# Patient Record
Sex: MALE | Race: WHITE | NOT HISPANIC OR LATINO | Employment: FULL TIME | URBAN - NONMETROPOLITAN AREA
[De-identification: names, ages, dates, MRNs, and addresses within clinical notes are randomized per-mention and may not be internally consistent; named-entity substitution may affect disease eponyms.]

---
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
## 2020-03-20 ENCOUNTER — HOSPITAL ENCOUNTER (EMERGENCY)
Facility: HOSPITAL | Age: 20
Discharge: HOME | End: 2020-03-20
Attending: EMERGENCY MEDICINE | Admitting: EMERGENCY MEDICINE
Payer: COMMERCIAL

## 2020-03-20 VITALS
HEIGHT: 66 IN | OXYGEN SATURATION: 97 % | RESPIRATION RATE: 16 BRPM | HEART RATE: 78 BPM | TEMPERATURE: 98.4 F | DIASTOLIC BLOOD PRESSURE: 86 MMHG | BODY MASS INDEX: 29.87 KG/M2 | SYSTOLIC BLOOD PRESSURE: 135 MMHG | WEIGHT: 185.85 LBS

## 2020-03-20 DIAGNOSIS — R05.9 COUGH: Primary | ICD-10-CM

## 2020-03-20 PROCEDURE — 99283 EMERGENCY DEPT VISIT LOW MDM: CPT

## 2020-03-20 PROCEDURE — 99281 EMR DPT VST MAYX REQ PHY/QHP: CPT | Performed by: PHYSICIAN ASSISTANT

## 2020-03-20 NOTE — ED PROVIDER NOTES
History  Chief Complaint   Patient presents with    Cough     patient reports cough starting four days ago, was sent here from work due to cough, denies n/v/d/fever/chills     Patient presents to the emergency department today essentially because he has a cough and he states his employer sent every 1 home has a cough and need 0 know to return to work  He has had no fevers chills or sweats  He has had absolutely no travel between home and work which is not in a hot zone [employer]  He is a smoker  Symptoms present for about 4 days  No chest pain or shortness of breath  Prior to Admission Medications   Prescriptions Last Dose Informant Patient Reported? Taking?   atorvastatin (LIPITOR) 20 mg tablet Not Taking at Unknown time  Yes No   Sig: Take 20 mg by mouth daily   ondansetron (ZOFRAN-ODT) 4 mg disintegrating tablet Not Taking at Unknown time  No No   Sig: Take 1 tablet (4 mg total) by mouth every 6 (six) hours as needed for nausea or vomiting   Patient not taking: Reported on 3/20/2020   potassium chloride (K-DUR,KLOR-CON) 20 mEq tablet   No No   Sig: Take 1 tablet (20 mEq total) by mouth 2 (two) times a day for 2 days   simvastatin (ZOCOR) 20 mg tablet Not Taking at Unknown time  Yes No   Sig: Take 20 mg by mouth daily at bedtime      Facility-Administered Medications: None       Past Medical History:   Diagnosis Date    ADHD (attention deficit hyperactivity disorder)     Bipolar disorder ([facility] [place] [address]     Hyperlipidemia     Psychiatric disorder     Tourette syndrome        Past Surgical History:   Procedure Laterality Date    TONSILECTOMY AND ADNOIDECTOMY      TYMPANOSTOMY TUBE PLACEMENT         History reviewed  No pertinent family history  I have reviewed and agree with the history as documented      E-Cigarette/Vaping    E-Cigarette Use Current Every Day User     Comments dap pen      E-Cigarette/Vaping Substances     Social History     Tobacco Use    Smoking status: Current Every Day Smoker Packs/day: 1 00     Types: Cigarettes    Smokeless tobacco: Never Used   Substance Use Topics    Alcohol use: Not Currently    Drug use: Not Currently     Types: Marijuana     Comment: pills       Review of Systems   Constitutional: Negative  Negative for activity change, appetite change, chills, diaphoresis, fatigue, fever and unexpected weight change  HENT: Negative  Negative for sore throat, trouble swallowing and voice change  Eyes: Negative  Respiratory: Positive for cough  Negative for chest tightness, shortness of breath and wheezing  Cardiovascular: Negative  Negative for chest pain, palpitations and leg swelling  Gastrointestinal: Negative  Negative for abdominal pain, blood in stool, nausea and vomiting  Endocrine: Negative  Genitourinary: Negative  Negative for flank pain and hematuria  Musculoskeletal: Negative  Negative for arthralgias, back pain, gait problem, joint swelling, myalgias, neck pain and neck stiffness  Skin: Negative  Negative for rash and wound  Allergic/Immunologic: Negative  Neurological: Negative  Negative for dizziness, seizures, syncope, weakness, light-headedness and headaches  Hematological: Negative  Psychiatric/Behavioral: Negative  All other systems reviewed and are negative  Physical Exam  Physical Exam   Constitutional: He is oriented to person, place, and time  Vital signs are normal  He appears well-developed and well-nourished  He does not have a sickly appearance  He does not appear ill  No distress  HENT:   Right Ear: External ear normal  No swelling  Tympanic membrane is not bulging  Left Ear: External ear normal  No swelling  Tympanic membrane is not bulging  Nose: Nose normal    Mouth/Throat: Oropharynx is clear and moist  No oropharyngeal exudate  Eyes: Pupils are equal, round, and reactive to light  Conjunctivae, EOM and lids are normal    Neck: Normal range of motion  Neck supple  No JVD present   No tracheal deviation, no edema and normal range of motion present  No thyromegaly present  Cardiovascular: Normal rate, regular rhythm, normal heart sounds, intact distal pulses and normal pulses  Exam reveals no gallop and no friction rub  No murmur heard  Pulmonary/Chest: Effort normal and breath sounds normal  No stridor  No respiratory distress  He has no wheezes  He has no rales  He exhibits no tenderness  Abdominal: Soft  Bowel sounds are normal  He exhibits no distension and no mass  There is no tenderness  There is no rebound, no guarding and negative Esparza's sign  No hernia  Musculoskeletal: Normal range of motion  He exhibits no edema or tenderness  Lymphadenopathy:     He has no cervical adenopathy  Neurological: He is alert and oriented to person, place, and time  He has normal strength and normal reflexes  No cranial nerve deficit or sensory deficit  GCS eye subscore is 4  GCS verbal subscore is 5  GCS motor subscore is 6  Skin: Skin is warm and dry  Capillary refill takes less than 2 seconds  No rash noted  He is not diaphoretic  No erythema  No pallor  Psychiatric: He has a normal mood and affect  His speech is normal and behavior is normal    Vitals reviewed        Vital Signs  ED Triage Vitals [03/20/20 0959]   Temperature Pulse Respirations Blood Pressure SpO2   98 4 °F (36 9 °C) 78 16 135/86 97 %      Temp Source Heart Rate Source Patient Position - Orthostatic VS BP Location FiO2 (%)   Temporal Monitor Lying Left arm --      Pain Score       --           Vitals:    03/20/20 0959   BP: 135/86   Pulse: 78   Patient Position - Orthostatic VS: Lying         Visual Acuity      ED Medications  Medications - No data to display    Diagnostic Studies  Results Reviewed     None                 No orders to display              Procedures  Procedures         ED Course                                 MDM      Disposition  Final diagnoses:   Cough     Time reflects when diagnosis was documented in both MDM as applicable and the Disposition within this note     Time User Action Codes Description Comment    3/20/2020 10:05 AM Efra Blanchard Add [R05] Cough       ED Disposition     ED Disposition Condition Date/Time Comment    Discharge Stable Fri Mar 20, 2020 10:05 AM Ari Gooden discharge to home/self care  Follow-up Information     Follow up With Specialties Details Why Contact Info    Ramón San DO  Schedule an appointment as soon as possible for a visit   [phone] [id number]  [phone]            Patient's Medications   Discharge Prescriptions    No medications on file     No discharge procedures on file      PDMP Review     None          ED Provider  Electronically Signed by           Silas Mitchell PA-C  03/20/20 8572

## 2020-03-31 ENCOUNTER — HOSPITAL ENCOUNTER (EMERGENCY)
Facility: HOSPITAL | Age: 20
Discharge: HOME | End: 2020-03-31
Attending: EMERGENCY MEDICINE | Admitting: EMERGENCY MEDICINE
Payer: COMMERCIAL

## 2020-03-31 VITALS
WEIGHT: 181.22 LBS | OXYGEN SATURATION: 97 % | TEMPERATURE: 98.6 F | SYSTOLIC BLOOD PRESSURE: 135 MMHG | RESPIRATION RATE: 16 BRPM | DIASTOLIC BLOOD PRESSURE: 69 MMHG | HEART RATE: 89 BPM | BODY MASS INDEX: 29.25 KG/M2

## 2020-03-31 DIAGNOSIS — J32.9 SINUSITIS: Primary | ICD-10-CM

## 2020-03-31 PROCEDURE — 99283 EMERGENCY DEPT VISIT LOW MDM: CPT

## 2020-03-31 PROCEDURE — 99284 EMERGENCY DEPT VISIT MOD MDM: CPT | Performed by: PHYSICIAN ASSISTANT

## 2020-03-31 RX ORDER — AMOXICILLIN 500 MG/1
500 CAPSULE ORAL 3 TIMES DAILY
Qty: 30 CAPSULE | Refills: 0 | Status: SHIPPED | OUTPATIENT
Start: 2020-03-31 | End: 2020-04-10

## 2020-03-31 RX ORDER — FLUTICASONE PROPIONATE 50 MCG
1 SPRAY, SUSPENSION (ML) NASAL DAILY
Qty: 16 G | Refills: 0 | Status: SHIPPED | OUTPATIENT
Start: 2020-03-31 | End: 2020-08-04 | Stop reason: ALTCHOICE

## 2020-04-04 ENCOUNTER — HOSPITAL ENCOUNTER (EMERGENCY)
Facility: HOSPITAL | Age: 20
Discharge: HOME | End: 2020-04-04
Attending: EMERGENCY MEDICINE | Admitting: EMERGENCY MEDICINE
Payer: COMMERCIAL

## 2020-04-04 ENCOUNTER — APPOINTMENT (EMERGENCY)
Dept: RADIOLOGY | Facility: HOSPITAL | Age: 20
End: 2020-04-04
Payer: COMMERCIAL

## 2020-04-04 VITALS
RESPIRATION RATE: 18 BRPM | TEMPERATURE: 97 F | DIASTOLIC BLOOD PRESSURE: 74 MMHG | OXYGEN SATURATION: 96 % | HEART RATE: 81 BPM | SYSTOLIC BLOOD PRESSURE: 154 MMHG | BODY MASS INDEX: 28.96 KG/M2 | WEIGHT: 179.45 LBS

## 2020-04-04 DIAGNOSIS — R05.9 COUGH: ICD-10-CM

## 2020-04-04 DIAGNOSIS — R11.10 VOMITING: Primary | ICD-10-CM

## 2020-04-04 PROCEDURE — 99284 EMERGENCY DEPT VISIT MOD MDM: CPT

## 2020-04-04 PROCEDURE — 87635 SARS-COV-2 COVID-19 AMP PRB: CPT | Performed by: EMERGENCY MEDICINE

## 2020-04-04 PROCEDURE — 71045 X-RAY EXAM CHEST 1 VIEW: CPT

## 2020-04-04 PROCEDURE — 99284 EMERGENCY DEPT VISIT MOD MDM: CPT | Performed by: EMERGENCY MEDICINE

## 2020-04-04 RX ORDER — ONDANSETRON 4 MG/1
4 TABLET, ORALLY DISINTEGRATING ORAL ONCE
Status: COMPLETED | OUTPATIENT
Start: 2020-04-04 | End: 2020-04-04

## 2020-04-04 RX ORDER — ONDANSETRON 4 MG/1
4 TABLET, FILM COATED ORAL EVERY 6 HOURS
Qty: 12 TABLET | Refills: 0 | Status: SHIPPED | OUTPATIENT
Start: 2020-04-04 | End: 2020-08-04 | Stop reason: ALTCHOICE

## 2020-04-04 RX ADMIN — ONDANSETRON 4 MG: 4 TABLET, ORALLY DISINTEGRATING ORAL at 15:49

## 2020-04-07 LAB — SARS-COV-2 RNA SPEC QL NAA+PROBE: NOT DETECTED

## 2020-06-03 ENCOUNTER — APPOINTMENT (EMERGENCY)
Dept: RADIOLOGY | Facility: HOSPITAL | Age: 20
End: 2020-06-03
Payer: COMMERCIAL

## 2020-06-03 ENCOUNTER — HOSPITAL ENCOUNTER (EMERGENCY)
Facility: HOSPITAL | Age: 20
Discharge: HOME | End: 2020-06-04
Attending: EMERGENCY MEDICINE | Admitting: EMERGENCY MEDICINE
Payer: COMMERCIAL

## 2020-06-03 DIAGNOSIS — T50.901A POLYSUBSTANCE OVERDOSE: Primary | ICD-10-CM

## 2020-06-03 LAB
ANION GAP SERPL CALCULATED.3IONS-SCNC: 8 MMOL/L (ref 4–13)
APAP SERPL-MCNC: <2 UG/ML (ref 10–20)
BASOPHILS # BLD AUTO: 0.06 THOUSANDS/ΜL (ref 0–0.1)
BASOPHILS NFR BLD AUTO: 1 % (ref 0–1)
BUN SERPL-MCNC: 16 MG/DL (ref 5–25)
CALCIUM SERPL-MCNC: 8.8 MG/DL (ref 8.3–10.1)
CHLORIDE SERPL-SCNC: 105 MMOL/L (ref 100–108)
CO2 SERPL-SCNC: 28 MMOL/L (ref 21–32)
CREAT SERPL-MCNC: 0.99 MG/DL (ref 0.6–1.3)
EOSINOPHIL # BLD AUTO: 0.61 THOUSAND/ΜL (ref 0–0.61)
EOSINOPHIL NFR BLD AUTO: 5 % (ref 0–6)
ERYTHROCYTE [DISTWIDTH] IN BLOOD BY AUTOMATED COUNT: 12.8 % (ref 11.6–15.1)
ETHANOL SERPL-MCNC: <3 MG/DL (ref 0–3)
GFR SERPL CREATININE-BSD FRML MDRD: 110 ML/MIN/1.73SQ M
GLUCOSE SERPL-MCNC: 96 MG/DL (ref 65–140)
HCT VFR BLD AUTO: 41.3 % (ref 36.5–49.3)
HGB BLD-MCNC: 14.1 G/DL (ref 12–17)
IMM GRANULOCYTES # BLD AUTO: 0.08 THOUSAND/UL (ref 0–0.2)
IMM GRANULOCYTES NFR BLD AUTO: 1 % (ref 0–2)
LYMPHOCYTES # BLD AUTO: 3.76 THOUSANDS/ΜL (ref 0.6–4.47)
LYMPHOCYTES NFR BLD AUTO: 33 % (ref 14–44)
MAGNESIUM SERPL-MCNC: 1.7 MG/DL (ref 1.6–2.6)
MCH RBC QN AUTO: 29.9 PG (ref 26.8–34.3)
MCHC RBC AUTO-ENTMCNC: 34.1 G/DL (ref 31.4–37.4)
MCV RBC AUTO: 88 FL (ref 82–98)
MONOCYTES # BLD AUTO: 1.41 THOUSAND/ΜL (ref 0.17–1.22)
MONOCYTES NFR BLD AUTO: 12 % (ref 4–12)
NEUTROPHILS # BLD AUTO: 5.43 THOUSANDS/ΜL (ref 1.85–7.62)
NEUTS SEG NFR BLD AUTO: 48 % (ref 43–75)
NRBC BLD AUTO-RTO: 0 /100 WBCS
PLATELET # BLD AUTO: 287 THOUSANDS/UL (ref 149–390)
PMV BLD AUTO: 8.9 FL (ref 8.9–12.7)
POTASSIUM SERPL-SCNC: 3.6 MMOL/L (ref 3.5–5.3)
RBC # BLD AUTO: 4.72 MILLION/UL (ref 3.88–5.62)
SALICYLATES SERPL-MCNC: <3 MG/DL (ref 3–20)
SODIUM SERPL-SCNC: 141 MMOL/L (ref 136–145)
WBC # BLD AUTO: 11.35 THOUSAND/UL (ref 4.31–10.16)

## 2020-06-03 PROCEDURE — 83735 ASSAY OF MAGNESIUM: CPT | Performed by: EMERGENCY MEDICINE

## 2020-06-03 PROCEDURE — 80320 DRUG SCREEN QUANTALCOHOLS: CPT | Performed by: EMERGENCY MEDICINE

## 2020-06-03 PROCEDURE — 99284 EMERGENCY DEPT VISIT MOD MDM: CPT | Performed by: EMERGENCY MEDICINE

## 2020-06-03 PROCEDURE — 99285 EMERGENCY DEPT VISIT HI MDM: CPT

## 2020-06-03 PROCEDURE — 80048 BASIC METABOLIC PNL TOTAL CA: CPT | Performed by: EMERGENCY MEDICINE

## 2020-06-03 PROCEDURE — 85025 COMPLETE CBC W/AUTO DIFF WBC: CPT | Performed by: EMERGENCY MEDICINE

## 2020-06-03 PROCEDURE — 80329 ANALGESICS NON-OPIOID 1 OR 2: CPT | Performed by: EMERGENCY MEDICINE

## 2020-06-03 PROCEDURE — 36415 COLL VENOUS BLD VENIPUNCTURE: CPT | Performed by: EMERGENCY MEDICINE

## 2020-06-03 PROCEDURE — 96374 THER/PROPH/DIAG INJ IV PUSH: CPT

## 2020-06-03 PROCEDURE — 93005 ELECTROCARDIOGRAM TRACING: CPT

## 2020-06-03 PROCEDURE — 71045 X-RAY EXAM CHEST 1 VIEW: CPT

## 2020-06-03 RX ORDER — ONDANSETRON 2 MG/ML
4 INJECTION INTRAMUSCULAR; INTRAVENOUS ONCE
Status: COMPLETED | OUTPATIENT
Start: 2020-06-03 | End: 2020-06-03

## 2020-06-03 RX ORDER — NALOXONE HYDROCHLORIDE 1 MG/ML
2 INJECTION PARENTERAL ONCE
Status: COMPLETED | OUTPATIENT
Start: 2020-06-03 | End: 2020-06-03

## 2020-06-03 RX ADMIN — ONDANSETRON 4 MG: 2 INJECTION INTRAMUSCULAR; INTRAVENOUS at 22:26

## 2020-06-04 VITALS
HEART RATE: 52 BPM | DIASTOLIC BLOOD PRESSURE: 60 MMHG | SYSTOLIC BLOOD PRESSURE: 121 MMHG | OXYGEN SATURATION: 98 % | RESPIRATION RATE: 18 BRPM | WEIGHT: 181 LBS | TEMPERATURE: 97.7 F | BODY MASS INDEX: 29.21 KG/M2

## 2020-06-04 RX ORDER — NALOXONE HYDROCHLORIDE 1 MG/ML
2 INJECTION INTRAMUSCULAR; INTRAVENOUS; SUBCUTANEOUS ONCE
Qty: 2 ML | Refills: 1 | Status: SHIPPED | OUTPATIENT
Start: 2020-06-04 | End: 2021-10-19

## 2020-06-05 LAB
ATRIAL RATE: 62 BPM
P AXIS: 28 DEGREES
PR INTERVAL: 130 MS
QRS AXIS: 36 DEGREES
QRSD INTERVAL: 94 MS
QT INTERVAL: 396 MS
QTC INTERVAL: 401 MS
T WAVE AXIS: 1 DEGREES
VENTRICULAR RATE: 62 BPM

## 2020-06-05 PROCEDURE — 93010 ELECTROCARDIOGRAM REPORT: CPT | Performed by: INTERNAL MEDICINE

## 2020-07-01 ENCOUNTER — APPOINTMENT (EMERGENCY)
Dept: RADIOLOGY | Facility: HOSPITAL | Age: 20
End: 2020-07-01
Payer: COMMERCIAL

## 2020-07-01 ENCOUNTER — HOSPITAL ENCOUNTER (EMERGENCY)
Facility: HOSPITAL | Age: 20
Discharge: HOME | End: 2020-07-01
Attending: EMERGENCY MEDICINE
Payer: COMMERCIAL

## 2020-07-01 ENCOUNTER — APPOINTMENT (EMERGENCY)
Dept: CT IMAGING | Facility: HOSPITAL | Age: 20
End: 2020-07-01
Payer: COMMERCIAL

## 2020-07-01 VITALS
WEIGHT: 178.79 LBS | TEMPERATURE: 98.7 F | DIASTOLIC BLOOD PRESSURE: 55 MMHG | HEIGHT: 66 IN | HEART RATE: 57 BPM | RESPIRATION RATE: 18 BRPM | SYSTOLIC BLOOD PRESSURE: 112 MMHG | OXYGEN SATURATION: 96 % | BODY MASS INDEX: 28.73 KG/M2

## 2020-07-01 DIAGNOSIS — R11.2 NAUSEA AND VOMITING: ICD-10-CM

## 2020-07-01 DIAGNOSIS — R10.9 ABDOMINAL PAIN: Primary | ICD-10-CM

## 2020-07-01 LAB
ALBUMIN SERPL BCP-MCNC: 3.8 G/DL (ref 3.5–5)
ALP SERPL-CCNC: 83 U/L (ref 46–484)
ALT SERPL W P-5'-P-CCNC: 23 U/L (ref 12–78)
ANION GAP SERPL CALCULATED.3IONS-SCNC: 8 MMOL/L (ref 4–13)
AST SERPL W P-5'-P-CCNC: 13 U/L (ref 5–45)
BASOPHILS # BLD AUTO: 0.06 THOUSANDS/ΜL (ref 0–0.1)
BASOPHILS NFR BLD AUTO: 1 % (ref 0–1)
BILIRUB SERPL-MCNC: 0.2 MG/DL (ref 0.2–1)
BILIRUB UR QL STRIP: NEGATIVE
BUN SERPL-MCNC: 19 MG/DL (ref 5–25)
CALCIUM SERPL-MCNC: 8.5 MG/DL (ref 8.3–10.1)
CHLORIDE SERPL-SCNC: 105 MMOL/L (ref 100–108)
CLARITY UR: CLEAR
CO2 SERPL-SCNC: 27 MMOL/L (ref 21–32)
COLOR UR: YELLOW
CREAT SERPL-MCNC: 0.85 MG/DL (ref 0.6–1.3)
EOSINOPHIL # BLD AUTO: 1.04 THOUSAND/ΜL (ref 0–0.61)
EOSINOPHIL NFR BLD AUTO: 11 % (ref 0–6)
ERYTHROCYTE [DISTWIDTH] IN BLOOD BY AUTOMATED COUNT: 12.8 % (ref 11.6–15.1)
GFR SERPL CREATININE-BSD FRML MDRD: 126 ML/MIN/1.73SQ M
GLUCOSE SERPL-MCNC: 111 MG/DL (ref 65–140)
GLUCOSE UR STRIP-MCNC: NEGATIVE MG/DL
HCT VFR BLD AUTO: 38.6 % (ref 36.5–49.3)
HGB BLD-MCNC: 13.2 G/DL (ref 12–17)
HGB UR QL STRIP.AUTO: NEGATIVE
IMM GRANULOCYTES # BLD AUTO: 0.04 THOUSAND/UL (ref 0–0.2)
IMM GRANULOCYTES NFR BLD AUTO: 0 % (ref 0–2)
KETONES UR STRIP-MCNC: ABNORMAL MG/DL
LEUKOCYTE ESTERASE UR QL STRIP: NEGATIVE
LIPASE SERPL-CCNC: 278 U/L (ref 73–393)
LYMPHOCYTES # BLD AUTO: 4.81 THOUSANDS/ΜL (ref 0.6–4.47)
LYMPHOCYTES NFR BLD AUTO: 47 % (ref 14–44)
MAGNESIUM SERPL-MCNC: 2 MG/DL (ref 1.6–2.6)
MCH RBC QN AUTO: 30.3 PG (ref 26.8–34.3)
MCHC RBC AUTO-ENTMCNC: 34.2 G/DL (ref 31.4–37.4)
MCV RBC AUTO: 89 FL (ref 82–98)
MONOCYTES # BLD AUTO: 1.14 THOUSAND/ΜL (ref 0.17–1.22)
MONOCYTES NFR BLD AUTO: 12 % (ref 4–12)
NEUTROPHILS # BLD AUTO: 2.85 THOUSANDS/ΜL (ref 1.85–7.62)
NEUTS SEG NFR BLD AUTO: 29 % (ref 43–75)
NITRITE UR QL STRIP: NEGATIVE
NRBC BLD AUTO-RTO: 0 /100 WBCS
PH UR STRIP.AUTO: 6.5 [PH]
PLATELET # BLD AUTO: 262 THOUSANDS/UL (ref 149–390)
PMV BLD AUTO: 9.3 FL (ref 8.9–12.7)
POTASSIUM SERPL-SCNC: 3.6 MMOL/L (ref 3.5–5.3)
PROT SERPL-MCNC: 6.9 G/DL (ref 6.4–8.2)
PROT UR STRIP-MCNC: NEGATIVE MG/DL
RBC # BLD AUTO: 4.36 MILLION/UL (ref 3.88–5.62)
SODIUM SERPL-SCNC: 140 MMOL/L (ref 136–145)
SP GR UR STRIP.AUTO: 1.02 (ref 1–1.03)
UROBILINOGEN UR QL STRIP.AUTO: 0.2 E.U./DL
WBC # BLD AUTO: 9.94 THOUSAND/UL (ref 4.31–10.16)

## 2020-07-01 PROCEDURE — 99284 EMERGENCY DEPT VISIT MOD MDM: CPT | Performed by: EMERGENCY MEDICINE

## 2020-07-01 PROCEDURE — 83735 ASSAY OF MAGNESIUM: CPT | Performed by: EMERGENCY MEDICINE

## 2020-07-01 PROCEDURE — 80053 COMPREHEN METABOLIC PANEL: CPT | Performed by: EMERGENCY MEDICINE

## 2020-07-01 PROCEDURE — 96361 HYDRATE IV INFUSION ADD-ON: CPT

## 2020-07-01 PROCEDURE — 96374 THER/PROPH/DIAG INJ IV PUSH: CPT

## 2020-07-01 PROCEDURE — 71046 X-RAY EXAM CHEST 2 VIEWS: CPT

## 2020-07-01 PROCEDURE — 83690 ASSAY OF LIPASE: CPT | Performed by: EMERGENCY MEDICINE

## 2020-07-01 PROCEDURE — 74177 CT ABD & PELVIS W/CONTRAST: CPT

## 2020-07-01 PROCEDURE — 85025 COMPLETE CBC W/AUTO DIFF WBC: CPT | Performed by: EMERGENCY MEDICINE

## 2020-07-01 PROCEDURE — 96375 TX/PRO/DX INJ NEW DRUG ADDON: CPT

## 2020-07-01 PROCEDURE — 81003 URINALYSIS AUTO W/O SCOPE: CPT | Performed by: EMERGENCY MEDICINE

## 2020-07-01 PROCEDURE — 36415 COLL VENOUS BLD VENIPUNCTURE: CPT | Performed by: EMERGENCY MEDICINE

## 2020-07-01 PROCEDURE — 99284 EMERGENCY DEPT VISIT MOD MDM: CPT

## 2020-07-01 RX ORDER — NALOXONE HYDROCHLORIDE 1 MG/ML
INJECTION INTRAMUSCULAR; INTRAVENOUS; SUBCUTANEOUS
Status: DISCONTINUED
Start: 2020-07-01 | End: 2020-07-01 | Stop reason: WASHOUT

## 2020-07-01 RX ORDER — KETOROLAC TROMETHAMINE 30 MG/ML
10 INJECTION, SOLUTION INTRAMUSCULAR; INTRAVENOUS ONCE
Status: COMPLETED | OUTPATIENT
Start: 2020-07-01 | End: 2020-07-01

## 2020-07-01 RX ORDER — ONDANSETRON 2 MG/ML
4 INJECTION INTRAMUSCULAR; INTRAVENOUS ONCE
Status: COMPLETED | OUTPATIENT
Start: 2020-07-01 | End: 2020-07-01

## 2020-07-01 RX ADMIN — KETOROLAC TROMETHAMINE 9.9 MG: 30 INJECTION, SOLUTION INTRAMUSCULAR at 01:09

## 2020-07-01 RX ADMIN — IOHEXOL 100 ML: 350 INJECTION, SOLUTION INTRAVENOUS at 01:43

## 2020-07-01 RX ADMIN — ONDANSETRON 4 MG: 2 INJECTION INTRAMUSCULAR; INTRAVENOUS at 01:08

## 2020-07-01 RX ADMIN — SODIUM CHLORIDE 1000 ML: 0.9 INJECTION, SOLUTION INTRAVENOUS at 01:07

## 2020-07-01 NOTE — ED PROVIDER NOTES
History  Chief Complaint   Patient presents with    Abdominal Pain     abdominal pain past 2 days  Patient: Cecilia Roper  24 y o /male  YOB: 2000  MRN: [id number]  PCP: Luisa Andersen DO  Date of evaluation: 7/1/2020    (N B   Voice-recognition software may have been used in the preparation of this document  Occasional wrong word or "sound-alike" substitutions may have occurred due to the inherent limitations of voice recognition software  Interpretation should be guided by context )    3 days of epigastric pain and bloating, constant, gradually increasing, nonradiating  Associated with vomiting of ingested food and then light green fluid, nothing black, red, or purple  No coffee grounds  He complains of a burning sensation in the esophagus since he has been vomiting  Where episodes of diarrhea, liquid, nothing black, red, or purple  No documented fever but he has been getting clammy and sweaty  He admits to dyspnea on exertion  He complains of lightheadedness on standing and moving around  +++   Seen as Trauma pt 06/12/2020 at [facility]:    CT Impression:  No evidence of acute traumatic injury within the chest abdomen or pelvis  Workstation:[id number]  Result Narrative  Clinical History: Trauma  Comparison: No previous chest or abdominal CT  Comment: CT of the chest, abdomen, and pelvis was performed after the  administration of 100 cc of Omnipaque 350 intravenous contrast       Chest:  Lungs/pleura: No pulmonary parenchymal mass or focal consolidation  No pleural  effusion or pneumothorax  Central airways: Grossly patent  Mediastinum/lymph nodes: No evidence of mediastinal hematoma  Scattered  unenlarged mediastinal and bilateral hilar lymph nodes are nonspecific but  presumably reactive  Vessels/heart: Thoracic aorta normal in caliber, without dissection  Heart  normal in size, with no significant pericardial effusion      Thyroid/chest wall: Thyroid is likely partially visualized but grossly  unremarkable  No axillary adenopathy  Bones: No evidence of acute fracture within the thorax  Abdomen:  Liver: Unremarkable  Spleen: Unremarkable  Pancreas: Unremarkable  Adrenal glands: Unremarkable  Gallbladder/bile ducts: Unremarkable  Kidneys: Unremarkable  Bowel: Small large bowel normal in caliber  Appendix is normal in appearance  Lymph nodes: No retroperitoneal or mesenteric adenopathy  Peritoneum: No free fluid, free intraperitoneal air, or fluid collection within  the abdomen  Vessels: Abdominal aorta normal in caliber  Pelvis:  Urinary bladder: Unremarkable  Lymph nodes: No pelvic adenopathy  Bones: No evidence of acute pelvic fracture  Please see the separate dedicated  CT report for evaluation of the thoracolumbar spine  Other Result Information  Interface, Rad Results In - 06/12/2020  5:45 PM EDT  Clinical History: Trauma  Comparison: No previous chest or abdominal CT  Comment: CT of the chest, abdomen, and pelvis was performed after the  administration of 100 cc of Omnipaque 350 intravenous contrast       Chest:  Lungs/pleura: No pulmonary parenchymal mass or focal consolidation  No pleural  effusion or pneumothorax  Central airways: Grossly patent  Mediastinum/lymph nodes: No evidence of mediastinal hematoma  Scattered  unenlarged mediastinal and bilateral hilar lymph nodes are nonspecific but  presumably reactive  Vessels/heart: Thoracic aorta normal in caliber, without dissection  Heart  normal in size, with no significant pericardial effusion  Thyroid/chest wall: Thyroid is likely partially visualized but grossly  unremarkable  No axillary adenopathy  Bones: No evidence of acute fracture within the thorax  Abdomen:  Liver: Unremarkable  Spleen: Unremarkable  Pancreas: Unremarkable  Adrenal glands: Unremarkable  Gallbladder/bile ducts: Unremarkable  Kidneys: Unremarkable      Bowel: Small large bowel normal in caliber  Appendix is normal in appearance  Lymph nodes: No retroperitoneal or mesenteric adenopathy  Peritoneum: No free fluid, free intraperitoneal air, or fluid collection within  the abdomen  Vessels: Abdominal aorta normal in caliber  Pelvis:  Urinary bladder: Unremarkable  Lymph nodes: No pelvic adenopathy  Bones: No evidence of acute pelvic fracture  Please see the separate dedicated  CT report for evaluation of the thoracolumbar spine  IMPRESSION:  Impression:  No evidence of acute traumatic injury within the chest abdomen or pelvis     +++      History provided by:  Patient and medical records  Abdominal Pain   Associated symptoms: chills, diarrhea, nausea and vomiting    Associated symptoms: no chest pain, no constipation and no fever  Cough: Patient states chronic smoker's cough  Shortness of breath:  dyspnea on exertion  Prior to Admission Medications   Prescriptions Last Dose Informant Patient Reported?  Taking?   atorvastatin (LIPITOR) 20 mg tablet Not Taking at Unknown time  Yes No   Sig: Take 20 mg by mouth daily   fluticasone (FLONASE) 50 mcg/act nasal spray Not Taking at Unknown time  No No   Si spray into each nostril daily   Patient not taking: Reported on 2020   ondansetron (ZOFRAN) 4 mg tablet   No No   Sig: Take 1 tablet (4 mg total) by mouth every 6 (six) hours   ondansetron (ZOFRAN-ODT) 4 mg disintegrating tablet   No No   Sig: Take 1 tablet (4 mg total) by mouth every 6 (six) hours as needed for nausea or vomiting   Patient not taking: Reported on 3/20/2020   potassium chloride (K-DUR,KLOR-CON) 20 mEq tablet   No No   Sig: Take 1 tablet (20 mEq total) by mouth 2 (two) times a day for 2 days   simvastatin (ZOCOR) 20 mg tablet Not Taking at Unknown time  Yes No   Sig: Take 20 mg by mouth daily at bedtime      Facility-Administered Medications: None       Past Medical History:   Diagnosis Date    ADHD (attention deficit hyperactivity disorder)  Bipolar disorder ([facility] [address] )     Hyperlipidemia     Hypertension     Psychiatric disorder     Tourette syndrome        Past Surgical History:   Procedure Laterality Date    TONSILECTOMY AND ADNOIDECTOMY      TYMPANOSTOMY TUBE PLACEMENT         History reviewed  No pertinent family history  I have reviewed and agree with the history as documented  E-Cigarette/Vaping    E-Cigarette Use Never User      E-Cigarette/Vaping Substances    Nicotine No     THC No     CBD No     Flavoring No     Other No     Unknown No      Social History     Tobacco Use    Smoking status: Current Every Day Smoker     Packs/day: 2 00     Types: Cigarettes    Smokeless tobacco: Never Used   Substance Use Topics    Alcohol use: Not Currently    Drug use: Yes     Types: Marijuana     Comment: pills       Review of Systems   Constitutional: Positive for chills and diaphoresis  Negative for fever  HENT: Negative  Negative for trouble swallowing and voice change  Eyes: Negative for photophobia and visual disturbance  Respiratory: Negative  Cough: Patient states chronic smoker's cough  Shortness of breath:  dyspnea on exertion  Cardiovascular: Negative  Negative for chest pain, palpitations and leg swelling  Gastrointestinal: Positive for abdominal distention, abdominal pain, diarrhea, nausea and vomiting  Negative for constipation  Endocrine: Negative  Negative for polydipsia and polyuria  Genitourinary: Negative  Negative for difficulty urinating and urgency  Musculoskeletal: Negative  Negative for back pain and neck pain  Skin: Negative  Negative for rash and wound  Allergic/Immunologic: Negative for immunocompromised state  Neurological: Negative  Negative for speech difficulty and weakness  Hematological: Negative  Negative for adenopathy  Does not bruise/bleed easily  All other systems reviewed and are negative        Physical Exam  Physical Exam   Constitutional: He is oriented to person, place, and time  He appears well-developed and well-nourished  HENT:   Mouth/Throat: Oropharynx is clear and moist and mucous membranes are normal    Voice normal   Eyes: Pupils are equal, round, and reactive to light  EOM are normal    Cardiovascular: Normal rate and regular rhythm  Pulmonary/Chest: Effort normal    Abdominal: Soft  Bowel sounds are normal  There is tenderness in the epigastric area and left lower quadrant  There is no rigidity, no rebound and no guarding  No bruising on chest, abdomen, flanks, back  Neurological: He is alert and oriented to person, place, and time  GCS eye subscore is 4  GCS verbal subscore is 5  GCS motor subscore is 6  Skin: Skin is warm and dry  Psychiatric: He has a normal mood and affect  His speech is normal and behavior is normal    Nursing note and vitals reviewed        Vital Signs  ED Triage Vitals [07/01/20 0027]   Temperature Pulse Respirations Blood Pressure SpO2   98 7 °F (37 1 °C) 80 18 124/66 97 %      Temp src Heart Rate Source Patient Position - Orthostatic VS BP Location FiO2 (%)   -- Monitor Sitting Right arm --      Pain Score       7           Vitals:    07/01/20 0058 07/01/20 0115 07/01/20 0257 07/01/20 0300   BP: 102/55 104/59 112/55 112/55   Pulse: 58 56 59 57   Patient Position - Orthostatic VS: Standing - Orthostatic VS Sitting  Sitting         Visual Acuity      ED Medications  Medications   naloxone (NARCAN) 2 MG/2ML injection **ADS Override Pull** (has no administration in time range)   sodium chloride 0 9 % bolus 1,000 mL (0 mL Intravenous Stopped 7/1/20 0257)   ondansetron (ZOFRAN) injection 4 mg (4 mg Intravenous Given 7/1/20 0108)   ketorolac (TORADOL) injection 9 9 mg (9 9 mg Intravenous Given 7/1/20 0109)   iohexol (OMNIPAQUE) 350 MG/ML injection (SINGLE-DOSE) 100 mL (100 mL Intravenous Given 7/1/20 0143)       Diagnostic Studies  Results Reviewed     Procedure Component Value Units Date/Time    Comprehensive metabolic panel [[id number]] Collected:  07/01/20 0054    Lab Status:  Final result Specimen:  Blood from Arm, Right Updated:  07/01/20 0121     Sodium 140 mmol/L      Potassium 3 6 mmol/L      Chloride 105 mmol/L      CO2 27 mmol/L      ANION GAP 8 mmol/L      BUN 19 mg/dL      Creatinine 0 85 mg/dL      Glucose 111 mg/dL      Calcium 8 5 mg/dL      AST 13 U/L      ALT 23 U/L      Alkaline Phosphatase 83 U/L      Total Protein 6 9 g/dL      Albumin 3 8 g/dL      Total Bilirubin 0 20 mg/dL      eGFR 126 ml/min/1 73sq m     Narrative:       National Kidney Disease Foundation guidelines for Chronic Kidney Disease (CKD):     Stage 1 with normal or high GFR (GFR > 90 mL/min/1 73 square meters)    Stage 2 Mild CKD (GFR = 60-89 mL/min/1 73 square meters)    Stage 3A Moderate CKD (GFR = 45-59 mL/min/1 73 square meters)    Stage 3B Moderate CKD (GFR = 30-44 mL/min/1 73 square meters)    Stage 4 Severe CKD (GFR = 15-29 mL/min/1 73 square meters)    Stage 5 End Stage CKD (GFR <15 mL/min/1 73 square meters)  Note: GFR calculation is accurate only with a steady state creatinine    Magnesium [[id number]]  (Normal) Collected:  07/01/20 0054    Lab Status:  Final result Specimen:  Blood from Arm, Right Updated:  07/01/20 0114     Magnesium 2 0 mg/dL     Lipase [[id number]]  (Normal) Collected:  07/01/20 0054    Lab Status:  Final result Specimen:  Blood from Arm, Right Updated:  07/01/20 0114     Lipase 278 u/L     UA w Reflex to Microscopic w Reflex to Culture [[id number]]  (Abnormal) Collected:  07/01/20 0054    Lab Status:  Final result Specimen:  Urine, Clean Catch Updated:  07/01/20 0106     Color, UA Yellow     Clarity, UA Clear     Specific Gravity, UA 1 025     pH, UA 6 5     Leukocytes, UA Negative     Nitrite, UA Negative     Protein, UA Negative mg/dl      Glucose, UA Negative mg/dl      Ketones, UA Trace mg/dl      Urobilinogen, UA 0 2 E U /dl      Bilirubin, UA Negative     Blood, UA Negative    CBC and differential [[id number]] (Abnormal) Collected:  07/01/20 0054    Lab Status:  Final result Specimen:  Blood from Arm, Right Updated:  07/01/20 0105     WBC 9 94 Thousand/uL      RBC 4 36 Million/uL      Hemoglobin 13 2 g/dL      Hematocrit 38 6 %      MCV 89 fL      MCH 30 3 pg      MCHC 34 2 g/dL      RDW 12 8 %      MPV 9 3 fL      Platelets 934 Thousands/uL      nRBC 0 /100 WBCs      Neutrophils Relative 29 %      Immat GRANS % 0 %      Lymphocytes Relative 47 %      Monocytes Relative 12 %      Eosinophils Relative 11 %      Basophils Relative 1 %      Neutrophils Absolute 2 85 Thousands/µL      Immature Grans Absolute 0 04 Thousand/uL      Lymphocytes Absolute 4 81 Thousands/µL      Monocytes Absolute 1 14 Thousand/µL      Eosinophils Absolute 1 04 Thousand/µL      Basophils Absolute 0 06 Thousands/µL                  XR chest 2 views   Final Result by Barber Nam MD (07/01 0156)      No acute cardiopulmonary disease  Workstation performed: [id number]         CT abdomen pelvis with contrast   Final Result by Polly Harp DO (07/01 0206)      No acute findings            Workstation performed: [id number]                    Procedures  Procedures         ED Course  ED Course as of Jul 01 0346 Wed Jul 01, 2020   0300 Came to see patient because RN was unable to wake patient  VS noted  Pt appeared pale  Patient did not awaken to my voice but did awaken when I turned the bracelet his arm to check his name  I asked him why it was so difficult to wake him  He said that he had just been sleeping deeply because he had been able to sleep well before  He denied having taken any medications or drugs prior to coming in for during the visit  He became more animated and appeared to become increasingly angry with us after that  He finally exclaimed "you can fucking discharge me" and he started ripping off his leads and BP cuff       0309 Pt left the department with a brisk steady gait, muttering    I was able to give his discharge papers to his female  who did accept them for later reference  MOISÉS      Most Recent Value   During the past 12 months, did you:   1  Drink any alcohol (more than a few sips)? No Filed at: 07/01/2020 0027   2  Smoke any marijuana or hashish  No Filed at: 07/01/2020 0027   3  Use anything else to get high? ("anything else" includes illegal drugs, over the counter and prescription drugs, and things that you sniff or 'parson')? No Filed at: 07/01/2020 0027                                        MDM  Number of Diagnoses or Management Options  Diagnosis management comments: The differential diagnosis includes but is not limited to the following:  peptic ulcer disease, GERD, appendicitis, gastroenteritis, gastritis, enteritis, colitis, , hepatitis, pancreatitis, diverticulitis, food poisoning, mesenteric adenitis, mesenteric ischemia, inflammatory bowel disease, irritable bowel syndrome, ileus, bowel obstruction,  volvulus, AAA, cholecystitis, biliary colic, pelvic pathology, renal colic, pyelonephritis, UTI           Amount and/or Complexity of Data Reviewed  Tests in the radiology section of CPT®: ordered and reviewed  Tests in the medicine section of CPT®: ordered and reviewed  Obtain history from someone other than the patient: yes    Risk of Complications, Morbidity, and/or Mortality  Presenting problems: high  Diagnostic procedures: high    Patient Progress  Patient progress: improved        Disposition  Final diagnoses:   Abdominal pain   Nausea and vomiting     Time reflects when diagnosis was documented in both MDM as applicable and the Disposition within this note     Time User Action Codes Description Comment    7/1/2020  3:01 AM Juana Chakraborty Add [R10 9] Abdominal pain     7/1/2020  3:01 AM Juana Chakraborty Add [R11 2] Nausea and vomiting       ED Disposition     ED Disposition Condition Date/Time Comment    Discharge Stable Wed Jul 1, 2020  3:02 AM Yaneth Perez discharge to home/self care  Follow-up Information     Follow up With Specialties Details Why Contact Info Additional Information    Dinah Clarke DO    [address] [facility]   [address] [place] [id number] [facility]   [address] [facility],  [address], [facility] AFFILIATE OF [place], [place], [address]          Discharge Medication List as of 7/1/2020  3:02 AM      CONTINUE these medications which have NOT CHANGED    Details   atorvastatin (LIPITOR) 20 mg tablet Take 20 mg by mouth daily, Historical Med      fluticasone (FLONASE) 50 mcg/act nasal spray 1 spray into each nostril daily, Starting Tue 3/31/2020, Normal      ondansetron (ZOFRAN) 4 mg tablet Take 1 tablet (4 mg total) by mouth every 6 (six) hours, Starting Sat 4/4/2020, Normal      ondansetron (ZOFRAN-ODT) 4 mg disintegrating tablet Take 1 tablet (4 mg total) by mouth every 6 (six) hours as needed for nausea or vomiting, Starting Sat 7/27/2019, Print      potassium chloride (K-DUR,KLOR-CON) 20 mEq tablet Take 1 tablet (20 mEq total) by mouth 2 (two) times a day for 2 days, Starting Sun 4/7/2019, Until Mon 4/22/2019, Normal      simvastatin (ZOCOR) 20 mg tablet Take 20 mg by mouth daily at bedtime, Historical Med               PDMP Review     None          ED Provider  Electronically Signed by           Elpidio Mcclure MD  07/01/20 9409

## 2020-07-01 NOTE — ED NOTES
Once patient was awake he said "what the fuck is wrong with you I was sleeping, I have not slept in days and I am tired " Educated him that we tried a few times with sternal rubs and he would still not wake        Tarik Pepe RN  07/01/20 4797

## 2020-07-01 NOTE — ED NOTES
Attempts to give patient PO fluids, patient would not wake up  Tap and should with no response  Vigourous sternal rub for 10 seconds with no response  Noted to have sweat beads to forehead  Vital signs assessed  Requested for doctor to come to the room  Doctor came to room  Visitor in room noted to be laying on 2 chairs, also had difficulty waking her  Once we were able to wake the patient he became verbally aggressive and stated he was sick of this bullshit and wanted to leave  He then ripped his gown and ripped it off        Eren Wilder RN  07/01/20 3962

## 2020-08-04 ENCOUNTER — HOSPITAL ENCOUNTER (INPATIENT)
Facility: HOSPITAL | Age: 20
LOS: 7 days | Discharge: HOME | DRG: 753 | End: 2020-08-12
Attending: EMERGENCY MEDICINE | Admitting: PSYCHIATRY & NEUROLOGY
Payer: COMMERCIAL

## 2020-08-04 DIAGNOSIS — F32.A DEPRESSION: ICD-10-CM

## 2020-08-04 DIAGNOSIS — F31.32 BIPOLAR 1 DISORDER, DEPRESSED, MODERATE (HCC): Primary | ICD-10-CM

## 2020-08-04 LAB
AMPHETAMINES SERPL QL SCN: NEGATIVE
BARBITURATES UR QL: NEGATIVE
BENZODIAZ UR QL: NEGATIVE
COCAINE UR QL: NEGATIVE
ETHANOL EXG-MCNC: 0 MG/DL
METHADONE UR QL: NEGATIVE
OPIATES UR QL SCN: NEGATIVE
OXYCODONE+OXYMORPHONE UR QL SCN: NEGATIVE
PCP UR QL: NEGATIVE
SARS-COV-2 RNA RESP QL NAA+PROBE: NEGATIVE
THC UR QL: POSITIVE

## 2020-08-04 PROCEDURE — 82075 ASSAY OF BREATH ETHANOL: CPT

## 2020-08-04 PROCEDURE — 99285 EMERGENCY DEPT VISIT HI MDM: CPT

## 2020-08-04 PROCEDURE — 99282 EMERGENCY DEPT VISIT SF MDM: CPT | Performed by: EMERGENCY MEDICINE

## 2020-08-04 PROCEDURE — 81003 URINALYSIS AUTO W/O SCOPE: CPT | Performed by: PSYCHIATRY & NEUROLOGY

## 2020-08-04 PROCEDURE — 87635 SARS-COV-2 COVID-19 AMP PRB: CPT

## 2020-08-04 PROCEDURE — 80307 DRUG TEST PRSMV CHEM ANLYZR: CPT

## 2020-08-04 NOTE — ED TRIAGE NOTES
Patient arrives due to increase in depression and states he wants to sign himself in  Patient denies SI,HI, hallucinations

## 2020-08-04 NOTE — ED NOTES
Pt presents w/ family due to a 302 warrant  Pt is A&O X 4, medically clear, calm and cooperative  Pt was aware of the 302 warrant and verbalized an understanding of what an involuntary committment was  Pt was read his [facility] Rights and offered a copy, along w/ a copy of his Josue Cleary of Rights both of which he declined and requested they be placed on his chart  Pt was able to answer all assessment questions w/ appropriate elaboration  Pt denies any active SI or plan but states he has a general passive SI all the time, e g  pt has no will to live and lacks motivation to do anything that used to bring him happpiness but rather just feels hopeless and wishes he would die  Pt denies having any type of plan or act of furtherance  Pt denies any HI or thoughts to hurt others in any way  Pt denies any AH, VH, or delusions  Pt further states he was clean and sober for over 2 years but he relapsed after his father  and has been using marijuana only lately  Pt is experiencing increased depression and increased anxiety which he states has been getting progressively worse for just over a year  Pt attributes his depression to having found his father passed away in 2019  Pt is also experiencing increased irritability/agitation lately and has acted out aggressively toward family members  Pt denies any appetite issues but states his sleep has been poor  Pt is in agreement w/ I/P psych admission and is requesting to downgrade to a voluntary status  Pt's voluntary paperwork, e g  201, and his Rights were explained in detail  Pt signed his 61 51 81 and was given a copy of his Rights along w/ a copy of the Voluntary Pt Handout

## 2020-08-05 PROBLEM — F33.2 SEVERE EPISODE OF RECURRENT MAJOR DEPRESSIVE DISORDER, WITHOUT PSYCHOTIC FEATURES (HCC): Status: ACTIVE | Noted: 2020-08-05

## 2020-08-05 PROBLEM — F31.32 BIPOLAR 1 DISORDER, DEPRESSED, MODERATE (HCC): Status: ACTIVE | Noted: 2020-08-05

## 2020-08-05 PROBLEM — F12.20 CANNABIS USE DISORDER, SEVERE, DEPENDENCE (HCC): Status: ACTIVE | Noted: 2020-08-05

## 2020-08-05 PROBLEM — F95.2 TOURETTE'S SYNDROME: Status: ACTIVE | Noted: 2020-08-05

## 2020-08-05 PROBLEM — Z00.8 MEDICAL CLEARANCE FOR PSYCHIATRIC ADMISSION: Status: ACTIVE | Noted: 2020-08-05

## 2020-08-05 PROBLEM — Z72.0 TOBACCO USE: Status: ACTIVE | Noted: 2020-08-05

## 2020-08-05 LAB
ALBUMIN SERPL BCP-MCNC: 4 G/DL (ref 3.5–5.7)
ALP SERPL-CCNC: 60 U/L (ref 60–400)
ALT SERPL W P-5'-P-CCNC: 14 U/L (ref 7–52)
ANION GAP SERPL CALCULATED.3IONS-SCNC: 8 MMOL/L (ref 4–13)
AST SERPL W P-5'-P-CCNC: 15 U/L (ref 13–39)
BASOPHILS # BLD AUTO: 0 THOUSANDS/ΜL (ref 0–0.1)
BASOPHILS NFR BLD AUTO: 1 % (ref 0–2)
BILIRUB SERPL-MCNC: 0.5 MG/DL (ref 0.2–1)
BILIRUB UR QL STRIP: NEGATIVE
BUN SERPL-MCNC: 10 MG/DL (ref 7–25)
CALCIUM SERPL-MCNC: 8.9 MG/DL (ref 8.6–10.5)
CHLORIDE SERPL-SCNC: 107 MMOL/L (ref 98–107)
CHOLEST SERPL-MCNC: 131 MG/DL (ref 0–200)
CLARITY UR: CLEAR
CO2 SERPL-SCNC: 23 MMOL/L (ref 21–31)
COLOR UR: YELLOW
CREAT SERPL-MCNC: 0.67 MG/DL (ref 0.7–1.3)
EOSINOPHIL # BLD AUTO: 0.3 THOUSAND/ΜL (ref 0–0.61)
EOSINOPHIL NFR BLD AUTO: 5 % (ref 0–5)
ERYTHROCYTE [DISTWIDTH] IN BLOOD BY AUTOMATED COUNT: 13.3 % (ref 11.5–14.5)
GFR SERPL CREATININE-BSD FRML MDRD: 139 ML/MIN/1.73SQ M
GLUCOSE P FAST SERPL-MCNC: 93 MG/DL (ref 65–99)
GLUCOSE SERPL-MCNC: 93 MG/DL (ref 65–99)
GLUCOSE UR STRIP-MCNC: NEGATIVE MG/DL
HCT VFR BLD AUTO: 39.9 % (ref 42–47)
HDLC SERPL-MCNC: 31 MG/DL
HGB BLD-MCNC: 13.8 G/DL (ref 14–18)
HGB UR QL STRIP.AUTO: NEGATIVE
KETONES UR STRIP-MCNC: NEGATIVE MG/DL
LDLC SERPL CALC-MCNC: 64 MG/DL (ref 0–100)
LEUKOCYTE ESTERASE UR QL STRIP: NEGATIVE
LYMPHOCYTES # BLD AUTO: 3.7 THOUSANDS/ΜL (ref 0.6–4.47)
LYMPHOCYTES NFR BLD AUTO: 50 % (ref 21–51)
MCH RBC QN AUTO: 30.4 PG (ref 26–34)
MCHC RBC AUTO-ENTMCNC: 34.5 G/DL (ref 31–37)
MCV RBC AUTO: 88 FL (ref 81–99)
MONOCYTES # BLD AUTO: 0.9 THOUSAND/ΜL (ref 0.17–1.22)
MONOCYTES NFR BLD AUTO: 12 % (ref 2–12)
NEUTROPHILS # BLD AUTO: 2.5 THOUSANDS/ΜL (ref 1.4–6.5)
NEUTS SEG NFR BLD AUTO: 34 % (ref 42–75)
NITRITE UR QL STRIP: NEGATIVE
NONHDLC SERPL-MCNC: 100 MG/DL
PH UR STRIP.AUTO: 5.5 [PH]
PLATELET # BLD AUTO: 268 THOUSANDS/UL (ref 149–390)
PMV BLD AUTO: 7.2 FL (ref 8.6–11.7)
POTASSIUM SERPL-SCNC: 3.7 MMOL/L (ref 3.5–5.5)
PROT SERPL-MCNC: 6.5 G/DL (ref 6.4–8.9)
PROT UR STRIP-MCNC: NEGATIVE MG/DL
RBC # BLD AUTO: 4.53 MILLION/UL (ref 4.3–5.9)
RPR SER QL: NORMAL
SODIUM SERPL-SCNC: 138 MMOL/L (ref 134–143)
SP GR UR STRIP.AUTO: 1.02 (ref 1–1.03)
TRIGL SERPL-MCNC: 179 MG/DL (ref 44–166)
TSH SERPL DL<=0.05 MIU/L-ACNC: 0.58 UIU/ML (ref 0.45–5.33)
UROBILINOGEN UR QL STRIP.AUTO: 0.2 E.U./DL
WBC # BLD AUTO: 7.4 THOUSAND/UL (ref 4.8–10.8)

## 2020-08-05 PROCEDURE — 86592 SYPHILIS TEST NON-TREP QUAL: CPT | Performed by: PSYCHIATRY & NEUROLOGY

## 2020-08-05 PROCEDURE — 99253 IP/OBS CNSLTJ NEW/EST LOW 45: CPT | Performed by: PHYSICIAN ASSISTANT

## 2020-08-05 PROCEDURE — 99221 1ST HOSP IP/OBS SF/LOW 40: CPT | Performed by: PSYCHIATRY & NEUROLOGY

## 2020-08-05 PROCEDURE — 84443 ASSAY THYROID STIM HORMONE: CPT | Performed by: PSYCHIATRY & NEUROLOGY

## 2020-08-05 PROCEDURE — 80061 LIPID PANEL: CPT | Performed by: PSYCHIATRY & NEUROLOGY

## 2020-08-05 PROCEDURE — 85025 COMPLETE CBC W/AUTO DIFF WBC: CPT | Performed by: PSYCHIATRY & NEUROLOGY

## 2020-08-05 PROCEDURE — 80053 COMPREHEN METABOLIC PANEL: CPT | Performed by: PSYCHIATRY & NEUROLOGY

## 2020-08-05 RX ORDER — ZIPRASIDONE HYDROCHLORIDE 20 MG/1
20 CAPSULE ORAL 2 TIMES DAILY WITH MEALS
Status: DISCONTINUED | OUTPATIENT
Start: 2020-08-05 | End: 2020-08-05

## 2020-08-05 RX ORDER — TRAZODONE HYDROCHLORIDE 50 MG/1
100 TABLET ORAL
Status: DISCONTINUED | OUTPATIENT
Start: 2020-08-05 | End: 2020-08-12 | Stop reason: HOSPADM

## 2020-08-05 RX ORDER — NICOTINE 21 MG/24HR
1 PATCH, TRANSDERMAL 24 HOURS TRANSDERMAL DAILY
Status: DISCONTINUED | OUTPATIENT
Start: 2020-08-05 | End: 2020-08-12 | Stop reason: HOSPADM

## 2020-08-05 RX ORDER — ACETAMINOPHEN 325 MG/1
650 TABLET ORAL EVERY 6 HOURS PRN
Status: DISCONTINUED | OUTPATIENT
Start: 2020-08-05 | End: 2020-08-12 | Stop reason: HOSPADM

## 2020-08-05 RX ORDER — HALOPERIDOL 5 MG
5 TABLET ORAL EVERY 8 HOURS PRN
Status: DISCONTINUED | OUTPATIENT
Start: 2020-08-05 | End: 2020-08-12 | Stop reason: HOSPADM

## 2020-08-05 RX ORDER — ACETAMINOPHEN 325 MG/1
650 TABLET ORAL EVERY 4 HOURS PRN
Status: DISCONTINUED | OUTPATIENT
Start: 2020-08-05 | End: 2020-08-12 | Stop reason: HOSPADM

## 2020-08-05 RX ORDER — MAGNESIUM HYDROXIDE/ALUMINUM HYDROXICE/SIMETHICONE 120; 1200; 1200 MG/30ML; MG/30ML; MG/30ML
30 SUSPENSION ORAL EVERY 4 HOURS PRN
Status: DISCONTINUED | OUTPATIENT
Start: 2020-08-05 | End: 2020-08-12 | Stop reason: HOSPADM

## 2020-08-05 RX ORDER — ACETAMINOPHEN 325 MG/1
975 TABLET ORAL EVERY 6 HOURS PRN
Status: DISCONTINUED | OUTPATIENT
Start: 2020-08-05 | End: 2020-08-12 | Stop reason: HOSPADM

## 2020-08-05 RX ORDER — HALOPERIDOL 5 MG
5 TABLET ORAL EVERY 8 HOURS PRN
Status: DISCONTINUED | OUTPATIENT
Start: 2020-08-05 | End: 2020-08-05

## 2020-08-05 RX ORDER — BENZTROPINE MESYLATE 1 MG/ML
1 INJECTION INTRAMUSCULAR; INTRAVENOUS EVERY 6 HOURS PRN
Status: DISCONTINUED | OUTPATIENT
Start: 2020-08-05 | End: 2020-08-12 | Stop reason: HOSPADM

## 2020-08-05 RX ORDER — HYDROXYZINE HYDROCHLORIDE 25 MG/1
25 TABLET, FILM COATED ORAL EVERY 6 HOURS PRN
Status: DISCONTINUED | OUTPATIENT
Start: 2020-08-05 | End: 2020-08-12 | Stop reason: HOSPADM

## 2020-08-05 RX ORDER — TRAZODONE HYDROCHLORIDE 50 MG/1
50 TABLET ORAL
Status: DISCONTINUED | OUTPATIENT
Start: 2020-08-05 | End: 2020-08-05

## 2020-08-05 RX ORDER — HYDROXYZINE HYDROCHLORIDE 25 MG/1
50 TABLET, FILM COATED ORAL EVERY 4 HOURS PRN
Status: DISCONTINUED | OUTPATIENT
Start: 2020-08-05 | End: 2020-08-12 | Stop reason: HOSPADM

## 2020-08-05 RX ORDER — HALOPERIDOL 5 MG/ML
5 INJECTION INTRAMUSCULAR EVERY 6 HOURS PRN
Status: DISCONTINUED | OUTPATIENT
Start: 2020-08-05 | End: 2020-08-05

## 2020-08-05 RX ORDER — GABAPENTIN 300 MG/1
300 CAPSULE ORAL 3 TIMES DAILY
Status: DISCONTINUED | OUTPATIENT
Start: 2020-08-05 | End: 2020-08-05

## 2020-08-05 RX ORDER — CHLORPROMAZINE HYDROCHLORIDE 25 MG/1
50 TABLET, FILM COATED ORAL EVERY 6 HOURS PRN
Status: DISCONTINUED | OUTPATIENT
Start: 2020-08-05 | End: 2020-08-12 | Stop reason: HOSPADM

## 2020-08-05 RX ORDER — BENZTROPINE MESYLATE 1 MG/ML
2 INJECTION INTRAMUSCULAR; INTRAVENOUS EVERY 6 HOURS PRN
Status: DISCONTINUED | OUTPATIENT
Start: 2020-08-05 | End: 2020-08-05

## 2020-08-05 RX ORDER — HALOPERIDOL 5 MG/ML
5 INJECTION INTRAMUSCULAR EVERY 6 HOURS PRN
Status: DISCONTINUED | OUTPATIENT
Start: 2020-08-05 | End: 2020-08-12 | Stop reason: HOSPADM

## 2020-08-05 RX ORDER — BENZTROPINE MESYLATE 1 MG/1
2 TABLET ORAL EVERY 6 HOURS PRN
Status: DISCONTINUED | OUTPATIENT
Start: 2020-08-05 | End: 2020-08-05

## 2020-08-05 RX ORDER — LORAZEPAM 1 MG/1
1 TABLET ORAL EVERY 8 HOURS PRN
Status: DISCONTINUED | OUTPATIENT
Start: 2020-08-05 | End: 2020-08-05

## 2020-08-05 RX ORDER — RISPERIDONE 1 MG/1
1 TABLET, ORALLY DISINTEGRATING ORAL EVERY 4 HOURS PRN
Status: DISCONTINUED | OUTPATIENT
Start: 2020-08-05 | End: 2020-08-05

## 2020-08-05 RX ORDER — IBUPROFEN 600 MG/1
600 TABLET ORAL EVERY 8 HOURS PRN
Status: DISCONTINUED | OUTPATIENT
Start: 2020-08-05 | End: 2020-08-05

## 2020-08-05 RX ORDER — GABAPENTIN 300 MG/1
300 CAPSULE ORAL 3 TIMES DAILY
Status: DISCONTINUED | OUTPATIENT
Start: 2020-08-05 | End: 2020-08-07

## 2020-08-05 RX ORDER — OLANZAPINE 10 MG/1
10 INJECTION, POWDER, LYOPHILIZED, FOR SOLUTION INTRAMUSCULAR
Status: DISCONTINUED | OUTPATIENT
Start: 2020-08-05 | End: 2020-08-12 | Stop reason: HOSPADM

## 2020-08-05 RX ORDER — LORAZEPAM 2 MG/ML
2 INJECTION INTRAMUSCULAR EVERY 6 HOURS PRN
Status: DISCONTINUED | OUTPATIENT
Start: 2020-08-05 | End: 2020-08-11

## 2020-08-05 RX ORDER — BENZTROPINE MESYLATE 1 MG/1
1 TABLET ORAL EVERY 6 HOURS PRN
Status: DISCONTINUED | OUTPATIENT
Start: 2020-08-05 | End: 2020-08-12 | Stop reason: HOSPADM

## 2020-08-05 RX ADMIN — HYDROXYZINE HYDROCHLORIDE 50 MG: 25 TABLET, FILM COATED ORAL at 17:34

## 2020-08-05 RX ADMIN — NICOTINE 1 PATCH: 21 PATCH, EXTENDED RELEASE TRANSDERMAL at 09:00

## 2020-08-05 RX ADMIN — GABAPENTIN 300 MG: 300 CAPSULE ORAL at 10:49

## 2020-08-05 RX ADMIN — ALUMINUM HYDROXIDE, MAGNESIUM HYDROXIDE, AND SIMETHICONE 30 ML: 200; 200; 20 SUSPENSION ORAL at 17:35

## 2020-08-05 RX ADMIN — GABAPENTIN 300 MG: 300 CAPSULE ORAL at 21:07

## 2020-08-05 RX ADMIN — TRAZODONE HYDROCHLORIDE 100 MG: 50 TABLET ORAL at 21:07

## 2020-08-05 RX ADMIN — TRAZODONE HYDROCHLORIDE 50 MG: 50 TABLET ORAL at 01:06

## 2020-08-05 RX ADMIN — GABAPENTIN 300 MG: 300 CAPSULE ORAL at 17:30

## 2020-08-05 RX ADMIN — ACETAMINOPHEN 650 MG: 325 TABLET ORAL at 17:34

## 2020-08-05 NOTE — TREATMENT PLAN
Treatment Plan [address] Amberly Chen R Coolidge 23 y o  male MRN: [id number]     Rae Balderrama [id number] Encounter: [id number]          Admit Date/Time:  8/4/2020  6:28 PM    Treatment Team: Attending Provider: Ramón Guerrero MD; Consulting Physician: Carolina Ramirez MD; Patient Care Assistant: Matthew Jiang; Patient Care Assistant: Elise Viveros; Patient Care Assistant: Magda Mora; Recreational Therapist: Bryce Lemus;  Registered Nurse: Gladys Hoffmann RN; Registered Nurse: Gelene Ormond, RN; Patient Care Assistant: Amelia Kidd; Licensed Practical Nurse: Lacy Fletcher LPN; Patient Care Assistant: Ruperto Tilley    Diagnosis: Principal Problem:    Bipolar 1 disorder, depressed, moderate ([facility] [address] )  Active Problems:    Medical clearance for psychiatric admission    Tourette's syndrome    Tobacco use    Cannabis use disorder, severe, dependence ([facility] [address] )        Patient Strengths: ability for insight     Patient Barriers: self-care deficit    Progress Toward Goals: ongoing    Recommended Treatment: discharge planning     Treatment Frequency: 1-2 times per week     Expected Discharge Date:     Discharge Plan: referrals as indicated     Treatment Plan Created/Updated By: Ramón Guerrero MD Normal rate, regular rhythm.  Heart sounds S1, S2.  No murmurs, rubs or gallops.

## 2020-08-05 NOTE — ED NOTES
Crisis contacted Intake for f/u, [facility] had not yet gotten back to Intake  Waiting for acceptance/orders

## 2020-08-05 NOTE — ED NOTES
Patient is accepted at [facility]  Patient is accepted by Dr Laurel Cordova per [facility]  Patient may go to the floor at upon completion of report  Nurse report is to be called to [phone] prior to patient transfer  ALEJA RN to call down for report at this time

## 2020-08-05 NOTE — PROGRESS NOTES
Patient's vitals taken this morning, automatically, patient was in the lying position  Blood pressure was 98/56 with a pulse of 56  Blood pressure and pulse is on the lower side  RN made aware  Will pass onto the next shift

## 2020-08-05 NOTE — PROGRESS NOTES
08/05/20 0706   Activity/Group Checklist   Group   (Goal Setting and Communications)   Attendance Did not attend  (AT group offered, pt elected to remain in room)

## 2020-08-05 NOTE — PROGRESS NOTES
08/05/20 0900   Activity/Group Checklist   Group   (Everyday Stressors and Ways to [place]/Art Therapy Processing)   Attendance Did not attend  (AT group offered, pt elected to remain in room)

## 2020-08-05 NOTE — PLAN OF CARE
Problem: SELF HARM/SUICIDALITY  Goal: Will have no self-injury during hospital stay  Description: INTERVENTIONS:  - Q 15 MINUTES: Routine safety checks  - Q WAKING SHIFT & PRN: Assess risk to determine if routine checks are adequate to maintain patient safety  - Encourage patient to participate actively in care by formulating a plan to combat response to suicidal ideation, identify supports and resources  Outcome: Progressing     Problem: DEPRESSION  Goal: Will be euthymic at discharge  Description: INTERVENTIONS:  - Administer medication as ordered  - Provide emotional support via 1:1 interaction with staff  - Encourage involvement in milieu/groups/activities  - Monitor for social isolation  Outcome: Not Progressing     Problem: ANXIETY  Goal: Will report anxiety at manageable levels  Description: INTERVENTIONS:  - Administer medication as ordered  - Teach and encourage coping skills  - Provide emotional support  - Assess patient/family for anxiety and ability to cope  Outcome: Not Progressing

## 2020-08-05 NOTE — PLAN OF CARE
Problem: Ineffective Coping  Goal: Participates in unit activities  Description: Interventions:  - Provide therapeutic environment   - Provide required programming   - Redirect inappropriate behaviors   Outcome: Not Progressing

## 2020-08-05 NOTE — ED PROVIDER NOTES
History  Chief Complaint   Patient presents with    Psychiatric Evaluation     HPI    Patient medically clear for psychiatric admission  Patient is a pleasant 23 yom who presents with depression  No f/c/s/n/v/d  No cp or sob  No abdominal pain  No dysuria, hematuria  No hallucinations  He notes some passive suicidal thoughts but no plan  His depression is chronic  MDM depression, passive suicidally, will discuss with crisis  Prior to Admission Medications   Prescriptions Last Dose Informant Patient Reported? Taking?   simvastatin (ZOCOR) 20 mg tablet Not Taking at Unknown time  Yes No   Sig: Take 20 mg by mouth daily at bedtime      Facility-Administered Medications: None       Past Medical History:   Diagnosis Date    ADHD (attention deficit hyperactivity disorder)     [facility]     Depression     Hyperlipidemia     Hypertension     Psychiatric disorder     Substance abuse ([facility] )     Tourette syndrome        Past Surgical History:   Procedure Laterality Date    TONSILECTOMY AND ADNOIDECTOMY      TYMPANOSTOMY TUBE PLACEMENT         History reviewed  No pertinent family history  I have reviewed and agree with the history as documented  E-Cigarette/Vaping    E-Cigarette Use Never User      E-Cigarette/Vaping Substances    Nicotine No     THC No     CBD No     Flavoring No     Other No     Unknown No      Social History     Tobacco Use    Smoking status: Current Every Day Smoker     Packs/day: 2 00     Types: Cigarettes    Smokeless tobacco: Never Used   Substance Use Topics    Alcohol use: Not Currently    Drug use: Yes     Types: Marijuana     Comment: pills       Review of Systems   Psychiatric/Behavioral: Positive for dysphoric mood  All other systems reviewed and are negative  Physical Exam  Physical Exam  Vitals signs and nursing note reviewed  Constitutional:       Appearance: He is well-developed  He is not diaphoretic     HENT: Head: Normocephalic and atraumatic  Eyes:      Pupils: Pupils are equal, round, and reactive to light  Neck:      Musculoskeletal: Normal range of motion and neck supple  Cardiovascular:      Rate and Rhythm: Normal rate and regular rhythm  Heart sounds: Normal heart sounds  No murmur  Pulmonary:      Effort: Pulmonary effort is normal  No respiratory distress  Breath sounds: Normal breath sounds  No wheezing  Abdominal:      General: Bowel sounds are normal  There is no distension  Palpations: Abdomen is soft  Tenderness: There is no abdominal tenderness  Musculoskeletal: Normal range of motion  General: No tenderness  Skin:     General: Skin is warm and dry  Findings: No erythema  Neurological:      Mental Status: He is alert and oriented to person, place, and time  Cranial Nerves: No cranial nerve deficit  Coordination: Coordination normal    Psychiatric:         Behavior: Behavior normal          Vital Signs  ED Triage Vitals [08/04/20 1830]   Temperature Pulse Respirations Blood Pressure SpO2   99 1 °F (37 3 °C) 71 18 138/74 97 %      Temp Source Heart Rate Source Patient Position - Orthostatic VS BP Location FiO2 (%)   Temporal Monitor Sitting Left arm --      Pain Score       --           Vitals:    08/04/20 1830   BP: 138/74   Pulse: 71   Patient Position - Orthostatic VS: Sitting         Visual Acuity      ED Medications  Medications - No data to display    Diagnostic Studies  Results Reviewed     Procedure Component Value Units Date/Time    Novel Coronavirus Dorene NICOLE [facility] [[id number]]  (Normal) Collected:  08/04/20 1842    Lab Status:  Final result Specimen:  Nares from Nose Updated:  08/04/20 1940     SARS-CoV-2 Negative    Narrative:        The specimen collection materials, transport medium, and/or testing methodology utilized in the production of these test results have been proven to be reliable in a limited validation with an abbreviated program under the Emergency Utilization Authorization provided by the FDA  Testing reported as "Presumptive positive" will be confirmed with secondary testing with a reference laboratory to ensure result accuracy  Clinical caution and judgement should be used with the interpretation of these results with consideration of the clinical impression and other laboratory testing  Testing reported as "Positive" or "Negative" has been proven to be accurate according to standard laboratory validation requirements  All testing is performed with control materials showing appropriate reactivity at standard intervals  Rapid drug screen, urine [[id number]]  (Abnormal) Collected:  08/04/20 1838    Lab Status:  Final result Specimen:  Urine, Clean Catch Updated:  08/04/20 1857     Amph/Meth UR Negative     Barbiturate Ur Negative     Benzodiazepine Urine Negative     Cocaine Urine Negative     Methadone Urine Negative     Opiate Urine Negative     PCP Ur Negative     THC Urine Positive     Oxycodone Urine Negative    Narrative:       Presumptive report  If requested, specimen will be sent to reference lab for confirmation  FOR MEDICAL PURPOSES ONLY  IF CONFIRMATION NEEDED PLEASE CONTACT THE LAB WITHIN 5 DAYS  Drug Screen Cutoff Levels:  AMPHETAMINE/METHAMPHETAMINES  1000 ng/mL  BARBITURATES     200 ng/mL  BENZODIAZEPINES     200 ng/mL  COCAINE      300 ng/mL  METHADONE      300 ng/mL  OPIATES      300 ng/mL  PHENCYCLIDINE     25 ng/mL  THC       50 ng/mL  OXYCODONE      100 ng/mL    POCT alcohol breath test [[id number]]  (Normal) Resulted:  08/04/20 1833    Lab Status:  Final result Updated:  08/04/20 1833     EXTBreath Alcohol 0 0                 No orders to display              Procedures  Procedures         ED Course       US AUDIT      Most Recent Value   Initial Alcohol Screen: US AUDIT-C    1  How often do you have a drink containing alcohol?  0 Filed at: 08/04/2020 1846   2   How many drinks containing alcohol do you have on a typical day you are drinking? 0 Filed at: 08/04/2020 1846   3a  Male UNDER 65: How often do you have five or more drinks on one occasion? 0 Filed at: 08/04/2020 1846   3b  FEMALE Any Age, or MALE 65+: How often do you have 4 or more drinks on one occassion? 0 Filed at: 08/04/2020 1846   Audit-C Score  0 Filed at: 08/04/2020 1846          CRAFFT      Most Recent Value   During the past 12 months, did you:   1  Drink any alcohol (more than a few sips)? No Filed at: 08/04/2020 1831   2  Smoke any marijuana or hashish  Yes Filed at: 08/04/2020 1831   3  Use anything else to get high? ("anything else" includes illegal drugs, over the counter and prescription drugs, and things that you sniff or 'parson')? No Filed at: 08/04/2020 1831   During the past 12 months:   1  Have you ever ridden in a car driven by someone (including yourself) who was "high" or had been using alcohol or drugs? 0 Filed at: 08/04/2020 1831   2  Do you ever use alcohol or drugs to relax, feel better about yourself, or fit in?  0 Filed at: 08/04/2020 1831   3  Do you ever use alcohol/drugs while you are by yourself, alone? 0 Filed at: 08/04/2020 1831   4  Do you ever forget things you did while using alcohol or drugs? 0 Filed at: 08/04/2020 1831   5  Do your family or friends ever tell you that you should cut down on your drinking or drug use? 0 Filed at: 08/04/2020 1831   6  Have you gotten into trouble while you were using alcohol or drugs? 0 Filed at: 08/04/2020 1831   CRAFFT Score  0 Filed at: 08/04/2020 1831                                        MDM      Disposition  Final diagnoses:   None     ED Disposition     None      MD Documentation      Most Recent Value   Sending MD Dr Awilda Harris MD      Follow-up Information    None         Patient's Medications   Discharge Prescriptions    No medications on file     No discharge procedures on file      PDMP Review     None          ED Provider  Electronically Signed by           Sim Smith MD  08/04/20 3037

## 2020-08-05 NOTE — ED NOTES
Crisis contacted Intake for f/u  Angel Speaker in Intake will inform ED RNs when pt has been accepted and give them a transfer time

## 2020-08-05 NOTE — H&P
Psychiatric Evaluation - [address] [place] [address] R [place] 23 y o  male MRN: [id number]  Unit/Bed#: U 254-02 Encounter: [id number]    Assessment/Plan   Principal Problem:    Bipolar 1 disorder, depressed, moderate ([facility] )  Active Problems:    Medical clearance for psychiatric admission    Tourette's syndrome    Tobacco use    Cannabis use disorder, severe, dependence ([facility] )    Plan:   Risks, benefits and possible side effects of Medications:   Risks, benefits, and possible side effects of medications explained to patient and patient verbalizes understanding  1  Admit to acute psychiatric level of care for safety and stability  2  Start gabapentin help with anxiety in mood  3  Individual, group, and milieu therapy  4  Safety and discharge planning    Chief Complaint: "I thought I was losing my mind"    History of Present Illness     Patient is a 23 y o  male presents with worsening of depression, mood swings, agitation and suicidal thoughts  Patient initially presented to the emergency room on a 302 by his family after becoming very agitated and making threats to hurt himself and his family  Patient reports that family members have confronted him about using drugs and he denied using any drugs and then things escalated  Patient reports that he currently only smokes marijuana but in the past he used methamphetamine and Percocets  He reports he also sells drugs and has been in juvenile half-way for about 9 months for drug related charges  Patient denies having any probation currently or any legal troubles  Patient has history of 2 previous psychiatric hospitalizations while being an adolescent  He reports that once he overdosed on Xanax and Percocet while being chased by the police so he does not get arrested with the drugs  Patient has significant family history of mental illness and addiction  His father  from drug overdose and he was methamphetamine user    His older brother does have history of substance use  Psychiatric Review Of Systems:  sleep: yes  appetite changes: no  weight changes: yes  energy/anergy: no  interest/pleasure/anhedonia: yes  somatic symptoms: no  anxiety/panic: yes  cami: yes  guilty/hopeless: no  self injurious behavior/risky behavior: yes    Historical Information     Past Psychiatric History:   Dual drug/alcohol  Currently in treatment with none  Past Suicide attempts: yes  Past Violent behavior: yes      Substance Abuse History:  E-Cigarette/Vaping    E-Cigarette Use Never User       E-Cigarette/Vaping Substances    Nicotine No     THC No     CBD No     Flavoring No     Other No     Unknown No        Social History     Tobacco History     Smoking Status  Current Every Day Smoker Smoking Frequency  2 packs/day Smoking Tobacco Type  Cigarettes    Smokeless Tobacco Use  Never Used          Alcohol History     Alcohol Use Status  Not Currently          Drug Use     Drug Use Status  Yes Types  Marijuana Comment  pills          Sexual Activity     Sexually Active  Not Asked          Activities of Daily Living    Not Asked               Additional Substance Use Detail     Questions Responses    Problems Due to Past Use of Substances? Yes    Last reviewed by Krunal Chua RN on 8/5/2020        I have assessed this patient for substance use within the past 12 months    Family Psychiatric History:   Psychiatric Illness Mother  Illness: depression    Social History:  Education: high school diploma/GED  Learning Disabilities: special education      Past Medical History:   Diagnosis Date    ADHD (attention deficit hyperactivity disorder)     [facility]     Depression     Hyperlipidemia     Hypertension     Psychiatric disorder     [facility] )     Tourette syndrome        Medical Review Of Systems:  Pertinent items are noted in HPI      Meds/Allergies   all current active meds have been reviewed  Allergies   Allergen Reactions    Abilify [Aripiprazole] Other (See Comments)     Seizures and hyperglycemia       Objective   Vital signs in last 24 hours:  Temp:  [97 3 °F (36 3 °C)-99 1 °F (37 3 °C)] 98 6 °F (37 °C)  HR:  [56-71] 60  Resp:  [16-18] 16  BP: ()/(52-74) 155/58    No intake or output data in the 24 hours ending 08/05/20 1516    Mental Status Evaluation:  Appearance:  casually dressed   Behavior:  guarded   Speech:  tangential   Mood:  anxious   Affect:  constricted   Language: repeating phrases   Thought Process:  circumstantial   Thought Content:  obsessions   Perceptual Disturbances: None   Risk Potential: Suicidal Ideations none  Homicidal Ideations none  Potential for Aggression No   Sensorium:  person and place   Memory:  recent and remote memory grossly intact   Consciousness:  awake    Attention: attention span appeared shorter than expected for age   Intellect: normal   Fund of Knowledge: vocabulary: fair   Insight:  limited   Judgment: limited   Muscle Strength and Tone: neck   Gait/Station: slow   Motor Activity: no abnormal movements     Lab Results: I have personally reviewed all pertinent laboratory/tests results  Patient Strengths/Assets: interpersonal skills, self reliant, work skills    Patient Barriers/Limitations: self-care deficit    Counseling / Coordination of Care  Total floor / unit time spent today 60 minutes  Greater than 50% of total time was spent with the patient and / or family counseling and / or coordination of care   A description of the counseling / coordination of care:

## 2020-08-05 NOTE — ASSESSMENT & PLAN NOTE
· Patient initially brought in on 302 warrant  · Patient opted to sign 201  · Primary services for all medications and treatments

## 2020-08-05 NOTE — CONSULTS
Consult- Cele Fenton 2000, 23 y o  male MRN: [id number]    Unit/Bed#: [facility] Notice 456-78 Encounter: [id number]    Primary Care Provider: Deepika Hodgson DO   Date and time admitted to hospital: 8/4/2020  6:28 PM      Inpatient consult for Medical Clearance for [facility] patient  Consult performed by: Cornelia Mandujano PA-C  Consult ordered by: Mandy Solorio MD          Tobacco use  Assessment & Plan  · Education on quitting  · Nicotine patch    Tourette's syndrome  Assessment & Plan  · Chronic condition    Medical clearance for psychiatric admission  Assessment & Plan  · Patient initially brought in on 302 warrant  · Patient opted to sign 201  · Primary services for all medications and treatments    Marijuana abuse  Assessment & Plan  · Patient does admit to smoking marijuana    * Bipolar 1 disorder, depressed, moderate ([facility] )  Assessment & Plan  · Secondary to his father passing away in 2019  · Medications per primary services        Recommendations for Discharge:  · Per Primary Service      History of Present Illness:  Cele Fenton is a 23 y o  male who is originally admitted to the behavior health unit service due to increased depression  We are consulted for medical clearance  Patient has had depressive episodes ever since his father's death in 2019  Patient states that he had a fight with his family due to his use of marijuana  He was initially brought into the emergency department with a 302 warrant, and opted to sign 201  Patient is currently appropriate for the behavior health unit, and will follow medication and treatment recommendations per primary services  At this time patient remains medically clear by ED provider, crisis and medical staff for admission to behavior health unit  If any new issues arise please to not hesitate to contact us, at this time we will sign off on this patient  Review of Systems:  Review of Systems   Gastrointestinal: Positive for nausea   Negative for abdominal pain, diarrhea and vomiting  Psychiatric/Behavioral: Positive for behavioral problems  The patient is nervous/anxious  All other systems reviewed and are negative  Past Medical and Surgical History:   Past Medical History:   Diagnosis Date    ADHD (attention deficit hyperactivity disorder)     [facility] )     Depression     Hyperlipidemia     Hypertension     Psychiatric disorder     Substance abuse ([facility] )     Tourette syndrome        Past Surgical History:   Procedure Laterality Date    TONSILECTOMY AND ADNOIDECTOMY      TYMPANOSTOMY TUBE PLACEMENT         Meds/Allergies:  all medications and allergies reviewed    Allergies: Allergies   Allergen Reactions    Abilify [Aripiprazole] Other (See Comments)     Seizures and hyperglycemia       Social History:     Marital Status: Single    Substance Use History:   Social History     Substance and Sexual Activity   Alcohol Use Not Currently     Social History     Tobacco Use   Smoking Status Current Every Day Smoker    Packs/day: 2 00    Types: Cigarettes   Smokeless Tobacco Never Used     Social History     Substance and Sexual Activity   Drug Use Yes    Types: Marijuana    Comment: pills       Family History:  I have reviewed the patients family history    Physical Exam:   Vitals:   Blood Pressure: 113/53 (08/05/20 1618)  Pulse: 56 (08/05/20 1618)  Temperature: 97 6 °F (36 4 °C) (08/05/20 1618)  Temp Source: Temporal (08/05/20 1618)  Respirations: 16 (08/05/20 1618)  Height: 5' 6" (167 6 cm) (08/05/20 0112)  Weight - Scale: 78 9 kg (174 lb) (08/05/20 0112)  SpO2: 97 % (08/05/20 1618)    Physical Exam   Constitutional: He is oriented to person, place, and time  He appears well-developed  HENT:   Head: Normocephalic and atraumatic  Eyes: Conjunctivae are normal  Right eye exhibits no discharge  Left eye exhibits no discharge  Neck: Normal range of motion  No tracheal deviation present     Cardiovascular: Normal rate, regular rhythm and normal heart sounds  Exam reveals no gallop and no friction rub  No murmur heard  Pulmonary/Chest: Effort normal and breath sounds normal  No respiratory distress  He has no wheezes  He has no rales  Abdominal: Soft  Bowel sounds are normal  He exhibits no distension and no mass  There is no abdominal tenderness  There is no guarding  Musculoskeletal: Normal range of motion  General: No tenderness or deformity  Neurological: He is alert and oriented to person, place, and time  No cranial nerve deficit  Skin: Skin is warm and dry  No rash noted  No erythema  No pallor  Psychiatric: His behavior is normal    Nursing note and vitals reviewed  Additional Data:   Lab Results: Drug screen positive THC        ** Please Note: This note has been constructed using a voice recognition system   **

## 2020-08-05 NOTE — PROGRESS NOTES
Patient has been sleeping well after receiving trazodone earlier  No changes or problems  Q 7 minute safety checks maintained
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
## 2020-08-05 NOTE — PROGRESS NOTES
08/05/20 0946   Team Meeting   Meeting Type Daily Rounds   Initial Conference Date 08/05/20   Patient/Family Present   Patient Present No   Patient's Family Present No     Team Members Present:   MD Leonard Lucia CRNP Sherita Fare, RN  [facility], PA-C Student  Carlos Manuel Frias, [facility] [place]    Passive SI  Fight with family  Irritable  OD in 10/20  Had found father dead

## 2020-08-05 NOTE — ED NOTES
Intake notified crisis that the attendings note was not visible, and medical clearance needed to be verified  Attending made aware

## 2020-08-05 NOTE — PROGRESS NOTES
Pt has been visible on the unit, pt has been denying any current si/hi at this time  Pt has been both medication and meal compliant  Pt denies any current complaints at this time, will continue to monitor

## 2020-08-05 NOTE — PROGRESS NOTES
Patient admitted to [facility] on Wednesday, August 05, 2020 at [address]  Patient cooperative with body assessment and admission process  The following items will be given to patient to remain at bedside:  Pants x 1  Underwear x 1  Socks x 1    The following items were sent to storage:  Hooded sweatshirt x 1  Belt x 1  Shoes x 1    Patient did not come in with a cell phone, wallet, or any home medications

## 2020-08-06 PROCEDURE — 99232 SBSQ HOSP IP/OBS MODERATE 35: CPT | Performed by: PSYCHIATRY & NEUROLOGY

## 2020-08-06 RX ADMIN — BENZTROPINE MESYLATE 1 MG: 1 TABLET ORAL at 19:03

## 2020-08-06 RX ADMIN — HYDROXYZINE HYDROCHLORIDE 50 MG: 25 TABLET, FILM COATED ORAL at 19:03

## 2020-08-06 RX ADMIN — NICOTINE 1 PATCH: 21 PATCH, EXTENDED RELEASE TRANSDERMAL at 08:43

## 2020-08-06 RX ADMIN — TRAZODONE HYDROCHLORIDE 100 MG: 50 TABLET ORAL at 21:19

## 2020-08-06 RX ADMIN — GABAPENTIN 300 MG: 300 CAPSULE ORAL at 16:19

## 2020-08-06 RX ADMIN — GABAPENTIN 300 MG: 300 CAPSULE ORAL at 21:17

## 2020-08-06 RX ADMIN — GABAPENTIN 300 MG: 300 CAPSULE ORAL at 08:45

## 2020-08-06 RX ADMIN — HALOPERIDOL 5 MG: 5 TABLET ORAL at 19:03

## 2020-08-06 NOTE — PROGRESS NOTES
Patient alert and present in [facility] for meals  Attends group with certain participation   Remains quiet, flat, depressed  Denies si hi  And hallucinations  Compliant with medications  Naps during the day  Superficial with answers  Non specific complaint of "not feeling well"  No prns requested  Mostly non social with peers  Will maintain on safety precautions and continual monitoring  NO needs identified

## 2020-08-06 NOTE — PROGRESS NOTES
Progress Note - [address] [place] [address] R [place] 23 y o  male MRN: [id number]  Unit/Bed#: [facility] 254-02 Encounter: [id number]    Assessment/Plan   Principal Problem:    Bipolar 1 disorder, depressed, moderate ([facility] )  Active Problems:    Medical clearance for psychiatric admission    Tourette's syndrome    Tobacco use    Cannabis use disorder, severe, dependence ([facility] )      Behavior over the last 24 hours:  Unchanged  Patient reports that he is feeling a little bit better and he was talking to his mother  He reports that he might stay with his girlfriend upon discharge  Patient reports that he feels he is having some withdrawal methamphetamine use  Patient expressed his interest to be referred to Suboxone treatment program as he does have history of abusing opioid medications  Patient reports his sister is currently on Suboxone maintenance  Sleep: insomnia  Appetite: poor  Medication side effects: No  ROS: no complaints    Mental Status Evaluation:  Appearance:  casually dressed   Behavior:  guarded   Speech:  normal pitch   Mood:  irritable   Affect:  constricted   Thought Process:  concrete   Thought Content:  obsessions   Perceptual Disturbances: None   Risk Potential: Suicidal Ideations none  Homicidal Ideations none  Potential for Aggression No   Sensorium:  person and place   Memory:  recent and remote memory grossly intact   Consciousness:  awake    Attention: attention span appeared shorter than expected for age   Insight:  limited   Judgment: limited   Gait/Station: normal balance   Motor Activity: no abnormal movements     Progress Toward Goals: ongoing    Recommended Treatment: Continue with group therapy, milieu therapy and occupational therapy  Risks, benefits and possible side effects of Medications:   Risks, benefits, and possible side effects of medications explained to patient and patient verbalizes understanding  Medications: continue current psychiatric medications  Labs:  I have personally reviewed all pertinent laboratory/tests results  Counseling / Coordination of Care  Total floor / unit time spent today 25 minutes  Greater than 50% of total time was spent with the patient and / or family counseling and / or coordination of care   A description of the counseling / coordination of care:
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
## 2020-08-06 NOTE — PROGRESS NOTES
08/06/20 1000   Activity/Group Checklist   Group   ([employer] and Art Therapy Processing)   Attendance Attended   Attendance Duration (min) Greater than 60   Interactions Interacted appropriately   Affect/Mood Appropriate;Calm   Goals Achieved Identified feelings; Identified triggers; Discussed coping strategies; Identified resources and support systems; Able to listen to others; Able to engage in interactions
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
## 2020-08-06 NOTE — PROGRESS NOTES
08/06/20 0730   Activity/Group Checklist   Group   (Goal Setting and Communications)   Attendance Attended   Attendance Duration (min) 46-60   Interactions Interacted appropriately   Affect/Mood Appropriate;Calm   Goals Achieved Identified feelings; Discussed coping strategies   PT came to group for the first time today and identified the goals that he would like to accomplish today as being:  Be more social  Staying awake  Go to groups

## 2020-08-06 NOTE — PROGRESS NOTES
Pt remains calm and cooperative, pt reports just feeling sick due to withdrawn from meth and thc  Pt offered comfort medications  Pt denies any current si/hi at this time

## 2020-08-07 PROCEDURE — 99232 SBSQ HOSP IP/OBS MODERATE 35: CPT | Performed by: PSYCHIATRY & NEUROLOGY

## 2020-08-07 RX ORDER — GABAPENTIN 400 MG/1
400 CAPSULE ORAL 3 TIMES DAILY
Status: DISCONTINUED | OUTPATIENT
Start: 2020-08-07 | End: 2020-08-08

## 2020-08-07 RX ADMIN — NICOTINE 1 PATCH: 21 PATCH, EXTENDED RELEASE TRANSDERMAL at 08:49

## 2020-08-07 RX ADMIN — HYDROXYZINE HYDROCHLORIDE 50 MG: 25 TABLET, FILM COATED ORAL at 12:19

## 2020-08-07 RX ADMIN — GABAPENTIN 400 MG: 400 CAPSULE ORAL at 21:32

## 2020-08-07 RX ADMIN — TRAZODONE HYDROCHLORIDE 100 MG: 50 TABLET ORAL at 21:37

## 2020-08-07 RX ADMIN — GABAPENTIN 300 MG: 300 CAPSULE ORAL at 08:49

## 2020-08-07 RX ADMIN — GABAPENTIN 400 MG: 400 CAPSULE ORAL at 17:29

## 2020-08-07 NOTE — PLAN OF CARE
Problem: SELF HARM/SUICIDALITY  Goal: Will have no self-injury during hospital stay  Description: INTERVENTIONS:  - Q 15 MINUTES: Routine safety checks  - Q WAKING SHIFT & PRN: Assess risk to determine if routine checks are adequate to maintain patient safety  - Encourage patient to participate actively in care by formulating a plan to combat response to suicidal ideation, identify supports and resources  Outcome: Progressing     Problem: DEPRESSION  Goal: Will be euthymic at discharge  Description: INTERVENTIONS:  - Administer medication as ordered  - Provide emotional support via 1:1 interaction with staff  - Encourage involvement in milieu/groups/activities  - Monitor for social isolation  Outcome: Progressing     Problem: ANXIETY  Goal: Will report anxiety at manageable levels  Description: INTERVENTIONS:  - Administer medication as ordered  - Teach and encourage coping skills  - Provide emotional support  - Assess patient/family for anxiety and ability to cope  Outcome: Progressing  Goal: By discharge: Patient will verbalize 2 strategies to deal with anxiety  Description: Interventions:  - Identify any obvious source/trigger to anxiety  - Staff will assist patient in applying identified coping technique/skills  - Encourage attendance of scheduled groups and activities  Outcome: Progressing     Problem: Ineffective Coping  Goal: Participates in unit activities  Description: Interventions:  - Provide therapeutic environment   - Provide required programming   - Redirect inappropriate behaviors   Outcome: Progressing     Problem: SUBSTANCE USE/ABUSE  Goal: By discharge, will develop insight into their chemical dependency and sustain motivation to continue in recovery  Description: INTERVENTIONS:  - Attends all daily group sessions and scheduled AA groups  - Actively practices coping skills through participation in the therapeutic community and adherence to program rules  - Reviews and completes assignments from individual treatment plan  - Assist patient development of understanding of their personal cycle of addiction and relapse triggers  Outcome: Progressing  Goal: By discharge, patient will have ongoing treatment plan addressing chemical dependency  Description: INTERVENTIONS:  - Assist patient with resources and/or appointments for ongoing recovery based living  Outcome: Progressing     Problem: DISCHARGE PLANNING - CARE MANAGEMENT  Goal: Discharge to post-acute care or home with appropriate resources  Description: INTERVENTIONS:  - Conduct assessment to determine patient/family and health care team treatment goals, and need for post-acute services based on payer coverage, community resources, and patient preferences, and barriers to discharge  - Address psychosocial, clinical, and financial barriers to discharge as identified in assessment in conjunction with the patient/family and health care team  - Arrange appropriate level of post-acute services according to patients   needs and preference and payer coverage in collaboration with the physician and health care team  - Communicate with and update the patient/family, physician, and health care team regarding progress on the discharge plan  - Arrange appropriate transportation to post-acute venues  Outcome: Progressing

## 2020-08-07 NOTE — PROGRESS NOTES
Patient visible on the unit  Attended group  Complaint with meals and medications  Had a bad phone converstion earlier, became loud and upset  Was given Haldol, cogentin, and atarax with good results  Currently watching TV with peers  Continual monitoring maintained

## 2020-08-07 NOTE — PROGRESS NOTES
08/07/20 0900   Activity/Group Checklist   Group Impromptu group (Comment)  (Question Cards )   Attendance Attended   Attendance Duration (min) 16-30   Interactions Interacted appropriately   Affect/Mood Appropriate;Bright;Calm;Normal range   Goals Achieved Identified feelings; Able to listen to others; Able to engage in interactions; Able to self-disclose

## 2020-08-07 NOTE — PROGRESS NOTES
08/07/20 0753   Activity/Group Checklist   Group   (Goal Setting and Communications)   Attendance Did not attend  (AT group offered, pt elected to remain in room)

## 2020-08-07 NOTE — PROGRESS NOTES
08/07/20 0945   Team Meeting   Meeting Type Daily Rounds   Initial Conference Date 08/07/20   Patient/Family Present   Patient Present No   Patient's Family Present No     Team Members Present:   MD Shanique Sharma PA-C Student  Abhi Krueger, CALVIN Roland, [facility]    Low BP this am   Flat / depressed  Long HX of drug abuse  May go to rehab  No behavior issues  Wants Saboxone

## 2020-08-07 NOTE — PROGRESS NOTES
Progress Note - [address] [place] [address] R [place] 23 y o  male MRN: [id number]  Unit/Bed#: U 254-02 Encounter: [id number]    Assessment/Plan   Principal Problem:    Bipolar 1 disorder, depressed, moderate ([facility] )  Active Problems:    Medical clearance for psychiatric admission    Tourette's syndrome    Tobacco use    Cannabis use disorder, severe, dependence ([facility] )    P:  Increase gabapentin to 100 mg t i d   Behavior over the last 24 hours:  Unchanged  Patient complained of feeling anxious and requested Xanax to help with anxiety  We discussed the dangers of Xanax as a highly addictive drug  I also expressed to him that if he is seeking maintenance treatment with Suboxone Xanax can handle that since it is highly recommended not to give both together due to the extreme dangers of using both together  Sleep: insomnia  Appetite: poor  Medication side effects: No  ROS: no complaints    Mental Status Evaluation:  Appearance:  casually dressed   Behavior:  guarded   Speech:  normal pitch   Mood:  anxious   Affect:  mood-congruent   Thought Process:  circumstantial   Thought Content:  normal   Perceptual Disturbances: None   Risk Potential: Suicidal Ideations none  Homicidal Ideations none  Potential for Aggression No   Sensorium:  person and place   Memory:  recent and remote memory grossly intact   Consciousness:  awake    Attention: attention span appeared shorter than expected for age   Insight:  limited   Judgment: limited   Gait/Station: normal balance   Motor Activity: no abnormal movements     Progress Toward Goals: ongoing    Recommended Treatment: Continue with group therapy, milieu therapy and occupational therapy  Risks, benefits and possible side effects of Medications:   Risks, benefits, and possible side effects of medications explained to patient and patient verbalizes understanding  Medications: continue current psychiatric medications  Labs:  I have personally reviewed all pertinent laboratory/tests results  Counseling / Coordination of Care  Total floor / unit time spent today 25 minutes  Greater than 50% of total time was spent with the patient and / or family counseling and / or coordination of care   A description of the counseling / coordination of care:

## 2020-08-07 NOTE — PROGRESS NOTES
Patient bright, alert for meals, did attend am group with quiet participation  Denies si hi and hallucinations  Talks about his history of medications and therapy since he was 11years old  Feels as if nothing works for him to keep his anxiety manageable  Says he has 1 person in his life that he has ever felt comfortable talking to about his problems, and that she is not here, so he feels like he can't open up  He is calm and cooperative with staff  Will maintain on safety precautions and continual monitoring  No needs identified

## 2020-08-07 NOTE — PROGRESS NOTES
08/07/20 1000   Activity/Group Checklist   Group   (Self Awareness and Art Therapy Processing)   Attendance Did not attend  (AT group offered, pt elected to remain in room)

## 2020-08-08 PROBLEM — Z00.8 MEDICAL CLEARANCE FOR PSYCHIATRIC ADMISSION: Status: RESOLVED | Noted: 2020-08-05 | Resolved: 2020-08-08

## 2020-08-08 PROCEDURE — 99232 SBSQ HOSP IP/OBS MODERATE 35: CPT | Performed by: NURSE PRACTITIONER

## 2020-08-08 RX ORDER — GABAPENTIN 300 MG/1
600 CAPSULE ORAL 3 TIMES DAILY
Status: DISCONTINUED | OUTPATIENT
Start: 2020-08-08 | End: 2020-08-12 | Stop reason: HOSPADM

## 2020-08-08 RX ADMIN — GABAPENTIN 600 MG: 300 CAPSULE ORAL at 17:00

## 2020-08-08 RX ADMIN — HALOPERIDOL 5 MG: 5 TABLET ORAL at 14:23

## 2020-08-08 RX ADMIN — GABAPENTIN 400 MG: 400 CAPSULE ORAL at 09:13

## 2020-08-08 RX ADMIN — GABAPENTIN 600 MG: 300 CAPSULE ORAL at 21:58

## 2020-08-08 RX ADMIN — TRAZODONE HYDROCHLORIDE 100 MG: 50 TABLET ORAL at 21:58

## 2020-08-08 RX ADMIN — NICOTINE 1 PATCH: 21 PATCH, EXTENDED RELEASE TRANSDERMAL at 09:14

## 2020-08-08 RX ADMIN — HYDROXYZINE HYDROCHLORIDE 50 MG: 25 TABLET, FILM COATED ORAL at 13:33

## 2020-08-08 NOTE — PROGRESS NOTES
Progress Note - [address] [place] [address] R [place] 23 y o  male MRN: [id number]  Unit/Bed#: Klaudia Sargent 628-53 Encounter: [id number]    The patient was seen for continuing care and reviewed with treatment team   Wellington Hale presents as cooperative and calm today, discussing that his drug is perpetuated by 'things going really wrong; everything builds up then I call my dealer and use'  He states he had a run of 3 years sober and does want to stop using (heroin, meth) but rehab has not been overly beneficial in the past because he returns to the same stressors and environment  Expresses wanting to go to a suboxone clinic and would be willing to do outpatient D&A, acknowledging that he needs as much support as he can get and would like to set it up ASAP  Encouraged him to speak to social work on Monday as there are providers in the local area that are licensed to prescribe suboxone  He denies SI/HI today, stating he had SI when he came to the hospital but does truly want help  Reports high anxiety that causes some irritability; will increase gabapentin to 600 mg TID  Wellington Hale denies side effects of medications      Mental Status Evaluation:  Appearance:  Adequate hygiene and grooming and Good eye contact   Behavior:  calm, cooperative and friendly   Fund of knowledge  aware of current events   Speech:   Language: Normal rate and Normal volume  No overt abnormality   Mood:  anxious   Affect:   Associations: appropriate  Tightly connected   Thought Process:  Goal directed and coherent   Thought Content:  Does not verbalize delusional material   Perceptual Disturbances: Denies hallucinations and does not appear to be responding to internal stimuli   Risk Potential: No suicidal or homicidal ideation   Orientation  Oriented x 3   Memory No deficits   Attention/Concentration attention span and concentration were age appropriate   Insight:  Good insight   Judgment: Good judgment   Gait/Station: normal gait/station   Motor Activity: No abnormal movement noted     Vitals:    08/08/20 0700   BP: 124/76   Pulse: 78   Resp: 18   Temp: 97 9 °F (36 6 °C)   SpO2: 98%       Progress Toward Goals: medication adherent, interested in suboxone and OP D&A  Assessment/Plan    Principal Problem:    Bipolar 1 disorder, depressed, moderate ([facility])  Active Problems:    Medical clearance for psychiatric admission    Tourette's syndrome    Tobacco use    Cannabis use disorder, severe, dependence ([facility])    Plan:   Continue q 7 minute safety checks  Continue current medications, increasing gabapentin to 600 mg TID  Encourage group and milieu therapy  Discharge planning and disposition ongoing    Recommended Treatment: Continue with pharmacotherapy, group therapy, milieu therapy and occupational therapy    The patient will be maintained on the following medications:  Current Facility-Administered Medications   Medication Dose Route Frequency Provider Last Rate    acetaminophen  650 mg Oral Q6H PRN Shabbir Aparicio MD      acetaminophen  650 mg Oral Q4H PRN Magnus Sotoics, CRNP      acetaminophen  975 mg Oral Q6H PRN Geraldine REBOLLAR Lodics, CRNP      aluminum-magnesium hydroxide-simethicone  30 mL Oral Q4H PRN Shabbir Aparicio MD      benztropine  1 mg Intramuscular Q6H PRN Geraldine N Lodics, CRNP      benztropine  1 mg Oral Q6H PRN Geraldine REBOLLAR Lodics, CRNP      chlorproMAZINE  50 mg Oral Q6H PRN Ivory Winston MD      gabapentin  600 mg Oral TID Tesha Peterson, CRNP      haloperidol  5 mg Oral Q8H PRN Geraldine N Lodics, CRNP      haloperidol lactate  5 mg Intramuscular Q6H PRN Geraldine REBOLLAR Lodics, CRNP      hydrOXYzine HCL  25 mg Oral Q6H PRN Geraldine REBOLLAR Lodics, CRNP      hydrOXYzine HCL  50 mg Oral Q4H PRN Geraldine REBOLLAR Lodics, CRNP      LORazepam  2 mg Intramuscular Q6H PRN Shabbir Aparicio MD      magnesium hydroxide  30 mL Oral Daily PRN Shabbir Aparicio MD      nicotine  1 patch Transdermal Daily Shabbir Aparicio MD      OLANZapine  10 mg Intramuscular Q3H PRN PORTIA Payne      traZODone  100 mg Oral HS PRN Aleyda Barrientos MD         Risks, benefits and possible side effects of Medications:   Risks, benefits, and possible side effects of medications explained to patient and patient verbalizes understanding        PORTIA Cook  8/8/2020

## 2020-08-08 NOTE — PROGRESS NOTES
Pt is visible on the unit, labile at times, tearful and blaming self for grandmothers recent dementia diagnosis  Pt denies SI and HI currently, no behavioral problems on the unit but becomes irritable and angry with phone calls  Yelling at people on the phone and using derogatory language  Cooperative with medications but verbalized that the medications he is currently on are not controlling his anxiety and anger  Pt requested several PRNs through out the day  Positive for meals  Poor insight  Alert and oriented  Able to make needs known  No signs or symptoms of distress  Continual monitoring maintained for safety

## 2020-08-08 NOTE — PLAN OF CARE
Problem: SELF HARM/SUICIDALITY  Goal: Will have no self-injury during hospital stay  Description: INTERVENTIONS:  - Q 15 MINUTES: Routine safety checks  - Q WAKING SHIFT & PRN: Assess risk to determine if routine checks are adequate to maintain patient safety  - Encourage patient to participate actively in care by formulating a plan to combat response to suicidal ideation, identify supports and resources  Outcome: Progressing

## 2020-08-08 NOTE — PLAN OF CARE
Problem: SELF HARM/SUICIDALITY  Goal: Will have no self-injury during hospital stay  Description: INTERVENTIONS:  - Q 15 MINUTES: Routine safety checks  - Q WAKING SHIFT & PRN: Assess risk to determine if routine checks are adequate to maintain patient safety  - Encourage patient to participate actively in care by formulating a plan to combat response to suicidal ideation, identify supports and resources  8/8/2020 1842 by Kendra Weaver RN  Outcome: Progressing  8/8/2020 1840 by Kendra Weaver RN  Outcome: Progressing

## 2020-08-09 PROCEDURE — 99232 SBSQ HOSP IP/OBS MODERATE 35: CPT | Performed by: NURSE PRACTITIONER

## 2020-08-09 RX ADMIN — GABAPENTIN 600 MG: 300 CAPSULE ORAL at 08:26

## 2020-08-09 RX ADMIN — HYDROXYZINE HYDROCHLORIDE 50 MG: 25 TABLET, FILM COATED ORAL at 23:21

## 2020-08-09 RX ADMIN — TRAZODONE HYDROCHLORIDE 100 MG: 50 TABLET ORAL at 22:05

## 2020-08-09 RX ADMIN — ALUMINUM HYDROXIDE, MAGNESIUM HYDROXIDE, AND SIMETHICONE 30 ML: 200; 200; 20 SUSPENSION ORAL at 19:46

## 2020-08-09 RX ADMIN — NICOTINE 1 PATCH: 21 PATCH, EXTENDED RELEASE TRANSDERMAL at 08:46

## 2020-08-09 RX ADMIN — GABAPENTIN 600 MG: 300 CAPSULE ORAL at 16:58

## 2020-08-09 RX ADMIN — GABAPENTIN 600 MG: 300 CAPSULE ORAL at 22:04

## 2020-08-09 NOTE — PROGRESS NOTES
Progress Note - [address] [place] [address] R [place] 23 y o  male MRN: [id number]  Unit/Bed#: Jonas Holman 697-13 Encounter: [id number]    The patient was seen for continuing care and reviewed with treatment team  Dior Mosqueda presents in a positive mood, stating he slept well last night with Trazodone and feels a bit less anxiety with gabapentin increased  He states he woke up happy  He spoke to his mother last night and his girlfriend briefly this morning  He denies SI/HI and continues to verbalize wanting OP suboxone treatment  Dior Mosqueda denies side effects of medications      Mental Status Evaluation:  Appearance:  Adequate hygiene and grooming and Good eye contact   Behavior:  calm, cooperative and friendly   Fund of knowledge  vocabulary Average   Speech:   Language: Normal rate and Normal volume  No overt abnormality   Mood:  euthymic   Affect:   Associations: appropriate  Tightly connected   Thought Process:  Goal directed and coherent   Thought Content:  Does not verbalize delusional material   Perceptual Disturbances: Denies hallucinations and does not appear to be responding to internal stimuli   Risk Potential: No suicidal or homicidal ideation   Orientation  Oriented x 3   Memory No deficits   Attention/Concentration attention span and concentration were age appropriate   Insight:  Good insight   Judgment: Limited   Gait/Station: normal gait/station   Motor Activity: No abnormal movement noted     Vitals:    08/09/20 0700   BP: 146/97   Pulse: 83   Resp: 18   Temp: 97 5 °F (36 4 °C)   SpO2: 97%     Progress Toward Goals: medication adherent, interested in treatment (suboxone)    Assessment/Plan    Principal Problem:    Bipolar 1 disorder, depressed, moderate ([facility])  Active Problems:    Tourette's syndrome    Tobacco use    Cannabis use disorder, severe, dependence ([facility] )    Plan:   Continue q 7 minute safety checks  Encourage group and milieu therapy  Continue current medications  Discharge planning and disposition ongoing    Recommended Treatment: Continue with pharmacotherapy, group therapy, milieu therapy and occupational therapy  The patient will be maintained on the following medications:  Current Facility-Administered Medications   Medication Dose Route Frequency Provider Last Rate    acetaminophen  650 mg Oral Q6H PRN Nicole Quiñonez MD      acetaminophen  650 mg Oral Q4H PRN Mukul Maryann Lodics, CRNP      acetaminophen  975 mg Oral Q6H PRN Geraldine Ricketts, CRNP      aluminum-magnesium hydroxide-simethicone  30 mL Oral Q4H PRN Nicole Quiñonez MD      benztropine  1 mg Intramuscular Q6H PRN Geraldine Sotoics, CRNP      benztropine  1 mg Oral Q6H PRN Geraldine Ricketts, CRNP      chlorproMAZINE  50 mg Oral Q6H PRN Earl Sanders MD      gabapentin  600 mg Oral TID Naveed Gonzalez, PORTIA      haloperidol  5 mg Oral Q8H PRN Geraldine Ricketts, CRNP      haloperidol lactate  5 mg Intramuscular Q6H PRN Geraldine Ricketts, CRNP      hydrOXYzine HCL  25 mg Oral Q6H PRN Geraldine Sotoics, CRNP      hydrOXYzine HCL  50 mg Oral Q4H PRN Geraldine Ricketts, CRNP      LORazepam  2 mg Intramuscular Q6H PRN Nicole Quiñonez MD      magnesium hydroxide  30 mL Oral Daily PRN Nicole Quiñonez MD      nicotine  1 patch Transdermal Daily Nicole Quiñonez MD      OLANZapine  10 mg Intramuscular Q3H PRN Geraldine Ricketts, CRNP      traZODone  100 mg Oral HS PRN Earl Sanders MD         Risks, benefits and possible side effects of Medications:   Risks, benefits, and possible side effects of medications explained to patient and patient verbalizes understanding        PORTIA Rey  8/9/2020

## 2020-08-09 NOTE — PROGRESS NOTES
Pt is cooperative with medications and meals  Pt expressed that the halodol from earlier in the day help with his agitation and he felt calmer  Denies SI and HI currently  Visible for snack in the community area  Will continue to monitor

## 2020-08-09 NOTE — PROGRESS NOTES
Patient bright and spontaneous on approach this AM  " I feel really good this morning " Unsure of why; "just do " Denied feelings of depression/ anxiiety; denied A/V hallucinations/ lethality  Reported past legal involvement of 'attempted murder', though stated that charges were dropped because he was hospitalized instead  Stated that he would be unable to work as he will be on disability for a year due to sx  Stated he was told that prior to admission  Patient is not interested in Rehab for substance abuse as "I've been in Rehab 7 times already " Reported intention to f/u with Suboxone for OP treatment  Has been cooperative with med administration and unit protocol  Reported no current unmet needs

## 2020-08-09 NOTE — PLAN OF CARE
Problem: ANXIETY  Goal: Will report anxiety at manageable levels  Description: INTERVENTIONS:  - Administer medication as ordered  - Teach and encourage coping skills  - Provide emotional support  - Assess patient/family for anxiety and ability to cope  Outcome: Progressing

## 2020-08-10 PROCEDURE — 99233 SBSQ HOSP IP/OBS HIGH 50: CPT | Performed by: NURSE PRACTITIONER

## 2020-08-10 RX ORDER — QUETIAPINE FUMARATE 100 MG/1
100 TABLET, FILM COATED ORAL
Status: DISCONTINUED | OUTPATIENT
Start: 2020-08-10 | End: 2020-08-12 | Stop reason: HOSPADM

## 2020-08-10 RX ORDER — QUETIAPINE FUMARATE 25 MG/1
25 TABLET, FILM COATED ORAL 2 TIMES DAILY
Status: DISCONTINUED | OUTPATIENT
Start: 2020-08-10 | End: 2020-08-12 | Stop reason: HOSPADM

## 2020-08-10 RX ADMIN — GABAPENTIN 600 MG: 300 CAPSULE ORAL at 15:41

## 2020-08-10 RX ADMIN — GABAPENTIN 600 MG: 300 CAPSULE ORAL at 21:10

## 2020-08-10 RX ADMIN — QUETIAPINE FUMARATE 25 MG: 25 TABLET ORAL at 15:41

## 2020-08-10 RX ADMIN — GABAPENTIN 600 MG: 300 CAPSULE ORAL at 08:48

## 2020-08-10 RX ADMIN — QUETIAPINE FUMARATE 25 MG: 25 TABLET ORAL at 10:56

## 2020-08-10 RX ADMIN — QUETIAPINE FUMARATE 100 MG: 100 TABLET ORAL at 21:11

## 2020-08-10 RX ADMIN — CHLORPROMAZINE HYDROCHLORIDE 50 MG: 25 TABLET, SUGAR COATED ORAL at 06:07

## 2020-08-10 RX ADMIN — NICOTINE 1 PATCH: 21 PATCH, EXTENDED RELEASE TRANSDERMAL at 09:00

## 2020-08-10 RX ADMIN — BENZTROPINE MESYLATE 1 MG: 1 TABLET ORAL at 10:59

## 2020-08-10 RX ADMIN — LORAZEPAM 2 MG: 2 INJECTION INTRAMUSCULAR; INTRAVENOUS at 21:29

## 2020-08-10 RX ADMIN — HYDROXYZINE HYDROCHLORIDE 50 MG: 25 TABLET, FILM COATED ORAL at 15:41

## 2020-08-10 NOTE — PROGRESS NOTES
Patient awake, pacing the halls  C/O restlessness and anxiety  States atarax and haldol not effective when taking before  Prn thorazine given  Will monitor effects

## 2020-08-10 NOTE — PROGRESS NOTES
08/10/20 0738   Activity/Group Checklist   Group Community meeting   Attendance Attended   Attendance Duration (min) 46-60   Interactions Interacted appropriately   Affect/Mood Appropriate;Bright;Normal range   Goals Achieved Identified feelings; Able to listen to others; Able to engage in interactions; Discussed coping strategies

## 2020-08-10 NOTE — PROGRESS NOTES
Patient has been out in the milieu all evening  Bright, pleasant and social with peers  Still c/o anxiety but happy with gabapentin increase and denied need for prn meds  No SI/HI/Hallucinations  No irritability or agitation  Will continue to observe

## 2020-08-10 NOTE — PROGRESS NOTES
Progress Note - [address] [facility] R Manteo 23 y o  male MRN: [id number]   Unit/Bed#: [facility] 254-02 Encounter: [id number]    Behavior over the last 24 hours:      Kirstie Mello was seen for an inpatient follow-up psychiatric visit this date  His behavior remains controlled on the unit  He is still complaining of anxiety and mood lability  He denies any suicidal or homicidal ideation as well as any auditory or visual hallucinations or any other signs of psychosis  He has not had any behavioral issues on the unit  He is engaging in group activities and interacting appropriately  ROS: no complaints, all other systems are negative    Mental Status Evaluation:    Appearance:  casually dressed, dressed appropriately   Behavior:  cooperative, calm   Speech:  normal rate and volume   Mood:  anxious, labile, somewhat irritable   Affect:  mood-congruent   Thought Process:  organized, logical, coherent   Associations: intact associations   Thought Content:  normal, no overt delusions   Perceptual Disturbances: none   Risk Potential: Suicidal ideation - None  Homicidal ideation - None  Potential for aggression - No   Sensorium:  oriented to person, place and time/date   Memory:  recent and remote memory grossly intact   Consciousness:  alert and awake   Attention: attention span and concentration appear shorter than expected for age   Insight:  limited   Judgment: limited   Gait/Station: normal gait/station, normal balance   Motor Activity: no abnormal movements     Vital signs in last 24 hours:    Temp:  [98 2 °F (36 8 °C)-98 6 °F (37 °C)] 98 2 °F (36 8 °C)  HR:  [68-77] 68  Resp:  [16] 16  BP: (102-111)/(60-61) 102/60    Laboratory results:  I have personally reviewed all pertinent laboratory/tests results      Progress Toward Goals: progressing    Assessment/Plan   Principal Problem:    Bipolar 1 disorder, depressed, moderate (HCC)  Active Problems:    Tourette's syndrome    Tobacco use    Cannabis use disorder, severe, dependence ([address] )    Recommended Treatment:     Seroquel added 25 mg b i d  And 100 mg q h s   Continue Neurontin  Continue to monitor  Discharge disposition and planning are ongoing  All current active medications have been reviewed  Encourage group therapy, milieu therapy and occupational therapy  Behavioral Health checks every 7 minutes    Current Facility-Administered Medications   Medication Dose Route Frequency Provider Last Rate    acetaminophen  650 mg Oral Q6H PRN Wilson Alston MD      acetaminophen  650 mg Oral Q4H PRN Philip Pacheco Jamarcus, SHARITANP      acetaminophen  975 mg Oral Q6H PRN Geraldine Ricketts, CRNP      aluminum-magnesium hydroxide-simethicone  30 mL Oral Q4H PRN Wilson Alston MD      benztropine  1 mg Intramuscular Q6H PRN Geraldine Ricketts, CRNP      benztropine  1 mg Oral Q6H PRN Geraldine Ricketts, CRNP      chlorproMAZINE  50 mg Oral Q6H PRN Colin Murry MD      gabapentin  600 mg Oral TID Elpidio Wooten, SHARITANP      haloperidol  5 mg Oral Q8H PRN Geraldine Ricketts, CRNP      haloperidol lactate  5 mg Intramuscular Q6H PRN Geraldine Ricketts, CRNP      hydrOXYzine HCL  25 mg Oral Q6H PRN Geraldine Ricketts, CRNP      hydrOXYzine HCL  50 mg Oral Q4H PRN Geraldine Ricketst, SHARITANP      LORazepam  2 mg Intramuscular Q6H PRN Wilson Alston MD      magnesium hydroxide  30 mL Oral Daily PRN Wilson Alston MD      nicotine  1 patch Transdermal Daily Wilson Alston MD      OLANZapine  10 mg Intramuscular Q3H PRN Geraldine Ricketts, SHARITANP      QUEtiapine  100 mg Oral HS Geraldine Ricketts, CRNP      QUEtiapine  25 mg Oral BID Geraldine Ricketts, CRNP      traZODone  100 mg Oral HS PRN Colin Murry MD         Risks / Benefits of Treatment:    Risks, benefits, and possible side effects of medications explained to patient and patient verbalizes understanding and agreement for treatment      Counseling / Coordination of Care:      Patient's progress discussed with staff in treatment team meeting  Medications, treatment progress and treatment plan reviewed with patient

## 2020-08-10 NOTE — PROGRESS NOTES
Pt is visible on the unit, cooperative with medications  Positive for morning meals  Pt continues to complain of anxiety, but states " Im good for now " Good eye contact  Pt expressed that he has plans to live with girlfriend on discharge  Pt denies SI and HI currently, no behavioral problems  Alert and oriented  Able to make needs known  No signs or symptoms of distress  Continual monitoring maintained for safety
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
## 2020-08-10 NOTE — PROGRESS NOTES
Patient has been sleeping well through the night after receiving prn trazodone then later atarax after trazodone was not effective  No further c/o  Q 7 minute safety checks maintained

## 2020-08-10 NOTE — PROGRESS NOTES
08/10/20 1000   Activity/Group Checklist   Group Other (Comment)  (Goal Planning )   Attendance Attended   Attendance Duration (min) Greater than 60   Interactions Interacted appropriately   Affect/Mood Appropriate;Bright;Normal range   Goals Achieved Identified feelings; Discussed coping strategies; Able to listen to others; Able to engage in interactions; Able to reflect/comment on own behavior;Able to manage/cope with feelings; Able to self-disclose

## 2020-08-11 PROCEDURE — 99232 SBSQ HOSP IP/OBS MODERATE 35: CPT | Performed by: PSYCHIATRY & NEUROLOGY

## 2020-08-11 RX ADMIN — NICOTINE 1 PATCH: 21 PATCH, EXTENDED RELEASE TRANSDERMAL at 08:04

## 2020-08-11 RX ADMIN — QUETIAPINE FUMARATE 100 MG: 100 TABLET ORAL at 20:01

## 2020-08-11 RX ADMIN — QUETIAPINE FUMARATE 25 MG: 25 TABLET ORAL at 08:05

## 2020-08-11 RX ADMIN — QUETIAPINE FUMARATE 25 MG: 25 TABLET ORAL at 16:37

## 2020-08-11 RX ADMIN — GABAPENTIN 600 MG: 300 CAPSULE ORAL at 08:05

## 2020-08-11 RX ADMIN — ALUMINUM HYDROXIDE, MAGNESIUM HYDROXIDE, AND SIMETHICONE 30 ML: 200; 200; 20 SUSPENSION ORAL at 12:33

## 2020-08-11 RX ADMIN — GABAPENTIN 600 MG: 300 CAPSULE ORAL at 16:37

## 2020-08-11 RX ADMIN — GABAPENTIN 600 MG: 300 CAPSULE ORAL at 20:00
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
## 2020-08-11 NOTE — PROGRESS NOTES
08/11/20 0715   Activity/Group Checklist   Group Community meeting   Attendance Attended   Attendance Duration (min) 46-60   Interactions Interacted appropriately   Affect/Mood Appropriate;Calm;Normal range   Goals Achieved Identified feelings; Able to listen to others; Able to engage in interactions

## 2020-08-11 NOTE — PROGRESS NOTES
08/11/20 1000   Activity/Group Checklist   Group   (Morning Group/Discharge Ready )   Attendance Attended   Attendance Duration (min) Greater than 60   Interactions Interacted appropriately   Affect/Mood Appropriate;Bright;Normal range   Goals Achieved Identified feelings; Identified resources and support systems; Able to listen to others; Able to engage in interactions; Able to reflect/comment on own behavior;Able to manage/cope with feelings; Able to self-disclose;Discussed coping strategies; Identified relapse prevention strategies

## 2020-08-11 NOTE — PROGRESS NOTES
Pt slept throughout the night without awakening  No sign of distress or labored breathing  Continuous visual monitoring performed throughout the night  Will continue to monitor

## 2020-08-11 NOTE — PROGRESS NOTES
08/11/20 0943   Team Meeting   Meeting Type Daily Rounds   Initial Conference Date 08/11/20   Patient/Family Present   Patient Present No   Patient's Family Present No     Team Members Present:   MD Louisa Chavarria, PA-C Michaela Baumgarten, RN  Joann Lim, RN  Mary Vanegas, [facility] tomorrow  Anxious yesterday,  got IM Ativan  Does not want Zyperxa

## 2020-08-11 NOTE — DISCHARGE INSTR - OTHER ORDERS
You will discharge home to live with your Mother / family at [address], [place], [address] [facility]; Phone:  [phone] (your mother's cell)  (Upon discharge, your mother will pick you up and take you to spend a few days at the residence of your Girlfriend, Shagufta Murray, [address] [facility], [place], [address], [address] )    Crisis Plan:    Triggers you identified during your hospital stay that led to your verbal and physical aggression towards family members included:  Employment challenges; financial stressors; and increased anxiety and depression  Coping skills you identified during your hospital stay include:  Music, skateboarding, and talking with your Girlfriend  After discharge, if you find your coping skills are not effective and you continue to feel distressed, please talk with trusted family members / friends, and /or contact your future therapist     If that is not effective and you feel you are a danger to yourself or others, please contact [address] [place] [address]: [phone], [address] [place] [address] Street:  [phone], Peer Support Talk Line (Seven days a week, 1:00 PM  9:00 PM) Call: [phone] or Text: [phone], Alcohol Anonymous: 751.123.8905, [place] [facility]: 992.642.1314[place] on [address] [facility] ([facility]) HELPLINE: 320.579.1899/Email: www lelo  org, or Substance Abuse and Mental Health Services Administration([facility]) [employer], which is a confidential, free, 24-hour-a-day, 365-day-a-year, information service for individuals and family members facing mental health and/or substance use disorders  This service provides referrals to local treatment facilities, support groups, and community-based organizations  Callers can also order free publications and other information    Call 2-375.316.2440/SSOVI: www [facility] gov    Marcia Lunsford RN, [facility] Nurse Navigator, will be calling you on the phone number that you provided, within 72 hours of your discharge  She will be available as an additional support, if needed  If you wish to speak with her, you may contact Constance Chowdhury at [phone]  What you need to know aboutcoronavirus disease 2019 (COVID-19)     What is coronavirus disease 2019 (COVID-19)? Coronavirus disease 2019 (COVID-19) is a respiratory illness that can spread from person to person  The virus that causes COVID-19 is a novel coronavirus that was first identified during an investigation into an outbreak in [place], [place]  Can people in the [place]  get COVID-19? Yes  COVID-19 is spreading from person to person in parts of the [facility]  Risk of infection with COVID-19 is higher for people who are close contacts of someone known to have COVID-19, for example healthcare workers, or household members  Other people at higher risk for infection are those who live in or have recently been in an area with ongoing spread of COVID-19  Learn more about places with ongoing spread at   PreviewBuy tn  html#geographic  Have there been cases of COVID-19 in the [place] ?   Yes  The first case of COVID-19 in the [place] was reported on January 21, 2020  The current count of cases of COVID-19 in the [place] is available on [employer] at [facility]  How does COVID-19 spread? The virus that causes COVID-19 probably emerged from an animal source, but is now spreading from person to person  The virus is thought to spread mainly between people who are in close contact with one another (within about 6 feet) through respiratory droplets produced when an infected person coughs or sneezes  It also may be possible that a person can get COVID-19 by touching a surface or object that has the virus on it and then touching their own mouth, nose, or possibly their eyes, but this is not thought to be the main way the virus spreads  Learn what is known about the spread of newly emerged coronaviruses at [facility]  What are the symptoms of COVID-19? Patients with COVID-19 have had mild to severe respiratory illness with symptoms of   fever   cough   shortness of breath  What are severe complications from this virus? Some patients have pneumonia in both lungs, multi-organ failure and in some cases death  How can I help protect myself? People can help protect themselves from respiratory illness with everyday preventive actions  Avoid close contact with people who are sick  Avoid touching your eyes, nose, and mouth withunwashed hands  Wash your hands often with soap and water for at least 20 seconds  Use an alcohol-based hand  that contains at least 60% alcohol if soap and water are not available  If you are sick, to keep from spreading respiratory illness to others, you should   Stay home when you are sick  Cover your cough or sneeze with a tissue, then throw the tissue in the trash  Clean and disinfect frequently touched objectsand surfaces  What should I do if I recently traveled from an area with ongoing spread of COVID-19? If you have traveled from an affected area, there may be restrictions on your movements for up to 2 weeks  If you develop symptoms during that period (fever, cough, trouble breathing), seek medical advice  Call the office of your health care provider before you go, and tell them about your travel and your symptoms  They will give you instructions on how to get care without exposing other people to your illness  While sick, avoid contact with people, don't go out and delay any travel to reduce the possibility of spreading illness to others  Is there a vaccine? There is currently no vaccine to protect against COVID-19   The best way to prevent infection is to take everyday preventive actions, like avoiding close contact with people who are sick and washing your hands often  Is there a treatment? There is no specific antiviral treatment for COVID-19  People with COVID-19 can seek medical care to helprelieve symptoms  For more information: www cdc gov/IYIHH30OZ 373809-L 03/03/2020     What to do if you are sick withcoronavirus disease 2019 (COVID-19)     If you are sick with COVID-19 or suspect you are infected with the virus that causes COVID-19, follow the steps below to help prevent the disease from spreading to people in your home and community  Stay home except to get medical care   You should restrict activities outside your home, except for getting medical care  Do not go to work, school, or public areas  Avoid using public transportation, ride-sharing, or taxis  Separate yourself from other people and animals inyour home  People: As much as possible, you should stay in a specific room and away from other people in your home  Also, you should use a separate bathroom, if available  Animals: Do not handle pets or other animals while sick  See COVID-19 and Animals for more information  Call ahead before visiting your doctor   If you have a medical appointment, call the healthcare provider and tell them that you have or may have COVID-19  This will help the healthcare provider's office take steps to keep other people from getting infected or exposed  Wear a facemask  You should wear a facemask when you are around other people (e g , sharing a room or vehicle) or pets and before you enter a healthcare provider's office  If you are not able to wear a facemask (for example, because it causes trouble breathing), then people who live with you should not stay in the same room with you, or they should wear a facemask if they enteryour room  Cover your coughs and sneezes   Cover your mouth and nose with a tissue when you cough or sneeze   Throw used tissues in a lined trash can; immediately wash your hands with soap and water for at least 20 seconds or clean your hands with an alcohol-based hand  that contains at least 60 to 95% alcohol, covering all surfaces of your hands and rubbing them together until they feel dry  Soap and water should be used preferentially if hands are visibly dirty  Avoid sharing personal household items   You should not share dishes, drinking glasses, cups, eating utensils, towels, or bedding with other people or pets in your home  After using these items, they should be washed thoroughly with soap and water  Clean your hands often  Wash your hands often with soap and water for at least 20 seconds  If soap and water are not available, clean your hands with an alcohol-based hand  that contains at least 60% alcohol, covering all surfaces of your hands and rubbing them together until they feel dry  Soap and water should be used preferentially if hands are visibly dirty  Avoid touching your eyes, nose, and mouth with unwashed hands  Clean all "high-touch" surfaces every day  High touch surfaces include counters, tabletops, doorknobs, bathroom fixtures, toilets, phones, keyboards, tablets, and bedside tables  Also, clean any surfaces that may have blood, stool, or body fluids on them  Use a household cleaning spray or wipe, according to the label instructions  Labels contain instructions for safe and effective use of the cleaning product including precautions you should take when applying the product, such as wearing gloves and making sure you have good ventilation during use of the product  Monitor your symptoms  Seek prompt medical attention if your illness is worsening (e g , difficulty breathing)  Before seeking care, call your healthcare provider and tell them that you have, or are being evaluated for, COVID-19  Put on a facemask before you enter the facility  These steps will help the healthcare provider's office to keep other people in the office or waiting room from getting infectedor exposed     Ask your healthcare provider to call the local or state health department  Persons who are placed under active monitoring or facilitated self-monitoring should follow instructions provided by their local health department or occupational health professionals, as appropriate  If you have a medical emergency and need to call 911, notify the dispatch personnel that you have, or are being evaluated for COVID-19  If possible, put on a facemask before emergency medical services arrive  Discontinuing home isolation  Patients with confirmed COVID-19 should remain under home isolation precautions until the risk of secondary transmission to others is thought to be low  The decision to discontinue home isolation precautions should be made on a case-by-case basis, in consultation with healthcare providers and [facility] health departments  For more information: www cdc gov/SDXBF24YE 331064-O 02/24/2020     Stay home when you are sick,except to get medical care  Wash your hands often with soap and water for at least 20 seconds  Cover your cough or sneeze with a tissue, then throw the tissue in the trash  Clean and disinfect frequently touched objects and surfaces  Avoid touching your eyes, nose, and mouth  STOP THE SPREAD OF GERMS  For more information: www cdc gov/COVID19 Avoid close contact with people who are sick  Help prevent the spread of respiratory diseases like COVID-19
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
## 2020-08-11 NOTE — PROGRESS NOTES
Patient visible on the unit, pleasant and cooperative  Denies S/I H/I anxiety and depression  Complaint with medications and meals  Able to make needs known  Continual monitoring maintained

## 2020-08-11 NOTE — DISCHARGE INSTR - APPOINTMENTS
The treatment recommends that you obtain ongoing psychiatric medication management, individual therapy, and substance abuse rehab counseling  NO INSURANCE    You do not have medical insurance at this time  Because of the lack of insurance, outpatient services are unable to be set up for you, upon discharge  A telehealth phone Intake appointment has been scheduled in your behalf on Monday, August 17, 2020 at 9:30 am, with administrative , Bennett Owusu, from [facility], [address] [place] [address], [address]; Phone:  [phone]; Fax:  [phone]  Please promptly answer Mitchell's call to you, at [phone], and provide information, as requested  This liability appointment will help [facility], while working towards receiving medical insurance  Please notify Oly Isaac of unpaid medical bills, proof of any income, and copy of checking and savings account statements, if any  Your summary of care will be faxed to this provider for continuity of care  Oly Isaac will discuss with you a referral to [facility] for which you will be scheduled for an Intake appointment  A telehealth phone Intake appointment has been scheduled in your behalf on Monday, August 17, 2020 at 1:30 pm, with substance abuse counselor, Dyan Hardin, from [address] [place] [address] [facility], [address] [place] [address], [address]; Phone:  [phone]; Fax:  [phone]  Please promptly answer Amelia Lea's call to you, at [phone], and provide information, as requested  During this appointment, Laurel Trinh will explain substance abuse counseling services available to you and schedule you for sessions, as appropriate  Your summary of care will be faxed to this provider for continuity of care      You declined the scheduling of an appointment with your primary care physician, Dr Love Romero, at [facility], [address] [place] [address], [facility] [address] [facility], [address]; Phone:  [phone]  At your request, your summary of care will not be faxed to this provider

## 2020-08-11 NOTE — PROGRESS NOTES
Patient bright, alert in am, eating meals with peers, selectively social, pleasant to staff  Pt talking loudly but calmly on phone  Talking to unknown party about drugs and was asked to change the subject by this nurse and he was agreeable  Denies si hi and hallucinations  Will maintain on safety precautions and continual monitoring,  No needs identified

## 2020-08-11 NOTE — PLAN OF CARE
Problem: Ineffective Coping  Goal: Participates in unit activities  Description: Interventions:  - Provide therapeutic environment   - Provide required programming   - Redirect inappropriate behaviors   Outcome: Progressing   Patient attending and talkative in group settings and face to face with this writer  Patient able to disclose issues with family, drugs/alcohol and still struggles with the death of his father  Patient did approach this writer for lists of AA/NA meetings in his area which was provided to him  Patient expressed desire to stay clean and obtain GED and eventually go to Nursing School to work with others in recovery  Social on unit with staff and peers

## 2020-08-11 NOTE — PLAN OF CARE
Problem: DISCHARGE PLANNING - CARE MANAGEMENT  Goal: Discharge to post-acute care or home with appropriate resources  Description: INTERVENTIONS:  - Conduct assessment to determine patient/family and health care team treatment goals, and need for post-acute services based on payer coverage, community resources, and patient preferences, and barriers to discharge  - Address psychosocial, clinical, and financial barriers to discharge as identified in assessment in conjunction with the patient/family and health care team  - Arrange appropriate level of post-acute services according to patients   needs and preference and payer coverage in collaboration with the physician and health care team  - Communicate with and update the patient/family, physician, and health care team regarding progress on the discharge plan  - Arrange appropriate transportation to post-acute venues  Outcome: Progressing    Pt agreed to participate in recovery services at [facility] D&A and for referral for [facility] funding to receive [address] services at [facility]

## 2020-08-11 NOTE — PROGRESS NOTES
Progress Note - [address] [place] [address] R Pullman 23 y o  male MRN: [id number]  Unit/Bed#: [facility] 254-02 Encounter: [id number]    Assessment/Plan   Principal Problem:    Bipolar 1 disorder, depressed, moderate ([facility] [address] )  Active Problems:    Tourette's syndrome    Tobacco use    Cannabis use disorder, severe, dependence ([facility] [address] )      Behavior over the last 24 hours:  Patient demanded to be put on lorazepam for anxiety just as another patient was recently started on the same medicine over the weekend  I informed the patient that given his substance use history Ativan is not a good choice  Patient became angry and upset and later tried to obtain the Ativan injectable for severe anxiety and once he realized it was discontinued he became more angry and refused any other alternative  Patient never had this kind of anxiety attacks in the 1st 3 days of admission and they start a caring once he realized that another peer obtained Ativan  Sleep: insomnia  Appetite: poor  Medication side effects: No  ROS: no complaints    Mental Status Evaluation:  Appearance:  casually dressed   Behavior:  guarded   Speech:  loud   Mood:  angry   Affect:  inappropriate   Thought Process:  circumstantial   Thought Content:  obsessions   Perceptual Disturbances: None   Risk Potential: Suicidal Ideations none  Homicidal Ideations none  Potential for Aggression No   Sensorium:  person and place   Memory:  recent and remote memory grossly intact   Consciousness:  awake    Attention: attention span appeared shorter than expected for age   Insight:  limited   Judgment: limited   Gait/Station: normal balance   Motor Activity: no abnormal movements     Progress Toward Goals: ongoing    Recommended Treatment: Continue with group therapy, milieu therapy and occupational therapy  Risks, benefits and possible side effects of Medications:   Patient does not verbalize understanding at this time and will require further explanation        Medications: continue current psychiatric medications  Labs: I have personally reviewed all pertinent laboratory/tests results  Counseling / Coordination of Care  Total floor / unit time spent today 25 minutes  Greater than 50% of total time was spent with the patient and / or family counseling and / or coordination of care   A description of the counseling / coordination of care:

## 2020-08-12 VITALS
BODY MASS INDEX: 27.97 KG/M2 | RESPIRATION RATE: 16 BRPM | DIASTOLIC BLOOD PRESSURE: 53 MMHG | HEIGHT: 66 IN | SYSTOLIC BLOOD PRESSURE: 126 MMHG | OXYGEN SATURATION: 97 % | TEMPERATURE: 97.5 F | WEIGHT: 174 LBS | HEART RATE: 71 BPM

## 2020-08-12 PROCEDURE — 99238 HOSP IP/OBS DSCHRG MGMT 30/<: CPT | Performed by: NURSE PRACTITIONER

## 2020-08-12 RX ORDER — QUETIAPINE FUMARATE 25 MG/1
25 TABLET, FILM COATED ORAL 2 TIMES DAILY
Qty: 60 TABLET | Refills: 0 | Status: SHIPPED | OUTPATIENT
Start: 2020-08-12 | End: 2021-04-21 | Stop reason: SDUPTHER

## 2020-08-12 RX ORDER — QUETIAPINE FUMARATE 100 MG/1
100 TABLET, FILM COATED ORAL
Qty: 30 TABLET | Refills: 0 | Status: SHIPPED | OUTPATIENT
Start: 2020-08-12 | End: 2021-04-21 | Stop reason: SDUPTHER

## 2020-08-12 RX ORDER — GABAPENTIN 300 MG/1
600 CAPSULE ORAL 3 TIMES DAILY
Qty: 180 CAPSULE | Refills: 0 | Status: SHIPPED | OUTPATIENT
Start: 2020-08-12 | End: 2021-04-21 | Stop reason: SDUPTHER

## 2020-08-12 RX ADMIN — NICOTINE 1 PATCH: 21 PATCH, EXTENDED RELEASE TRANSDERMAL at 08:09

## 2020-08-12 RX ADMIN — GABAPENTIN 600 MG: 300 CAPSULE ORAL at 08:09

## 2020-08-12 RX ADMIN — TRAZODONE HYDROCHLORIDE 100 MG: 50 TABLET ORAL at 00:47

## 2020-08-12 RX ADMIN — QUETIAPINE FUMARATE 25 MG: 25 TABLET ORAL at 08:09

## 2020-08-12 NOTE — PLAN OF CARE
Problem: SELF HARM/SUICIDALITY  Goal: Will have no self-injury during hospital stay  Description: INTERVENTIONS:  - Q 15 MINUTES: Routine safety checks  - Q WAKING SHIFT & PRN: Assess risk to determine if routine checks are adequate to maintain patient safety  - Encourage patient to participate actively in care by formulating a plan to combat response to suicidal ideation, identify supports and resources  Outcome: Completed     Problem: DEPRESSION  Goal: Will be euthymic at discharge  Description: INTERVENTIONS:  - Administer medication as ordered  - Provide emotional support via 1:1 interaction with staff  - Encourage involvement in milieu/groups/activities  - Monitor for social isolation  Outcome: Completed     Problem: ANXIETY  Goal: Will report anxiety at manageable levels  Description: INTERVENTIONS:  - Administer medication as ordered  - Teach and encourage coping skills  - Provide emotional support  - Assess patient/family for anxiety and ability to cope  Outcome: Completed  Goal: By discharge: Patient will verbalize 2 strategies to deal with anxiety  Description: Interventions:  - Identify any obvious source/trigger to anxiety  - Staff will assist patient in applying identified coping technique/skills  - Encourage attendance of scheduled groups and activities  Outcome: Completed     Problem: Ineffective Coping  Goal: Participates in unit activities  Description: Interventions:  - Provide therapeutic environment   - Provide required programming   - Redirect inappropriate behaviors   Outcome: Completed     Problem: SUBSTANCE USE/ABUSE  Goal: By discharge, will develop insight into their chemical dependency and sustain motivation to continue in recovery  Description: INTERVENTIONS:  - Attends all daily group sessions and scheduled AA groups  - Actively practices coping skills through participation in the therapeutic community and adherence to program rules  - Reviews and completes assignments from individual treatment plan  - Assist patient development of understanding of their personal cycle of addiction and relapse triggers  Outcome: Completed  Goal: By discharge, patient will have ongoing treatment plan addressing chemical dependency  Description: INTERVENTIONS:  - Assist patient with resources and/or appointments for ongoing recovery based living  Outcome: Completed     Problem: DISCHARGE PLANNING - CARE MANAGEMENT  Goal: Discharge to post-acute care or home with appropriate resources  Description: INTERVENTIONS:  - Conduct assessment to determine patient/family and health care team treatment goals, and need for post-acute services based on payer coverage, community resources, and patient preferences, and barriers to discharge  - Address psychosocial, clinical, and financial barriers to discharge as identified in assessment in conjunction with the patient/family and health care team  - Arrange appropriate level of post-acute services according to patients   needs and preference and payer coverage in collaboration with the physician and health care team  - Communicate with and update the patient/family, physician, and health care team regarding progress on the discharge plan  - Arrange appropriate transportation to post-acute venues  Outcome: Completed

## 2020-08-12 NOTE — PROGRESS NOTES
Patient slept through the night without incident  q7min checks maintained  Will continue to monitor

## 2020-08-12 NOTE — PROGRESS NOTES
Pt is due to discharge today  Pt and floor staff have reviewed all of the pt belongings  Pt has verified that all belongings are present and accounted for as of this time   The followings is a completed list of the pt belongings  1 pr pants   3 pr socks  1 pr underware   2 belt   1 pr shoes   1 hooded sweatshirt   1 pr torn umderware   1 hasp lock sweater   1 pr shorts   3 talisha shirts   1 dial soap bottle

## 2020-08-12 NOTE — NURSING NOTE
All discharge instructions and medications discussed  Pt left unit with AVS and belongings  No needs identified

## 2020-08-12 NOTE — PROGRESS NOTES
Late Entry- Met with patient today by request of RN VN  Was informed patient is upset and poorly redirectable following review of med regimen  On approach, pt was visibly anxious and pacing, tearful, twirling hair excessively  Expressed frustration with chronic anxiety and minimally effective medication  Aired frustration with care team and their inability to help him  Pt processed with this writer for approx 30 minutes  Is able to talk through treatment history while also discussing history of substance abuse as a result of self medicating  Pt does share expectation of medication to completely resolve anxiety  Discussed coping strategies and interventions necessary in combination with medication, along with involving his support system which he informs includes his girlfriend  Pt does report use of marijuana at home which he states helps the most  Pt does express feeling that a narcotic anxiolytic is necessary in his regimen, but acknowledges potential for tolerance and habit forming behaviors  Pt shares that he is hopeful for discharge tomorrow and is applying for insurance to be able to continue treatment  Pt continued to pace his room throughout discussion, crying on and off, but reports it takes him some time to calm down  Pt stated he would seek out staff for further needs and was thankful for conversation

## 2020-08-12 NOTE — PROGRESS NOTES
08/12/20 0946   Team Meeting   Meeting Type Daily Rounds   Initial Conference Date 08/12/20   Patient/Family Present   Patient Present No   Patient's Family Present No     Team Members Present:   MD Alan Cardoso PA-C  [address], [place], [facility]    DC at 11 am   [facility] D&A services [facility]

## 2020-08-12 NOTE — PROGRESS NOTES
Patient bright, alert, awake walking in hallways, visible in milieu, ate meals with peers, compliant with medications  Denies si hi and hallucinations  Pt feels ready for discharge today and is agreeable to follow up care as recommended by treatment team here  Will maintain on safety precautions and continual monitoring  No needs identified

## 2020-08-12 NOTE — SOCIAL WORK
SW met with pt who feels ready for discharge, having denied SI / HI  Pt is willing to follow through with Intakes scheduled in his behalf at [facility] MH[facility]DS and [facility] D&A  Pt wants to work and verbalized understanding of importance of sobriety in his life

## 2020-08-12 NOTE — PROGRESS NOTES
Patient irritable and agitated at start of shift yelling and hitting walls after having a bad phone call  Patient able to calm down and be redirected to quiet room  Patient laid in quiet room for a short time and was able to relax  Patient had phone calls later and was slightly agitated at times but was able to control behavior and calm down rather than become aggressive  Patient pleasant and cooperative at this time  Will continue to monitor

## 2020-08-12 NOTE — BH TRANSITION RECORD
Contact Information: If you have any questions, concerns, pended studies, tests and/or procedures, or emergencies regarding your inpatient behavioral health visit  Please contact [facility] behavioral health unit [phone] and ask to speak to a , nurse or physician  A contact is available 24 hours/ 7 days a week at this number  Summary of Procedures Performed During your Stay:  Below is a list of major procedures performed during your hospital stay and a summary of results:  - No major procedures performed  Pending Studies (From admission, onward)    None        If studies are pending at discharge, follow up with your PCP and/or referring provider

## 2020-08-12 NOTE — PROGRESS NOTES
08/12/20 0783   Activity/Group Checklist   Group Community meeting   Attendance Attended   Attendance Duration (min) 46-60   Interactions Interacted appropriately   Affect/Mood Appropriate;Bright;Normal range   Goals Achieved Identified feelings; Discussed discharge plans; Able to listen to others; Able to engage in interactions; Able to self-disclose

## 2020-08-12 NOTE — DISCHARGE SUMMARY
Discharge Summary - [address] [place] [address] R Atwood 23 y o  male MRN: [id number]  Unit/Bed#: [facility] 865-08 Encounter: [id number]     Admission Date: 2020         Discharge Date: 2020 11:26 AM    Attending Psychiatrist: Jolene Waldrop MD    Reason for Admission/HPI:     Principal Problem:    Bipolar 1 disorder, depressed, moderate ([facility] [address] )  Active Problems:    Tourette's syndrome    Tobacco use    Cannabis use disorder, severe, dependence ([facility] [address] )    Kayce Delgado is a 17-year-old male patient admitted on a voluntary 12 commitment basis to the adult behavioral health unit due to depression and aggressive behavior  Per crisis intake completed by Crisis Worker Bebeto Dose:    Pt presents w/ family due to a 36 warrant  Pt is A&O X 4, medically clear, calm and cooperative  Pt was aware of the 302 warrant and verbalized an understanding of what an involuntary committment was  Pt was read his [employer] 568K Rights and offered a copy, along w/ a copy of his Bizo Zendejas of Rights both of which he declined and requested they be placed on his chart  Pt was able to answer all assessment questions w/ appropriate elaboration  Pt denies any active SI or plan but states he has a general passive SI all the time, e g  pt has no will to live and lacks motivation to do anything that used to bring him happpiness but rather just feels hopeless and wishes he would die  Pt denies having any type of plan or act of furtherance  Pt denies any HI or thoughts to hurt others in any way  Pt denies any AH, VH, or delusions  Pt further states he was clean and sober for over 2 years but he relapsed after his father  and has been using marijuana only lately  Pt is experiencing increased depression and increased anxiety which he states has been getting progressively worse for just over a year  Pt attributes his depression to having found his father passed away in 2019   Pt is also experiencing increased irritability/agitation lately and has acted out aggressively toward family members  Pt denies any appetite issues but states his sleep has been poor  Pt is in agreement w/ I/P psych admission and is requesting to downgrade to a voluntary status  Pt's voluntary paperwork, e g  201, and his Rights were explained in detail  Pt signed his 61 51 81 and was given a copy of his Rights along w/ a copy of the Voluntary Pt Handout  Per initial psychiatric evaluation completed by Dr Rachael Tai:    Patient is a 23 y o  male presents with worsening of depression, mood swings, agitation and suicidal thoughts  Patient initially presented to the emergency room on a 302 by his family after becoming very agitated and making threats to hurt himself and his family  Patient reports that family members have confronted him about using drugs and he denied using any drugs and then things escalated  Patient reports that he currently only smokes marijuana but in the past he used methamphetamine and Percocets  He reports he also sells drugs and has been in juvenile [facility] for about 9 months for drug related charges  Patient denies having any probation currently or any legal troubles  Patient has history of 2 previous psychiatric hospitalizations while being an adolescent  He reports that once he overdosed on Xanax and Percocet while being chased by the police so he does not get arrested with the drugs  Patient has significant family history of mental illness and addiction  His father  from drug overdose and he was methamphetamine user  His older brother does have history of substance use  Hospital Course: The patient was admitted to the inpatient psychiatric unit and started on every 7 minutes precautions  During the hospitalization the patient was attending individual therapy, group therapy, milieu therapy and occupational therapy  Psychiatric medications were titrated over the hospital stay   To address mood instability and anxiety symptoms the patient was started on antipsychotic medication Seroquel and anxiolytic medication Neurontin  Medication doses were titrated during the hospital course  Prior to beginning of treatment medications risks and benefits and possible side effects including risk of parkinsonian symptoms, Tardive Dyskinesia and metabolic syndrome related to treatment with antipsychotic medications were reviewed with the patient  The patient verbalized understanding and agreement for treatment  Patient's symptoms improved gradually over the hospital course  At the end of treatment the patient was doing well  Mood was stable at the time of discharge  The patient denied suicidal ideation, intent or plan at the time of discharge and denied homicidal ideation, intent or plan at the time of discharge  There was no overt psychosis at the time of discharge  Sleep and appetite were improved  The patient was tolerating medications and was not reporting any significant side effects at the time of discharge  Since Marquita Coronel was doing well at the end of the hospitalization, treatment team felt that he could be safely discharged to outpatient care  The outpatient follow up with intake at [facility] MH/DS and family physician was arranged by the unit  upon discharge  Mental Status at time of Discharge:     Appearance:  casually dressed   Behavior:  normal   Speech:  normal pitch and normal volume   Mood:  normal   Affect:  normal   Thought Process:  normal   Thought Content:  normal   Perceptual Disturbances: None   Risk Potential: Patient denies any suicidal or homicidal ideations     Sensorium:  person, place and time/date   Cognition:  recent and remote memory grossly intact   Consciousness:  alert and awake    Attention: attention span and concentration were age appropriate   Insight:  fair   Judgment: fair   Gait/Station: normal gait/station and normal balance   Motor Activity: no abnormal movements     Admission Diagnosis:Depression [F32 9]  Psychiatric complaint [F69]    Discharge Diagnosis:   Principal Problem:    Bipolar 1 disorder, depressed, moderate ([facility])  Active Problems:    Tourette's syndrome    Tobacco use    Cannabis use disorder, severe, dependence ([facility] [address] )  Resolved Problems:    Medical clearance for psychiatric admission        Lab results:  Admission on 08/04/2020, Discharged on 08/12/2020   Component Date Value    Amph/Meth UR 08/04/2020 Negative     Barbiturate Ur 08/04/2020 Negative     Benzodiazepine Urine 08/04/2020 Negative     Cocaine Urine 08/04/2020 Negative     Methadone Urine 08/04/2020 Negative     Opiate Urine 08/04/2020 Negative     PCP Ur 08/04/2020 Negative     THC Urine 08/04/2020 Positive*    Oxycodone Urine 08/04/2020 Negative     EXTBreath Alcohol 08/04/2020 0 0     SARS-CoV-2 08/04/2020 Negative     TSH 3RD GENERATON 08/05/2020 0 580     RPR 08/05/2020 Non-Reactive     Color, UA 08/04/2020 Yellow     Clarity, UA 08/04/2020 Clear     Specific Gravity, UA 08/04/2020 1 025     pH, UA 08/04/2020 5 5     Leukocytes, UA 08/04/2020 Negative     Nitrite, UA 08/04/2020 Negative     Protein, UA 08/04/2020 Negative     Glucose, UA 08/04/2020 Negative     Ketones, UA 08/04/2020 Negative     Urobilinogen, UA 08/04/2020 0 2     Bilirubin, UA 08/04/2020 Negative     Blood, UA 08/04/2020 Negative     Sodium 08/05/2020 138     Potassium 08/05/2020 3 7     Chloride 08/05/2020 107     CO2 08/05/2020 23     ANION GAP 08/05/2020 8     BUN 08/05/2020 10     Creatinine 08/05/2020 0 67*    Glucose 08/05/2020 93     Glucose, Fasting 08/05/2020 93     Calcium 08/05/2020 8 9     AST 08/05/2020 15     ALT 08/05/2020 14     Alkaline Phosphatase 08/05/2020 60     Total Protein 08/05/2020 6 5     Albumin 08/05/2020 4 0     Total Bilirubin 08/05/2020 0 50     eGFR 08/05/2020 139     WBC 08/05/2020 7 40     RBC 08/05/2020 4 53     Hemoglobin 08/05/2020 13 8*    Hematocrit 08/05/2020 39 9*    MCV 08/05/2020 88     MCH 08/05/2020 30 4     MCHC 08/05/2020 34 5     RDW 08/05/2020 13 3     MPV 08/05/2020 7 2*    Platelets 62/17/6383 268     Neutrophils Relative 08/05/2020 34*    Lymphocytes Relative 08/05/2020 50     Monocytes Relative 08/05/2020 12     Eosinophils Relative 08/05/2020 5     Basophils Relative 08/05/2020 1     Neutrophils Absolute 08/05/2020 2 50     Lymphocytes Absolute 08/05/2020 3 70     Monocytes Absolute 08/05/2020 0 90     Eosinophils Absolute 08/05/2020 0 30     Basophils Absolute 08/05/2020 0 00     Cholesterol 08/05/2020 131     Triglycerides 08/05/2020 179*    HDL, Direct 08/05/2020 31*    LDL Calculated 08/05/2020 64     Non-HDL-Chol (CHOL-HDL) 08/05/2020 100        Discharge Medications:  Discharge Medication List as of 8/12/2020 10:53 AM      START taking these medications    Details   gabapentin (NEURONTIN) 300 mg capsule Take 2 capsules (600 mg total) by mouth 3 (three) times a day, Starting Wed 8/12/2020, Until Fri 9/11/2020, Normal      !! QUEtiapine (SEROquel) 100 mg tablet Take 1 tablet (100 mg total) by mouth daily at bedtime, Starting Wed 8/12/2020, Until Fri 9/11/2020, Normal      !! QUEtiapine (SEROquel) 25 mg tablet Take 1 tablet (25 mg total) by mouth 2 (two) times a day, Starting Wed 8/12/2020, Until Fri 9/11/2020, Normal       !! - Potential duplicate medications found  Please discuss with provider  Discharge Medication List as of 8/12/2020 10:53 AM         Discharge Medication List as of 8/12/2020 10:53 AM         Discharge Medication List as of 8/12/2020 10:53 AM      CONTINUE these medications which have NOT CHANGED    Details   simvastatin (ZOCOR) 20 mg tablet Take 20 mg by mouth daily at bedtime, Historical Med              Discharge instructions/Information to patient and family:   See after visit summary for information provided to patient and family        Provisions for Follow-Up Care:  See after visit summary for information related to follow-up care and any pertinent home health orders  Discharge Statement   I spent 30 minutes discharging the patient  This time was spent on the day of discharge  I had direct contact with the patient on the day of discharge  Additional documentation is required if more than 30 minutes were spent on discharge

## 2020-08-12 NOTE — DISCHARGE INSTRUCTIONS
How to Stop Smoking   WHAT YOU NEED TO KNOW:   You will improve your health and the health of others around you if you stop smoking  Your risk for heart and lung disease, cancer, stroke, heart attack, and vision problems will also decrease  You can benefit from quitting no matter how long you have smoked  DISCHARGE INSTRUCTIONS:   Prepare to stop smoking:  Nicotine is a highly addictive drug found in cigarettes  Withdrawal symptoms can happen when you stop smoking and make it hard to quit  These include anxiety, depression, irritability, trouble sleeping, and increased appetite  You increase your chances of success if you prepare to quit  · Set a quit date  [facility] a date that is within the next 2 weeks  Do not pick a day that you think may be stressful or busy  Write down the day or [facility] it on your calender  · Tell friends and family that you plan to quit  Explain that you may have withdrawal symptoms when you try to quit  Ask them to support you  They may be able to encourage you and help reduce your stress to make it easier for you to quit  · Make a list of your reasons for quitting  Put the list somewhere you will see it every day, such as your refrigerator  You can look at the list when you have a craving  · Remove all tobacco and nicotine products from your home, car, and workplace  Also, remove anything else that will tempt you to smoke, such as lighters, matches, or ashtrays  Clean your car, home, and places at work that smell like smoke  The smell of smoke can trigger a craving  · Identify triggers that make you want to smoke  This may include activities, feelings, or people  Also write down 1 way you can deal with each of your triggers  For example, if you want to smoke as soon as you wake up, plan another activity during this time, such as exercise  · Make a plan for how you will quit    Learn about the tools that can help you quit, such as medicine, counseling, or nicotine replacement therapy  Choose at least 2 options to help you quit  Tools to help you stop smoking:   · Counseling  from a trained healthcare provider can provide you with support and skills to quit smoking  The provider will also teach you to manage your withdrawal symptoms and cravings  You may receive counseling from one counselor, in group therapy, or through phone therapy called a quit line  · Nicotine replacement therapy (NRT)  such as nicotine patches, gum, or lozenges may help reduce your nicotine cravings  You may get these without a doctor's order  Do not use e-cigarettes or smokeless tobacco in place of cigarettes or to help you quit  They still contain nicotine  · Prescription medicines  such as nasal sprays or nicotine inhalers may help reduce your withdrawal symptoms  Other medicines may also be used to reduce your urge to smoke  Ask your healthcare provider about these medicines  You may need to start certain medicines 2 weeks before your quit date for them to work well  · Hypnosis  is a practice that helps guide you through thoughts and feelings  Hypnosis may help decrease your cravings and make you more willing to quit  · Acupuncture therapy  uses very thin needles to balance energy channels in the body  This is thought to help decrease cravings and symptoms of nicotine withdrawal      · Support groups  let you talk to others who are trying to quit or have already quit  It may be helpful to speak with others about how they quit  Manage your cravings:   · Avoid situations, people, and places that tempt you to smoke  Go to nonsmoking places, such as libraries or restaurants  Understand what tempts you and try to avoid these things  · Keep your hands busy  Hold things such as a stress ball or pen  · Put candy or toothpicks in your mouth  Keep lollipops, sugarless gum, or toothpicks with you at all times  · Do not have alcohol or caffeine    These drinks may tempt you to smoke  Drink healthy liquids such as water or juice instead  · Reward yourself when you resist your cravings  Rewards will motivate you and help you stay positive  · Do an activity that distracts you from your craving  Examples include going for a walk, exercising, or cleaning  Prevent weight gain after you quit:  You may gain a few pounds after you quit smoking  It is healthier for you to gain a few pounds than to continue to smoke  The following can help you prevent weight gain:  · Eat healthy foods  These include fruits, vegetables, whole-grain breads, low-fat dairy products, beans, lean meats, and fish  Eat healthy snacks, such as low-fat yogurt, if you get hungry between meals  · Drink water before, during, and between meals  This will make your stomach feel full and help prevent you from overeating  Ask your healthcare provider how much liquid to drink each day and which liquids are best for you  · Exercise  Take a walk or do some kind of exercise every day  Ask your healthcare provider what exercise is right for you  This may help reduce your cravings and reduce stress  For support and more information:   · Smokefree  gov  Phone: 3- 606 - 082-9934  Web Address: www smokefree  gov  © 2017 [address] [employer] Information is for End User's use only and may not be sold, redistributed or otherwise used for commercial purposes  All illustrations and images included in CareNotes® are the copyrighted property of [employer]  or Lonnie Strickland  The above information is an  only  It is not intended as medical advice for individual conditions or treatments  Talk to your doctor, nurse or pharmacist before following any medical regimen to see if it is safe and effective for you

## 2020-09-16 ENCOUNTER — OFFICE VISIT (OUTPATIENT)
Dept: INTERNAL MEDICINE CLINIC | Facility: CLINIC | Age: 20
End: 2020-09-16
Payer: COMMERCIAL

## 2020-09-16 VITALS
DIASTOLIC BLOOD PRESSURE: 80 MMHG | BODY MASS INDEX: 30.86 KG/M2 | WEIGHT: 192 LBS | RESPIRATION RATE: 18 BRPM | OXYGEN SATURATION: 98 % | HEIGHT: 66 IN | HEART RATE: 71 BPM | TEMPERATURE: 98.5 F | SYSTOLIC BLOOD PRESSURE: 120 MMHG

## 2020-09-16 DIAGNOSIS — Z79.899 ENCOUNTER FOR MONITORING SUBOXONE MAINTENANCE THERAPY: ICD-10-CM

## 2020-09-16 DIAGNOSIS — F12.20 CANNABIS USE DISORDER, SEVERE, DEPENDENCE (HCC): ICD-10-CM

## 2020-09-16 DIAGNOSIS — F19.20 POLYSUBSTANCE DEPENDENCE INCLUDING OPIOID TYPE DRUG WITH COMPLICATION, CONTINUOUS USE (HCC): Primary | ICD-10-CM

## 2020-09-16 DIAGNOSIS — Z72.0 TOBACCO USE: ICD-10-CM

## 2020-09-16 DIAGNOSIS — Z91.89 HISTORY OF DRUG OVERDOSE: ICD-10-CM

## 2020-09-16 DIAGNOSIS — F31.32 BIPOLAR 1 DISORDER, DEPRESSED, MODERATE (HCC): ICD-10-CM

## 2020-09-16 DIAGNOSIS — Z51.81 ENCOUNTER FOR MONITORING SUBOXONE MAINTENANCE THERAPY: ICD-10-CM

## 2020-09-16 PROBLEM — K76.0 FATTY LIVER: Status: ACTIVE | Noted: 2018-07-18

## 2020-09-16 PROBLEM — F95.2 TOURETTE'S: Status: ACTIVE | Noted: 2018-03-28

## 2020-09-16 PROBLEM — E78.1 HYPERTRIGLYCERIDEMIA: Status: ACTIVE | Noted: 2018-07-18

## 2020-09-16 PROBLEM — G44.209 ACUTE NON INTRACTABLE TENSION-TYPE HEADACHE: Status: RESOLVED | Noted: 2019-04-22 | Resolved: 2020-09-16

## 2020-09-16 PROBLEM — E78.2 MIXED HYPERLIPIDEMIA: Status: ACTIVE | Noted: 2018-07-18

## 2020-09-16 PROBLEM — R07.89 ATYPICAL CHEST PAIN: Status: RESOLVED | Noted: 2019-04-22 | Resolved: 2020-09-16

## 2020-09-16 PROBLEM — F90.9 ATTENTION DEFICIT HYPERACTIVITY DISORDER: Status: ACTIVE | Noted: 2018-03-28

## 2020-09-16 PROBLEM — Z78.9 ELECTRONIC CIGARETTE USE: Status: ACTIVE | Noted: 2020-09-16

## 2020-09-16 PROCEDURE — 99204 OFFICE O/P NEW MOD 45 MIN: CPT | Performed by: INTERNAL MEDICINE

## 2020-09-16 PROCEDURE — 80307 DRUG TEST PRSMV CHEM ANLYZR: CPT | Performed by: INTERNAL MEDICINE

## 2020-09-16 RX ORDER — BUPRENORPHINE HYDROCHLORIDE AND NALOXONE HYDROCHLORIDE DIHYDRATE 8; 2 MG/1; MG/1
TABLET SUBLINGUAL
Qty: 2 TABLET | Refills: 0 | Status: SHIPPED | OUTPATIENT
Start: 2020-09-16 | End: 2020-09-23

## 2020-09-16 RX ORDER — NALOXONE HYDROCHLORIDE 4 MG/.1ML
1 SPRAY NASAL AS NEEDED
Qty: 1 EACH | Refills: 1 | Status: SHIPPED | OUTPATIENT
Start: 2020-09-16 | End: 2021-12-01 | Stop reason: HOSPADM

## 2020-09-16 RX ORDER — BUPRENORPHINE HYDROCHLORIDE AND NALOXONE HYDROCHLORIDE DIHYDRATE 8; 2 MG/1; MG/1
TABLET SUBLINGUAL
Qty: 14 TABLET | Refills: 0 | Status: SHIPPED | OUTPATIENT
Start: 2020-09-16 | End: 2020-09-23

## 2020-09-16 NOTE — PROGRESS NOTES
BMI Counseling: There is no height or weight on file to calculate BMI  The BMI is above normal  Nutrition recommendations include decreasing portion sizes and encouraging healthy choices of fruits and vegetables  Exercise recommendations include moderate physical activity 150 minutes/week  No pharmacotherapy was ordered  Tobacco Cessation Counseling: Tobacco cessation counseling was provided  The patient is sincerely urged to quit consumption of tobacco  He is not ready to quit tobacco  Medication options discussed  Side effects of medication not discussed  Patient refused medication     Assessment/Plan:  Problem List Items Addressed This Visit        Other    Bipolar 1 disorder, depressed, moderate (HCC)    Relevant Medications    buprenorphine-naloxone (SUBOXONE) 8-2 mg per SL tablet    buprenorphine-naloxone (SUBOXONE) 8-2 mg per SL tablet    naloxone (Narcan) 4 mg/0 1 mL nasal spray    Tobacco use    Cannabis use disorder, severe, dependence ([facility])    Relevant Medications    buprenorphine-naloxone (SUBOXONE) 8-2 mg per SL tablet    buprenorphine-naloxone (SUBOXONE) 8-2 mg per SL tablet    naloxone (Narcan) 4 mg/0 1 mL nasal spray    Polysubstance dependence including opioid type drug with complication, continuous use ([facility]) - Primary    Relevant Medications    buprenorphine-naloxone (SUBOXONE) 8-2 mg per SL tablet    buprenorphine-naloxone (SUBOXONE) 8-2 mg per SL tablet    naloxone (Narcan) 4 mg/0 1 mL nasal spray    Encounter for monitoring Suboxone maintenance therapy    Relevant Medications    buprenorphine-naloxone (SUBOXONE) 8-2 mg per SL tablet    buprenorphine-naloxone (SUBOXONE) 8-2 mg per SL tablet    naloxone (Narcan) 4 mg/0 1 mL nasal spray    Other Relevant Orders    Suboxone    Toxicology screen, urine    History of drug overdose    Relevant Medications    buprenorphine-naloxone (SUBOXONE) 8-2 mg per SL tablet    buprenorphine-naloxone (SUBOXONE) 8-2 mg per SL tablet    naloxone (Narcan) 4 mg/0 1 mL nasal spray           Diagnoses and all orders for this visit:    Polysubstance dependence including opioid type drug with complication, continuous use (HCC)  -     buprenorphine-naloxone (SUBOXONE) 8-2 mg per SL tablet; Return to office with sample to be dispensed  -     buprenorphine-naloxone (SUBOXONE) 8-2 mg per SL tablet; One or two sl q day  -     naloxone (Narcan) 4 mg/0 1 mL nasal spray; 0 1 mL (4 mg total) into each nostril as needed (respiratory depression or possible OD)    Cannabis use disorder, severe, dependence (HCC)  -     buprenorphine-naloxone (SUBOXONE) 8-2 mg per SL tablet; Return to office with sample to be dispensed  -     buprenorphine-naloxone (SUBOXONE) 8-2 mg per SL tablet; One or two sl q day  -     naloxone (Narcan) 4 mg/0 1 mL nasal spray; 0 1 mL (4 mg total) into each nostril as needed (respiratory depression or possible OD)    History of drug overdose  -     buprenorphine-naloxone (SUBOXONE) 8-2 mg per SL tablet; Return to office with sample to be dispensed  -     buprenorphine-naloxone (SUBOXONE) 8-2 mg per SL tablet; One or two sl q day  -     naloxone (Narcan) 4 mg/0 1 mL nasal spray; 0 1 mL (4 mg total) into each nostril as needed (respiratory depression or possible OD)    Encounter for monitoring Suboxone maintenance therapy  -     buprenorphine-naloxone (SUBOXONE) 8-2 mg per SL tablet; Return to office with sample to be dispensed  -     buprenorphine-naloxone (SUBOXONE) 8-2 mg per SL tablet; One or two sl q day  -     naloxone (Narcan) 4 mg/0 1 mL nasal spray; 0 1 mL (4 mg total) into each nostril as needed (respiratory depression or possible OD)  -     Suboxone  -     Toxicology screen, urine    Bipolar 1 disorder, depressed, moderate (HCC)  -     buprenorphine-naloxone (SUBOXONE) 8-2 mg per SL tablet; Return to office with sample to be dispensed  -     buprenorphine-naloxone (SUBOXONE) 8-2 mg per SL tablet; One or two sl q day    -     naloxone (Narcan) 4 mg/0 1 mL nasal spray; 0 1 mL (4 mg total) into each nostril as needed (respiratory depression or possible OD)    Tobacco use        No problem-specific Assessment & Plan notes found for this encounter  A/P: Pt RTC with the med and was dosed  After about 15 minutes, started to feel better and no s/s of side effects  Will be d/c'd on 16mg, tabs, dose 1/6 and continue current counseling  UDT obtained and sent to the lab  Discussed keep meds safe and out of reach of children  Will provide script for narcan as well  RTC one week for f/u  Scheduled Medication Review:  Pt's scheduled medication use was reviewed by myself/staff via the [employer] website  Pt's use has been found to be appropriate w/o any concerns for misuse by the patient  Pt's current conditions require continued scheduled medication use at this time  Future review for continued appropriate medication use and misuse will continue  Tobacco Cessation Counseling: Tobacco cessation counseling and education was provided  The patient is sincerely urged to quit consumption of tobacco  He is not ready to quit tobacco  The numerous health risks of tobacco consumption were discussed  If he decides to quit, there are a number of helpful adjunctive aids, and he can see me to discuss nicotine replacement therapy, chantix, or bupropion anytime in the future  Subjective:      Patient ID: Zenobia Elizalde is a 23 y o  male  WM present for his initial suboxone visit  Pt with a strong PMH for psychiatric illnesses and recently d/c from a 302 along with a long past drug use history  Pt with past incarcerations for drug related issues, but not currently on probation  Chart shows pt has had a history of not only using illicit drugs,but also trafficking  Chart reports past history of OD x2 and some suicidal violent ideations in the past  Pt has abused prescription narcs, benzo,  Meth, and moderate to heavy THC   Was clean for two years until relapsing early 2019 after the death of his father due to an OD  Has been clean since d/c from psych one month ago except for continued THC use  Attending counseling at [facility]  Started abusing THC at 11y/o and then quickly escalated to oral prescription narc abuse  By 16, moved on to meth and heroin nasally  Entered [place] detox in 2015, but relapsed  In 2017 was incarcerated and relapsed upon release  Then became clean for two years and relapsed 2019 after his father OD  The following portions of the patient's history were reviewed and updated as appropriate:   He has a past medical history of ADHD (attention deficit hyperactivity disorder), Bipolar disorder ([facility] [address] ), Depression, Hyperlipidemia, Hypertension, Psychiatric disorder, Substance abuse ([facility] [address] ), and Tourette syndrome  ,  does not have any pertinent problems on file  ,   has a past surgical history that includes Tympanostomy tube placement and TONSILECTOMY AND ADNOIDECTOMY  ,  family history includes Cirrhosis in his father; Coronary artery disease in his father; Hepatitis in his father; No Known Problems in his mother; Substance Abuse in his brother and father  ,   reports that he has been smoking cigarettes  He has a 20 00 pack-year smoking history  He has never used smokeless tobacco  He reports previous alcohol use  He reports current drug use  Drug: Marijuana  ,  is allergic to abilify [aripiprazole]     Current Outpatient Medications   Medication Sig Dispense Refill    gabapentin (NEURONTIN) 300 mg capsule Take 2 capsules (600 mg total) by mouth 3 (three) times a day 180 capsule 0    QUEtiapine (SEROquel) 100 mg tablet Take 1 tablet (100 mg total) by mouth daily at bedtime 30 tablet 0    QUEtiapine (SEROquel) 25 mg tablet Take 1 tablet (25 mg total) by mouth 2 (two) times a day 60 tablet 0    buprenorphine-naloxone (SUBOXONE) 8-2 mg per SL tablet Return to office with sample to be dispensed   2 tablet 0    buprenorphine-naloxone (SUBOXONE) 8-2 mg per SL tablet One or two sl q day  14 tablet 0    naloxone (NARCAN) 1 mg/mL intranasal 2 mL (2 mg total) into each nostril once for 1 dose 2 mL 1    naloxone (Narcan) 4 mg/0 1 mL nasal spray 0 1 mL (4 mg total) into each nostril as needed (respiratory depression or possible OD) 1 each 1     No current facility-administered medications for this visit  Review of Systems   Constitutional: Negative for activity change, chills, diaphoresis, fatigue and fever  HENT: Negative  Eyes: Negative for visual disturbance  Respiratory: Negative for cough, chest tightness, shortness of breath and wheezing  Cardiovascular: Negative for chest pain, palpitations and leg swelling  Gastrointestinal: Negative for abdominal pain, constipation, diarrhea, nausea and vomiting  Endocrine: Negative for cold intolerance and heat intolerance  Genitourinary: Negative for difficulty urinating, dysuria and frequency  Musculoskeletal: Negative for arthralgias, gait problem and myalgias  Neurological: Negative for dizziness, seizures, syncope, weakness, light-headedness and headaches  Psychiatric/Behavioral: Negative for confusion, dysphoric mood, hallucinations, self-injury, sleep disturbance and suicidal ideas  The patient is not nervous/anxious  PHQ-9 Depression Screening    PHQ-9:    Frequency of the following problems over the past two weeks:             Objective:  Vitals:    09/16/20 1233   BP: 120/80   BP Location: Left arm   Patient Position: Sitting   Cuff Size: Adult   Pulse: 71   Resp: 18   Temp: 98 5 °F (36 9 °C)   TempSrc: Temporal   SpO2: 98%   Weight: 87 1 kg (192 lb)   Height: 5' 6" (1 676 m)     Body mass index is 30 99 kg/m²  Physical Exam  Vitals signs and nursing note reviewed  Constitutional:       General: He is not in acute distress  Appearance: Normal appearance  HENT:      Head: Normocephalic and atraumatic        Mouth/Throat:      Mouth: Mucous membranes are moist    Eyes:      Extraocular Movements: Extraocular movements intact  Conjunctiva/sclera: Conjunctivae normal       Pupils: Pupils are equal, round, and reactive to light  Neck:      Musculoskeletal: Normal range of motion and neck supple  No neck rigidity or muscular tenderness  Vascular: No carotid bruit  Cardiovascular:      Rate and Rhythm: Normal rate and regular rhythm  Heart sounds: Normal heart sounds  Pulmonary:      Effort: Pulmonary effort is normal  No respiratory distress  Breath sounds: Normal breath sounds  No wheezing or rales  Abdominal:      General: Bowel sounds are normal  There is no distension  Palpations: Abdomen is soft  Tenderness: There is no abdominal tenderness  Musculoskeletal:      Right lower leg: No edema  Left lower leg: No edema  Lymphadenopathy:      Cervical: No cervical adenopathy  Neurological:      General: No focal deficit present  Mental Status: He is alert and oriented to person, place, and time  Mental status is at baseline  Cranial Nerves: No cranial nerve deficit  Sensory: No sensory deficit  Motor: No weakness  Gait: Gait normal    Psychiatric:         Mood and Affect: Mood normal          Behavior: Behavior normal          Thought Content: Thought content normal          Judgment: Judgment normal          BMI Counseling: Body mass index is 30 99 kg/m²  The BMI is above normal  Nutrition recommendations include reducing portion sizes, decreasing overall calorie intake, reducing intake of saturated fat and trans fat and reducing intake of cholesterol  Exercise recommendations include moderate aerobic physical activity for 150 minutes/week

## 2020-09-16 NOTE — PATIENT INSTRUCTIONS
Naloxone (Into the nose)   Naloxone (nal-OX-one)  Treats opioid overdose in an emergency situation  Must be given as soon as possible  Brand Name(s): Narcan   There may be other brand names for this medicine  When This Medicine Should Not Be Used: This medicine is not right for everyone  Do not use it if you had an allergic reaction to naloxone  How to Use This Medicine:   Spray  · Your doctor will tell you how much medicine to use  Do not use more than directed  · This medicine must be given to you (the patient) by someone else  Talk with people close to you so they know what to do in case of an emergency  · Read and follow the patient instructions that come with this medicine  Talk to your doctor or pharmacist if you have any questions  · This medicine is for use only in the nose  Do not get any of it in your eyes or on your skin  If it does get on these areas, rinse it off right away  · To use:   ¨ Each nasal spray contains only one dose of naloxone  Do not prime or test the nasal spray  ¨ Hold the nasal spray with your thumb on the bottom of the plunger and your first and middle fingers on either side of the nozzle  ¨ Lay the patient on his or her back  Support the patient's neck with your hand and let the head tilt back  ¨ Gently insert the tip of the nozzle into one nostril, until your fingers on either side of the nozzle are against the bottom of the patient's nose  ¨ Press the plunger firmly to give the dose  Remove the nasal spray from the patient's nose  ¨ Move the patient on his or her side (recovery position)  Get emergency medical help right away  ¨ Watch the patient closely  If needed, you may give more doses every 2 to 3 minutes until the patient responds  Use a new nasal spray for each dose and spray the medicine into the other nostril each time  · Store the medicine in a closed container at room temperature, away from heat, moisture, and direct light  Do not freeze    · Ask your pharmacist about the best way to dispose of medicine that you do not use  Drugs and Foods to Avoid:      Ask your doctor or pharmacist before using any other medicine, including over-the-counter medicines, vitamins, and herbal products  Warnings While Using This Medicine:   · Tell your doctor if you are pregnant or breastfeeding, or if you have heart or blood vessel disease  · This medicine should be given right away after a suspected or known overdose of an opioid (narcotic) medicine  This will help prevent serious breathing problems and severe sleepiness that can lead to death  · The effects of the opioid medicine may last longer than the effects of the naloxone  This means the breathing problems and sleepiness could come back  Always call for emergency help after the first dose of naloxone  · This medicine could cause withdrawal symptoms from the opioid medicine  · Keep all medicine out of the reach of children  Never share your medicine with anyone  Possible Side Effects While Using This Medicine:   Call your doctor right away if you notice any of these side effects:  · Allergic reaction: Itching or hives, swelling in your face or hands, swelling or tingling in your mouth or throat, chest tightness, trouble breathing  · Crying more than the usual (in babies)  · Diarrhea, nausea, vomiting, stomach cramps  · Fast, pounding, or uneven heartbeat  · Fever, runny nose, sneezing, sweating, yawning  · Ongoing trouble breathing  · Seizure, shaking, or feeling restless, nervous, or irritable  If you notice these less serious side effects, talk with your doctor:   · Headache  · Joint or muscle pain  If you notice other side effects that you think are caused by this medicine, tell your doctor  Call your doctor for medical advice about side effects   You may report side effects to FDA at 9-767-FDA-3354  © 2017 [address] [employer] Information is for End User's use only and may not be sold, redistributed or otherwise used for commercial purposes  The above information is an  only  It is not intended as medical advice for individual conditions or treatments  Talk to your doctor, nurse or pharmacist before following any medical regimen to see if it is safe and effective for you  Buprenorphine/Naloxone (Into the mouth)   Buprenorphine (bue-pre-NOR-feen), Naloxone (nal-OX-one)  Treats narcotic dependence  Brand Name(s): Bunavail, Suboxone, Zubsolv   There may be other brand names for this medicine  When This Medicine Should Not Be Used: This medicine is not right for everyone  Do not use it if you had an allergic reaction to buprenorphine or naloxone  How to Use This Medicine: Thin Sheet, Tablet  · Take your medicine as directed  Your dose may need to be changed several times to find what works best for you  · You must let the medicine dissolve  Never swallow the film or tablet  Your body may not absorb enough of the medicine if you swallow it  · Your health caregiver should show you how to use the medicine  If you do not understand, ask for help  It is important to use the medicine correctly  · Do not talk while the medicine is in your mouth  · Buccal film: Rinse your mouth with water to moisten it  Place the film against the inside of your cheek  If your doctor told you to use more than 1 film, place the second film inside your other cheek  Do not place more than 2 films inside of 1 cheek at a time  Do not move or touch the film  Do not eat or drink anything until the film is completely dissolved  · Sublingual tablet: Place the tablet under your tongue  If your doctor told you to use more than 1 tablet, place all of the tablets in different places under your tongue at the same time  You can use 2 tablets at a time until you have taken all of the medicine, if that is easier for you  Let the tablets dissolve completely in your mouth   Do not eat or drink anything until the tablets are completely dissolved  · Sublingual film: Drink some water to help moisten your mouth  Place the film under your tongue  If your doctor told you to use more than 1 film, place the second film on the opposite side from the first one  Do not move the film after you place it under your tongue  If you are supposed to use more than 2 films, use them the same way, but do not start until the first 2 films are completely dissolved  · Do not break, crush, chew, or cut the film or tablet  · This medicine should come with a Medication Guide  Ask your pharmacist for a copy if you do not have one  · Missed dose: Take a dose as soon as you remember  If it is almost time for your next dose, wait until then and take a regular dose  Do not take extra medicine to make up for a missed dose  · Store the medicine in a closed container at room temperature, away from heat, moisture, and direct light  Ask your pharmacist about the best way to dispose of medicine you do not use  Drugs and Foods to Avoid:   Ask your doctor or pharmacist before using any other medicine, including over-the-counter medicines, vitamins, and herbal products  · Do not use this medicine if you are using or have used an MAO inhibitor within the past 14 days  · Some medicines can affect how buprenorphine/naloxone works  Tell your doctor if you are using the following:   ¨ Carbamazepine, erythromycin, ketoconazole, mirtazapine, phenobarbital, phenytoin, rifampin, tramadol, or trazodone  ¨ Medicine to treat depression  ¨ Medicine to treat HIV/AIDS (including atazanavir, delavirdine, efavirenz, etravirine, nevirapine, ritonavir)  ¨ Phenothiazine medicine  · Do not drink alcohol while you are using this medicine  · Tell your doctor if you use anything else that makes you sleepy  Some examples are allergy medicine, narcotic pain medicine, and alcohol  Tell your doctor if you are also using butorphanol, nalbuphine, pentazocine, or a muscle relaxer    Warnings While Using This Medicine:   · Tell your doctor if you are pregnant or breastfeeding, or if you have kidney disease, liver disease (including hepatitis), lung or breathing problems, adrenal gland problems, an enlarged prostate, trouble urinating, gallbladder problems, low thyroid levels, stomach problems, or a history of depression, brain tumor, head injury, alcohol or drug abuse  · This medicine may cause the following problems:  ¨ High risk of overdose, which can lead to death  ¨ Respiratory depression (serious breathing problem that can be life-threatening)  ¨ Liver problems  ¨ Serotonin syndrome, when used with certain medicines  · This medicine may make you dizzy or drowsy  Do not drive or do anything that could be dangerous until you know how this medicine affects you  Stand or sit up slowly if you feel lightheaded or dizzy  · Tell any doctor or dentist who treats you that you are using this medicine  · This medicine can be habit-forming  Do not use more than your prescribed dose  Call your doctor if you think your medicine is not working  · Do not stop using this medicine suddenly  Your doctor will need to slowly decrease your dose before you stop it completely  · This medicine could cause infertility  Talk with your doctor before using this medicine if you plan to have children  · Keep all medicine out of the reach of children  Never share your medicine with anyone    Possible Side Effects While Using This Medicine:   Call your doctor right away if you notice any of these side effects:  · Allergic reaction: Itching or hives, swelling in your face or hands, swelling or tingling in your mouth or throat, chest tightness, trouble breathing  · Blue lips, fingernails, or skin  · Dark urine or pale stools, nausea, vomiting, loss of appetite, stomach pain, yellow skin or eyes  · Extreme dizziness or weakness, shallow breathing, sweating, seizures, cold or clammy skin  · Severe confusion, lightheadedness, dizziness, or fainting  · Trouble breathing or slow breathing  If you notice these less serious side effects, talk with your doctor:   · Headache, trouble sleeping  · Constipation or upset stomach  · Shaking, feeling hot or cold, runny nose, watery eyes, diarrhea, vomiting, and muscle aches  If you notice other side effects that you think are caused by this medicine, tell your doctor  Call your doctor for medical advice about side effects  You may report side effects to FDA at 9-951-ROX-9883  © 2017 [address] [employer] Information is for End User's use only and may not be sold, redistributed or otherwise used for commercial purposes  The above information is an  only  It is not intended as medical advice for individual conditions or treatments  Talk to your doctor, nurse or pharmacist before following any medical regimen to see if it is safe and effective for you  Obesity   AMBULATORY CARE:   Obesity  is when your body mass index (BMI) is greater than 30  Your healthcare provider will use your height and weight to measure your BMI  The risks of obesity include  many health problems, such as injuries or physical disability  You may need tests to check for the following:  · Diabetes     · High blood pressure or high cholesterol     · Heart disease     · Gallbladder or liver disease     · Cancer of the colon, breast, prostate, liver, or kidney     · Sleep apnea     · Arthritis or gout  Seek care immediately if:   · You have a severe headache, confusion, or difficulty speaking  · You have weakness on one side of your body  · You have chest pain, sweating, or shortness of breath  Contact your healthcare provider if:   · You have symptoms of gallbladder or liver disease, such as pain in your upper abdomen  · You have knee or hip pain and discomfort while walking  · You have symptoms of diabetes, such as intense hunger and thirst, and frequent urination       · You have symptoms of sleep apnea, such as snoring or daytime sleepiness  · You have questions or concerns about your condition or care  Treatment for obesity  focuses on helping you lose weight to improve your health  Even a small decrease in BMI can reduce the risk for many health problems  Your healthcare provider will help you set a weight-loss goal   · Lifestyle changes  are the first step in treating obesity  These include making healthy food choices and getting regular physical activity  Your healthcare provider may suggest a weight-loss program that involves coaching, education, and therapy  · Medicine  may help you lose weight when it is used with a healthy diet and physical activity  · Surgery  can help you lose weight if you are very obese and have other health problems  There are several types of weight-loss surgery  Ask your healthcare provider for more information  Be successful losing weight:   · Set small, realistic goals  An example of a small goal is to walk for 20 minutes 5 days a week  Anther goal is to lose 5% of your body weight  · Tell friends, family members, and coworkers about your goals  and ask for their support  Ask a friend to lose weight with you, or join a weight-loss support group  · Identify foods or triggers that may cause you to overeat , and find ways to avoid them  Remove tempting high-calorie foods from your home and workplace  Place a bowl of fresh fruit on your kitchen counter  If stress causes you to eat, then find other ways to cope with stress  · Keep a diary to track what you eat and drink  Also write down how many minutes of physical activity you do each day  Weigh yourself once a week and record it in your diary  Eating changes: You will need to eat 500 to 1,000 fewer calories each day than you currently eat to lose 1 to 2 pounds a week  The following changes will help you cut calories:  · Eat smaller portions  Use small plates, no larger than 9 inches in diameter  Fill your plate half full of fruits and vegetables  Measure your food using measuring cups until you know what a serving size looks like  · Eat 3 meals and 1 or 2 snacks each day  Plan your meals in advance  [place] and eat at home most of the time  Eat slowly  · Eat fruits and vegetables at every meal   They are low in calories and high in fiber, which makes you feel full  Do not add butter, margarine, or cream sauce to vegetables  Use herbs to season steamed vegetables  · Eat less fat and fewer fried foods  Eat more baked or grilled chicken and fish  These protein sources are lower in calories and fat than red meat  Limit fast food  Dress your salads with olive oil and vinegar instead of bottled dressing  · Limit the amount of sugar you eat  Do not drink sugary beverages  Limit alcohol  Activity changes:  Physical activity is good for your body in many ways  It helps you burn calories and build strong muscles  It decreases stress and depression, and improves your mood  It can also help you sleep better  Talk to your healthcare provider before you begin an exercise program   · Exercise for at least 30 minutes 5 days a week  Start slowly  Set aside time each day for physical activity that you enjoy and that is convenient for you  It is best to do both weight training and an activity that increases your heart rate, such as walking, bicycling, or swimming  · Find ways to be more active  Do yard work and housecleaning  Walk up the stairs instead of using elevators  Spend your leisure time going to events that require walking, such as outdoor festivals or fairs  This extra physical activity can help you lose weight and keep it off  Follow up with your healthcare provider as directed: You may need to meet with a dietitian  Write down your questions so you remember to ask them during your visits     © 2017 [address] [employer] Information is for End User's use only and may not be sold, redistributed or otherwise used for commercial purposes  All illustrations and images included in CareNotes® are the copyrighted property of [employer]  or Lonnie Strickland  The above information is an  only  It is not intended as medical advice for individual conditions or treatments  Talk to your doctor, nurse or pharmacist before following any medical regimen to see if it is safe and effective for you  Low Fat Diet   AMBULATORY CARE:   A low-fat diet  is an eating plan that is low in total fat, unhealthy fat, and cholesterol  You may need to follow a low-fat diet if you have trouble digesting or absorbing fat  You may also need to follow this diet if you have high cholesterol  You can also lower your cholesterol by increasing the amount of fiber in your diet  Soluble fiber is a type of fiber that helps to decrease cholesterol levels  Different types of fat in food:   · Limit unhealthy fats  A diet that is high in cholesterol, saturated fat, and trans fat may cause unhealthy cholesterol levels  Unhealthy cholesterol levels increase your risk of heart disease  ¨ Cholesterol:  Limit intake of cholesterol to less than 200 mg per day  Cholesterol is found in meat, eggs, and dairy  ¨ Saturated fat:  Limit saturated fat to less than 7% of your total daily calories  Ask your dietitian how many calories you need each day  Saturated fat is found in butter, cheese, ice cream, whole milk, and palm oil  Saturated fat is also found in meat, such as beef, pork, chicken skin, and processed meats  Processed meats include sausage, hot dogs, and bologna  ¨ Trans fat:  Avoid trans fat as much as possible  Trans fat is used in fried and baked foods  Foods that say trans fat free on the label may still have up to 0 5 grams of trans fat per serving  · Include healthy fats  Replace foods that are high in saturated and trans fat with foods high in healthy fats   This may help to decrease high cholesterol levels  ¨ Monounsaturated fats: These are found in avocados, nuts, and vegetable oils, such as olive, canola, and sunflower oil  ¨ Polyunsaturated fats: These can be found in vegetable oils, such as soybean or corn oil  Omega-3 fats can help to decrease the risk of heart disease  Omega-3 fats are found in fish, such as salmon, herring, trout, and tuna  Omega-3 fats can also be found in plant foods, such as walnuts, flaxseed, soybeans, and canola oil    Foods to limit or avoid:   · Grains:      ¨ Snacks that are made with partially hydrogenated oils, such as chips, regular crackers, and butter-flavored popcorn    ¨ High-fat baked goods, such as biscuits, croissants, doughnuts, pies, cookies, and pastries    · Dairy:      ¨ Whole milk, 2% milk, and yogurt and ice cream made with whole milk    ¨ Half and half creamer, heavy cream, and whipping cream    ¨ Cheese, cream cheese, and sour cream    · Meats and proteins:      ¨ High-fat cuts of meat (T-bone steak, regular hamburger, and ribs)    ¨ Fried meat, poultry (turkey and chicken), and fish    ¨ Poultry (chicken and turkey) with skin    ¨ Cold cuts (salami or bologna), hot dogs, calderon, and sausage    ¨ Whole eggs and egg yolks    · Vegetables and fruits with added fat:      ¨ Fried vegetables or vegetables in butter or high-fat sauces, such as cream or cheese sauces    ¨ Fried fruit or fruit served with butter or cream    · Fats:      ¨ Butter, stick margarine, and shortening    ¨ Coconut, palm oil, and palm kernel oil  Foods to include:   · Grains:      ¨ Whole-grain breads, cereals, pasta, and brown rice    ¨ Low-fat crackers and pretzels    · Vegetables and fruits:      ¨ Fresh, frozen, or canned vegetables (no salt or low-sodium)    ¨ Fresh, frozen, dried, or canned fruit (canned in light syrup or fruit juice)    ¨ Avocado    · Low-fat dairy products:      ¨ Nonfat (skim) or 1% milk    ¨ Nonfat or low-fat cheese, yogurt, and cottage cheese    · Meats and proteins:      ¨ Chicken or turkey with no skin    ¨ Baked or broiled fish    ¨ Lean beef and pork (loin, round, extra lean hamburger)    ¨ Beans and peas, unsalted nuts, soy products    ¨ Egg whites and substitutes    ¨ Seeds and nuts    · Fats:      ¨ Unsaturated oil, such as canola, olive, peanut, soybean, or sunflower oil    ¨ Soft or liquid margarine and vegetable oil spread    ¨ Low-fat salad dressing  Other ways to decrease fat:   · Read food labels before you buy foods  Choose foods that have less than 30% of calories from fat  Choose low-fat or fat-free dairy products  Remember that fat free does not mean calorie free  These foods still contain calories, and too many calories can lead to weight gain  · Trim fat from meat and avoid fried food  Trim all visible fat from meat before you cook it  Remove the skin from poultry  Do not gambino meat, fish, or poultry  Bake, roast, boil, or broil these foods instead  Avoid fried foods  Eat a baked potato instead of [place] fries  Steam vegetables instead of sautéing them in butter  · Add less fat to foods  Use imitation calderon bits on salads and baked potatoes instead of regular calderon bits  Use fat-free or low-fat salad dressings instead of regular dressings  Use low-fat or nonfat butter-flavored topping instead of regular butter or margarine on popcorn and other foods  Ways to decrease fat in recipes:  Replace high-fat ingredients with low-fat or nonfat ones  This may cause baked goods to be drier than usual  You may need to use nonfat cooking spray on pans to prevent food from sticking  You also may need to change the amount of other ingredients, such as water, in the recipe  Try the following:  · Use low-fat or light margarine instead of regular margarine or shortening  · Use lean ground turkey breast or chicken, or lean ground beef (less than 5% fat) instead of hamburger       · Add 1 teaspoon of canola oil to 8 ounces of skim milk instead of using cream or half and half  · Use grated zucchini, carrots, or apples in breads instead of coconut  · Use blenderized, low-fat cottage cheese, plain tofu, or low-fat ricotta cheese instead of cream cheese  · Use 1 egg white and 1 teaspoon of canola oil, or use ¼ cup (2 ounces) of fat-free egg substitute instead of a whole egg  · Replace half of the oil that is called for in a recipe with applesauce when you bake  Use 3 tablespoons of cocoa powder and 1 tablespoon of canola oil instead of a square of baking chocolate  How to increase fiber:  Eat enough high-fiber foods to get 20 to 30 grams of fiber every day  Slowly increase your fiber intake to avoid stomach cramps, gas, and other problems  · Eat 3 ounces of whole-grain foods each day  An ounce is about 1 slice of bread  Eat whole-grain breads, such as whole-wheat bread  Whole wheat, whole-wheat flour, or other whole grains should be listed as the first ingredient on the food label  Replace white flour with whole-grain flour or use half of each in recipes  Whole-grain flour is heavier than white flour, so you may have to add more yeast or baking powder  · Eat a high-fiber cereal for breakfast   Oatmeal is a good source of soluble fiber  Look for cereals that have bran or fiber in the name  Choose whole-grain products, such as brown rice, barley, and whole-wheat pasta  · Eat more beans, peas, and lentils  For example, add beans to soups or salads  Eat at least 5 cups of fruits and vegetables each day  Eat fruits and vegetables with the peel because the peel is high in fiber  © 2017 [address]  Information is for End User's use only and may not be sold, redistributed or otherwise used for commercial purposes  All illustrations and images included in CareNotes® are the copyrighted property of [employer]  or Lonnie Strickland  The above information is an  only   It is not intended as medical advice for individual conditions or treatments  Talk to your doctor, nurse or pharmacist before following any medical regimen to see if it is safe and effective for you  Heart Healthy Diet   AMBULATORY CARE:   A heart healthy diet  is an eating plan low in total fat, unhealthy fats, and sodium (salt)  A heart healthy diet helps decrease your risk for heart disease and stroke  Limit the amount of fat you eat to 25% to 35% of your total daily calories  Limit sodium to less than 2,300 mg each day  Healthy fats:  Healthy fats can help improve cholesterol levels  The risk for heart disease is decreased when cholesterol levels are normal  Choose healthy fats, such as the following:  · Unsaturated fat  is found in foods such as soybean, canola, olive, corn, and safflower oils  It is also found in soft tub margarine that is made with liquid vegetable oil  · Omega-3 fat  is found in certain fish, such as salmon, tuna, and trout, and in walnuts and flaxseed  Unhealthy fats:  Unhealthy fats can cause unhealthy cholesterol levels in your blood and increase your risk of heart disease  Limit unhealthy fats, such as the following:  · Cholesterol  is found in animal foods, such as eggs and lobster, and in dairy products made from whole milk  Limit cholesterol to less than 300 milligrams (mg) each day  You may need to limit cholesterol to 200 mg each day if you have heart disease  · Saturated fat  is found in meats, such as calderon and hamburger  It is also found in chicken or turkey skin, whole milk, and butter  Limit saturated fat to less than 7% of your total daily calories  Limit saturated fat to less than 6% if you have heart disease or are at increased risk for it  · Trans fat  is found in packaged foods, such as potato chips and cookies  It is also in hard margarine, some fried foods, and shortening  Avoid trans fats as much as possible    Heart healthy foods and drinks to include:  Ask your dietitian or healthcare provider how many servings to have from each of the following food groups:  · Grains:      ¨ Whole-wheat breads, cereals, and pastas, and brown rice    ¨ Low-fat, low-sodium crackers and chips    · Vegetables:      ¨ Broccoli, green beans, green peas, and spinach    ¨ Collards, kale, and lima beans    ¨ Carrots, sweet potatoes, tomatoes, and peppers    ¨ Canned vegetables with no salt added    · Fruits:      ¨ Bananas, peaches, pears, and pineapple    ¨ Grapes, raisins, and dates    ¨ Oranges, tangerines, grapefruit, orange juice, and grapefruit juice    ¨ Apricots, mangoes, melons, and papaya    ¨ Raspberries and strawberries    ¨ Canned fruit with no added sugar    · Low-fat dairy products:      ¨ Nonfat (skim) milk, 1% milk, and low-fat almond, cashew, or soy milks fortified with calcium    ¨ Low-fat cheese, regular or frozen yogurt, and cottage cheese    · Meats and proteins , such as lean cuts of beef and pork (loin, leg, round), skinless chicken and turkey, legumes, soy products, egg whites, and nuts  Foods and drinks to limit or avoid:  Ask your dietitian or healthcare provider about these and other foods that are high in unhealthy fat, sodium, and sugar:  · Snack or packaged foods , such as frozen dinners, cookies, macaroni and cheese, and cereals with more than 300 mg of sodium per serving    · Canned or dry mixes  for cakes, soups, sauces, or gravies    · Vegetables with added sodium , such as instant potatoes, vegetables with added sauces, or regular canned vegetables    · Other foods high in sodium , such as ketchup, barbecue sauce, salad dressing, pickles, olives, soy sauce, and miso    · High-fat dairy foods  such as whole or 2% milk, cream cheese, or sour cream, and cheeses     · High-fat protein foods  such as high-fat cuts of beef (T-bone steaks, ribs), chicken or turkey with skin, and organ meats, such as liver    · Cured or smoked meats , such as hot dogs, calderon, and sausage    · Unhealthy fats and oils , such as butter, stick margarine, shortening, and cooking oils such as coconut or palm oil    · Food and drinks high in sugar , such as soft drinks (soda), sports drinks, sweetened tea, candy, cake, cookies, pies, and doughnuts  Other diet guidelines to follow:   · Eat more foods containing omega-3 fats  Eat fish high in omega-3 fats at least 2 times a week  · Limit alcohol  Too much alcohol can damage your heart and raise your blood pressure  Women should limit alcohol to 1 drink a day  Men should limit alcohol to 2 drinks a day  A drink of alcohol is 12 ounces of beer, 5 ounces of wine, or 1½ ounces of liquor  · Choose low-sodium foods  High-sodium foods can lead to high blood pressure  Add little or no salt to food you prepare  Use herbs and spices in place of salt  · Eat more fiber  to help lower cholesterol levels  Eat at least 5 servings of fruits and vegetables each day  Eat 3 ounces of whole-grain foods each day  Legumes (beans) are also a good source of fiber  Lifestyle guidelines:   · Do not smoke  Nicotine and other chemicals in cigarettes and cigars can cause lung and heart damage  Ask your healthcare provider for information if you currently smoke and need help to quit  E-cigarettes or smokeless tobacco still contain nicotine  Talk to your healthcare provider before you use these products  · Exercise regularly  to help you maintain a healthy weight and improve your blood pressure and cholesterol levels  Ask your healthcare provider about the best exercise plan for you  Do not start an exercise program without asking your healthcare provider  Follow up with your healthcare provider as directed:  Write down your questions so you remember to ask them during your visits  © 2017 [employer] Information is for End User's use only and may not be sold, redistributed or otherwise used for commercial purposes   All illustrations and images included in CareNotes® are the copyrighted property of [employer]  or Lonnie Strickland  The above information is an  only  It is not intended as medical advice for individual conditions or treatments  Talk to your doctor, nurse or pharmacist before following any medical regimen to see if it is safe and effective for you

## 2020-09-17 LAB — BUPRENORPHINE UR QL CFM: NEGATIVE NG/ML

## 2020-09-21 LAB
AMPHETAMINES UR QL SCN: NEGATIVE NG/ML
BARBITURATES UR QL SCN: NEGATIVE NG/ML
BENZODIAZ UR QL: NEGATIVE NG/ML
BZE UR QL: NEGATIVE NG/ML
CANNABINOIDS UR QL SCN: POSITIVE
METHADONE UR QL SCN: NEGATIVE NG/ML
OPIATES UR QL: NEGATIVE NG/ML
PCP UR QL: NEGATIVE NG/ML
PROPOXYPH UR QL SCN: NEGATIVE NG/ML

## 2020-09-23 ENCOUNTER — OFFICE VISIT (OUTPATIENT)
Dept: INTERNAL MEDICINE CLINIC | Facility: CLINIC | Age: 20
End: 2020-09-23
Payer: COMMERCIAL

## 2020-09-23 VITALS
WEIGHT: 190 LBS | HEIGHT: 66 IN | OXYGEN SATURATION: 97 % | SYSTOLIC BLOOD PRESSURE: 118 MMHG | DIASTOLIC BLOOD PRESSURE: 68 MMHG | HEART RATE: 67 BPM | RESPIRATION RATE: 18 BRPM | BODY MASS INDEX: 30.53 KG/M2 | TEMPERATURE: 98.4 F

## 2020-09-23 DIAGNOSIS — Z51.81 ENCOUNTER FOR MONITORING SUBOXONE MAINTENANCE THERAPY: ICD-10-CM

## 2020-09-23 DIAGNOSIS — F19.20 POLYSUBSTANCE DEPENDENCE INCLUDING OPIOID TYPE DRUG WITH COMPLICATION, CONTINUOUS USE (HCC): Primary | ICD-10-CM

## 2020-09-23 DIAGNOSIS — Z79.899 MEDICATION THERAPY CONTINUED: ICD-10-CM

## 2020-09-23 DIAGNOSIS — Z79.899 ENCOUNTER FOR MONITORING SUBOXONE MAINTENANCE THERAPY: ICD-10-CM

## 2020-09-23 PROCEDURE — 99213 OFFICE O/P EST LOW 20 MIN: CPT | Performed by: INTERNAL MEDICINE

## 2020-09-23 PROCEDURE — 80307 DRUG TEST PRSMV CHEM ANLYZR: CPT | Performed by: INTERNAL MEDICINE

## 2020-09-23 RX ORDER — BUPRENORPHINE HYDROCHLORIDE AND NALOXONE HYDROCHLORIDE DIHYDRATE 8; 2 MG/1; MG/1
TABLET SUBLINGUAL
Qty: 60 TABLET | Refills: 0 | Status: SHIPPED | OUTPATIENT
Start: 2020-09-23 | End: 2020-10-21 | Stop reason: SDUPTHER

## 2020-09-23 NOTE — PATIENT INSTRUCTIONS
Buprenorphine/Naloxone (Into the mouth)   Buprenorphine (bue-pre-NOR-feen), Naloxone (nal-OX-one)  Treats narcotic dependence  Brand Name(s): Bunavail, Suboxone, Zubsolv   There may be other brand names for this medicine  When This Medicine Should Not Be Used: This medicine is not right for everyone  Do not use it if you had an allergic reaction to buprenorphine or naloxone  How to Use This Medicine: Thin Sheet, Tablet  · Take your medicine as directed  Your dose may need to be changed several times to find what works best for you  · You must let the medicine dissolve  Never swallow the film or tablet  Your body may not absorb enough of the medicine if you swallow it  · Your health caregiver should show you how to use the medicine  If you do not understand, ask for help  It is important to use the medicine correctly  · Do not talk while the medicine is in your mouth  · Buccal film: Rinse your mouth with water to moisten it  Place the film against the inside of your cheek  If your doctor told you to use more than 1 film, place the second film inside your other cheek  Do not place more than 2 films inside of 1 cheek at a time  Do not move or touch the film  Do not eat or drink anything until the film is completely dissolved  · Sublingual tablet: Place the tablet under your tongue  If your doctor told you to use more than 1 tablet, place all of the tablets in different places under your tongue at the same time  You can use 2 tablets at a time until you have taken all of the medicine, if that is easier for you  Let the tablets dissolve completely in your mouth  Do not eat or drink anything until the tablets are completely dissolved  · Sublingual film: Drink some water to help moisten your mouth  Place the film under your tongue  If your doctor told you to use more than 1 film, place the second film on the opposite side from the first one  Do not move the film after you place it under your tongue   If you are supposed to use more than 2 films, use them the same way, but do not start until the first 2 films are completely dissolved  · Do not break, crush, chew, or cut the film or tablet  · This medicine should come with a Medication Guide  Ask your pharmacist for a copy if you do not have one  · Missed dose: Take a dose as soon as you remember  If it is almost time for your next dose, wait until then and take a regular dose  Do not take extra medicine to make up for a missed dose  · Store the medicine in a closed container at room temperature, away from heat, moisture, and direct light  Ask your pharmacist about the best way to dispose of medicine you do not use  Drugs and Foods to Avoid:   Ask your doctor or pharmacist before using any other medicine, including over-the-counter medicines, vitamins, and herbal products  · Do not use this medicine if you are using or have used an MAO inhibitor within the past 14 days  · Some medicines can affect how buprenorphine/naloxone works  Tell your doctor if you are using the following:   ¨ Carbamazepine, erythromycin, ketoconazole, mirtazapine, phenobarbital, phenytoin, rifampin, tramadol, or trazodone  ¨ Medicine to treat depression  ¨ Medicine to treat HIV/AIDS (including atazanavir, delavirdine, efavirenz, etravirine, nevirapine, ritonavir)  ¨ Phenothiazine medicine  · Do not drink alcohol while you are using this medicine  · Tell your doctor if you use anything else that makes you sleepy  Some examples are allergy medicine, narcotic pain medicine, and alcohol  Tell your doctor if you are also using butorphanol, nalbuphine, pentazocine, or a muscle relaxer    Warnings While Using This Medicine:   · Tell your doctor if you are pregnant or breastfeeding, or if you have kidney disease, liver disease (including hepatitis), lung or breathing problems, adrenal gland problems, an enlarged prostate, trouble urinating, gallbladder problems, low thyroid levels, stomach problems, or a history of depression, brain tumor, head injury, alcohol or drug abuse  · This medicine may cause the following problems:  ¨ High risk of overdose, which can lead to death  ¨ Respiratory depression (serious breathing problem that can be life-threatening)  ¨ Liver problems  ¨ Serotonin syndrome, when used with certain medicines  · This medicine may make you dizzy or drowsy  Do not drive or do anything that could be dangerous until you know how this medicine affects you  Stand or sit up slowly if you feel lightheaded or dizzy  · Tell any doctor or dentist who treats you that you are using this medicine  · This medicine can be habit-forming  Do not use more than your prescribed dose  Call your doctor if you think your medicine is not working  · Do not stop using this medicine suddenly  Your doctor will need to slowly decrease your dose before you stop it completely  · This medicine could cause infertility  Talk with your doctor before using this medicine if you plan to have children  · Keep all medicine out of the reach of children  Never share your medicine with anyone    Possible Side Effects While Using This Medicine:   Call your doctor right away if you notice any of these side effects:  · Allergic reaction: Itching or hives, swelling in your face or hands, swelling or tingling in your mouth or throat, chest tightness, trouble breathing  · Blue lips, fingernails, or skin  · Dark urine or pale stools, nausea, vomiting, loss of appetite, stomach pain, yellow skin or eyes  · Extreme dizziness or weakness, shallow breathing, sweating, seizures, cold or clammy skin  · Severe confusion, lightheadedness, dizziness, or fainting  · Trouble breathing or slow breathing  If you notice these less serious side effects, talk with your doctor:   · Headache, trouble sleeping  · Constipation or upset stomach  · Shaking, feeling hot or cold, runny nose, watery eyes, diarrhea, vomiting, and muscle aches  If you notice other side effects that you think are caused by this medicine, tell your doctor  Call your doctor for medical advice about side effects  You may report side effects to FDA at 6-387-OGQ-3562  © 2017 [employer] Information is for End User's use only and may not be sold, redistributed or otherwise used for commercial purposes  The above information is an  only  It is not intended as medical advice for individual conditions or treatments  Talk to your doctor, nurse or pharmacist before following any medical regimen to see if it is safe and effective for you

## 2020-09-23 NOTE — PROGRESS NOTES
Assessment/Plan:  Problem List Items Addressed This Visit        Other    Polysubstance dependence including opioid type drug with complication, continuous use (HCC) - Primary    Relevant Medications    buprenorphine-naloxone (SUBOXONE) 8-2 mg per SL tablet    Encounter for monitoring Suboxone maintenance therapy    Relevant Medications    buprenorphine-naloxone (SUBOXONE) 8-2 mg per SL tablet    Other Relevant Orders    Suboxone    Toxicology screen, urine    Medication therapy continued    Relevant Medications    buprenorphine-naloxone (SUBOXONE) 8-2 mg per SL tablet           Diagnoses and all orders for this visit:    Polysubstance dependence including opioid type drug with complication, continuous use (HCC)  -     buprenorphine-naloxone (SUBOXONE) 8-2 mg per SL tablet; 1-2 tabs SL q day    Encounter for monitoring Suboxone maintenance therapy  -     Suboxone  -     Toxicology screen, urine  -     buprenorphine-naloxone (SUBOXONE) 8-2 mg per SL tablet; 1-2 tabs SL q day    Medication therapy continued  -     buprenorphine-naloxone (SUBOXONE) 8-2 mg per SL tablet; 1-2 tabs SL q day        No problem-specific Assessment & Plan notes found for this encounter  Scheduled Medication Review:  Pt's scheduled medication use was reviewed by myself/staff via the [employer] website  Pt's use has been found to be appropriate w/o any concerns for misuse by the patient  Pt's current conditions require continued scheduled medication use at this time  Future review for continued appropriate medication use and misuse will continue  A/P: Doing well and will continue 16mg tabs, dose 1/6 and keep f/u for intake for  counseling  Reminded to keep meds safe and out of reach of children  RTC 4 weeks  Subjective: WM presents for f/u suboxone  Doing well and no c/o's  Not using and no withdraw  Doesn't attend counseling  UDT obtained today  Tolerating the meds and no side effects  Patient ID: Ileana Castillo is a 23 y o  male     HPI    The following portions of the patient's history were reviewed and updated as appropriate:   He has a past medical history of ADHD (attention deficit hyperactivity disorder), Bipolar disorder ([facility] [address] ), Depression, Hyperlipidemia, Hypertension, Psychiatric disorder, Substance abuse ([facility] [address] ), and Tourette syndrome  ,  does not have any pertinent problems on file  ,   has a past surgical history that includes Tympanostomy tube placement and TONSILECTOMY AND ADNOIDECTOMY  ,  family history includes Cirrhosis in his father; Coronary artery disease in his father; Hepatitis in his father; No Known Problems in his mother; Substance Abuse in his brother and father  ,   reports that he has been smoking cigarettes  He has a 20 00 pack-year smoking history  He has never used smokeless tobacco  He reports previous alcohol use  He reports current drug use  Drug: Marijuana  ,  is allergic to abilify [aripiprazole]     Current Outpatient Medications   Medication Sig Dispense Refill    gabapentin (NEURONTIN) 300 mg capsule Take 2 capsules (600 mg total) by mouth 3 (three) times a day 180 capsule 0    naloxone (NARCAN) 1 mg/mL intranasal 2 mL (2 mg total) into each nostril once for 1 dose 2 mL 1    naloxone (Narcan) 4 mg/0 1 mL nasal spray 0 1 mL (4 mg total) into each nostril as needed (respiratory depression or possible OD) 1 each 1    QUEtiapine (SEROquel) 100 mg tablet Take 1 tablet (100 mg total) by mouth daily at bedtime 30 tablet 0    QUEtiapine (SEROquel) 25 mg tablet Take 1 tablet (25 mg total) by mouth 2 (two) times a day 60 tablet 0    buprenorphine-naloxone (SUBOXONE) 8-2 mg per SL tablet 1-2 tabs SL q day 60 tablet 0     No current facility-administered medications for this visit  Review of Systems   Constitutional: Negative for activity change, chills, diaphoresis, fatigue and fever  Respiratory: Negative for cough, chest tightness, shortness of breath and wheezing      Cardiovascular: Negative for chest pain, palpitations and leg swelling  Gastrointestinal: Negative for abdominal pain, constipation, diarrhea, nausea and vomiting  Genitourinary: Negative for difficulty urinating, dysuria and frequency  Musculoskeletal: Negative for arthralgias, gait problem and myalgias  Neurological: Negative for dizziness, seizures, syncope, weakness, light-headedness and headaches  Psychiatric/Behavioral: Negative for confusion, dysphoric mood, self-injury, sleep disturbance and suicidal ideas  The patient is not nervous/anxious  PHQ-9 Depression Screening    PHQ-9:    Frequency of the following problems over the past two weeks:             Objective:  Vitals:    09/23/20 1200   BP: 118/68   BP Location: Left arm   Patient Position: Sitting   Cuff Size: Adult   Pulse: 67   Resp: 18   Temp: 98 4 °F (36 9 °C)   TempSrc: Temporal   SpO2: 97%   Weight: 86 2 kg (190 lb)   Height: 5' 6" (1 676 m)     Body mass index is 30 67 kg/m²  Physical Exam  Vitals signs and nursing note reviewed  Constitutional:       General: He is not in acute distress  Appearance: Normal appearance  HENT:      Head: Normocephalic and atraumatic  Mouth/Throat:      Mouth: Mucous membranes are moist    Eyes:      Extraocular Movements: Extraocular movements intact  Conjunctiva/sclera: Conjunctivae normal       Pupils: Pupils are equal, round, and reactive to light  Cardiovascular:      Rate and Rhythm: Normal rate and regular rhythm  Pulses: Normal pulses  Heart sounds: Normal heart sounds  Pulmonary:      Effort: Pulmonary effort is normal  No respiratory distress  Breath sounds: Normal breath sounds  No wheezing or rales  Abdominal:      General: Bowel sounds are normal  There is no distension  Palpations: Abdomen is soft  Tenderness: There is no abdominal tenderness  Neurological:      General: No focal deficit present        Mental Status: He is alert and oriented to person, place, and time  Mental status is at baseline  Psychiatric:         Mood and Affect: Mood normal          Behavior: Behavior normal          Thought Content:  Thought content normal          Judgment: Judgment normal

## 2020-09-28 LAB
BUPRENORPHINE UR CFM-MCNC: 24 NG/ML
BUPRENORPHINE UR QL CFM: NORMAL NG/ML
BUPRENORPHINE UR QL CFM: POSITIVE
BUPRENORPHINE+NOR UR QL: POSITIVE
NORBUPRENORPHINE UR CFM-MCNC: 118 NG/ML
NORBUPRENORPHINE UR QL CFM: POSITIVE

## 2020-10-21 ENCOUNTER — OFFICE VISIT (OUTPATIENT)
Dept: INTERNAL MEDICINE CLINIC | Facility: CLINIC | Age: 20
End: 2020-10-21
Payer: COMMERCIAL

## 2020-10-21 VITALS
OXYGEN SATURATION: 97 % | RESPIRATION RATE: 18 BRPM | TEMPERATURE: 97.8 F | DIASTOLIC BLOOD PRESSURE: 70 MMHG | SYSTOLIC BLOOD PRESSURE: 112 MMHG | BODY MASS INDEX: 30.22 KG/M2 | HEIGHT: 66 IN | WEIGHT: 188 LBS | HEART RATE: 89 BPM

## 2020-10-21 DIAGNOSIS — Z51.81 ENCOUNTER FOR MONITORING SUBOXONE MAINTENANCE THERAPY: ICD-10-CM

## 2020-10-21 DIAGNOSIS — Z79.899 ENCOUNTER FOR MONITORING SUBOXONE MAINTENANCE THERAPY: ICD-10-CM

## 2020-10-21 DIAGNOSIS — Z79.899 MEDICATION THERAPY CONTINUED: ICD-10-CM

## 2020-10-21 DIAGNOSIS — F19.20 POLYSUBSTANCE DEPENDENCE INCLUDING OPIOID TYPE DRUG WITH COMPLICATION, CONTINUOUS USE (HCC): Primary | ICD-10-CM

## 2020-10-21 PROCEDURE — 99213 OFFICE O/P EST LOW 20 MIN: CPT | Performed by: INTERNAL MEDICINE

## 2020-10-21 PROCEDURE — 80307 DRUG TEST PRSMV CHEM ANLYZR: CPT | Performed by: INTERNAL MEDICINE

## 2020-10-21 PROCEDURE — 4004F PT TOBACCO SCREEN RCVD TLK: CPT | Performed by: INTERNAL MEDICINE

## 2020-10-21 RX ORDER — BUPRENORPHINE HYDROCHLORIDE AND NALOXONE HYDROCHLORIDE DIHYDRATE 8; 2 MG/1; MG/1
TABLET SUBLINGUAL
Qty: 60 TABLET | Refills: 0 | Status: SHIPPED | OUTPATIENT
Start: 2020-10-21 | End: 2020-11-18 | Stop reason: SDUPTHER

## 2020-10-24 LAB
BUPRENORPHINE UR CFM-MCNC: 62 NG/ML
BUPRENORPHINE UR QL CFM: NORMAL NG/ML
BUPRENORPHINE UR QL CFM: POSITIVE
BUPRENORPHINE+NOR UR QL: POSITIVE
NORBUPRENORPHINE UR CFM-MCNC: 226 NG/ML
NORBUPRENORPHINE UR QL CFM: POSITIVE

## 2020-11-03 ENCOUNTER — HOSPITAL ENCOUNTER (EMERGENCY)
Facility: HOSPITAL | Age: 20
Discharge: HOME | End: 2020-11-03
Attending: EMERGENCY MEDICINE | Admitting: EMERGENCY MEDICINE
Payer: COMMERCIAL

## 2020-11-03 ENCOUNTER — APPOINTMENT (EMERGENCY)
Dept: CT IMAGING | Facility: HOSPITAL | Age: 20
End: 2020-11-03
Payer: COMMERCIAL

## 2020-11-03 VITALS
DIASTOLIC BLOOD PRESSURE: 55 MMHG | BODY MASS INDEX: 30.67 KG/M2 | RESPIRATION RATE: 18 BRPM | HEART RATE: 61 BPM | OXYGEN SATURATION: 95 % | SYSTOLIC BLOOD PRESSURE: 101 MMHG | TEMPERATURE: 97.3 F | WEIGHT: 190 LBS

## 2020-11-03 DIAGNOSIS — R51.9 HEADACHE: Primary | ICD-10-CM

## 2020-11-03 LAB — SARS-COV-2 RNA RESP QL NAA+PROBE: NEGATIVE

## 2020-11-03 PROCEDURE — 96374 THER/PROPH/DIAG INJ IV PUSH: CPT

## 2020-11-03 PROCEDURE — G1004 CDSM NDSC: HCPCS

## 2020-11-03 PROCEDURE — 99284 EMERGENCY DEPT VISIT MOD MDM: CPT

## 2020-11-03 PROCEDURE — 87635 SARS-COV-2 COVID-19 AMP PRB: CPT | Performed by: PHYSICIAN ASSISTANT

## 2020-11-03 PROCEDURE — 96375 TX/PRO/DX INJ NEW DRUG ADDON: CPT

## 2020-11-03 PROCEDURE — 99284 EMERGENCY DEPT VISIT MOD MDM: CPT | Performed by: PHYSICIAN ASSISTANT

## 2020-11-03 PROCEDURE — 70450 CT HEAD/BRAIN W/O DYE: CPT

## 2020-11-03 RX ORDER — DIPHENHYDRAMINE HYDROCHLORIDE 50 MG/ML
25 INJECTION INTRAMUSCULAR; INTRAVENOUS ONCE
Status: COMPLETED | OUTPATIENT
Start: 2020-11-03 | End: 2020-11-03

## 2020-11-03 RX ORDER — METOCLOPRAMIDE HYDROCHLORIDE 5 MG/ML
10 INJECTION INTRAMUSCULAR; INTRAVENOUS ONCE
Status: COMPLETED | OUTPATIENT
Start: 2020-11-03 | End: 2020-11-03

## 2020-11-03 RX ADMIN — METOCLOPRAMIDE HYDROCHLORIDE 10 MG: 5 INJECTION INTRAMUSCULAR; INTRAVENOUS at 15:43

## 2020-11-03 RX ADMIN — DIPHENHYDRAMINE HYDROCHLORIDE 25 MG: 50 INJECTION, SOLUTION INTRAMUSCULAR; INTRAVENOUS at 15:43

## 2020-11-03 RX ADMIN — SODIUM CHLORIDE 1000 ML: 0.9 INJECTION, SOLUTION INTRAVENOUS at 15:43

## 2020-11-18 ENCOUNTER — OFFICE VISIT (OUTPATIENT)
Dept: INTERNAL MEDICINE CLINIC | Facility: CLINIC | Age: 20
End: 2020-11-18
Payer: COMMERCIAL

## 2020-11-18 VITALS
BODY MASS INDEX: 31.02 KG/M2 | TEMPERATURE: 96.8 F | HEART RATE: 68 BPM | DIASTOLIC BLOOD PRESSURE: 78 MMHG | HEIGHT: 66 IN | WEIGHT: 193 LBS | OXYGEN SATURATION: 98 % | SYSTOLIC BLOOD PRESSURE: 128 MMHG

## 2020-11-18 DIAGNOSIS — Z79.899 MEDICATION THERAPY CONTINUED: ICD-10-CM

## 2020-11-18 DIAGNOSIS — F19.20 POLYSUBSTANCE DEPENDENCE INCLUDING OPIOID TYPE DRUG WITH COMPLICATION, CONTINUOUS USE (HCC): Primary | ICD-10-CM

## 2020-11-18 DIAGNOSIS — Z51.81 ENCOUNTER FOR MONITORING SUBOXONE MAINTENANCE THERAPY: ICD-10-CM

## 2020-11-18 DIAGNOSIS — Z79.899 ENCOUNTER FOR MONITORING SUBOXONE MAINTENANCE THERAPY: ICD-10-CM

## 2020-11-18 DIAGNOSIS — K59.03 DRUG-INDUCED CONSTIPATION: ICD-10-CM

## 2020-11-18 PROCEDURE — 3008F BODY MASS INDEX DOCD: CPT | Performed by: INTERNAL MEDICINE

## 2020-11-18 PROCEDURE — 99213 OFFICE O/P EST LOW 20 MIN: CPT | Performed by: INTERNAL MEDICINE

## 2020-11-18 PROCEDURE — 4004F PT TOBACCO SCREEN RCVD TLK: CPT | Performed by: INTERNAL MEDICINE

## 2020-11-18 PROCEDURE — 80307 DRUG TEST PRSMV CHEM ANLYZR: CPT | Performed by: INTERNAL MEDICINE

## 2020-11-18 RX ORDER — BUPRENORPHINE HYDROCHLORIDE AND NALOXONE HYDROCHLORIDE DIHYDRATE 8; 2 MG/1; MG/1
TABLET SUBLINGUAL
Qty: 60 TABLET | Refills: 0 | Status: SHIPPED | OUTPATIENT
Start: 2020-11-18 | End: 2021-01-15

## 2020-11-22 LAB
BUPRENORPHINE UR CFM-MCNC: 120 NG/ML
BUPRENORPHINE UR QL CFM: NORMAL NG/ML
BUPRENORPHINE UR QL CFM: POSITIVE
BUPRENORPHINE+NOR UR QL: POSITIVE
NORBUPRENORPHINE UR CFM-MCNC: 532 NG/ML
NORBUPRENORPHINE UR QL CFM: POSITIVE

## 2020-12-18 ENCOUNTER — OFFICE VISIT (OUTPATIENT)
Dept: INTERNAL MEDICINE CLINIC | Facility: CLINIC | Age: 20
End: 2020-12-18
Payer: COMMERCIAL

## 2020-12-18 VITALS
SYSTOLIC BLOOD PRESSURE: 118 MMHG | WEIGHT: 193.25 LBS | TEMPERATURE: 97 F | HEIGHT: 66 IN | DIASTOLIC BLOOD PRESSURE: 76 MMHG | OXYGEN SATURATION: 97 % | HEART RATE: 67 BPM | BODY MASS INDEX: 31.06 KG/M2

## 2020-12-18 DIAGNOSIS — Z79.899 ENCOUNTER FOR MONITORING SUBOXONE MAINTENANCE THERAPY: ICD-10-CM

## 2020-12-18 DIAGNOSIS — F19.20 POLYSUBSTANCE DEPENDENCE INCLUDING OPIOID TYPE DRUG WITH COMPLICATION, CONTINUOUS USE (HCC): Primary | ICD-10-CM

## 2020-12-18 DIAGNOSIS — Z79.899 MEDICATION THERAPY CONTINUED: ICD-10-CM

## 2020-12-18 DIAGNOSIS — Z51.81 ENCOUNTER FOR MONITORING SUBOXONE MAINTENANCE THERAPY: ICD-10-CM

## 2020-12-18 PROCEDURE — 4004F PT TOBACCO SCREEN RCVD TLK: CPT | Performed by: INTERNAL MEDICINE

## 2020-12-18 PROCEDURE — 80307 DRUG TEST PRSMV CHEM ANLYZR: CPT | Performed by: INTERNAL MEDICINE

## 2020-12-18 PROCEDURE — 99213 OFFICE O/P EST LOW 20 MIN: CPT | Performed by: INTERNAL MEDICINE

## 2020-12-18 PROCEDURE — 3008F BODY MASS INDEX DOCD: CPT | Performed by: INTERNAL MEDICINE

## 2020-12-18 RX ORDER — BUPRENORPHINE AND NALOXONE 8; 2 MG/1; MG/1
FILM, SOLUBLE BUCCAL; SUBLINGUAL
Qty: 60 FILM | Refills: 0 | Status: SHIPPED | OUTPATIENT
Start: 2020-12-18 | End: 2021-01-15

## 2020-12-22 LAB
BUPRENORPHINE UR CFM-MCNC: 390 NG/ML
BUPRENORPHINE UR QL CFM: NORMAL NG/ML
BUPRENORPHINE UR QL CFM: POSITIVE
BUPRENORPHINE+NOR UR QL: POSITIVE
NORBUPRENORPHINE UR CFM-MCNC: 546 NG/ML
NORBUPRENORPHINE UR QL CFM: POSITIVE

## 2020-12-24 ENCOUNTER — HOSPITAL ENCOUNTER (EMERGENCY)
Facility: HOSPITAL | Age: 20
Discharge: HOME | End: 2020-12-24
Attending: EMERGENCY MEDICINE
Payer: COMMERCIAL

## 2020-12-24 ENCOUNTER — APPOINTMENT (EMERGENCY)
Dept: RADIOLOGY | Facility: HOSPITAL | Age: 20
End: 2020-12-24
Payer: COMMERCIAL

## 2020-12-24 VITALS
HEIGHT: 67 IN | SYSTOLIC BLOOD PRESSURE: 127 MMHG | RESPIRATION RATE: 18 BRPM | TEMPERATURE: 97 F | WEIGHT: 192.02 LBS | OXYGEN SATURATION: 97 % | BODY MASS INDEX: 30.14 KG/M2 | DIASTOLIC BLOOD PRESSURE: 84 MMHG | HEART RATE: 75 BPM

## 2020-12-24 DIAGNOSIS — S63.601A SPRAIN OF RIGHT THUMB: Primary | ICD-10-CM

## 2020-12-24 PROCEDURE — 99283 EMERGENCY DEPT VISIT LOW MDM: CPT

## 2020-12-24 PROCEDURE — 99284 EMERGENCY DEPT VISIT MOD MDM: CPT | Performed by: EMERGENCY MEDICINE

## 2020-12-24 PROCEDURE — 29130 APPL FINGER SPLINT STATIC: CPT | Performed by: EMERGENCY MEDICINE

## 2020-12-24 PROCEDURE — 73140 X-RAY EXAM OF FINGER(S): CPT

## 2020-12-24 RX ORDER — IBUPROFEN 400 MG/1
400 TABLET ORAL ONCE
Status: COMPLETED | OUTPATIENT
Start: 2020-12-24 | End: 2020-12-24

## 2020-12-24 RX ORDER — ACETAMINOPHEN 325 MG/1
975 TABLET ORAL ONCE
Status: COMPLETED | OUTPATIENT
Start: 2020-12-24 | End: 2020-12-24

## 2020-12-24 RX ADMIN — ACETAMINOPHEN 975 MG: 325 TABLET, FILM COATED ORAL at 17:54

## 2020-12-24 RX ADMIN — IBUPROFEN 400 MG: 400 TABLET ORAL at 17:54

## 2021-01-15 ENCOUNTER — OFFICE VISIT (OUTPATIENT)
Dept: INTERNAL MEDICINE CLINIC | Facility: CLINIC | Age: 21
End: 2021-01-15
Payer: COMMERCIAL

## 2021-01-15 VITALS
WEIGHT: 194.25 LBS | TEMPERATURE: 98.5 F | BODY MASS INDEX: 30.49 KG/M2 | OXYGEN SATURATION: 98 % | DIASTOLIC BLOOD PRESSURE: 74 MMHG | HEART RATE: 75 BPM | HEIGHT: 67 IN | SYSTOLIC BLOOD PRESSURE: 120 MMHG

## 2021-01-15 DIAGNOSIS — F19.20 POLYSUBSTANCE DEPENDENCE INCLUDING OPIOID TYPE DRUG WITH COMPLICATION, CONTINUOUS USE (HCC): ICD-10-CM

## 2021-01-15 DIAGNOSIS — Z51.81 ENCOUNTER FOR MONITORING SUBOXONE MAINTENANCE THERAPY: Primary | ICD-10-CM

## 2021-01-15 DIAGNOSIS — Z79.899 MEDICATION THERAPY CONTINUED: ICD-10-CM

## 2021-01-15 DIAGNOSIS — Z79.899 ENCOUNTER FOR MONITORING SUBOXONE MAINTENANCE THERAPY: Primary | ICD-10-CM

## 2021-01-15 PROCEDURE — 4004F PT TOBACCO SCREEN RCVD TLK: CPT | Performed by: INTERNAL MEDICINE

## 2021-01-15 PROCEDURE — 99213 OFFICE O/P EST LOW 20 MIN: CPT | Performed by: INTERNAL MEDICINE

## 2021-01-15 PROCEDURE — 3008F BODY MASS INDEX DOCD: CPT | Performed by: INTERNAL MEDICINE

## 2021-01-15 PROCEDURE — 80307 DRUG TEST PRSMV CHEM ANLYZR: CPT | Performed by: INTERNAL MEDICINE

## 2021-01-15 RX ORDER — BUPRENORPHINE AND NALOXONE 8; 2 MG/1; MG/1
FILM, SOLUBLE BUCCAL; SUBLINGUAL
Qty: 60 FILM | Refills: 0 | Status: SHIPPED | OUTPATIENT
Start: 2021-01-15 | End: 2021-02-12 | Stop reason: SDUPTHER

## 2021-01-15 NOTE — PATIENT INSTRUCTIONS
Buprenorphine/Naloxone (Into the mouth)   Buprenorphine (bue-pre-NOR-feen), Naloxone (nal-OX-one)  Treats narcotic dependence  Brand Name(s): Suboxone, Zubsolv   There may be other brand names for this medicine  When This Medicine Should Not Be Used: This medicine is not right for everyone  Do not use it if you had an allergic reaction to buprenorphine or naloxone  How to Use This Medicine: Thin Sheet, Tablet  · Take your medicine as directed  Your dose may need to be changed several times to find what works best for you  · You must let the medicine dissolve  Never swallow the film or tablet  Your body may not absorb enough of the medicine if you swallow it  · Your health caregiver should show you how to use the medicine  If you do not understand, ask for help  It is important to use the medicine correctly  · Do not talk while the medicine is inside your mouth  · Buccal film: Rinse your mouth with water to moisten it  Place the film against the inside of your cheek  If your doctor told you to use more than 1 film, place the second film inside your other cheek  Do not place more than 2 films inside of 1 cheek at a time  Do not move or touch the film  Do not eat or drink anything until the film is completely dissolved  · Sublingual tablet: Place the tablet under your tongue  If your doctor told you to use more than 1 tablet, place all of the tablets in different places under your tongue at the same time  You can use 2 tablets at a time until you have taken all of the medicine, if that is easier for you  Let the tablets dissolve completely in your mouth  Do not eat or drink anything until the tablets are completely dissolved  · Sublingual film: Drink some water to help moisten your mouth  Place the film under your tongue  If your doctor told you to use more than 1 film, place the second film on the opposite side from the first one  Do not move the film after you placed it under your tongue   If you are supposed to use more than 2 films, use them the same way, but do not start until the first 2 films are completely dissolved  · Do not break, crush, chew, or cut the film or tablet  · This medicine should come with a Medication Guide  Ask your pharmacist for a copy if you do not have one  · Missed dose: Take a dose as soon as you remember  If it is almost time for your next dose, wait until then and take a regular dose  Do not take extra medicine to make up for a missed dose  · Store the medicine in a closed container at room temperature, away from heat, moisture, and direct light  Drop off any unused narcotic medicine at a drug take-back location right away  If you do not have a drug take-back location near you, flush any unused narcotic medicine down the toilet  Check your local drug store and clinics for take-back locations  You can also check the [employer] web site for locations  Here is the link to the [facility] safe disposal of medicines [employer] ee  Drugs and Foods to Avoid:   Ask your doctor or pharmacist before using any other medicine, including over-the-counter medicines, vitamins, and herbal products  · Do not use this medicine if you are using or have used an MAO inhibitor within the past 14 days  · Some medicines can affect how buprenorphine/naloxone works  Tell your doctor if you are using the following:   ? Carbamazepine, cyclobenzaprine, erythromycin, ketoconazole, metaxalone, mirtazapine, phenobarbital, phenytoin, rifampin, tramadol, trazodone  ? Diuretic (water pill)  ? Medicine to treat depression, anxiety, and mental health illness  ? Medicine to treat HIV/AIDS (including atazanavir, delavirdine, efavirenz, etravirine, nevirapine, ritonavir)  ? Phenothiazine medicine  ?  Triptan medicine to treat migraine headaches  · Do not drink alcohol while you are using this medicine  · Tell your doctor if you use anything else that makes you sleepy  Some examples are allergy medicine, narcotic pain medicine, and alcohol  Tell your doctor if you are also using butorphanol, nalbuphine, pentazocine, or a muscle relaxer  Warnings While Using This Medicine:   · Tell your doctor if you are pregnant or breastfeeding, or if you have kidney disease, liver disease (including hepatitis), lung or breathing problems (including sleep apnea), adrenal gland problems, an enlarged prostate, trouble urinating, gallbladder problems, thyroid problems, stomach problems, or a history of depression, brain tumor, head injury, alcohol or drug abuse  · This medicine may cause the following problems:  ? High risk of overdose, which can lead to death  ? Respiratory depression (serious breathing problem that can be life-threatening)  ? Sleep-related breathing problems (including sleep apnea, sleep-related hypoxemia)  ? Liver problems  ? Serotonin syndrome, when used with certain medicines  · This medicine may make you dizzy or drowsy  Do not drive or do anything else that could be dangerous until you know how this medicine affects you  Stand or sit up slowly if you feel lightheaded or dizzy  · Tell any doctor or dentist who treats you that you are using this medicine  · This medicine can be habit-forming  Do not use more than your prescribed dose  Call your doctor if you think your medicine is not working  · This medicine may cause constipation, especially with long-term use  Ask your doctor if you should use a laxative to prevent and treat constipation  · Do not stop using this medicine suddenly  Your doctor will need to slowly decrease your dose before you stop it completely  · This medicine could cause infertility  Talk with your doctor before using this medicine if you plan to have children  · Your doctor will do lab tests at regular visits to check on the effects of this medicine   Keep all appointments  · Keep all medicine out of the reach of children  Never share your medicine with anyone  Possible Side Effects While Using This Medicine:   Call your doctor right away if you notice any of these side effects:  · Allergic reaction: Itching or hives, swelling in your face or hands, swelling or tingling in your mouth or throat, chest tightness, trouble breathing  · Blue lips, fingernails, or skin  · Changes in skin color, dark freckles  · Cold feeling, weakness or tiredness, weight loss  · Dark urine or pale stools, nausea, vomiting, loss of appetite, stomach pain, yellow skin or eyes  · Extreme dizziness or weakness, shallow breathing, sweating, seizures, cold or clammy skin  · Severe confusion, lightheadedness, dizziness, or fainting  · Trouble breathing or slow breathing  If you notice these less serious side effects, talk with your doctor:   · Constipation or upset stomach  · Headache, trouble sleeping  · Shaking, runny nose, watery eyes, diarrhea, muscle aches  If you notice other side effects that you think are caused by this medicine, tell your doctor  Call your doctor for medical advice about side effects  You may report side effects to FDA at 4-564-FDA-5946  © Copyright [address] [place] [address] Drive Information is for End User's use only and may not be sold, redistributed or otherwise used for commercial purposes  The above information is an  only  It is not intended as medical advice for individual conditions or treatments  Talk to your doctor, nurse or pharmacist before following any medical regimen to see if it is safe and effective for you  Weight Management   AMBULATORY CARE:   Why it is important to manage your weight:  Being overweight increases your risk of health conditions such as heart disease, high blood pressure, type 2 diabetes, and certain types of cancer  It can also increase your risk for osteoarthritis, sleep apnea, and other respiratory problems   Aim for a slow, steady weight loss  Even a small amount of weight loss can lower your risk of health problems  How to lose weight safely:  A safe and healthy way to lose weight is to eat fewer calories and get regular exercise  · You can lose up about 1 pound a week by decreasing the number of calories you eat by 500 calories each day  You can decrease calories by eating smaller portion sizes or by cutting out high-calorie foods  Read labels to find out how many calories are in the foods you eat  · You can also burn calories with exercise such as walking, swimming, or biking  You will be more likely to keep weight off if you make these changes part of your lifestyle  Exercise at least 30 minutes per day on most days of the week  You can also fit in more physical activity by taking the stairs instead of the elevator or parking farther away from stores  Ask your healthcare provider about the best exercise plan for you  Healthy meal plan for weight management:  A healthy meal plan includes a variety of foods, contains fewer calories, and helps you stay healthy  A healthy meal plan includes the following:     · Eat whole-grain foods more often  A healthy meal plan should contain fiber  Fiber is the part of grains, fruits, and vegetables that is not broken down by your body  Whole-grain foods are healthy and provide extra fiber in your diet  Some examples of whole-grain foods are whole-wheat breads and pastas, oatmeal, brown rice, and bulgur  · Eat a variety of vegetables every day  Include dark, leafy greens such as spinach, kale, priscila greens, and mustard greens  Eat yellow and orange vegetables such as carrots, sweet potatoes, and winter squash  · Eat a variety of fruits every day  Choose fresh or canned fruit (canned in its own juice or light syrup) instead of juice  Fruit juice has very little or no fiber  · Eat low-fat dairy foods  Drink fat-free (skim) milk or 1% milk   Eat fat-free yogurt and low-fat cottage cheese  Try low-fat cheeses such as mozzarella and other reduced-fat cheeses  · Choose meat and other protein foods that are low in fat  Choose beans or other legumes such as split peas or lentils  Choose fish, skinless poultry (chicken or turkey), or lean cuts of red meat (beef or pork)  Before you cook meat or poultry, cut off any visible fat  · Use less fat and oil  Try baking foods instead of frying them  Add less fat, such as margarine, sour cream, regular salad dressing and mayonnaise to foods  Eat fewer high-fat foods  Some examples of high-fat foods include french fries, doughnuts, ice cream, and cakes  · Eat fewer sweets  Limit foods and drinks that are high in sugar  This includes candy, cookies, regular soda, and sweetened drinks  Ways to decrease calories:   · Eat smaller portions  ? Use a small plate with smaller servings  ? Do not eat second helpings  ? When you eat at a restaurant, ask for a box and place half of your meal in the box before you eat  ? Share an entrée with someone else  · Replace high-calorie snacks with healthy, low-calorie snacks  ? Choose fresh fruit, vegetables, fat-free rice cakes, or air-popped popcorn instead of potato chips, nuts, or chocolate  ? Choose water or calorie-free drinks instead of soda or sweetened drinks  · Do not shop for groceries when you are hungry  You may be more likely to make unhealthy food choices  Take a grocery list of healthy foods and shop after you have eaten  · Eat regular meals  Do not skip meals  Skipping meals can lead to overeating later in the day  This can make it harder for you to lose weight  Eat a healthy snack in place of a meal if you do not have time to eat a regular meal  Talk with a dietitian to help you create a meal plan and schedule that is right for you      Other things to consider as you try to lose weight:   · Be aware of situations that may give you the urge to overeat, such as eating while watching television  Find ways to avoid these situations  For example, read a book, go for a walk, or do crafts  · Meet with a weight loss support group or friends who are also trying to lose weight  This may help you stay motivated to continue working on your weight loss goals  © Copyright [address] Information is for End User's use only and may not be sold, redistributed or otherwise used for commercial purposes  All illustrations and images included in CareNotes® are the copyrighted property of A D A M , Inc  or Formerly named [facility] Abelardo Elliott   The above information is an  only  It is not intended as medical advice for individual conditions or treatments  Talk to your doctor, nurse or pharmacist before following any medical regimen to see if it is safe and effective for you  Low Fat Diet   AMBULATORY CARE:   A low-fat diet  is an eating plan that is low in total fat, unhealthy fat, and cholesterol  You may need to follow a low-fat diet if you have trouble digesting or absorbing fat  You may also need to follow this diet if you have high cholesterol  You can also lower your cholesterol by increasing the amount of fiber in your diet  Soluble fiber is a type of fiber that helps to decrease cholesterol levels  Different types of fat in food:   · Limit unhealthy fats  A diet that is high in cholesterol, saturated fat, and trans fat may cause unhealthy cholesterol levels  Unhealthy cholesterol levels increase your risk of heart disease  ? Cholesterol:  Limit intake of cholesterol to less than 200 mg per day  Cholesterol is found in meat, eggs, and dairy  ? Saturated fat:  Limit saturated fat to less than 7% of your total daily calories  Ask your dietitian how many calories you need each day  Saturated fat is found in butter, cheese, ice cream, whole milk, and palm oil  Saturated fat is also found in meat, such as beef, pork, chicken skin, and processed meats   Processed meats include sausage, hot dogs, and bologna  ? Trans fat:  Avoid trans fat as much as possible  Trans fat is used in fried and baked foods  Foods that say trans fat free on the label may still have up to 0 5 grams of trans fat per serving  · Include healthy fats  Replace foods that are high in saturated and trans fat with foods high in healthy fats  This may help to decrease high cholesterol levels  ? Monounsaturated fats: These are found in avocados, nuts, and vegetable oils, such as olive, canola, and sunflower oil  ? Polyunsaturated fats: These can be found in vegetable oils, such as soybean or corn oil  Omega-3 fats can help to decrease the risk of heart disease  Omega-3 fats are found in fish, such as salmon, herring, trout, and tuna  Omega-3 fats can also be found in plant foods, such as walnuts, flaxseed, soybeans, and canola oil  Foods to limit or avoid:   · Grains:      ? Snacks that are made with partially hydrogenated oils, such as chips, regular crackers, and butter-flavored popcorn    ? High-fat baked goods, such as biscuits, croissants, doughnuts, pies, cookies, and pastries    · Dairy:      ? Whole milk, 2% milk, and yogurt and ice cream made with whole milk    ? Half and half creamer, heavy cream, and whipping cream    ? Cheese, cream cheese, and sour cream    · Meats and proteins:      ? High-fat cuts of meat (T-bone steak, regular hamburger, and ribs)    ? Fried meat, poultry (turkey and chicken), and fish    ? Poultry (chicken and turkey) with skin    ? Cold cuts (salami or bologna), hot dogs, calderon, and sausage    ? Whole eggs and egg yolks    · Vegetables and fruits with added fat:      ? Fried vegetables or vegetables in butter or high-fat sauces, such as cream or cheese sauces    ? Fried fruit or fruit served with butter or cream    · Fats:      ? Butter, stick margarine, and shortening    ?  Coconut, palm oil, and palm kernel oil    Foods to include:   · Grains: ? Whole-grain breads, cereals, pasta, and brown rice    ? Low-fat crackers and pretzels    · Vegetables and fruits:      ? Fresh, frozen, or canned vegetables (no salt or low-sodium)    ? Fresh, frozen, dried, or canned fruit (canned in light syrup or fruit juice)    ? Avocado    · Low-fat dairy products:      ? Nonfat (skim) or 1% milk    ? Nonfat or low-fat cheese, yogurt, and cottage cheese    · Meats and proteins:      ? Chicken or turkey with no skin    ? Baked or broiled fish    ? Lean beef and pork (loin, round, extra lean hamburger)    ? Beans and peas, unsalted nuts, soy products    ? Egg whites and substitutes    ? Seeds and nuts    · Fats:      ? Unsaturated oil, such as canola, olive, peanut, soybean, or sunflower oil    ? Soft or liquid margarine and vegetable oil spread    ? Low-fat salad dressing    Other ways to decrease fat:   · Read food labels before you buy foods  Choose foods that have less than 30% of calories from fat  Choose low-fat or fat-free dairy products  Remember that fat free does not mean calorie free  These foods still contain calories, and too many calories can lead to weight gain  · Trim fat from meat and avoid fried food  Trim all visible fat from meat before you cook it  Remove the skin from poultry  Do not gambino meat, fish, or poultry  Bake, roast, boil, or broil these foods instead  Avoid fried foods  Eat a baked potato instead of [place] fries  Steam vegetables instead of sautéing them in butter  · Add less fat to foods  Use imitation calderon bits on salads and baked potatoes instead of regular calderon bits  Use fat-free or low-fat salad dressings instead of regular dressings  Use low-fat or nonfat butter-flavored topping instead of regular butter or margarine on popcorn and other foods  Ways to decrease fat in recipes:  Replace high-fat ingredients with low-fat or nonfat ones   This may cause baked goods to be drier than usual  You may need to use nonfat cooking spray on pans to prevent food from sticking  You also may need to change the amount of other ingredients, such as water, in the recipe  Try the following:  · Use low-fat or light margarine instead of regular margarine or shortening  · Use lean ground turkey breast or chicken, or lean ground beef (less than 5% fat) instead of hamburger  · Add 1 teaspoon of canola oil to 8 ounces of skim milk instead of using cream or half and half  · Use grated zucchini, carrots, or apples in breads instead of coconut  · Use blenderized, low-fat cottage cheese, plain tofu, or low-fat ricotta cheese instead of cream cheese  · Use 1 egg white and 1 teaspoon of canola oil, or use ¼ cup (2 ounces) of fat-free egg substitute instead of a whole egg  · Replace half of the oil that is called for in a recipe with applesauce when you bake  Use 3 tablespoons of cocoa powder and 1 tablespoon of canola oil instead of a square of baking chocolate  How to increase fiber:  Eat enough high-fiber foods to get 20 to 30 grams of fiber every day  Slowly increase your fiber intake to avoid stomach cramps, gas, and other problems  · Eat 3 ounces of whole-grain foods each day  An ounce is about 1 slice of bread  Eat whole-grain breads, such as whole-wheat bread  Whole wheat, whole-wheat flour, or other whole grains should be listed as the first ingredient on the food label  Replace white flour with whole-grain flour or use half of each in recipes  Whole-grain flour is heavier than white flour, so you may have to add more yeast or baking powder  · Eat a high-fiber cereal for breakfast   Oatmeal is a good source of soluble fiber  Look for cereals that have bran or fiber in the name  Choose whole-grain products, such as brown rice, barley, and whole-wheat pasta  · Eat more beans, peas, and lentils  For example, add beans to soups or salads  Eat at least 5 cups of fruits and vegetables each day   Eat fruits and vegetables with the peel because the peel is high in fiber  © Copyright [address] Information is for End User's use only and may not be sold, redistributed or otherwise used for commercial purposes  All illustrations and images included in CareNotes® are the copyrighted property of [employer] A M [employer] Inc  or Aidan Elliott   The above information is an  only  It is not intended as medical advice for individual conditions or treatments  Talk to your doctor, nurse or pharmacist before following any medical regimen to see if it is safe and effective for you  Heart Healthy Diet   AMBULATORY CARE:   A heart healthy diet  is an eating plan low in unhealthy fats and sodium (salt)  The plan is high in healthy fats and fiber  A heart healthy diet helps improve your cholesterol levels and lowers your risk for heart disease and stroke  A dietitian will teach you how to read and understand food labels  Heart healthy diet guidelines to follow:   · Choose foods that contain healthy fats  ? Unsaturated fats  include monounsaturated and polyunsaturated fats  Unsaturated fat is found in foods such as soybean, canola, olive, corn, and safflower oils  It is also found in soft tub margarine that is made with liquid vegetable oil  ? Omega-3 fat  is found in certain fish, such as salmon, tuna, and trout, and in walnuts and flaxseed  Eat fish high in omega-3 fats at least 2 times a week  · Get 20 to 30 grams of fiber each day  Fruits, vegetables, whole-grain foods, and legumes (cooked beans) are good sources of fiber  · Limit or do not have unhealthy fats  ? Cholesterol  is found in animal foods, such as eggs and lobster, and in dairy products made from whole milk  Limit cholesterol to less than 200 mg each day  ? Saturated fat  is found in meats, such as calderon and hamburger  It is also found in chicken or turkey skin, whole milk, and butter   Limit saturated fat to less than 7% of your total daily calories  ? Trans fat  is found in packaged foods, such as potato chips and cookies  It is also in hard margarine, some fried foods, and shortening  Do not eat foods that contain trans fats  · Limit sodium as directed  You may be told to limit sodium to 2,000 to 2,300 mg each day  Choose low-sodium or no-salt-added foods  Add little or no salt to food you prepare  Use herbs and spices in place of salt  Include the following in your heart healthy plan:  Ask your dietitian or healthcare provider how many servings to have from each of the following food groups:  · Grains:      ? Whole-wheat breads, cereals, and pastas, and brown rice    ? Low-fat, low-sodium crackers and chips    · Vegetables:      ? Broccoli, green beans, green peas, and spinach    ? Collards, kale, and lima beans    ? Carrots, sweet potatoes, tomatoes, and peppers    ? Canned vegetables with no salt added    · Fruits:      ? Bananas, peaches, pears, and pineapple    ? Grapes, raisins, and dates    ? Oranges, tangerines, grapefruit, orange juice, and grapefruit juice    ? Apricots, mangoes, melons, and papaya    ? Raspberries and strawberries    ? Canned fruit with no added sugar    · Low-fat dairy:      ? Nonfat (skim) milk, 1% milk, and low-fat almond, cashew, or soy milks fortified with calcium    ? Low-fat cheese, regular or frozen yogurt, and cottage cheese    · Meats and proteins:      ? Lean cuts of beef and pork (loin, leg, round), skinless chicken and turkey    ? Legumes, soy products, egg whites, or nuts    Limit or do not include the following in your heart healthy plan:   · Unhealthy fats and oils:      ? Whole or 2% milk, cream cheese, sour cream, or cheese    ? High-fat cuts of beef (T-bone steaks, ribs), chicken or turkey with skin, and organ meats such as liver    ?  Butter, stick margarine, shortening, and cooking oils such as coconut or palm oil    · Foods and liquids high in sodium:      ? Packaged foods, such as frozen dinners, cookies, macaroni and cheese, and cereals with more than 300 mg of sodium per serving    ? Vegetables with added sodium, such as instant potatoes, vegetables with added sauces, or regular canned vegetables    ? Cured or smoked meats, such as hot dogs, calderon, and sausage    ? High-sodium ketchup, barbecue sauce, salad dressing, pickles, olives, soy sauce, or miso    · Foods and liquids high in sugar:      ? Candy, cake, cookies, pies, or doughnuts    ? Soft drinks (soda), sports drinks, or sweetened tea    ? Canned or dry mixes for cakes, soups, sauces, or gravies    Other healthy heart guidelines:   · Do not smoke  Nicotine and other chemicals in cigarettes and cigars can cause lung and heart damage  Ask your healthcare provider for information if you currently smoke and need help to quit  E-cigarettes or smokeless tobacco still contain nicotine  Talk to your healthcare provider before you use these products  · Limit or do not drink alcohol as directed  Alcohol can damage your heart and raise your blood pressure  Your healthcare provider may give you specific daily and weekly limits  The general recommended limit is 1 drink a day for women 21 or older and for men 72 or older  Do not have more than 3 drinks in a day or 7 in a week  The recommended limit is 2 drinks a day for men 24to 59years of age  Do not have more than 4 drinks in a day or 14 in a week  A drink of alcohol is 12 ounces of beer, 5 ounces of wine, or 1½ ounces of liquor  · Exercise regularly  Exercise can help you maintain a healthy weight and improve your blood pressure and cholesterol levels  Regular exercise can also decrease your risk for heart problems  Ask your healthcare provider about the best exercise plan for you  Do not start an exercise program without asking your healthcare provider         Follow up with your doctor or cardiologist as directed:  Write down your questions so you remember to ask them during your visits  © Copyright [address] [place] [address] Information is for End User's use only and may not be sold, redistributed or otherwise used for commercial purposes  All illustrations and images included in CareNotes® are the copyrighted property of A D A M , Inc  or Aidan Barry  The above information is an  only  It is not intended as medical advice for individual conditions or treatments  Talk to your doctor, nurse or pharmacist before following any medical regimen to see if it is safe and effective for you

## 2021-01-15 NOTE — PROGRESS NOTES
BMI Counseling: There is no height or weight on file to calculate BMI  The BMI is above normal  Nutrition recommendations include decreasing portion sizes and encouraging healthy choices of fruits and vegetables  Exercise recommendations include moderate physical activity 150 minutes/week  No pharmacotherapy was ordered  Assessment/Plan:  Problem List Items Addressed This Visit        Other    Encounter for monitoring Suboxone maintenance therapy - Primary    Relevant Medications    buprenorphine-naloxone (SUBOXONE) 8-2 mg    Other Relevant Orders    Suboxone    Toxicology screen, urine    Medication therapy continued    Relevant Medications    buprenorphine-naloxone (SUBOXONE) 8-2 mg    Polysubstance dependence including opioid type drug with complication, continuous use (HCC)    Relevant Medications    buprenorphine-naloxone (SUBOXONE) 8-2 mg    Other Relevant Orders    Suboxone    Toxicology screen, urine           Diagnoses and all orders for this visit:    Encounter for monitoring Suboxone maintenance therapy  -     buprenorphine-naloxone (SUBOXONE) 8-2 mg; One or two strips sl q day  -     Suboxone  -     Toxicology screen, urine    Polysubstance dependence including opioid type drug with complication, continuous use (HCC)  -     buprenorphine-naloxone (SUBOXONE) 8-2 mg; One or two strips sl q day  -     Suboxone  -     Toxicology screen, urine    Medication therapy continued  -     buprenorphine-naloxone (SUBOXONE) 8-2 mg; One or two strips sl q day  No problem-specific Assessment & Plan notes found for this encounter  Scheduled Medication Review:  Pt's scheduled medication use was reviewed by myself/staff via the [employer] website  Pt's use has been found to be appropriate w/o any concerns for misuse by the patient  Pt's current conditions require continued scheduled medication use at this time  Future review for continued appropriate medication use and misuse will continue          A/P: Doing well and will continue 16mg films, dose 5/6 and continue with counseling  Reminded to keep meds safe and out of reach of children  RTC 4 weeks  Subjective: WM presents for f/u suboxone  Doing well and no c/o's  Not using and no withdraw  Does attend counseling  UDT obtained today  Tolerating the meds and no side effects  Patient ID: Antony Worley is a 21 y o  male  HPI    The following portions of the patient's history were reviewed and updated as appropriate:   He has a past medical history of ADHD (attention deficit hyperactivity disorder), Bipolar disorder ([facility] ), Depression, Hyperlipidemia, Hypertension, Psychiatric disorder, Substance abuse ([facility] ), and Tourette syndrome  ,  does not have any pertinent problems on file  ,   has a past surgical history that includes Tympanostomy tube placement and TONSILECTOMY AND ADNOIDECTOMY  ,  family history includes Cirrhosis in his father; Coronary artery disease in his father; Hepatitis in his father; No Known Problems in his mother; Substance Abuse in his brother and father  ,   reports that he has been smoking cigarettes  He has a 20 00 pack-year smoking history  He has never used smokeless tobacco  He reports previous alcohol use  He reports current drug use  Drug: Marijuana  ,  is allergic to abilify [aripiprazole]     Current Outpatient Medications   Medication Sig Dispense Refill    buprenorphine-naloxone (SUBOXONE) 8-2 mg One or two strips sl q day   60 Film 0    gabapentin (NEURONTIN) 300 mg capsule Take 2 capsules (600 mg total) by mouth 3 (three) times a day 180 capsule 0    naloxone (NARCAN) 1 mg/mL intranasal 2 mL (2 mg total) into each nostril once for 1 dose 2 mL 1    naloxone (Narcan) 4 mg/0 1 mL nasal spray 0 1 mL (4 mg total) into each nostril as needed (respiratory depression or possible OD) 1 each 1    QUEtiapine (SEROquel) 100 mg tablet Take 1 tablet (100 mg total) by mouth daily at bedtime 30 tablet 0    QUEtiapine (SEROquel) 25 mg tablet Take 1 tablet (25 mg total) by mouth 2 (two) times a day 60 tablet 0     No current facility-administered medications for this visit  Review of Systems   Constitutional: Negative for activity change, chills, diaphoresis, fatigue and fever  Respiratory: Negative for cough, chest tightness, shortness of breath and wheezing  Cardiovascular: Negative for chest pain, palpitations and leg swelling  Gastrointestinal: Negative for abdominal pain, constipation, diarrhea, nausea and vomiting  Genitourinary: Negative for difficulty urinating, dysuria and frequency  Musculoskeletal: Negative for arthralgias, gait problem and myalgias  Neurological: Negative for dizziness, seizures, syncope, weakness, light-headedness and headaches  Psychiatric/Behavioral: Negative for confusion, dysphoric mood, self-injury, sleep disturbance and suicidal ideas  The patient is not nervous/anxious  PHQ-9 Depression Screening    PHQ-9:   Frequency of the following problems over the past two weeks:            Objective:  Vitals:    01/15/21 1149   BP: 120/74   Pulse: 75   Temp: 98 5 °F (36 9 °C)   SpO2: 98%   Weight: 88 1 kg (194 lb 4 oz)   Height: 5' 7" (1 702 m)     Body mass index is 30 42 kg/m²  Physical Exam  Vitals signs and nursing note reviewed  Constitutional:       General: He is not in acute distress  Appearance: Normal appearance  He is not ill-appearing  HENT:      Head: Normocephalic and atraumatic  Mouth/Throat:      Mouth: Mucous membranes are moist    Eyes:      Extraocular Movements: Extraocular movements intact  Conjunctiva/sclera: Conjunctivae normal       Pupils: Pupils are equal, round, and reactive to light  Cardiovascular:      Rate and Rhythm: Normal rate and regular rhythm  Heart sounds: Normal heart sounds  Pulmonary:      Effort: Pulmonary effort is normal  No respiratory distress  Breath sounds: Normal breath sounds  No wheezing or rales     Abdominal: General: Bowel sounds are normal  There is no distension  Palpations: Abdomen is soft  Tenderness: There is no abdominal tenderness  Neurological:      General: No focal deficit present  Mental Status: He is alert and oriented to person, place, and time  Mental status is at baseline  Psychiatric:         Mood and Affect: Mood normal          Behavior: Behavior normal          Thought Content: Thought content normal          Judgment: Judgment normal          BMI Counseling: Body mass index is 30 42 kg/m²  The BMI is above normal  Nutrition recommendations include reducing portion sizes, decreasing overall calorie intake, reducing intake of saturated fat and trans fat and reducing intake of cholesterol  Exercise recommendations include moderate aerobic physical activity for 150 minutes/week

## 2021-01-17 ENCOUNTER — APPOINTMENT (EMERGENCY)
Dept: RADIOLOGY | Facility: HOSPITAL | Age: 21
End: 2021-01-17
Payer: COMMERCIAL

## 2021-01-17 ENCOUNTER — HOSPITAL ENCOUNTER (EMERGENCY)
Facility: HOSPITAL | Age: 21
Discharge: HOME | End: 2021-01-17
Attending: EMERGENCY MEDICINE
Payer: COMMERCIAL

## 2021-01-17 VITALS
WEIGHT: 194 LBS | BODY MASS INDEX: 30.38 KG/M2 | TEMPERATURE: 97.9 F | SYSTOLIC BLOOD PRESSURE: 117 MMHG | DIASTOLIC BLOOD PRESSURE: 64 MMHG | HEART RATE: 64 BPM | RESPIRATION RATE: 18 BRPM | OXYGEN SATURATION: 95 %

## 2021-01-17 DIAGNOSIS — J40 BRONCHITIS: Primary | ICD-10-CM

## 2021-01-17 DIAGNOSIS — Z72.0 TOBACCO ABUSE: ICD-10-CM

## 2021-01-17 PROCEDURE — 71046 X-RAY EXAM CHEST 2 VIEWS: CPT

## 2021-01-17 PROCEDURE — 99283 EMERGENCY DEPT VISIT LOW MDM: CPT

## 2021-01-17 PROCEDURE — 99284 EMERGENCY DEPT VISIT MOD MDM: CPT | Performed by: PHYSICIAN ASSISTANT

## 2021-01-17 RX ORDER — PREDNISONE 20 MG/1
40 TABLET ORAL DAILY
Qty: 8 TABLET | Refills: 0 | Status: SHIPPED | OUTPATIENT
Start: 2021-01-18 | End: 2021-01-22

## 2021-01-17 RX ORDER — AZITHROMYCIN 250 MG/1
500 TABLET, FILM COATED ORAL ONCE
Status: COMPLETED | OUTPATIENT
Start: 2021-01-17 | End: 2021-01-17

## 2021-01-17 RX ORDER — AZITHROMYCIN 250 MG/1
250 TABLET, FILM COATED ORAL EVERY 24 HOURS
Qty: 4 TABLET | Refills: 0 | Status: SHIPPED | OUTPATIENT
Start: 2021-01-18 | End: 2021-01-22

## 2021-01-17 RX ORDER — PREDNISONE 20 MG/1
40 TABLET ORAL ONCE
Status: COMPLETED | OUTPATIENT
Start: 2021-01-17 | End: 2021-01-17

## 2021-01-17 RX ORDER — BENZONATATE 100 MG/1
100 CAPSULE ORAL ONCE
Status: COMPLETED | OUTPATIENT
Start: 2021-01-17 | End: 2021-01-17

## 2021-01-17 RX ORDER — BENZONATATE 100 MG/1
100 CAPSULE ORAL 3 TIMES DAILY PRN
Qty: 20 CAPSULE | Refills: 0 | Status: SHIPPED | OUTPATIENT
Start: 2021-01-17 | End: 2021-03-31

## 2021-01-17 RX ORDER — ALBUTEROL SULFATE 90 UG/1
2 AEROSOL, METERED RESPIRATORY (INHALATION) ONCE
Status: COMPLETED | OUTPATIENT
Start: 2021-01-17 | End: 2021-01-17

## 2021-01-17 RX ORDER — ALBUTEROL SULFATE 90 UG/1
1-2 AEROSOL, METERED RESPIRATORY (INHALATION) EVERY 4 HOURS PRN
Qty: 1 INHALER | Refills: 0 | Status: ON HOLD | OUTPATIENT
Start: 2021-01-17 | End: 2021-12-22 | Stop reason: ALTCHOICE

## 2021-01-17 RX ADMIN — ALBUTEROL SULFATE 2 PUFF: 90 AEROSOL, METERED RESPIRATORY (INHALATION) at 16:49

## 2021-01-17 RX ADMIN — AZITHROMYCIN 500 MG: 250 TABLET, FILM COATED ORAL at 16:48

## 2021-01-17 RX ADMIN — PREDNISONE 40 MG: 20 TABLET ORAL at 16:48

## 2021-01-17 RX ADMIN — BENZONATATE 100 MG: 100 CAPSULE ORAL at 16:48

## 2021-01-17 NOTE — ED PROVIDER NOTES
History  Chief Complaint   Patient presents with    Cough     Patient presents to ER with a cough for 3 months and would like a work note  Patient admits to smoking 3-3 5 packs of cigarrettes a day  21year old male presents for evaluation of cough  Pt notes this has been ongoing for the past 3 months  He feels it has been getting worse  He reports that he has been bringing up chunks of phlegm  He describes this as yellow and green in color  Denies fever, chills  Denies sinus congestion  Denies chest pain, SOB  Denies N/V/D, abdominal pain  Denies sick contacts  Denies recent travel  Pt notes no prior evaluation of symptoms  No specific treatments tried  No reported aggravating or alleviating factors  Pt is current smoker and admits to smoking 3-3 5 PPD  He reports he has been smoking since age 6  I did question why pt waited 3 months to have the cough checked out and he indicated to me that he missed work and needed a note but also feels symptoms have been getting worse and feels he is coughing up more phlegm  History provided by:  Patient   used: No    Cough  Cough characteristics:  Productive  Sputum characteristics:  Yellow  Timing:  Constant  Progression:  Worsening  Chronicity:  New  Smoker: yes    Context: smoke exposure    Context: not sick contacts    Relieved by:  Nothing  Worsened by:  Nothing  Associated symptoms: no chest pain, no chills, no ear pain, no fever, no headaches, no myalgias, no rash, no rhinorrhea, no shortness of breath, no sinus congestion, no sore throat and no wheezing    Risk factors: no chemical exposure, no recent infection and no recent travel        Prior to Admission Medications   Prescriptions Last Dose Informant Patient Reported? Taking?    QUEtiapine (SEROquel) 100 mg tablet   No No   Sig: Take 1 tablet (100 mg total) by mouth daily at bedtime   QUEtiapine (SEROquel) 25 mg tablet   No No   Sig: Take 1 tablet (25 mg total) by mouth 2 (two) times a day   buprenorphine-naloxone (SUBOXONE) 8-2 mg   No No   Sig: One or two strips sl q day    gabapentin (NEURONTIN) 300 mg capsule   No No   Sig: Take 2 capsules (600 mg total) by mouth 3 (three) times a day   naloxone (NARCAN) 1 mg/mL intranasal   No No   Si mL (2 mg total) into each nostril once for 1 dose   naloxone (Narcan) 4 mg/0 1 mL nasal spray   No No   Si 1 mL (4 mg total) into each nostril as needed (respiratory depression or possible OD)      Facility-Administered Medications: None       Past Medical History:   Diagnosis Date    ADHD (attention deficit hyperactivity disorder)     [facility]     Depression     Hyperlipidemia     Hypertension     Psychiatric disorder     Substance abuse ([facility] )     Tourette syndrome        Past Surgical History:   Procedure Laterality Date    TONSILECTOMY AND ADNOIDECTOMY      TYMPANOSTOMY TUBE PLACEMENT         Family History   Problem Relation Age of Onset    No Known Problems Mother     Substance Abuse Father     Cirrhosis Father     Coronary artery disease Father     Hepatitis Father     Substance Abuse Brother     Diabetes Neg Hx      I have reviewed and agree with the history as documented  E-Cigarette/Vaping    E-Cigarette Use Current Every Day User      E-Cigarette/Vaping Substances    Nicotine No     THC Yes     CBD No     Flavoring No     Other No     Unknown No      Social History     Tobacco Use    Smoking status: Current Every Day Smoker     Packs/day: 3 00     Years: 10 00     Pack years: 30 00     Types: Cigarettes    Smokeless tobacco: Never Used   Substance Use Topics    Alcohol use: Not Currently    Drug use: Yes     Types: Marijuana     Comment: pills       Review of Systems   Constitutional: Negative  Negative for chills, fatigue and fever  HENT: Positive for postnasal drip  Negative for congestion, ear pain, rhinorrhea and sore throat  Eyes: Negative  Negative for visual disturbance  Respiratory: Positive for cough  Negative for shortness of breath and wheezing  Cardiovascular: Negative  Negative for chest pain, palpitations and leg swelling  Gastrointestinal: Negative  Negative for abdominal pain, constipation, diarrhea, nausea and vomiting  Genitourinary: Negative  Negative for dysuria, flank pain, frequency and hematuria  Musculoskeletal: Negative  Negative for back pain, myalgias and neck pain  Skin: Negative  Negative for rash  Neurological: Negative  Negative for dizziness, light-headedness and headaches  Psychiatric/Behavioral: Negative  Negative for confusion  All other systems reviewed and are negative  Physical Exam  Physical Exam  Vitals signs and nursing note reviewed  Constitutional:       General: He is not in acute distress  Appearance: Normal appearance  He is well-developed  He is not toxic-appearing  HENT:      Head: Normocephalic and atraumatic  Right Ear: Hearing, tympanic membrane, ear canal and external ear normal       Left Ear: Hearing, tympanic membrane, ear canal and external ear normal       Nose: Nose normal       Mouth/Throat:      Mouth: Mucous membranes are moist       Pharynx: Oropharynx is clear  Uvula midline  No oropharyngeal exudate  Eyes:      General: No scleral icterus  Extraocular Movements: Extraocular movements intact  Conjunctiva/sclera: Conjunctivae normal       Pupils: Pupils are equal, round, and reactive to light  Neck:      Musculoskeletal: Normal range of motion and neck supple  Trachea: Trachea and phonation normal  No tracheal deviation  Cardiovascular:      Rate and Rhythm: Normal rate and regular rhythm  Pulses: Normal pulses  Heart sounds: Normal heart sounds, S1 normal and S2 normal  No murmur  Pulmonary:      Effort: Pulmonary effort is normal  No tachypnea or respiratory distress  Breath sounds: Wheezing (exp wheezing heard in upper fields) present   No rhonchi or rales  Abdominal:      General: Bowel sounds are normal  There is no distension  Palpations: Abdomen is soft  Tenderness: There is no abdominal tenderness  There is no guarding or rebound  Hernia: No hernia is present  Musculoskeletal: Normal range of motion  General: No tenderness  Right lower leg: No edema  Left lower leg: No edema  Skin:     General: Skin is warm and dry  Capillary Refill: Capillary refill takes less than 2 seconds  Findings: No rash  Nails: There is no clubbing  Neurological:      General: No focal deficit present  Mental Status: He is alert and oriented to person, place, and time  GCS: GCS eye subscore is 4  GCS verbal subscore is 5  GCS motor subscore is 6  Cranial Nerves: No cranial nerve deficit  Sensory: No sensory deficit  Motor: No abnormal muscle tone        Gait: Gait normal    Psychiatric:         Mood and Affect: Mood normal          Speech: Speech normal          Behavior: Behavior normal          Vital Signs  ED Triage Vitals [01/17/21 1515]   Temperature Pulse Respirations Blood Pressure SpO2   97 9 °F (36 6 °C) 64 18 117/64 95 %      Temp Source Heart Rate Source Patient Position - Orthostatic VS BP Location FiO2 (%)   Temporal Monitor Sitting Right arm --      Pain Score       --           Vitals:    01/17/21 1515   BP: 117/64   Pulse: 64   Patient Position - Orthostatic VS: Sitting         Visual Acuity      ED Medications  Medications   azithromycin (ZITHROMAX) tablet 500 mg (500 mg Oral Given 1/17/21 1648)   predniSONE tablet 40 mg (40 mg Oral Given 1/17/21 1648)   benzonatate (TESSALON PERLES) capsule 100 mg (100 mg Oral Given 1/17/21 1648)   albuterol (PROVENTIL HFA,VENTOLIN HFA) inhaler 2 puff (2 puffs Inhalation Given 1/17/21 1649)       Diagnostic Studies  Results Reviewed     None                 XR chest 2 views    (Results Pending)              Procedures  Procedures         ED Course  ED Course as of Jan 17 1952   [place] Jan 17, 2021   1626 Independently viewed and interpreted by me - no acute  cardiopulmonary process; pending official read  XR chest 2 views   1630 Pt updated on results  Will discharge with treatment of bronchitis and recommended outpatient follow up and smoking cessation  Discussed continued symptomatic/supportive care  Advised rest, fluids, OTC meds as needed for symptoms  Rx zithromax, prednisone, albuterol and tessalon  Smoking cessation advised  Strict return precautions outlined  Note for work provided  Advised outpatient follow up with PCP or return to ER for change in condition as outlined  Pt verbalized understanding and had no further questions  MDM  Number of Diagnoses or Management Options  Bronchitis: new and requires workup  Tobacco abuse: established and worsening     Amount and/or Complexity of Data Reviewed  Tests in the radiology section of CPT®: ordered and reviewed  Decide to obtain previous medical records or to obtain history from someone other than the patient: yes  Obtain history from someone other than the patient: yes  Review and summarize past medical records: yes  Independent visualization of images, tracings, or specimens: yes    Patient Progress  Patient progress: improved      Disposition  Final diagnoses:   Bronchitis   Tobacco abuse     Time reflects when diagnosis was documented in both MDM as applicable and the Disposition within this note     Time User Action Codes Description Comment    1/17/2021  4:35 PM Nba Jeovany Add [J40] Bronchitis     1/17/2021  4:35 PM Nba Jeovany Add [Z72 0] Tobacco abuse       ED Disposition     ED Disposition Condition Date/Time Comment    Discharge Stable Sun Jan 17, 2021  4:35 PM Antony Worley discharge to home/self care              Follow-up Information     Follow up With Specialties Details Why Contact Info Additional Information    Matilde Hudson Virginia Schlatter,  Internal Medicine Schedule an appointment as soon as possible for a visit   [address] [facility] [address] [facility] Emergency Department Emergency Medicine  As needed [address] [phone]  [address] [facility] Emergency Department, [address], [address]          Discharge Medication List as of 1/17/2021  4:37 PM      START taking these medications    Details   albuterol (PROVENTIL HFA,VENTOLIN HFA) 90 mcg/act inhaler Inhale 1-2 puffs every 4 (four) hours as needed for wheezing or shortness of breath, Starting Sun 1/17/2021, Normal      azithromycin (Zithromax Z-Arian) 250 mg tablet Take 1 tablet (250 mg total) by mouth every 24 hours for 4 days, Starting Mon 1/18/2021, Until Fri 1/22/2021, Normal      benzonatate (TESSALON PERLES) 100 mg capsule Take 1 capsule (100 mg total) by mouth 3 (three) times a day as needed for cough, Starting Sun 1/17/2021, Normal      predniSONE 20 mg tablet Take 2 tablets (40 mg total) by mouth daily for 4 days, Starting Mon 1/18/2021, Until Fri 1/22/2021, Normal         CONTINUE these medications which have NOT CHANGED    Details   buprenorphine-naloxone (SUBOXONE) 8-2 mg One or two strips sl q day , Normal      gabapentin (NEURONTIN) 300 mg capsule Take 2 capsules (600 mg total) by mouth 3 (three) times a day, Starting Wed 8/12/2020, Until Wed 10/21/2020, Normal      naloxone (NARCAN) 1 mg/mL intranasal 2 mL (2 mg total) into each nostril once for 1 dose, Starting Thu 6/4/2020, Print      naloxone (Narcan) 4 mg/0 1 mL nasal spray 0 1 mL (4 mg total) into each nostril as needed (respiratory depression or possible OD), Starting Wed 9/16/2020, Print      QUEtiapine (SEROquel) 100 mg tablet Take 1 tablet (100 mg total) by mouth daily at bedtime, Starting Wed 8/12/2020, Until Thu 12/24/2020, Normal      QUEtiapine (SEROquel) 25 mg tablet Take 1 tablet (25 mg total) by mouth 2 (two) times a day, Starting Wed 8/12/2020, Until Thu 12/24/2020, Normal           No discharge procedures on file      PDMP Review       Value Time User    PDMP Reviewed  Yes 1/15/2021 11:34 AM Deirdre Abreu DO          ED Provider  Electronically Signed by           Reva Alexandra PA-C  01/17/21 1952

## 2021-01-17 NOTE — Clinical Note
Clifford Pérez was seen and treated in our emergency department on 1/17/2021  Diagnosis:     Naveed Batch    He may return on this date: 01/18/2021         If you have any questions or concerns, please don't hesitate to call        Yoselyn Elizabeth PA-C    ______________________________           _______________          _______________  Hospital Representative                              Date                                Time

## 2021-01-17 NOTE — DISCHARGE INSTRUCTIONS
Rest, plenty of fluids  Take antibiotic as directed for the full duration  Take steroid as directed with food  Albuterol inhaler every 4-6 hours as needed  Tessalon as needed for cough  Continue to alternate OTC tylenol and ibuprofen as needed  I would encourage you to cut back or quit smoking  Follow up with PCP for recheck or return to ER as needed

## 2021-01-20 LAB
AMPHETAMINES UR QL SCN: NEGATIVE NG/ML
BARBITURATES UR QL SCN: NEGATIVE NG/ML
BENZODIAZ UR QL: NEGATIVE NG/ML
BUPRENORPHINE UR CFM-MCNC: 14 NG/ML
BUPRENORPHINE UR QL CFM: NORMAL NG/ML
BUPRENORPHINE UR QL CFM: POSITIVE
BUPRENORPHINE+NOR UR QL: POSITIVE
BZE UR QL: NEGATIVE NG/ML
CANNABINOIDS UR QL SCN: POSITIVE
METHADONE UR QL SCN: NEGATIVE NG/ML
NORBUPRENORPHINE UR CFM-MCNC: 72 NG/ML
NORBUPRENORPHINE UR QL CFM: POSITIVE
OPIATES UR QL: NEGATIVE NG/ML
PCP UR QL: NEGATIVE NG/ML
PROPOXYPH UR QL SCN: NEGATIVE NG/ML

## 2021-02-12 ENCOUNTER — OFFICE VISIT (OUTPATIENT)
Dept: INTERNAL MEDICINE CLINIC | Facility: CLINIC | Age: 21
End: 2021-02-12
Payer: COMMERCIAL

## 2021-02-12 VITALS
BODY MASS INDEX: 29.51 KG/M2 | SYSTOLIC BLOOD PRESSURE: 142 MMHG | WEIGHT: 188 LBS | HEART RATE: 76 BPM | OXYGEN SATURATION: 97 % | HEIGHT: 67 IN | DIASTOLIC BLOOD PRESSURE: 82 MMHG | TEMPERATURE: 96.1 F

## 2021-02-12 DIAGNOSIS — Z79.899 ENCOUNTER FOR MONITORING SUBOXONE MAINTENANCE THERAPY: ICD-10-CM

## 2021-02-12 DIAGNOSIS — Z51.81 ENCOUNTER FOR MONITORING SUBOXONE MAINTENANCE THERAPY: ICD-10-CM

## 2021-02-12 DIAGNOSIS — F19.20 POLYSUBSTANCE DEPENDENCE INCLUDING OPIOID TYPE DRUG WITH COMPLICATION, CONTINUOUS USE (HCC): Primary | ICD-10-CM

## 2021-02-12 DIAGNOSIS — Z79.899 MEDICATION THERAPY CONTINUED: ICD-10-CM

## 2021-02-12 PROCEDURE — 99213 OFFICE O/P EST LOW 20 MIN: CPT | Performed by: INTERNAL MEDICINE

## 2021-02-12 PROCEDURE — 80307 DRUG TEST PRSMV CHEM ANLYZR: CPT | Performed by: INTERNAL MEDICINE

## 2021-02-12 RX ORDER — BUPRENORPHINE AND NALOXONE 8; 2 MG/1; MG/1
FILM, SOLUBLE BUCCAL; SUBLINGUAL
Qty: 60 FILM | Refills: 0 | Status: SHIPPED | OUTPATIENT
Start: 2021-02-12 | End: 2021-03-11

## 2021-02-12 NOTE — PATIENT INSTRUCTIONS
Buprenorphine/Naloxone (Into the mouth)   Buprenorphine (bue-pre-NOR-feen), Naloxone (nal-OX-one)  Treats narcotic dependence  Brand Name(s): Suboxone, Zubsolv   There may be other brand names for this medicine  When This Medicine Should Not Be Used: This medicine is not right for everyone  Do not use it if you had an allergic reaction to buprenorphine or naloxone  How to Use This Medicine: Thin Sheet, Tablet  · Take your medicine as directed  Your dose may need to be changed several times to find what works best for you  · You must let the medicine dissolve  Never swallow the film or tablet  Your body may not absorb enough of the medicine if you swallow it  · Your health caregiver should show you how to use the medicine  If you do not understand, ask for help  It is important to use the medicine correctly  · Do not talk while the medicine is inside your mouth  · Buccal film: Rinse your mouth with water to moisten it  Place the film against the inside of your cheek  If your doctor told you to use more than 1 film, place the second film inside your other cheek  Do not place more than 2 films inside of 1 cheek at a time  Do not move or touch the film  Do not eat or drink anything until the film is completely dissolved  · Sublingual tablet: Place the tablet under your tongue  If your doctor told you to use more than 1 tablet, place all of the tablets in different places under your tongue at the same time  You can use 2 tablets at a time until you have taken all of the medicine, if that is easier for you  Let the tablets dissolve completely in your mouth  Do not eat or drink anything until the tablets are completely dissolved  · Sublingual film: Drink some water to help moisten your mouth  Place the film under your tongue  If your doctor told you to use more than 1 film, place the second film on the opposite side from the first one  Do not move the film after you placed it under your tongue   If you are supposed to use more than 2 films, use them the same way, but do not start until the first 2 films are completely dissolved  · Do not break, crush, chew, or cut the film or tablet  · This medicine should come with a Medication Guide  Ask your pharmacist for a copy if you do not have one  · Missed dose: Take a dose as soon as you remember  If it is almost time for your next dose, wait until then and take a regular dose  Do not take extra medicine to make up for a missed dose  · Store the medicine in a closed container at room temperature, away from heat, moisture, and direct light  Drop off any unused narcotic medicine at a drug take-back location right away  If you do not have a drug take-back location near you, flush any unused narcotic medicine down the toilet  Check your local drug store and clinics for take-back locations  You can also check the [employer] web site for locations  Here is the link to the  safe disposal of medicines [employer] ee  Drugs and Foods to Avoid:   Ask your doctor or pharmacist before using any other medicine, including over-the-counter medicines, vitamins, and herbal products  · Do not use this medicine if you are using or have used an MAO inhibitor within the past 14 days  · Some medicines can affect how buprenorphine/naloxone works  Tell your doctor if you are using the following:   ? Carbamazepine, cyclobenzaprine, erythromycin, ketoconazole, metaxalone, mirtazapine, phenobarbital, phenytoin, rifampin, tramadol, trazodone  ? Diuretic (water pill)  ? Medicine to treat depression, anxiety, and mental health illness  ? Medicine to treat HIV/AIDS (including atazanavir, delavirdine, efavirenz, etravirine, nevirapine, ritonavir)  ? Phenothiazine medicine  ?  Triptan medicine to treat migraine headaches  · Do not drink alcohol while you are using this medicine  · Tell your doctor if you use anything else that makes you sleepy  Some examples are allergy medicine, narcotic pain medicine, and alcohol  Tell your doctor if you are also using butorphanol, nalbuphine, pentazocine, or a muscle relaxer  Warnings While Using This Medicine:   · Tell your doctor if you are pregnant or breastfeeding, or if you have kidney disease, liver disease (including hepatitis), lung or breathing problems (including sleep apnea), adrenal gland problems, an enlarged prostate, trouble urinating, gallbladder problems, thyroid problems, stomach problems, or a history of depression, brain tumor, head injury, alcohol or drug abuse  · This medicine may cause the following problems:  ? High risk of overdose, which can lead to death  ? Respiratory depression (serious breathing problem that can be life-threatening)  ? Sleep-related breathing problems (including sleep apnea, sleep-related hypoxemia)  ? Liver problems  ? Serotonin syndrome, when used with certain medicines  · This medicine may make you dizzy or drowsy  Do not drive or do anything else that could be dangerous until you know how this medicine affects you  Stand or sit up slowly if you feel lightheaded or dizzy  · Tell any doctor or dentist who treats you that you are using this medicine  · This medicine can be habit-forming  Do not use more than your prescribed dose  Call your doctor if you think your medicine is not working  · This medicine may cause constipation, especially with long-term use  Ask your doctor if you should use a laxative to prevent and treat constipation  · Do not stop using this medicine suddenly  Your doctor will need to slowly decrease your dose before you stop it completely  · This medicine could cause infertility  Talk with your doctor before using this medicine if you plan to have children  · Your doctor will do lab tests at regular visits to check on the effects of this medicine   Keep all appointments  · Keep all medicine out of the reach of children  Never share your medicine with anyone  Possible Side Effects While Using This Medicine:   Call your doctor right away if you notice any of these side effects:  · Allergic reaction: Itching or hives, swelling in your face or hands, swelling or tingling in your mouth or throat, chest tightness, trouble breathing  · Blue lips, fingernails, or skin  · Changes in skin color, dark freckles  · Cold feeling, weakness or tiredness, weight loss  · Dark urine or pale stools, nausea, vomiting, loss of appetite, stomach pain, yellow skin or eyes  · Extreme dizziness or weakness, shallow breathing, sweating, seizures, cold or clammy skin  · Severe confusion, lightheadedness, dizziness, or fainting  · Trouble breathing or slow breathing  If you notice these less serious side effects, talk with your doctor:   · Constipation or upset stomach  · Headache, trouble sleeping  · Shaking, runny nose, watery eyes, diarrhea, muscle aches  If you notice other side effects that you think are caused by this medicine, tell your doctor  Call your doctor for medical advice about side effects  You may report side effects to FDA at 8-707-FDA-3678  © Copyright [address] Information is for End User's use only and may not be sold, redistributed or otherwise used for commercial purposes  The above information is an  only  It is not intended as medical advice for individual conditions or treatments  Talk to your doctor, nurse or pharmacist before following any medical regimen to see if it is safe and effective for you

## 2021-02-12 NOTE — PROGRESS NOTES
Assessment/Plan:  Problem List Items Addressed This Visit        Other    Encounter for monitoring Suboxone maintenance therapy    Relevant Medications    buprenorphine-naloxone (SUBOXONE) 8-2 mg    Medication therapy continued    Relevant Medications    buprenorphine-naloxone (SUBOXONE) 8-2 mg    Polysubstance dependence including opioid type drug with complication, continuous use (HCC) - Primary    Relevant Medications    buprenorphine-naloxone (SUBOXONE) 8-2 mg           Diagnoses and all orders for this visit:    Polysubstance dependence including opioid type drug with complication, continuous use (HCC)  -     buprenorphine-naloxone (SUBOXONE) 8-2 mg; One or two strips sl q day  Encounter for monitoring Suboxone maintenance therapy  -     buprenorphine-naloxone (SUBOXONE) 8-2 mg; One or two strips sl q day  Medication therapy continued  -     buprenorphine-naloxone (SUBOXONE) 8-2 mg; One or two strips sl q day  No problem-specific Assessment & Plan notes found for this encounter  Scheduled Medication Review:  Pt's scheduled medication use was reviewed by myself/staff via the [employer] website  Pt's use has been found to be appropriate w/o any concerns for misuse by the patient  Pt's current conditions require continued scheduled medication use at this time  Future review for continued appropriate medication use and misuse will continue  A/P: Doing well and will continue 16mg films, dose 6/6 and consider weaning next visit  Continue with counseling  Reminded to keep meds safe and out of reach of children  RTC 4 weeks  Subjective: WM presents for f/u suboxone  Doing well and no c/o's  Not using and no withdraw  Does attend counseling  UDT obtained  today  Tolerating the meds and no side effects  Patient ID: Flori Newman is a 21 y o  male      HPI    The following portions of the patient's history were reviewed and updated as appropriate:   He has a past medical history of ADHD (attention deficit hyperactivity disorder), Bipolar disorder ([facility] [address] ), Depression, Hyperlipidemia, Hypertension, Psychiatric disorder, [facility] ), and Tourette syndrome  ,  does not have any pertinent problems on file  ,   has a past surgical history that includes Tympanostomy tube placement and TONSILECTOMY AND ADNOIDECTOMY  ,  family history includes Cirrhosis in his father; Coronary artery disease in his father; Hepatitis in his father; No Known Problems in his mother; Substance Abuse in his brother and father  ,   reports that he has been smoking cigarettes  He has a 30 00 pack-year smoking history  He has never used smokeless tobacco  He reports previous alcohol use  He reports current drug use  Drug: Marijuana  ,  is allergic to abilify [aripiprazole]     Current Outpatient Medications   Medication Sig Dispense Refill    albuterol (PROVENTIL HFA,VENTOLIN HFA) 90 mcg/act inhaler Inhale 1-2 puffs every 4 (four) hours as needed for wheezing or shortness of breath 1 Inhaler 0    benzonatate (TESSALON PERLES) 100 mg capsule Take 1 capsule (100 mg total) by mouth 3 (three) times a day as needed for cough 20 capsule 0    naloxone (NARCAN) 1 mg/mL intranasal 2 mL (2 mg total) into each nostril once for 1 dose 2 mL 1    QUEtiapine (SEROquel) 100 mg tablet Take 1 tablet (100 mg total) by mouth daily at bedtime 30 tablet 0    QUEtiapine (SEROquel) 25 mg tablet Take 1 tablet (25 mg total) by mouth 2 (two) times a day 60 tablet 0    buprenorphine-naloxone (SUBOXONE) 8-2 mg One or two strips sl q day  60 Film 0    gabapentin (NEURONTIN) 300 mg capsule Take 2 capsules (600 mg total) by mouth 3 (three) times a day 180 capsule 0    naloxone (Narcan) 4 mg/0 1 mL nasal spray 0 1 mL (4 mg total) into each nostril as needed (respiratory depression or possible OD) (Patient not taking: Reported on 2/12/2021) 1 each 1     No current facility-administered medications for this visit          Review of Systems   Constitutional: Negative for activity change, chills, diaphoresis, fatigue and fever  Respiratory: Negative for cough, chest tightness, shortness of breath and wheezing  Cardiovascular: Negative for chest pain, palpitations and leg swelling  Gastrointestinal: Negative for abdominal pain, constipation, diarrhea, nausea and vomiting  Genitourinary: Negative for difficulty urinating, dysuria and frequency  Musculoskeletal: Negative for arthralgias, gait problem and myalgias  Neurological: Negative for dizziness, seizures, syncope, weakness, light-headedness and headaches  Psychiatric/Behavioral: Negative for confusion, dysphoric mood, self-injury, sleep disturbance and suicidal ideas  The patient is not nervous/anxious  PHQ-9 Depression Screening    PHQ-9:   Frequency of the following problems over the past two weeks:            Objective:  Vitals:    02/12/21 1120   BP: 142/82   Pulse: 76   Temp: (!) 96 1 °F (35 6 °C)   SpO2: 97%   Weight: 85 3 kg (188 lb)   Height: 5' 7" (1 702 m)     Body mass index is 29 44 kg/m²  Physical Exam  Vitals signs and nursing note reviewed  Constitutional:       General: He is not in acute distress  Appearance: Normal appearance  He is not ill-appearing  HENT:      Head: Normocephalic and atraumatic  Mouth/Throat:      Mouth: Mucous membranes are moist    Eyes:      Extraocular Movements: Extraocular movements intact  Conjunctiva/sclera: Conjunctivae normal       Pupils: Pupils are equal, round, and reactive to light  Cardiovascular:      Rate and Rhythm: Normal rate and regular rhythm  Heart sounds: Normal heart sounds  Pulmonary:      Effort: Pulmonary effort is normal  No respiratory distress  Breath sounds: Normal breath sounds  No wheezing or rales  Abdominal:      General: Bowel sounds are normal  There is no distension  Palpations: Abdomen is soft  Tenderness: There is no abdominal tenderness     Neurological:      General: No focal deficit present  Mental Status: He is alert and oriented to person, place, and time  Mental status is at baseline  Psychiatric:         Mood and Affect: Mood normal          Behavior: Behavior normal          Thought Content:  Thought content normal          Judgment: Judgment normal

## 2021-02-16 LAB
BUPRENORPHINE UR CFM-MCNC: 262 NG/ML
BUPRENORPHINE UR QL CFM: NORMAL NG/ML
BUPRENORPHINE UR QL CFM: POSITIVE
BUPRENORPHINE+NOR UR QL: POSITIVE
NORBUPRENORPHINE UR CFM-MCNC: 716 NG/ML
NORBUPRENORPHINE UR QL CFM: POSITIVE

## 2021-03-11 ENCOUNTER — OFFICE VISIT (OUTPATIENT)
Dept: INTERNAL MEDICINE CLINIC | Facility: CLINIC | Age: 21
End: 2021-03-11
Payer: COMMERCIAL

## 2021-03-11 VITALS
OXYGEN SATURATION: 96 % | DIASTOLIC BLOOD PRESSURE: 90 MMHG | SYSTOLIC BLOOD PRESSURE: 152 MMHG | HEART RATE: 69 BPM | WEIGHT: 185.5 LBS | HEIGHT: 67 IN | BODY MASS INDEX: 29.11 KG/M2 | TEMPERATURE: 96.9 F

## 2021-03-11 DIAGNOSIS — Z79.899 MEDICATION THERAPY CONTINUED: ICD-10-CM

## 2021-03-11 DIAGNOSIS — R03.0 ELEVATED BP WITHOUT DIAGNOSIS OF HYPERTENSION: ICD-10-CM

## 2021-03-11 DIAGNOSIS — Z51.81 ENCOUNTER FOR MONITORING SUBOXONE MAINTENANCE THERAPY: ICD-10-CM

## 2021-03-11 DIAGNOSIS — F11.10 OPIATE ABUSE, EPISODIC (HCC): ICD-10-CM

## 2021-03-11 DIAGNOSIS — Z79.899 ENCOUNTER FOR MONITORING SUBOXONE MAINTENANCE THERAPY: ICD-10-CM

## 2021-03-11 DIAGNOSIS — F19.20 POLYSUBSTANCE DEPENDENCE INCLUDING OPIOID TYPE DRUG WITH COMPLICATION, CONTINUOUS USE (HCC): Primary | ICD-10-CM

## 2021-03-11 PROCEDURE — 4004F PT TOBACCO SCREEN RCVD TLK: CPT | Performed by: INTERNAL MEDICINE

## 2021-03-11 PROCEDURE — 80307 DRUG TEST PRSMV CHEM ANLYZR: CPT | Performed by: INTERNAL MEDICINE

## 2021-03-11 PROCEDURE — 99213 OFFICE O/P EST LOW 20 MIN: CPT | Performed by: INTERNAL MEDICINE

## 2021-03-11 RX ORDER — BUPRENORPHINE AND NALOXONE 8; 2 MG/1; MG/1
FILM, SOLUBLE BUCCAL; SUBLINGUAL
Qty: 53 FILM | Refills: 0 | Status: SHIPPED | OUTPATIENT
Start: 2021-03-11 | End: 2021-04-09

## 2021-03-11 NOTE — PATIENT INSTRUCTIONS
Buprenorphine/Naloxone (Into the mouth)   Buprenorphine (bue-pre-NOR-feen), Naloxone (nal-OX-one)  Treats narcotic dependence  Brand Name(s): Suboxone, Zubsolv   There may be other brand names for this medicine  When This Medicine Should Not Be Used: This medicine is not right for everyone  Do not use it if you had an allergic reaction to buprenorphine or naloxone  How to Use This Medicine: Thin Sheet, Tablet  · Take your medicine as directed  Your dose may need to be changed several times to find what works best for you  · You must let the medicine dissolve  Never swallow the film or tablet  Your body may not absorb enough of the medicine if you swallow it  · Your health caregiver should show you how to use the medicine  If you do not understand, ask for help  It is important to use the medicine correctly  · Do not talk while the medicine is inside your mouth  · Buccal film: Rinse your mouth with water to moisten it  Place the film against the inside of your cheek  If your doctor told you to use more than 1 film, place the second film inside your other cheek  Do not place more than 2 films inside of 1 cheek at a time  Do not move or touch the film  Do not eat or drink anything until the film is completely dissolved  · Sublingual tablet: Place the tablet under your tongue  If your doctor told you to use more than 1 tablet, place all of the tablets in different places under your tongue at the same time  You can use 2 tablets at a time until you have taken all of the medicine, if that is easier for you  Let the tablets dissolve completely in your mouth  Do not eat or drink anything until the tablets are completely dissolved  · Sublingual film: Drink some water to help moisten your mouth  Place the film under your tongue  If your doctor told you to use more than 1 film, place the second film on the opposite side from the first one  Do not move the film after you placed it under your tongue   If you are supposed to use more than 2 films, use them the same way, but do not start until the first 2 films are completely dissolved  · Do not break, crush, chew, or cut the film or tablet  · This medicine should come with a Medication Guide  Ask your pharmacist for a copy if you do not have one  · Missed dose: Take a dose as soon as you remember  If it is almost time for your next dose, wait until then and take a regular dose  Do not take extra medicine to make up for a missed dose  · Store the medicine in a closed container at room temperature, away from heat, moisture, and direct light  Drop off any unused narcotic medicine at a drug take-back location right away  If you do not have a drug take-back location near you, flush any unused narcotic medicine down the toilet  Check your local drug store and clinics for take-back locations  You can also check the [employer] web site for locations  Here is the link to the [facility] safe disposal of medicines [employer] com ee  Drugs and Foods to Avoid:   Ask your doctor or pharmacist before using any other medicine, including over-the-counter medicines, vitamins, and herbal products  · Do not use this medicine if you are using or have used an MAO inhibitor within the past 14 days  · Some medicines can affect how buprenorphine/naloxone works  Tell your doctor if you are using the following:   ? Carbamazepine, cyclobenzaprine, erythromycin, ketoconazole, metaxalone, mirtazapine, phenobarbital, phenytoin, rifampin, tramadol, trazodone  ? Diuretic (water pill)  ? Medicine to treat depression, anxiety, and mental health illness  ? Medicine to treat HIV/AIDS (including atazanavir, delavirdine, efavirenz, etravirine, nevirapine, ritonavir)  ? Phenothiazine medicine  ?  Triptan medicine to treat migraine headaches  · Do not drink alcohol while you are using this medicine  · Tell your doctor if you use anything else that makes you sleepy  Some examples are allergy medicine, narcotic pain medicine, and alcohol  Tell your doctor if you are also using butorphanol, nalbuphine, pentazocine, or a muscle relaxer  Warnings While Using This Medicine:   · Tell your doctor if you are pregnant or breastfeeding, or if you have kidney disease, liver disease (including hepatitis), lung or breathing problems (including sleep apnea), adrenal gland problems, an enlarged prostate, trouble urinating, gallbladder problems, thyroid problems, stomach problems, or a history of depression, brain tumor, head injury, alcohol or drug abuse  · This medicine may cause the following problems:  ? High risk of overdose, which can lead to death  ? Respiratory depression (serious breathing problem that can be life-threatening)  ? Sleep-related breathing problems (including sleep apnea, sleep-related hypoxemia)  ? Liver problems  ? Serotonin syndrome, when used with certain medicines  · This medicine may make you dizzy or drowsy  Do not drive or do anything else that could be dangerous until you know how this medicine affects you  Stand or sit up slowly if you feel lightheaded or dizzy  · Tell any doctor or dentist who treats you that you are using this medicine  · This medicine can be habit-forming  Do not use more than your prescribed dose  Call your doctor if you think your medicine is not working  · This medicine may cause constipation, especially with long-term use  Ask your doctor if you should use a laxative to prevent and treat constipation  · Do not stop using this medicine suddenly  Your doctor will need to slowly decrease your dose before you stop it completely  · This medicine could cause infertility  Talk with your doctor before using this medicine if you plan to have children  · Your doctor will do lab tests at regular visits to check on the effects of this medicine   Keep all appointments  · Keep all medicine out of the reach of children  Never share your medicine with anyone  Possible Side Effects While Using This Medicine:   Call your doctor right away if you notice any of these side effects:  · Allergic reaction: Itching or hives, swelling in your face or hands, swelling or tingling in your mouth or throat, chest tightness, trouble breathing  · Blue lips, fingernails, or skin  · Changes in skin color, dark freckles  · Cold feeling, weakness or tiredness, weight loss  · Dark urine or pale stools, nausea, vomiting, loss of appetite, stomach pain, yellow skin or eyes  · Extreme dizziness or weakness, shallow breathing, sweating, seizures, cold or clammy skin  · Severe confusion, lightheadedness, dizziness, or fainting  · Trouble breathing or slow breathing  If you notice these less serious side effects, talk with your doctor:   · Constipation or upset stomach  · Headache, trouble sleeping  · Shaking, runny nose, watery eyes, diarrhea, muscle aches  If you notice other side effects that you think are caused by this medicine, tell your doctor  Call your doctor for medical advice about side effects  You may report side effects to FDA at 7-482-FDA-6350  © Copyright [address] Information is for End User's use only and may not be sold, redistributed or otherwise used for commercial purposes  The above information is an  only  It is not intended as medical advice for individual conditions or treatments  Talk to your doctor, nurse or pharmacist before following any medical regimen to see if it is safe and effective for you

## 2021-03-11 NOTE — PROGRESS NOTES
Assessment/Plan:  Problem List Items Addressed This Visit        Other    Encounter for monitoring Suboxone maintenance therapy    Relevant Medications    buprenorphine-naloxone (Suboxone) 8-2 mg    Medication therapy continued    Relevant Medications    buprenorphine-naloxone (Suboxone) 8-2 mg    Other Relevant Orders    Suboxone    Toxicology screen, urine    Opiate abuse, episodic (HCC)    Relevant Medications    buprenorphine-naloxone (Suboxone) 8-2 mg    Polysubstance dependence including opioid type drug with complication, continuous use (HCC) - Primary    Relevant Medications    buprenorphine-naloxone (Suboxone) 8-2 mg      Other Visit Diagnoses     Elevated BP without diagnosis of hypertension        Relevant Medications    buprenorphine-naloxone (Suboxone) 8-2 mg           Diagnoses and all orders for this visit:    Polysubstance dependence including opioid type drug with complication, continuous use (HCC)  -     buprenorphine-naloxone (Suboxone) 8-2 mg; One and three quarters strip sl q day  Medication therapy continued  -     Suboxone  -     Toxicology screen, urine  -     buprenorphine-naloxone (Suboxone) 8-2 mg; One and three quarters strip sl q day  Opiate abuse, episodic (HCC)  -     buprenorphine-naloxone (Suboxone) 8-2 mg; One and three quarters strip sl q day  Encounter for monitoring Suboxone maintenance therapy  -     buprenorphine-naloxone (Suboxone) 8-2 mg; One and three quarters strip sl q day  Elevated BP without diagnosis of hypertension  -     buprenorphine-naloxone (Suboxone) 8-2 mg; One and three quarters strip sl q day  No problem-specific Assessment & Plan notes found for this encounter  Scheduled Medication Review:  Pt's scheduled medication use was reviewed by myself/staff via the [employer] website  Pt's use has been found to be appropriate w/o any concerns for misuse by the patient  Pt's current conditions require continued scheduled medication use at this time  Future review for continued appropriate medication use and misuse will continue  A/P: Doing well and will wean to 14 mg films, dose 1/6 and continue with counseling  Reminded to keep meds safe and out of reach of children  RTC 4 weeks  Briefly discussed BP being up and pt reports ?blood in his sputum at times  Told pt he needs to make a non-suboxone appt to get evaluated for his medical issues  Pt acknowledged  Subjective: WM presents for f/u suboxone  Doing well and no c/o's  Not using and no withdraw  Does attend counseling  UDT obtained today  Tolerating the meds and no side effects  Patient ID: Johanny Morton is a 21 y o  male  HPI    The following portions of the patient's history were reviewed and updated as appropriate:   He has a past medical history of ADHD (attention deficit hyperactivity disorder), Bipolar disorder ([facility] [address] ), Depression, Hyperlipidemia, Hypertension, Psychiatric disorder, Substance abuse ([facility] [address] ), and Tourette syndrome  ,  does not have any pertinent problems on file  ,   has a past surgical history that includes Tympanostomy tube placement and TONSILECTOMY AND ADNOIDECTOMY  ,  family history includes Cirrhosis in his father; Coronary artery disease in his father; Hepatitis in his father; No Known Problems in his mother; Substance Abuse in his brother and father  ,   reports that he has been smoking cigarettes  He has a 30 00 pack-year smoking history  He has never used smokeless tobacco  He reports previous alcohol use  He reports current drug use  Drug: Marijuana  ,  is allergic to abilify [aripiprazole]     Current Outpatient Medications   Medication Sig Dispense Refill    albuterol (PROVENTIL HFA,VENTOLIN HFA) 90 mcg/act inhaler Inhale 1-2 puffs every 4 (four) hours as needed for wheezing or shortness of breath 1 Inhaler 0    benzonatate (TESSALON PERLES) 100 mg capsule Take 1 capsule (100 mg total) by mouth 3 (three) times a day as needed for cough 20 capsule 0    buprenorphine-naloxone (Suboxone) 8-2 mg One and three quarters strip sl q day  53 Film 0    gabapentin (NEURONTIN) 300 mg capsule Take 2 capsules (600 mg total) by mouth 3 (three) times a day 180 capsule 0    naloxone (NARCAN) 1 mg/mL intranasal 2 mL (2 mg total) into each nostril once for 1 dose 2 mL 1    naloxone (Narcan) 4 mg/0 1 mL nasal spray 0 1 mL (4 mg total) into each nostril as needed (respiratory depression or possible OD) (Patient not taking: Reported on 2/12/2021) 1 each 1    QUEtiapine (SEROquel) 100 mg tablet Take 1 tablet (100 mg total) by mouth daily at bedtime 30 tablet 0    QUEtiapine (SEROquel) 25 mg tablet Take 1 tablet (25 mg total) by mouth 2 (two) times a day 60 tablet 0     No current facility-administered medications for this visit  Review of Systems   Constitutional: Negative for activity change, chills, diaphoresis, fatigue and fever  Respiratory: Negative for cough, chest tightness, shortness of breath and wheezing  Cardiovascular: Negative for chest pain, palpitations and leg swelling  Gastrointestinal: Negative for abdominal pain, constipation, diarrhea, nausea and vomiting  Genitourinary: Negative for difficulty urinating, dysuria and frequency  Musculoskeletal: Negative for arthralgias, gait problem and myalgias  Neurological: Negative for dizziness, seizures, syncope, weakness, light-headedness and headaches  Psychiatric/Behavioral: Negative for confusion, dysphoric mood, self-injury, sleep disturbance and suicidal ideas  The patient is not nervous/anxious  PHQ-9 Depression Screening    PHQ-9:   Frequency of the following problems over the past two weeks:            Objective:  Vitals:    03/11/21 1127   BP: 152/90   Pulse: 69   Temp: (!) 96 9 °F (36 1 °C)   SpO2: 96%   Weight: 84 1 kg (185 lb 8 oz)   Height: 5' 7" (1 702 m)     Body mass index is 29 05 kg/m²  Physical Exam  Vitals signs and nursing note reviewed     Constitutional: General: He is not in acute distress  Appearance: Normal appearance  HENT:      Head: Normocephalic and atraumatic  Mouth/Throat:      Mouth: Mucous membranes are moist    Eyes:      Extraocular Movements: Extraocular movements intact  Conjunctiva/sclera: Conjunctivae normal       Pupils: Pupils are equal, round, and reactive to light  Cardiovascular:      Rate and Rhythm: Regular rhythm  Heart sounds: Normal heart sounds  Pulmonary:      Effort: Pulmonary effort is normal  No respiratory distress  Breath sounds: Normal breath sounds  No wheezing or rales  Abdominal:      General: Bowel sounds are normal  There is no distension  Palpations: Abdomen is soft  Tenderness: There is no abdominal tenderness  Neurological:      General: No focal deficit present  Mental Status: He is alert and oriented to person, place, and time  Mental status is at baseline  Psychiatric:         Mood and Affect: Mood normal          Behavior: Behavior normal          Thought Content:  Thought content normal          Judgment: Judgment normal

## 2021-03-15 LAB
BUPRENORPHINE UR CFM-MCNC: 270 NG/ML
BUPRENORPHINE UR QL CFM: NORMAL NG/ML
BUPRENORPHINE UR QL CFM: POSITIVE
BUPRENORPHINE+NOR UR QL: POSITIVE
NORBUPRENORPHINE UR CFM-MCNC: 912 NG/ML
NORBUPRENORPHINE UR QL CFM: POSITIVE

## 2021-03-31 ENCOUNTER — APPOINTMENT (OUTPATIENT)
Dept: RADIOLOGY | Facility: CLINIC | Age: 21
End: 2021-03-31
Payer: COMMERCIAL

## 2021-03-31 ENCOUNTER — OFFICE VISIT (OUTPATIENT)
Dept: INTERNAL MEDICINE CLINIC | Facility: CLINIC | Age: 21
End: 2021-03-31
Payer: COMMERCIAL

## 2021-03-31 ENCOUNTER — TRANSCRIBE ORDERS (OUTPATIENT)
Dept: RADIOLOGY | Facility: CLINIC | Age: 21
End: 2021-03-31

## 2021-03-31 ENCOUNTER — APPOINTMENT (OUTPATIENT)
Dept: LAB | Facility: CLINIC | Age: 21
End: 2021-03-31
Payer: COMMERCIAL

## 2021-03-31 VITALS
HEART RATE: 71 BPM | BODY MASS INDEX: 28.25 KG/M2 | DIASTOLIC BLOOD PRESSURE: 80 MMHG | TEMPERATURE: 98.2 F | HEIGHT: 67 IN | WEIGHT: 180 LBS | OXYGEN SATURATION: 97 % | SYSTOLIC BLOOD PRESSURE: 150 MMHG | RESPIRATION RATE: 18 BRPM

## 2021-03-31 DIAGNOSIS — R11.2 NON-INTRACTABLE VOMITING WITH NAUSEA, UNSPECIFIED VOMITING TYPE: ICD-10-CM

## 2021-03-31 DIAGNOSIS — R00.1 SINUS BRADYCARDIA BY ELECTROCARDIOGRAM: ICD-10-CM

## 2021-03-31 DIAGNOSIS — E78.2 MIXED HYPERLIPIDEMIA: ICD-10-CM

## 2021-03-31 DIAGNOSIS — F31.9 BIPOLAR DEPRESSION (HCC): ICD-10-CM

## 2021-03-31 DIAGNOSIS — Z11.4 SCREENING FOR HIV (HUMAN IMMUNODEFICIENCY VIRUS): ICD-10-CM

## 2021-03-31 DIAGNOSIS — J41.1 CHRONIC BRONCHITIS WITH PRODUCTIVE MUCOPURULENT COUGH (HCC): ICD-10-CM

## 2021-03-31 DIAGNOSIS — Z72.0 TOBACCO USE: ICD-10-CM

## 2021-03-31 DIAGNOSIS — R03.0 ELEVATED BP WITHOUT DIAGNOSIS OF HYPERTENSION: ICD-10-CM

## 2021-03-31 DIAGNOSIS — F31.32 BIPOLAR 1 DISORDER, DEPRESSED, MODERATE (HCC): ICD-10-CM

## 2021-03-31 DIAGNOSIS — Z13.31 POSITIVE DEPRESSION SCREENING: ICD-10-CM

## 2021-03-31 DIAGNOSIS — Z13.1 SCREENING FOR DIABETES MELLITUS (DM): ICD-10-CM

## 2021-03-31 DIAGNOSIS — Z13.0 SCREENING FOR DEFICIENCY ANEMIA: ICD-10-CM

## 2021-03-31 DIAGNOSIS — F41.8 ANXIETY WITH DEPRESSION: ICD-10-CM

## 2021-03-31 DIAGNOSIS — F19.20 POLYSUBSTANCE DEPENDENCE INCLUDING OPIOID TYPE DRUG WITH COMPLICATION, CONTINUOUS USE (HCC): ICD-10-CM

## 2021-03-31 DIAGNOSIS — F32.1 MODERATE MAJOR DEPRESSION, SINGLE EPISODE (HCC): ICD-10-CM

## 2021-03-31 DIAGNOSIS — R06.02 SOB (SHORTNESS OF BREATH): ICD-10-CM

## 2021-03-31 DIAGNOSIS — Z78.9 ELECTRONIC CIGARETTE USE: ICD-10-CM

## 2021-03-31 DIAGNOSIS — L74.9 SWEATING ABNORMALITY: ICD-10-CM

## 2021-03-31 DIAGNOSIS — F95.2 TOURETTE'S: ICD-10-CM

## 2021-03-31 DIAGNOSIS — K76.0 FATTY LIVER: ICD-10-CM

## 2021-03-31 DIAGNOSIS — R11.2 NON-INTRACTABLE VOMITING WITH NAUSEA, UNSPECIFIED VOMITING TYPE: Primary | ICD-10-CM

## 2021-03-31 DIAGNOSIS — E78.2 MIXED HYPERLIPIDEMIA: Primary | ICD-10-CM

## 2021-03-31 DIAGNOSIS — Z13.220 SCREENING FOR LIPID DISORDERS: ICD-10-CM

## 2021-03-31 DIAGNOSIS — Z11.59 NEED FOR HEPATITIS C SCREENING TEST: ICD-10-CM

## 2021-03-31 DIAGNOSIS — Z13.29 SCREENING FOR THYROID DISORDER: ICD-10-CM

## 2021-03-31 DIAGNOSIS — R05.3 CHRONIC COUGH: ICD-10-CM

## 2021-03-31 LAB
ALBUMIN SERPL BCP-MCNC: 4.4 G/DL (ref 3.5–5)
ALP SERPL-CCNC: 102 U/L (ref 46–116)
ALT SERPL W P-5'-P-CCNC: 26 U/L (ref 12–78)
ANION GAP SERPL CALCULATED.3IONS-SCNC: 7 MMOL/L (ref 4–13)
AST SERPL W P-5'-P-CCNC: 19 U/L (ref 5–45)
BASOPHILS # BLD AUTO: 0.04 THOUSANDS/ΜL (ref 0–0.1)
BASOPHILS NFR BLD AUTO: 1 % (ref 0–1)
BILIRUB SERPL-MCNC: 0.42 MG/DL (ref 0.2–1)
BILIRUB UR QL STRIP: NEGATIVE
BUN SERPL-MCNC: 11 MG/DL (ref 5–25)
CALCIUM SERPL-MCNC: 9.2 MG/DL (ref 8.3–10.1)
CHLORIDE SERPL-SCNC: 106 MMOL/L (ref 100–108)
CLARITY UR: CLEAR
CO2 SERPL-SCNC: 24 MMOL/L (ref 21–32)
COLOR UR: YELLOW
CREAT SERPL-MCNC: 0.72 MG/DL (ref 0.6–1.3)
EOSINOPHIL # BLD AUTO: 0.26 THOUSAND/ΜL (ref 0–0.61)
EOSINOPHIL NFR BLD AUTO: 3 % (ref 0–6)
ERYTHROCYTE [DISTWIDTH] IN BLOOD BY AUTOMATED COUNT: 12.3 % (ref 11.6–15.1)
EST. AVERAGE GLUCOSE BLD GHB EST-MCNC: 100 MG/DL
GFR SERPL CREATININE-BSD FRML MDRD: 134 ML/MIN/1.73SQ M
GLUCOSE P FAST SERPL-MCNC: 85 MG/DL (ref 65–99)
GLUCOSE UR STRIP-MCNC: NEGATIVE MG/DL
HBA1C MFR BLD: 5.1 %
HCT VFR BLD AUTO: 40.9 % (ref 36.5–49.3)
HCV AB SER QL: NORMAL
HGB BLD-MCNC: 14.1 G/DL (ref 12–17)
HGB UR QL STRIP.AUTO: NEGATIVE
IMM GRANULOCYTES # BLD AUTO: 0.03 THOUSAND/UL (ref 0–0.2)
IMM GRANULOCYTES NFR BLD AUTO: 0 % (ref 0–2)
KETONES UR STRIP-MCNC: NEGATIVE MG/DL
LDLC SERPL DIRECT ASSAY-MCNC: 117 MG/DL (ref 0–100)
LEUKOCYTE ESTERASE UR QL STRIP: NEGATIVE
LYMPHOCYTES # BLD AUTO: 3.25 THOUSANDS/ΜL (ref 0.6–4.47)
LYMPHOCYTES NFR BLD AUTO: 43 % (ref 14–44)
MCH RBC QN AUTO: 28.9 PG (ref 26.8–34.3)
MCHC RBC AUTO-ENTMCNC: 34.5 G/DL (ref 31.4–37.4)
MCV RBC AUTO: 84 FL (ref 82–98)
MONOCYTES # BLD AUTO: 0.8 THOUSAND/ΜL (ref 0.17–1.22)
MONOCYTES NFR BLD AUTO: 11 % (ref 4–12)
NEUTROPHILS # BLD AUTO: 3.17 THOUSANDS/ΜL (ref 1.85–7.62)
NEUTS SEG NFR BLD AUTO: 42 % (ref 43–75)
NITRITE UR QL STRIP: NEGATIVE
NRBC BLD AUTO-RTO: 0 /100 WBCS
PH UR STRIP.AUTO: 6 [PH]
PLATELET # BLD AUTO: 275 THOUSANDS/UL (ref 149–390)
PMV BLD AUTO: 9.4 FL (ref 8.9–12.7)
POTASSIUM SERPL-SCNC: 3.7 MMOL/L (ref 3.5–5.3)
PROLACTIN SERPL-MCNC: 4.2 NG/ML (ref 2.5–17.4)
PROT SERPL-MCNC: 7.9 G/DL (ref 6.4–8.2)
PROT UR STRIP-MCNC: NEGATIVE MG/DL
RBC # BLD AUTO: 4.88 MILLION/UL (ref 3.88–5.62)
SODIUM SERPL-SCNC: 137 MMOL/L (ref 136–145)
SP GR UR STRIP.AUTO: 1.02 (ref 1–1.03)
TRIGL SERPL-MCNC: 158 MG/DL
TSH SERPL DL<=0.05 MIU/L-ACNC: 1.39 UIU/ML (ref 0.46–3.98)
UROBILINOGEN UR QL STRIP.AUTO: 0.2 E.U./DL
WBC # BLD AUTO: 7.55 THOUSAND/UL (ref 4.31–10.16)

## 2021-03-31 PROCEDURE — 3008F BODY MASS INDEX DOCD: CPT | Performed by: INTERNAL MEDICINE

## 2021-03-31 PROCEDURE — 80053 COMPREHEN METABOLIC PANEL: CPT

## 2021-03-31 PROCEDURE — 74019 RADEX ABDOMEN 2 VIEWS: CPT

## 2021-03-31 PROCEDURE — 93000 ELECTROCARDIOGRAM COMPLETE: CPT | Performed by: INTERNAL MEDICINE

## 2021-03-31 PROCEDURE — 81003 URINALYSIS AUTO W/O SCOPE: CPT | Performed by: INTERNAL MEDICINE

## 2021-03-31 PROCEDURE — 87389 HIV-1 AG W/HIV-1&-2 AB AG IA: CPT

## 2021-03-31 PROCEDURE — 83721 ASSAY OF BLOOD LIPOPROTEIN: CPT

## 2021-03-31 PROCEDURE — 86803 HEPATITIS C AB TEST: CPT

## 2021-03-31 PROCEDURE — 85025 COMPLETE CBC W/AUTO DIFF WBC: CPT

## 2021-03-31 PROCEDURE — U0003 INFECTIOUS AGENT DETECTION BY NUCLEIC ACID (DNA OR RNA); SEVERE ACUTE RESPIRATORY SYNDROME CORONAVIRUS 2 (SARS-COV-2) (CORONAVIRUS DISEASE [COVID-19]), AMPLIFIED PROBE TECHNIQUE, MAKING USE OF HIGH THROUGHPUT TECHNOLOGIES AS DESCRIBED BY CMS-2020-01-R: HCPCS | Performed by: INTERNAL MEDICINE

## 2021-03-31 PROCEDURE — 83036 HEMOGLOBIN GLYCOSYLATED A1C: CPT

## 2021-03-31 PROCEDURE — U0005 INFEC AGEN DETEC AMPLI PROBE: HCPCS | Performed by: INTERNAL MEDICINE

## 2021-03-31 PROCEDURE — 84146 ASSAY OF PROLACTIN: CPT

## 2021-03-31 PROCEDURE — 36415 COLL VENOUS BLD VENIPUNCTURE: CPT

## 2021-03-31 PROCEDURE — 99214 OFFICE O/P EST MOD 30 MIN: CPT | Performed by: INTERNAL MEDICINE

## 2021-03-31 PROCEDURE — 84478 ASSAY OF TRIGLYCERIDES: CPT

## 2021-03-31 PROCEDURE — 84443 ASSAY THYROID STIM HORMONE: CPT

## 2021-03-31 PROCEDURE — 71046 X-RAY EXAM CHEST 2 VIEWS: CPT

## 2021-03-31 RX ORDER — AMLODIPINE BESYLATE 5 MG/1
5 TABLET ORAL DAILY
Qty: 90 TABLET | Refills: 3 | Status: SHIPPED | OUTPATIENT
Start: 2021-03-31 | End: 2021-06-18 | Stop reason: SDUPTHER

## 2021-03-31 NOTE — PATIENT INSTRUCTIONS
Cigarette Smoking and Your Health   AMBULATORY CARE:   Risks to your health if you smoke:  Nicotine and other chemicals found in tobacco and e-cigarettes can damage every cell in your body  Even if you are a light smoker, you have an increased risk for cancer, heart disease, and lung disease  If you are pregnant or have diabetes, smoking increases your risk for complications  Nicotine can affect an adolescent's developing brain  This can lead to trouble thinking, learning, or paying attention  Benefits to your health if you stop smoking:   · You decrease respiratory symptoms such as coughing, wheezing, and shortness of breath  · You reduce your risk for cancers of the lung, mouth, throat, kidney, bladder, pancreas, stomach, and cervix  If you already have cancer, you increase the benefits of chemotherapy  You also reduce your risk for cancer returning or a second cancer from developing  · You reduce your risk for heart disease, blood clots, heart attack, and stroke  · You reduce your risk for lung infections, and diseases such as pneumonia, asthma, chronic bronchitis, and emphysema  · Your circulation improves  More oxygen can be delivered to your body  If you have diabetes, you lower your risk for complications, such as kidney, artery, and eye diseases  You also lower your risk for nerve damage  Nerve damage can lead to amputations, poor vision, and blindness  · You improve your body's ability to heal and to fight infections  · An adolescent can help his or her brain and body develop in a healthy way  Talk to your adolescent about all the health risks of nicotine  If you can, start talking about nicotine when your child is younger than 12 years  This may make it easier for him or her not to start using nicotine as a teenager or adult  Explain to him or her that it is best never to start  It can be hard to try to quit later      Benefits to the health of others if you stop smoking:  Tobacco is harmful to nonsmokers who breathe in your secondhand smoke  The following are ways the health of others around you may improve when you stop smoking:  · You lower the risks for lung cancer and heart disease in nonsmoking adults  · If you are pregnant, you lower the risk for miscarriage, early delivery, low birth weight, and stillbirth  You also lower your baby's risk for SIDS, obesity, developmental delay, and neurobehavioral problems, such as ADHD  · If you have children, you lower their risk for ear infections, colds, pneumonia, bronchitis, and asthma  Follow up with your doctor as directed:  Write down your questions so you remember to ask them during your visits  For more information and support to stop smoking:   · Smokefree  gov  Phone: 4- 559 - 791-3364  Web Address: [employer]  [address] Information is for End User's use only and may not be sold, redistributed or otherwise used for commercial purposes  All illustrations and images included in CareNotes® are the copyrighted property of [employer] A M , Inc  or [facility] Abelardo darwin   The above information is an  only  It is not intended as medical advice for individual conditions or treatments  Talk to your doctor, nurse or pharmacist before following any medical regimen to see if it is safe and effective for you  Depression   AMBULATORY CARE:   Depression  is a medical condition that causes feelings of sadness or hopelessness that do not go away  Depression may cause you to lose interest in things you used to enjoy  These feelings may interfere with your daily life    Common symptoms include the following:   · Appetite changes, or weight gain or loss    · Trouble going to sleep or staying asleep, or sleeping too much    · Fatigue or lack of energy    · Feeling restless, irritable, or withdrawn    · Feeling worthless, hopeless, discouraged, or guilty    · Trouble concentrating, remembering things, doing daily tasks, or making decisions    · Thoughts about hurting or killing yourself    Call your local emergency number (911 in the [address]) if:   · You think about harming yourself or someone else  · You have done something on purpose to hurt yourself  Call your therapist or doctor if:   · Your symptoms do not improve  · You cannot make it to your next appointment  · You have new symptoms  · You have questions or concerns about your condition or care  The following resources are available at any time to help you, if needed:   · [address] [place] [address]: 0-324.954.6261 (5-797-277-USOZ)     · Suicide Hotline: 3-132.521.2508 (6-200-UCYOEWP)     · For a list of international numbers: https://save org/find-help/international-resources/    Treatment for depression  may include medicine to relieve depression  Medicine is often used together with therapy  Therapy is a way for you to talk about your feelings and anything that may be causing depression  Therapy can be done alone or in a group  It may also be done with family members or a significant other  Self-care:   · Get regular physical activity  Try to be active for 30 minutes, 3 to 5 days a week  Physical activity can help relieve depression  Work with your healthcare provider to develop a plan that you enjoy  It may help to ask someone to be active with you  · Create a regular sleep schedule  A routine can help you relax before bed  Listen to music, read, or do yoga  Try to go to bed and wake up at the same time every day  Sleep is important for emotional health  · Eat a variety of healthy foods  Healthy foods include fruits, vegetables, whole-grain breads, low-fat dairy products, lean meats, fish, and cooked beans  A healthy meal plan is low in fat, salt, and added sugar  · Do not drink alcohol or use drugs  Alcohol and drugs can make depression worse  Talk to your therapist or doctor if you need help quitting      Follow up with your healthcare provider as directed: Your healthcare provider will monitor your progress at follow-up visits  He or she will also monitor your medicine if you take antidepressants  Your healthcare provider will ask if the medicine is helping  Tell him or her about any side effects or problems you may have with your medicine  The type or amount of medicine may need to be changed  Write down your questions so you remember to ask them during your visits  © Copyright [address] Information is for End User's use only and may not be sold, redistributed or otherwise used for commercial purposes  All illustrations and images included in CareNotes® are the copyrighted property of [employer] A M , Inc  or [address] [place], [place] [address]jovani Elliott   The above information is an  only  It is not intended as medical advice for individual conditions or treatments  Talk to your doctor, nurse or pharmacist before following any medical regimen to see if it is safe and effective for you

## 2021-04-01 LAB
HIV 1+2 AB+HIV1 P24 AG SERPL QL IA: NORMAL
SARS-COV-2 RNA RESP QL NAA+PROBE: NEGATIVE

## 2021-04-09 ENCOUNTER — OFFICE VISIT (OUTPATIENT)
Dept: INTERNAL MEDICINE CLINIC | Facility: CLINIC | Age: 21
End: 2021-04-09
Payer: COMMERCIAL

## 2021-04-09 VITALS
OXYGEN SATURATION: 97 % | WEIGHT: 182.38 LBS | BODY MASS INDEX: 28.63 KG/M2 | DIASTOLIC BLOOD PRESSURE: 74 MMHG | SYSTOLIC BLOOD PRESSURE: 142 MMHG | TEMPERATURE: 97 F | HEIGHT: 67 IN | HEART RATE: 77 BPM

## 2021-04-09 DIAGNOSIS — Z79.899 MEDICATION THERAPY CONTINUED: ICD-10-CM

## 2021-04-09 DIAGNOSIS — Z51.81 ENCOUNTER FOR MONITORING SUBOXONE MAINTENANCE THERAPY: ICD-10-CM

## 2021-04-09 DIAGNOSIS — Z79.899 ENCOUNTER FOR MONITORING SUBOXONE MAINTENANCE THERAPY: ICD-10-CM

## 2021-04-09 DIAGNOSIS — F19.20 POLYSUBSTANCE DEPENDENCE INCLUDING OPIOID TYPE DRUG WITH COMPLICATION, CONTINUOUS USE (HCC): Primary | ICD-10-CM

## 2021-04-09 PROCEDURE — 80307 DRUG TEST PRSMV CHEM ANLYZR: CPT | Performed by: INTERNAL MEDICINE

## 2021-04-09 PROCEDURE — 99213 OFFICE O/P EST LOW 20 MIN: CPT | Performed by: INTERNAL MEDICINE

## 2021-04-09 RX ORDER — BUPRENORPHINE AND NALOXONE 8; 2 MG/1; MG/1
FILM, SOLUBLE BUCCAL; SUBLINGUAL
Qty: 53 FILM | Refills: 0 | Status: SHIPPED | OUTPATIENT
Start: 2021-04-09 | End: 2021-05-18

## 2021-04-09 NOTE — PATIENT INSTRUCTIONS
Buprenorphine/Naloxone (Into the mouth)   Buprenorphine (bue-pre-NOR-feen), Naloxone (nal-OX-one)  Treats narcotic dependence  Brand Name(s): Suboxone, Zubsolv   There may be other brand names for this medicine  When This Medicine Should Not Be Used: This medicine is not right for everyone  Do not use it if you had an allergic reaction to buprenorphine or naloxone  How to Use This Medicine: Thin Sheet, Tablet  · Take your medicine as directed  Your dose may need to be changed several times to find what works best for you  · You must let the medicine dissolve  Never swallow the film or tablet  Your body may not absorb enough of the medicine if you swallow it  · Your health caregiver should show you how to use the medicine  If you do not understand, ask for help  It is important to use the medicine correctly  · Do not talk while the medicine is inside your mouth  · Buccal film: Rinse your mouth with water to moisten it  Place the film against the inside of your cheek  If your doctor told you to use more than 1 film, place the second film inside your other cheek  Do not place more than 2 films inside of 1 cheek at a time  Do not move or touch the film  Do not eat or drink anything until the film is completely dissolved  · Sublingual tablet: Place the tablet under your tongue  If your doctor told you to use more than 1 tablet, place all of the tablets in different places under your tongue at the same time  You can use 2 tablets at a time until you have taken all of the medicine, if that is easier for you  Let the tablets dissolve completely in your mouth  Do not eat or drink anything until the tablets are completely dissolved  · Sublingual film: Drink some water to help moisten your mouth  Place the film under your tongue  If your doctor told you to use more than 1 film, place the second film on the opposite side from the first one  Do not move the film after you placed it under your tongue   If you are supposed to use more than 2 films, use them the same way, but do not start until the first 2 films are completely dissolved  · Do not break, crush, chew, or cut the film or tablet  · This medicine should come with a Medication Guide  Ask your pharmacist for a copy if you do not have one  · Missed dose: Take a dose as soon as you remember  If it is almost time for your next dose, wait until then and take a regular dose  Do not take extra medicine to make up for a missed dose  · Store the medicine in a closed container at room temperature, away from heat, moisture, and direct light  Drop off any unused narcotic medicine at a drug take-back location right away  If you do not have a drug take-back location near you, flush any unused narcotic medicine down the toilet  Check your local drug store and clinics for take-back locations  You can also check the [employer] web site for locations  Here is the link to the [facility] safe disposal of medicines [employer] com ee  Drugs and Foods to Avoid:   Ask your doctor or pharmacist before using any other medicine, including over-the-counter medicines, vitamins, and herbal products  · Do not use this medicine if you are using or have used an MAO inhibitor within the past 14 days  · Some medicines can affect how buprenorphine/naloxone works  Tell your doctor if you are using the following:   ? Carbamazepine, cyclobenzaprine, erythromycin, ketoconazole, metaxalone, mirtazapine, phenobarbital, phenytoin, rifampin, tramadol, trazodone  ? Diuretic (water pill)  ? Medicine to treat depression, anxiety, and mental health illness  ? Medicine to treat HIV/AIDS (including atazanavir, delavirdine, efavirenz, etravirine, nevirapine, ritonavir)  ? Phenothiazine medicine  ?  Triptan medicine to treat migraine headaches  · Do not drink alcohol while you are using this medicine  · Tell your doctor if you use anything else that makes you sleepy  Some examples are allergy medicine, narcotic pain medicine, and alcohol  Tell your doctor if you are also using butorphanol, nalbuphine, pentazocine, or a muscle relaxer  Warnings While Using This Medicine:   · Tell your doctor if you are pregnant or breastfeeding, or if you have kidney disease, liver disease (including hepatitis), lung or breathing problems (including sleep apnea), adrenal gland problems, an enlarged prostate, trouble urinating, gallbladder problems, thyroid problems, stomach problems, or a history of depression, brain tumor, head injury, alcohol or drug abuse  · This medicine may cause the following problems:  ? High risk of overdose, which can lead to death  ? Respiratory depression (serious breathing problem that can be life-threatening)  ? Sleep-related breathing problems (including sleep apnea, sleep-related hypoxemia)  ? Liver problems  ? Serotonin syndrome, when used with certain medicines  · This medicine may make you dizzy or drowsy  Do not drive or do anything else that could be dangerous until you know how this medicine affects you  Stand or sit up slowly if you feel lightheaded or dizzy  · Tell any doctor or dentist who treats you that you are using this medicine  · This medicine can be habit-forming  Do not use more than your prescribed dose  Call your doctor if you think your medicine is not working  · This medicine may cause constipation, especially with long-term use  Ask your doctor if you should use a laxative to prevent and treat constipation  · Do not stop using this medicine suddenly  Your doctor will need to slowly decrease your dose before you stop it completely  · This medicine could cause infertility  Talk with your doctor before using this medicine if you plan to have children  · Your doctor will do lab tests at regular visits to check on the effects of this medicine   Keep all appointments  · Keep all medicine out of the reach of children  Never share your medicine with anyone  Possible Side Effects While Using This Medicine:   Call your doctor right away if you notice any of these side effects:  · Allergic reaction: Itching or hives, swelling in your face or hands, swelling or tingling in your mouth or throat, chest tightness, trouble breathing  · Blue lips, fingernails, or skin  · Changes in skin color, dark freckles  · Cold feeling, weakness or tiredness, weight loss  · Dark urine or pale stools, nausea, vomiting, loss of appetite, stomach pain, yellow skin or eyes  · Extreme dizziness or weakness, shallow breathing, sweating, seizures, cold or clammy skin  · Severe confusion, lightheadedness, dizziness, or fainting  · Trouble breathing or slow breathing  If you notice these less serious side effects, talk with your doctor:   · Constipation or upset stomach  · Headache, trouble sleeping  · Shaking, runny nose, watery eyes, diarrhea, muscle aches  If you notice other side effects that you think are caused by this medicine, tell your doctor  Call your doctor for medical advice about side effects  You may report side effects to FDA at 7-358-FDA-1144  © Copyright [address] Information is for End User's use only and may not be sold, redistributed or otherwise used for commercial purposes  The above information is an  only  It is not intended as medical advice for individual conditions or treatments  Talk to your doctor, nurse or pharmacist before following any medical regimen to see if it is safe and effective for you

## 2021-04-09 NOTE — PROGRESS NOTES
Assessment/Plan:  Problem List Items Addressed This Visit        Other    Polysubstance dependence including opioid type drug with complication, continuous use (HCC) - Primary    Relevant Medications    buprenorphine-naloxone (Suboxone) 8-2 mg    Medication therapy continued    Relevant Medications    buprenorphine-naloxone (Suboxone) 8-2 mg    Other Relevant Orders    Suboxone    Toxicology screen, urine    Encounter for monitoring Suboxone maintenance therapy    Relevant Medications    buprenorphine-naloxone (Suboxone) 8-2 mg           Diagnoses and all orders for this visit:    Polysubstance dependence including opioid type drug with complication, continuous use (HCC)  -     buprenorphine-naloxone (Suboxone) 8-2 mg; One and three quarters strip sl q day  Medication therapy continued  -     Suboxone  -     Toxicology screen, urine  -     buprenorphine-naloxone (Suboxone) 8-2 mg; One and three quarters strip sl q day  Encounter for monitoring Suboxone maintenance therapy  -     buprenorphine-naloxone (Suboxone) 8-2 mg; One and three quarters strip sl q day  No problem-specific Assessment & Plan notes found for this encounter  Scheduled Medication Review:  Pt's scheduled medication use was reviewed by myself/staff via the [employer] website  Pt's use has been found to be appropriate w/o any concerns for misuse by the patient  Pt's current conditions require continued scheduled medication use at this time  Future review for continued appropriate medication use and misuse will continue  A/P: Doing well and will continue 14mg films, dose 2/6 and continue with counseling  Reminded to keep meds safe and out of reach of children  RTC 4 weeks  Subjective: WM presents for f/u suboxone  Doing well and no c/o's  Not using and no withdraw  Does attend counseling  UDT obtained today  Tolerating the meds and no side effects  Patient ID: Brian Hsu is a 21 y o  male      HPI    The following portions of the patient's history were reviewed and updated as appropriate:   He has a past medical history of ADHD (attention deficit hyperactivity disorder), Bipolar disorder ([facility] [address] ), Depression, Hyperlipidemia, Hypertension, Psychiatric disorder, Substance abuse ([facility] [address] ), and Tourette syndrome  ,  does not have any pertinent problems on file  ,   has a past surgical history that includes Tympanostomy tube placement and TONSILECTOMY AND ADNOIDECTOMY  ,  family history includes Cirrhosis in his father; Coronary artery disease in his father; Hepatitis in his father; No Known Problems in his mother; Substance Abuse in his brother and father  ,   reports that he has been smoking cigarettes  He has a 30 00 pack-year smoking history  He has never used smokeless tobacco  He reports previous alcohol use  He reports current drug use  Drug: Marijuana  ,  is allergic to abilify [aripiprazole]     Current Outpatient Medications   Medication Sig Dispense Refill    albuterol (PROVENTIL HFA,VENTOLIN HFA) 90 mcg/act inhaler Inhale 1-2 puffs every 4 (four) hours as needed for wheezing or shortness of breath 1 Inhaler 0    amLODIPine (NORVASC) 5 mg tablet Take 1 tablet (5 mg total) by mouth daily 90 tablet 3    naloxone (NARCAN) 1 mg/mL intranasal 2 mL (2 mg total) into each nostril once for 1 dose 2 mL 1    naloxone (Narcan) 4 mg/0 1 mL nasal spray 0 1 mL (4 mg total) into each nostril as needed (respiratory depression or possible OD) 1 each 1    buprenorphine-naloxone (Suboxone) 8-2 mg One and three quarters strip sl q day   53 Film 0    gabapentin (NEURONTIN) 300 mg capsule Take 2 capsules (600 mg total) by mouth 3 (three) times a day (Patient not taking: Reported on 3/31/2021) 180 capsule 0    QUEtiapine (SEROquel) 100 mg tablet Take 1 tablet (100 mg total) by mouth daily at bedtime (Patient not taking: Reported on 3/31/2021) 30 tablet 0    QUEtiapine (SEROquel) 25 mg tablet Take 1 tablet (25 mg total) by mouth 2 (two) times a day (Patient not taking: Reported on 3/31/2021) 60 tablet 0     No current facility-administered medications for this visit  Review of Systems   Constitutional: Negative for activity change, chills, diaphoresis, fatigue and fever  Respiratory: Negative for cough, chest tightness, shortness of breath and wheezing  Cardiovascular: Negative for chest pain, palpitations and leg swelling  Gastrointestinal: Negative for abdominal pain, constipation, diarrhea, nausea and vomiting  Genitourinary: Negative for difficulty urinating, dysuria and frequency  Musculoskeletal: Negative for arthralgias, gait problem and myalgias  Neurological: Negative for dizziness, seizures, syncope, weakness, light-headedness and headaches  Psychiatric/Behavioral: Negative for confusion, dysphoric mood, self-injury, sleep disturbance and suicidal ideas  The patient is not nervous/anxious  PHQ-9 Depression Screening    PHQ-9:   Frequency of the following problems over the past two weeks:            Objective:  Vitals:    04/09/21 1126   BP: 142/74   Pulse: 77   Temp: (!) 97 °F (36 1 °C)   SpO2: 97%   Weight: 82 7 kg (182 lb 6 oz)   Height: 5' 7" (1 702 m)     Body mass index is 28 56 kg/m²  Physical Exam  Vitals signs and nursing note reviewed  Constitutional:       General: He is not in acute distress  Appearance: Normal appearance  He is not ill-appearing  HENT:      Head: Normocephalic and atraumatic  Mouth/Throat:      Mouth: Mucous membranes are moist    Eyes:      Extraocular Movements: Extraocular movements intact  Conjunctiva/sclera: Conjunctivae normal       Pupils: Pupils are equal, round, and reactive to light  Cardiovascular:      Rate and Rhythm: Normal rate and regular rhythm  Heart sounds: Normal heart sounds  Pulmonary:      Effort: Pulmonary effort is normal  No respiratory distress  Breath sounds: Normal breath sounds  No wheezing or rales     Abdominal:      General: Bowel sounds are normal  There is no distension  Palpations: Abdomen is soft  Tenderness: There is no abdominal tenderness  Neurological:      General: No focal deficit present  Mental Status: He is alert and oriented to person, place, and time  Mental status is at baseline  Psychiatric:         Mood and Affect: Mood normal          Behavior: Behavior normal          Thought Content:  Thought content normal          Judgment: Judgment normal

## 2021-04-13 LAB
BUPRENORPHINE UR CFM-MCNC: 92 NG/ML
BUPRENORPHINE UR QL CFM: NORMAL NG/ML
BUPRENORPHINE UR QL CFM: POSITIVE
BUPRENORPHINE+NOR UR QL: POSITIVE
NORBUPRENORPHINE UR CFM-MCNC: 212 NG/ML
NORBUPRENORPHINE UR QL CFM: POSITIVE

## 2021-04-21 ENCOUNTER — OFFICE VISIT (OUTPATIENT)
Dept: INTERNAL MEDICINE CLINIC | Facility: CLINIC | Age: 21
End: 2021-04-21
Payer: COMMERCIAL

## 2021-04-21 VITALS
HEART RATE: 62 BPM | OXYGEN SATURATION: 98 % | RESPIRATION RATE: 18 BRPM | SYSTOLIC BLOOD PRESSURE: 108 MMHG | WEIGHT: 178 LBS | TEMPERATURE: 98.2 F | BODY MASS INDEX: 27.94 KG/M2 | HEIGHT: 67 IN | DIASTOLIC BLOOD PRESSURE: 60 MMHG

## 2021-04-21 DIAGNOSIS — Z79.899 ENCOUNTER FOR MONITORING SUBOXONE MAINTENANCE THERAPY: ICD-10-CM

## 2021-04-21 DIAGNOSIS — E78.2 MIXED HYPERLIPIDEMIA: ICD-10-CM

## 2021-04-21 DIAGNOSIS — L74.9 SWEATING ABNORMALITY: ICD-10-CM

## 2021-04-21 DIAGNOSIS — F31.32 BIPOLAR 1 DISORDER, DEPRESSED, MODERATE (HCC): ICD-10-CM

## 2021-04-21 DIAGNOSIS — Z51.81 ENCOUNTER FOR MONITORING SUBOXONE MAINTENANCE THERAPY: ICD-10-CM

## 2021-04-21 DIAGNOSIS — F32.1 MODERATE MAJOR DEPRESSION, SINGLE EPISODE (HCC): ICD-10-CM

## 2021-04-21 DIAGNOSIS — R06.02 SOB (SHORTNESS OF BREATH): ICD-10-CM

## 2021-04-21 DIAGNOSIS — F41.0 PANIC ATTACKS: ICD-10-CM

## 2021-04-21 DIAGNOSIS — I10 ESSENTIAL HYPERTENSION: Primary | ICD-10-CM

## 2021-04-21 PROCEDURE — 99214 OFFICE O/P EST MOD 30 MIN: CPT | Performed by: INTERNAL MEDICINE

## 2021-04-21 PROCEDURE — 3008F BODY MASS INDEX DOCD: CPT | Performed by: INTERNAL MEDICINE

## 2021-04-21 PROCEDURE — 4004F PT TOBACCO SCREEN RCVD TLK: CPT | Performed by: INTERNAL MEDICINE

## 2021-04-21 RX ORDER — GABAPENTIN 300 MG/1
600 CAPSULE ORAL 3 TIMES DAILY
Qty: 180 CAPSULE | Refills: 0 | Status: SHIPPED | OUTPATIENT
Start: 2021-04-21 | End: 2021-06-18

## 2021-04-21 RX ORDER — QUETIAPINE FUMARATE 100 MG/1
100 TABLET, FILM COATED ORAL
Qty: 30 TABLET | Refills: 0 | Status: SHIPPED | OUTPATIENT
Start: 2021-04-21 | End: 2021-06-18 | Stop reason: SDUPTHER

## 2021-04-21 RX ORDER — QUETIAPINE FUMARATE 25 MG/1
25 TABLET, FILM COATED ORAL 2 TIMES DAILY
Qty: 60 TABLET | Refills: 0 | Status: SHIPPED | OUTPATIENT
Start: 2021-04-21 | End: 2021-06-18 | Stop reason: SDUPTHER

## 2021-04-21 NOTE — PROGRESS NOTES
Assessment/Plan:  Problem List Items Addressed This Visit        Other    Mixed hyperlipidemia    Encounter for monitoring Suboxone maintenance therapy    Relevant Orders    Suboxone    Toxicology screen, urine    Bipolar 1 disorder, depressed, moderate (HCC)    Relevant Medications    gabapentin (NEURONTIN) 300 mg capsule    QUEtiapine (SEROquel) 100 mg tablet    QUEtiapine (SEROquel) 25 mg tablet      Other Visit Diagnoses     Essential hypertension    -  Primary    SOB (shortness of breath)        Sweating abnormality        Moderate major depression, single episode (HCC)        Relevant Medications    QUEtiapine (SEROquel) 100 mg tablet    QUEtiapine (SEROquel) 25 mg tablet    Panic attacks               Diagnoses and all orders for this visit:    Essential hypertension    Encounter for monitoring Suboxone maintenance therapy  -     Suboxone  -     Toxicology screen, urine    Mixed hyperlipidemia    Bipolar 1 disorder, depressed, moderate (HCC)  -     gabapentin (NEURONTIN) 300 mg capsule; Take 2 capsules (600 mg total) by mouth 3 (three) times a day  -     QUEtiapine (SEROquel) 100 mg tablet; Take 1 tablet (100 mg total) by mouth daily at bedtime  -     QUEtiapine (SEROquel) 25 mg tablet; Take 1 tablet (25 mg total) by mouth 2 (two) times a day    SOB (shortness of breath)    Sweating abnormality    Moderate major depression, single episode (HCC)    Panic attacks        No problem-specific Assessment & Plan notes found for this encounter  A/P: Discussed labs and xray  Stable and s/s somewhat better  Tolerating the norvasc and bp has been good, but on the low side today  Will continue norvasc and may need to decrease to 2 5 or consider switching to beta blocker for bp and ERIK  Will restart his amanda and seroquel for the psych issues  See if this improves his s/s  Consider additonal meds for ERIK like SSRI with panic attack coverage if no better  RTC one month for f/u bp and psyche       Subjective:      Patient ID: Ankush Solano is a 21 y o  male  WM RTC for f/u labs, htn, bipolar, and episodes of SOB, sweating, and CP  Labs were good except for a borderline TG  Xrays were negative  Started on amlodipine and BP are better  Pt tolerating the meds  Remains on suboxone  Episodes are a less frequent and less severe  Now seems that they are related to stress  Has not been on his psych meds for a while  No new c/o's  The following portions of the patient's history were reviewed and updated as appropriate:   He has a past medical history of ADHD (attention deficit hyperactivity disorder), Bipolar disorder ([facility] [address] Depression, Hyperlipidemia, Hypertension, Psychiatric disorder, Substance abuse ([facility] [address] ), and Tourette syndrome  ,  does not have any pertinent problems on file  ,   has a past surgical history that includes Tympanostomy tube placement and TONSILECTOMY AND ADNOIDECTOMY  ,  family history includes Cirrhosis in his father; Coronary artery disease in his father; Hepatitis in his father; No Known Problems in his mother; Substance Abuse in his brother and father  ,   reports that he has been smoking cigarettes  He has a 30 00 pack-year smoking history  He has never used smokeless tobacco  He reports previous alcohol use  He reports current drug use  Drug: Marijuana  ,  is allergic to abilify [aripiprazole]     Current Outpatient Medications   Medication Sig Dispense Refill    albuterol (PROVENTIL HFA,VENTOLIN HFA) 90 mcg/act inhaler Inhale 1-2 puffs every 4 (four) hours as needed for wheezing or shortness of breath 1 Inhaler 0    amLODIPine (NORVASC) 5 mg tablet Take 1 tablet (5 mg total) by mouth daily 90 tablet 3    buprenorphine-naloxone (Suboxone) 8-2 mg One and three quarters strip sl q day   53 Film 0    naloxone (NARCAN) 1 mg/mL intranasal 2 mL (2 mg total) into each nostril once for 1 dose 2 mL 1    naloxone (Narcan) 4 mg/0 1 mL nasal spray 0 1 mL (4 mg total) into each nostril as needed (respiratory depression or possible OD) 1 each 1    gabapentin (NEURONTIN) 300 mg capsule Take 2 capsules (600 mg total) by mouth 3 (three) times a day 180 capsule 0    QUEtiapine (SEROquel) 100 mg tablet Take 1 tablet (100 mg total) by mouth daily at bedtime 30 tablet 0    QUEtiapine (SEROquel) 25 mg tablet Take 1 tablet (25 mg total) by mouth 2 (two) times a day 60 tablet 0     No current facility-administered medications for this visit  Review of Systems   Constitutional: Negative for activity change, chills, diaphoresis, fatigue and fever  Respiratory: Positive for shortness of breath  Negative for cough, chest tightness and wheezing  Cardiovascular: Positive for chest pain  Negative for palpitations and leg swelling  Gastrointestinal: Negative for abdominal pain, constipation, diarrhea, nausea and vomiting  Genitourinary: Negative for difficulty urinating, dysuria and frequency  Musculoskeletal: Negative for arthralgias, gait problem and myalgias  Neurological: Negative for dizziness, syncope, weakness, light-headedness and headaches  Psychiatric/Behavioral: Positive for dysphoric mood  Negative for confusion, self-injury, sleep disturbance and suicidal ideas  The patient is nervous/anxious  PHQ-9 Depression Screening    PHQ-9:   Frequency of the following problems over the past two weeks:            Objective:  Vitals:    04/21/21 1157   BP: 108/60   BP Location: Left arm   Patient Position: Sitting   Cuff Size: Adult   Pulse: 62   Resp: 18   Temp: 98 2 °F (36 8 °C)   TempSrc: Temporal   SpO2: 98%   Weight: 80 7 kg (178 lb)   Height: 5' 7" (1 702 m)     Body mass index is 27 88 kg/m²  Physical Exam  Vitals signs and nursing note reviewed  Constitutional:       General: He is not in acute distress  Appearance: Normal appearance  He is not ill-appearing  HENT:      Head: Normocephalic and atraumatic        Mouth/Throat:      Mouth: Mucous membranes are moist    Eyes:      Extraocular Movements: Extraocular movements intact  Conjunctiva/sclera: Conjunctivae normal       Pupils: Pupils are equal, round, and reactive to light  Neck:      Musculoskeletal: Neck supple  Vascular: No carotid bruit  Cardiovascular:      Rate and Rhythm: Normal rate and regular rhythm  Heart sounds: Normal heart sounds  Pulmonary:      Effort: Pulmonary effort is normal  No respiratory distress  Breath sounds: Normal breath sounds  No wheezing or rales  Abdominal:      General: Bowel sounds are normal  There is no distension  Palpations: Abdomen is soft  Tenderness: There is no abdominal tenderness  Musculoskeletal:      Right lower leg: No edema  Left lower leg: No edema  Neurological:      General: No focal deficit present  Mental Status: He is alert and oriented to person, place, and time  Mental status is at baseline  Psychiatric:         Mood and Affect: Mood normal          Behavior: Behavior normal          Thought Content:  Thought content normal          Judgment: Judgment normal

## 2021-05-18 ENCOUNTER — OFFICE VISIT (OUTPATIENT)
Dept: INTERNAL MEDICINE CLINIC | Facility: CLINIC | Age: 21
End: 2021-05-18
Payer: COMMERCIAL

## 2021-05-18 VITALS
BODY MASS INDEX: 28.25 KG/M2 | SYSTOLIC BLOOD PRESSURE: 126 MMHG | TEMPERATURE: 97.6 F | HEART RATE: 68 BPM | DIASTOLIC BLOOD PRESSURE: 76 MMHG | WEIGHT: 180 LBS | OXYGEN SATURATION: 97 % | HEIGHT: 67 IN

## 2021-05-18 DIAGNOSIS — Z79.899 MEDICATION THERAPY CONTINUED: ICD-10-CM

## 2021-05-18 DIAGNOSIS — Z79.899 ENCOUNTER FOR MONITORING SUBOXONE MAINTENANCE THERAPY: ICD-10-CM

## 2021-05-18 DIAGNOSIS — F19.20 POLYSUBSTANCE DEPENDENCE INCLUDING OPIOID TYPE DRUG WITH COMPLICATION, CONTINUOUS USE (HCC): Primary | ICD-10-CM

## 2021-05-18 DIAGNOSIS — Z51.81 ENCOUNTER FOR MONITORING SUBOXONE MAINTENANCE THERAPY: ICD-10-CM

## 2021-05-18 PROCEDURE — 99213 OFFICE O/P EST LOW 20 MIN: CPT | Performed by: INTERNAL MEDICINE

## 2021-05-18 PROCEDURE — 80307 DRUG TEST PRSMV CHEM ANLYZR: CPT | Performed by: INTERNAL MEDICINE

## 2021-05-18 RX ORDER — BUPRENORPHINE HYDROCHLORIDE AND NALOXONE HYDROCHLORIDE DIHYDRATE 8; 2 MG/1; MG/1
TABLET SUBLINGUAL
Qty: 53 TABLET | Refills: 0 | Status: SHIPPED | OUTPATIENT
Start: 2021-05-18 | End: 2021-06-18 | Stop reason: SDUPTHER

## 2021-05-18 NOTE — PATIENT INSTRUCTIONS
Buprenorphine/Naloxone (Into the mouth)   Buprenorphine (bue-pre-NOR-feen), Naloxone (nal-OX-one)  Treats narcotic dependence  Brand Name(s): Suboxone, Zubsolv   There may be other brand names for this medicine  When This Medicine Should Not Be Used: This medicine is not right for everyone  Do not use it if you had an allergic reaction to buprenorphine or naloxone  How to Use This Medicine: Thin Sheet, Tablet  · Take your medicine as directed  Your dose may need to be changed several times to find what works best for you  · You must let the medicine dissolve  Never swallow the film or tablet  Your body may not absorb enough of the medicine if you swallow it  · Your health caregiver should show you how to use the medicine  If you do not understand, ask for help  It is important to use the medicine correctly  · Do not talk while the medicine is inside your mouth  · Buccal film: Rinse your mouth with water to moisten it  Place the film against the inside of your cheek  If your doctor told you to use more than 1 film, place the second film inside your other cheek  Do not place more than 2 films inside of 1 cheek at a time  Do not move or touch the film  Do not eat or drink anything until the film is completely dissolved  · Sublingual tablet: Place the tablet under your tongue  If your doctor told you to use more than 1 tablet, place all of the tablets in different places under your tongue at the same time  You can use 2 tablets at a time until you have taken all of the medicine, if that is easier for you  Let the tablets dissolve completely in your mouth  Do not eat or drink anything until the tablets are completely dissolved  · Sublingual film: Drink some water to help moisten your mouth  Place the film under your tongue  If your doctor told you to use more than 1 film, place the second film on the opposite side from the first one  Do not move the film after you placed it under your tongue   If you are supposed to use more than 2 films, use them the same way, but do not start until the first 2 films are completely dissolved  · Do not break, crush, chew, or cut the film or tablet  · This medicine should come with a Medication Guide  Ask your pharmacist for a copy if you do not have one  · Missed dose: Take a dose as soon as you remember  If it is almost time for your next dose, wait until then and take a regular dose  Do not take extra medicine to make up for a missed dose  · Store the medicine in a closed container at room temperature, away from heat, moisture, and direct light  Drop off any unused narcotic medicine at a drug take-back location right away  If you do not have a drug take-back location near you, flush any unused narcotic medicine down the toilet  Check your local drug store and clinics for take-back locations  You can also check the [employer] web site for locations  Here is the link to the [facility] safe disposal of medicines [place] com ee  Drugs and Foods to Avoid:   Ask your doctor or pharmacist before using any other medicine, including over-the-counter medicines, vitamins, and herbal products  · Do not use this medicine if you are using or have used an MAO inhibitor within the past 14 days  · Some medicines can affect how buprenorphine/naloxone works  Tell your doctor if you are using the following:   ? Carbamazepine, cyclobenzaprine, erythromycin, ketoconazole, metaxalone, mirtazapine, phenobarbital, phenytoin, rifampin, tramadol, trazodone  ? Diuretic (water pill)  ? Medicine to treat depression, anxiety, and mental health illness  ? Medicine to treat HIV/AIDS (including atazanavir, delavirdine, efavirenz, etravirine, nevirapine, ritonavir)  ? Phenothiazine medicine  ?  Triptan medicine to treat migraine headaches  · Do not drink alcohol while you are using this medicine  · Tell your doctor if you use anything else that makes you sleepy  Some examples are allergy medicine, narcotic pain medicine, and alcohol  Tell your doctor if you are also using butorphanol, nalbuphine, pentazocine, or a muscle relaxer  Warnings While Using This Medicine:   · Tell your doctor if you are pregnant or breastfeeding, or if you have kidney disease, liver disease (including hepatitis), lung or breathing problems (including sleep apnea), adrenal gland problems, an enlarged prostate, trouble urinating, gallbladder problems, thyroid problems, stomach problems, or a history of depression, brain tumor, head injury, alcohol or drug abuse  · This medicine may cause the following problems:  ? High risk of overdose, which can lead to death  ? Respiratory depression (serious breathing problem that can be life-threatening)  ? Sleep-related breathing problems (including sleep apnea, sleep-related hypoxemia)  ? Liver problems  ? Serotonin syndrome, when used with certain medicines  · This medicine may make you dizzy or drowsy  Do not drive or do anything else that could be dangerous until you know how this medicine affects you  Stand or sit up slowly if you feel lightheaded or dizzy  · Tell any doctor or dentist who treats you that you are using this medicine  · This medicine can be habit-forming  Do not use more than your prescribed dose  Call your doctor if you think your medicine is not working  · This medicine may cause constipation, especially with long-term use  Ask your doctor if you should use a laxative to prevent and treat constipation  · Do not stop using this medicine suddenly  Your doctor will need to slowly decrease your dose before you stop it completely  · This medicine could cause infertility  Talk with your doctor before using this medicine if you plan to have children  · Your doctor will do lab tests at regular visits to check on the effects of this medicine   Keep all appointments  · Keep all medicine out of the reach of children  Never share your medicine with anyone  Possible Side Effects While Using This Medicine:   Call your doctor right away if you notice any of these side effects:  · Allergic reaction: Itching or hives, swelling in your face or hands, swelling or tingling in your mouth or throat, chest tightness, trouble breathing  · Blue lips, fingernails, or skin  · Changes in skin color, dark freckles  · Cold feeling, weakness or tiredness, weight loss  · Dark urine or pale stools, nausea, vomiting, loss of appetite, stomach pain, yellow skin or eyes  · Extreme dizziness or weakness, shallow breathing, sweating, seizures, cold or clammy skin  · Severe confusion, lightheadedness, dizziness, or fainting  · Trouble breathing or slow breathing  If you notice these less serious side effects, talk with your doctor:   · Constipation or upset stomach  · Headache, trouble sleeping  · Shaking, runny nose, watery eyes, diarrhea, muscle aches  If you notice other side effects that you think are caused by this medicine, tell your doctor  Call your doctor for medical advice about side effects  You may report side effects to FDA at 7-277-FDA-2472  © Copyright [employer] 2021 Information is for End User's use only and may not be sold, redistributed or otherwise used for commercial purposes  The above information is an  only  It is not intended as medical advice for individual conditions or treatments  Talk to your doctor, nurse or pharmacist before following any medical regimen to see if it is safe and effective for you

## 2021-05-18 NOTE — PROGRESS NOTES
Assessment/Plan:14  Problem List Items Addressed This Visit        Other    Polysubstance dependence including opioid type drug with complication, continuous use ([facility] [address] ) - Primary    Medication therapy continued    Relevant Orders    Suboxone    Toxicology screen, urine    Encounter for monitoring Suboxone maintenance therapy    Relevant Orders    Suboxone    Toxicology screen, urine           Diagnoses and all orders for this visit:    Polysubstance dependence including opioid type drug with complication, continuous use ([facility] [address] )    Encounter for monitoring Suboxone maintenance therapy  -     Suboxone  -     Toxicology screen, urine    Medication therapy continued  -     Suboxone  -     Toxicology screen, urine        No problem-specific Assessment & Plan notes found for this encounter  Scheduled Medication Review:  Pt's scheduled medication use was reviewed by myself/staff via the [employer] website  Pt's use has been found to be appropriate w/o any concerns for misuse by the patient  Pt's current conditions require continued scheduled medication use at this time  Future review for continued appropriate medication use and misuse will continue  A/P: Doing well and will continue 14mg and will switch to tabs per his request, dose 3/6 and continue with counseling  Reminded to keep meds safe and out of reach of children  RTC 4 weeks  Subjective: WM presents for f/u suboxone  Doing well and no c/o's  Not using and no withdraw  Does attend counseling  UDT obtained today  Tolerating the meds and no side effects  Patient ID: Dwaine Maldonado is a 21 y o  male  HPI    The following portions of the patient's history were reviewed and updated as appropriate:   He has a past medical history of ADHD (attention deficit hyperactivity disorder), Bipolar disorder ([facility] [address] ), Depression, Hyperlipidemia, Hypertension, Psychiatric disorder, Substance abuse ([facility] ), and Tourette syndrome  ,  does not have any pertinent problems on file ,   has a past surgical history that includes Tympanostomy tube placement and TONSILECTOMY AND ADNOIDECTOMY  ,  family history includes Cirrhosis in his father; Coronary artery disease in his father; Hepatitis in his father; No Known Problems in his mother; Substance Abuse in his brother and father  ,   reports that he has been smoking cigarettes  He has a 30 00 pack-year smoking history  He has never used smokeless tobacco  He reports previous alcohol use  He reports current drug use  Drug: Marijuana  ,  is allergic to abilify [aripiprazole]     Current Outpatient Medications   Medication Sig Dispense Refill    albuterol (PROVENTIL HFA,VENTOLIN HFA) 90 mcg/act inhaler Inhale 1-2 puffs every 4 (four) hours as needed for wheezing or shortness of breath 1 Inhaler 0    amLODIPine (NORVASC) 5 mg tablet Take 1 tablet (5 mg total) by mouth daily 90 tablet 3    buprenorphine-naloxone (Suboxone) 8-2 mg One and three quarters strip sl q day  53 Film 0    gabapentin (NEURONTIN) 300 mg capsule Take 2 capsules (600 mg total) by mouth 3 (three) times a day 180 capsule 0    naloxone (NARCAN) 1 mg/mL intranasal 2 mL (2 mg total) into each nostril once for 1 dose 2 mL 1    naloxone (Narcan) 4 mg/0 1 mL nasal spray 0 1 mL (4 mg total) into each nostril as needed (respiratory depression or possible OD) 1 each 1    QUEtiapine (SEROquel) 100 mg tablet Take 1 tablet (100 mg total) by mouth daily at bedtime 30 tablet 0    QUEtiapine (SEROquel) 25 mg tablet Take 1 tablet (25 mg total) by mouth 2 (two) times a day 60 tablet 0     No current facility-administered medications for this visit  Review of Systems   Constitutional: Negative for activity change, chills, diaphoresis, fatigue and fever  Respiratory: Negative for cough, chest tightness, shortness of breath and wheezing  Cardiovascular: Negative for chest pain, palpitations and leg swelling     Gastrointestinal: Negative for abdominal pain, constipation, diarrhea, nausea and vomiting  Genitourinary: Negative for difficulty urinating, dysuria and frequency  Musculoskeletal: Negative for arthralgias, gait problem and myalgias  Neurological: Negative for dizziness, seizures, syncope, weakness, light-headedness and headaches  Psychiatric/Behavioral: Negative for confusion, dysphoric mood, self-injury, sleep disturbance and suicidal ideas  The patient is not nervous/anxious  PHQ-9 Depression Screening    PHQ-9:   Frequency of the following problems over the past two weeks:            Objective:  Vitals:    05/18/21 1538   BP: 126/76   Pulse: 68   Temp: 97 6 °F (36 4 °C)   SpO2: 97%   Weight: 81 6 kg (180 lb)   Height: 5' 7" (1 702 m)     Body mass index is 28 19 kg/m²  Physical Exam  Vitals signs and nursing note reviewed  Constitutional:       General: He is not in acute distress  Appearance: Normal appearance  He is not ill-appearing  HENT:      Head: Normocephalic and atraumatic  Mouth/Throat:      Mouth: Mucous membranes are moist    Eyes:      Extraocular Movements: Extraocular movements intact  Conjunctiva/sclera: Conjunctivae normal       Pupils: Pupils are equal, round, and reactive to light  Cardiovascular:      Rate and Rhythm: Normal rate and regular rhythm  Heart sounds: Normal heart sounds  Pulmonary:      Effort: Pulmonary effort is normal  No respiratory distress  Breath sounds: Normal breath sounds  No wheezing or rales  Abdominal:      General: Bowel sounds are normal  There is no distension  Palpations: Abdomen is soft  Tenderness: There is no abdominal tenderness  Neurological:      General: No focal deficit present  Mental Status: He is alert and oriented to person, place, and time  Mental status is at baseline  Psychiatric:         Mood and Affect: Mood normal          Behavior: Behavior normal          Thought Content:  Thought content normal          Judgment: Judgment normal

## 2021-05-24 LAB
AMPHETAMINES UR QL SCN: NEGATIVE NG/ML
BARBITURATES UR QL SCN: NEGATIVE NG/ML
BENZODIAZ UR QL: NEGATIVE NG/ML
BUPRENORPHINE UR CFM-MCNC: 174 NG/ML
BUPRENORPHINE UR QL CFM: NORMAL NG/ML
BUPRENORPHINE UR QL CFM: POSITIVE
BUPRENORPHINE+NOR UR QL: POSITIVE
BZE UR QL: NEGATIVE NG/ML
CANNABINOIDS UR QL SCN: POSITIVE
METHADONE UR QL SCN: NEGATIVE NG/ML
NORBUPRENORPHINE UR CFM-MCNC: 374 NG/ML
NORBUPRENORPHINE UR QL CFM: POSITIVE
OPIATES UR QL: NEGATIVE NG/ML
PCP UR QL: NEGATIVE NG/ML
PROPOXYPH UR QL SCN: NEGATIVE NG/ML

## 2021-06-18 ENCOUNTER — OFFICE VISIT (OUTPATIENT)
Dept: INTERNAL MEDICINE CLINIC | Facility: CLINIC | Age: 21
End: 2021-06-18
Payer: COMMERCIAL

## 2021-06-18 VITALS
OXYGEN SATURATION: 98 % | HEART RATE: 79 BPM | SYSTOLIC BLOOD PRESSURE: 132 MMHG | HEIGHT: 67 IN | WEIGHT: 183.25 LBS | BODY MASS INDEX: 28.76 KG/M2 | TEMPERATURE: 98.5 F | DIASTOLIC BLOOD PRESSURE: 80 MMHG

## 2021-06-18 DIAGNOSIS — F31.32 BIPOLAR 1 DISORDER, DEPRESSED, MODERATE (HCC): ICD-10-CM

## 2021-06-18 DIAGNOSIS — Z79.899 MEDICATION THERAPY CONTINUED: ICD-10-CM

## 2021-06-18 DIAGNOSIS — R03.0 ELEVATED BP WITHOUT DIAGNOSIS OF HYPERTENSION: ICD-10-CM

## 2021-06-18 DIAGNOSIS — Z79.899 ENCOUNTER FOR MONITORING SUBOXONE MAINTENANCE THERAPY: ICD-10-CM

## 2021-06-18 DIAGNOSIS — F19.20 POLYSUBSTANCE DEPENDENCE INCLUDING OPIOID TYPE DRUG WITH COMPLICATION, CONTINUOUS USE (HCC): Primary | ICD-10-CM

## 2021-06-18 DIAGNOSIS — Z51.81 ENCOUNTER FOR MONITORING SUBOXONE MAINTENANCE THERAPY: ICD-10-CM

## 2021-06-18 PROCEDURE — 99213 OFFICE O/P EST LOW 20 MIN: CPT | Performed by: INTERNAL MEDICINE

## 2021-06-18 PROCEDURE — 3008F BODY MASS INDEX DOCD: CPT | Performed by: INTERNAL MEDICINE

## 2021-06-18 PROCEDURE — 4004F PT TOBACCO SCREEN RCVD TLK: CPT | Performed by: INTERNAL MEDICINE

## 2021-06-18 PROCEDURE — 80307 DRUG TEST PRSMV CHEM ANLYZR: CPT | Performed by: INTERNAL MEDICINE

## 2021-06-18 RX ORDER — GABAPENTIN 300 MG/1
600 CAPSULE ORAL 3 TIMES DAILY
Qty: 180 CAPSULE | Refills: 0 | Status: SHIPPED | OUTPATIENT
Start: 2021-06-18 | End: 2021-12-01 | Stop reason: HOSPADM

## 2021-06-18 RX ORDER — QUETIAPINE FUMARATE 100 MG/1
100 TABLET, FILM COATED ORAL
Qty: 30 TABLET | Refills: 0 | Status: SHIPPED | OUTPATIENT
Start: 2021-06-18 | End: 2021-10-19

## 2021-06-18 RX ORDER — QUETIAPINE FUMARATE 25 MG/1
25 TABLET, FILM COATED ORAL 2 TIMES DAILY
Qty: 60 TABLET | Refills: 0 | Status: SHIPPED | OUTPATIENT
Start: 2021-06-18 | End: 2021-12-01 | Stop reason: HOSPADM

## 2021-06-18 RX ORDER — QUETIAPINE FUMARATE 25 MG/1
25 TABLET, FILM COATED ORAL 2 TIMES DAILY
Qty: 60 TABLET | Refills: 0 | Status: SHIPPED | OUTPATIENT
Start: 2021-06-18 | End: 2021-10-19

## 2021-06-18 RX ORDER — BUPRENORPHINE HYDROCHLORIDE AND NALOXONE HYDROCHLORIDE DIHYDRATE 8; 2 MG/1; MG/1
TABLET SUBLINGUAL
Qty: 53 TABLET | Refills: 0 | Status: SHIPPED | OUTPATIENT
Start: 2021-06-18 | End: 2021-07-16 | Stop reason: SDUPTHER

## 2021-06-18 RX ORDER — QUETIAPINE FUMARATE 100 MG/1
100 TABLET, FILM COATED ORAL
Qty: 30 TABLET | Refills: 0 | Status: SHIPPED | OUTPATIENT
Start: 2021-06-18 | End: 2021-12-01 | Stop reason: HOSPADM

## 2021-06-18 RX ORDER — AMLODIPINE BESYLATE 5 MG/1
5 TABLET ORAL DAILY
Qty: 90 TABLET | Refills: 3 | Status: SHIPPED | OUTPATIENT
Start: 2021-06-18 | End: 2021-12-01 | Stop reason: HOSPADM

## 2021-06-18 NOTE — PATIENT INSTRUCTIONS
Buprenorphine/Naloxone (Into the mouth)   Buprenorphine (bue-pre-NOR-feen), Naloxone (nal-OX-one)  Treats narcotic dependence  Brand Name(s): Suboxone, Zubsolv   There may be other brand names for this medicine  When This Medicine Should Not Be Used: This medicine is not right for everyone  Do not use it if you had an allergic reaction to buprenorphine or naloxone  How to Use This Medicine: Thin Sheet, Tablet  · Take your medicine as directed  Your dose may need to be changed several times to find what works best for you  · You must let the medicine dissolve  Never swallow the film or tablet  Your body may not absorb enough of the medicine if you swallow it  · Your health caregiver should show you how to use the medicine  If you do not understand, ask for help  It is important to use the medicine correctly  · Do not talk while the medicine is inside your mouth  · Buccal film: Rinse your mouth with water to moisten it  Place the film against the inside of your cheek  If your doctor told you to use more than 1 film, place the second film inside your other cheek  Do not place more than 2 films inside of 1 cheek at a time  Do not move or touch the film  Do not eat or drink anything until the film is completely dissolved  · Sublingual tablet: Place the tablet under your tongue  If your doctor told you to use more than 1 tablet, place all of the tablets in different places under your tongue at the same time  You can use 2 tablets at a time until you have taken all of the medicine, if that is easier for you  Let the tablets dissolve completely in your mouth  Do not eat or drink anything until the tablets are completely dissolved  · Sublingual film: Drink some water to help moisten your mouth  Place the film under your tongue  If your doctor told you to use more than 1 film, place the second film on the opposite side from the first one  Do not move the film after you placed it under your tongue   If you are supposed to use more than 2 films, use them the same way, but do not start until the first 2 films are completely dissolved  · Do not break, crush, chew, or cut the film or tablet  · This medicine should come with a Medication Guide  Ask your pharmacist for a copy if you do not have one  · Missed dose: Take a dose as soon as you remember  If it is almost time for your next dose, wait until then and take a regular dose  Do not take extra medicine to make up for a missed dose  · Store the medicine in a closed container at room temperature, away from heat, moisture, and direct light  Drop off any unused narcotic medicine at a drug take-back location right away  If you do not have a drug take-back location near you, flush any unused narcotic medicine down the toilet  Check your local drug store and clinics for take-back locations  You can also check the [employer] web site for locations  Here is the link to the [facility] safe disposal of medicines [employer] com ee  Drugs and Foods to Avoid:   Ask your doctor or pharmacist before using any other medicine, including over-the-counter medicines, vitamins, and herbal products  · Do not use this medicine if you are using or have used an MAO inhibitor within the past 14 days  · Some medicines can affect how buprenorphine/naloxone works  Tell your doctor if you are using the following:   ? Carbamazepine, cyclobenzaprine, erythromycin, ketoconazole, metaxalone, mirtazapine, phenobarbital, phenytoin, rifampin, tramadol, trazodone  ? Diuretic (water pill)  ? Medicine to treat depression, anxiety, and mental health illness  ? Medicine to treat HIV/AIDS (including atazanavir, delavirdine, efavirenz, etravirine, nevirapine, ritonavir)  ? Phenothiazine medicine  ?  Triptan medicine to treat migraine headaches  · Do not drink alcohol while you are using this medicine  · Tell your doctor if you use anything else that makes you sleepy  Some examples are allergy medicine, narcotic pain medicine, and alcohol  Tell your doctor if you are also using butorphanol, nalbuphine, pentazocine, or a muscle relaxer  Warnings While Using This Medicine:   · Tell your doctor if you are pregnant or breastfeeding, or if you have kidney disease, liver disease (including hepatitis), lung or breathing problems (including sleep apnea), adrenal gland problems, an enlarged prostate, trouble urinating, gallbladder problems, thyroid problems, stomach problems, or a history of depression, brain tumor, head injury, alcohol or drug abuse  · This medicine may cause the following problems:  ? High risk of overdose, which can lead to death  ? Respiratory depression (serious breathing problem that can be life-threatening)  ? Sleep-related breathing problems (including sleep apnea, sleep-related hypoxemia)  ? Liver problems  ? Serotonin syndrome, when used with certain medicines  · This medicine may make you dizzy or drowsy  Do not drive or do anything else that could be dangerous until you know how this medicine affects you  Stand or sit up slowly if you feel lightheaded or dizzy  · Tell any doctor or dentist who treats you that you are using this medicine  · This medicine can be habit-forming  Do not use more than your prescribed dose  Call your doctor if you think your medicine is not working  · This medicine may cause constipation, especially with long-term use  Ask your doctor if you should use a laxative to prevent and treat constipation  · Do not stop using this medicine suddenly  Your doctor will need to slowly decrease your dose before you stop it completely  · This medicine could cause infertility  Talk with your doctor before using this medicine if you plan to have children  · Your doctor will do lab tests at regular visits to check on the effects of this medicine   Keep all appointments  · Keep all medicine out of the reach of children  Never share your medicine with anyone  Possible Side Effects While Using This Medicine:   Call your doctor right away if you notice any of these side effects:  · Allergic reaction: Itching or hives, swelling in your face or hands, swelling or tingling in your mouth or throat, chest tightness, trouble breathing  · Blue lips, fingernails, or skin  · Changes in skin color, dark freckles  · Cold feeling, weakness or tiredness, weight loss  · Dark urine or pale stools, nausea, vomiting, loss of appetite, stomach pain, yellow skin or eyes  · Extreme dizziness or weakness, shallow breathing, sweating, seizures, cold or clammy skin  · Severe confusion, lightheadedness, dizziness, or fainting  · Trouble breathing or slow breathing  If you notice these less serious side effects, talk with your doctor:   · Constipation or upset stomach  · Headache, trouble sleeping  · Shaking, runny nose, watery eyes, diarrhea, muscle aches  If you notice other side effects that you think are caused by this medicine, tell your doctor  Call your doctor for medical advice about side effects  You may report side effects to FDA at 0-420-FDA-3407  © Copyright [employer] 2021 Information is for End User's use only and may not be sold, redistributed or otherwise used for commercial purposes  The above information is an  only  It is not intended as medical advice for individual conditions or treatments  Talk to your doctor, nurse or pharmacist before following any medical regimen to see if it is safe and effective for you

## 2021-06-18 NOTE — PROGRESS NOTES
Assessment/Plan:  Problem List Items Addressed This Visit        Other    Polysubstance dependence including opioid type drug with complication, continuous use ([facility] ) - Primary    Medication therapy continued    Encounter for monitoring Suboxone maintenance therapy           Diagnoses and all orders for this visit:    Polysubstance dependence including opioid type drug with complication, continuous use ([facility] )  -     Suboxone  -     Toxicology screen, urine    Encounter for monitoring Suboxone maintenance therapy  -     Suboxone  -     Toxicology screen, urine    Medication therapy continued  -     Suboxone  -     Toxicology screen, urine        No problem-specific Assessment & Plan notes found for this encounter  Scheduled Medication Review:  Pt's scheduled medication use was reviewed by myself/staff via the [employer]  Pt's use has been found to be appropriate w/o any concerns for misuse by the patient  Pt's current conditions require continued scheduled medication use at this time  Future review for continued appropriate medication use and misuse will continue  A/P: Doing well and will continue 14mg tabs, dose 4/6 and continue with counseling  Reminded to keep meds safe and out of reach of children  RTC 4 weeks  Subjective: WM presents for f/u suboxone  Doing well and no c/o's  Not using and no withdraw  Does attend counseling  UDT obtained today  Tolerating the meds and no side effects  Patient ID: Yamile Angela is a 21 y o  male  HPI    The following portions of the patient's history were reviewed and updated as appropriate:   He has a past medical history of ADHD (attention deficit hyperactivity disorder), Bipolar disorder ([facility] [address] ), Depression, Hyperlipidemia, Hypertension, Psychiatric disorder, Substance abuse ([facility] ), and Tourette syndrome  ,  does not have any pertinent problems on file  ,   has a past surgical history that includes Tympanostomy tube placement and TONSILECTOMY AND ADNOIDECTOMY  ,  family history includes Cirrhosis in his father; Coronary artery disease in his father; Hepatitis in his father; No Known Problems in his mother; Substance Abuse in his brother and father  ,   reports that he has been smoking cigarettes  He has a 30 00 pack-year smoking history  He has never used smokeless tobacco  He reports previous alcohol use  He reports current drug use  Drug: Marijuana  ,  is allergic to abilify [aripiprazole]     Current Outpatient Medications   Medication Sig Dispense Refill    albuterol (PROVENTIL HFA,VENTOLIN HFA) 90 mcg/act inhaler Inhale 1-2 puffs every 4 (four) hours as needed for wheezing or shortness of breath 1 Inhaler 0    amLODIPine (NORVASC) 5 mg tablet Take 1 tablet (5 mg total) by mouth daily 90 tablet 3    buprenorphine-naloxone (SUBOXONE) 8-2 mg per SL tablet One and 3/4 tabs sl q day  53 tablet 0    gabapentin (NEURONTIN) 300 mg capsule Take 2 capsules (600 mg total) by mouth 3 (three) times a day 180 capsule 0    naloxone (NARCAN) 1 mg/mL intranasal 2 mL (2 mg total) into each nostril once for 1 dose 2 mL 1    QUEtiapine (SEROquel) 100 mg tablet Take 1 tablet (100 mg total) by mouth daily at bedtime 30 tablet 0    QUEtiapine (SEROquel) 25 mg tablet Take 1 tablet (25 mg total) by mouth 2 (two) times a day 60 tablet 0    naloxone (Narcan) 4 mg/0 1 mL nasal spray 0 1 mL (4 mg total) into each nostril as needed (respiratory depression or possible OD) 1 each 1     No current facility-administered medications for this visit  Review of Systems   Constitutional: Negative for activity change, chills, diaphoresis, fatigue and fever  Respiratory: Negative for cough, chest tightness, shortness of breath and wheezing  Cardiovascular: Negative for chest pain, palpitations and leg swelling  Gastrointestinal: Negative for abdominal pain, constipation, diarrhea, nausea and vomiting  Genitourinary: Negative for difficulty urinating, dysuria and frequency  Musculoskeletal: Negative for arthralgias, gait problem and myalgias  Neurological: Negative for dizziness, seizures, syncope, weakness, light-headedness and headaches  Psychiatric/Behavioral: Negative for confusion, dysphoric mood, self-injury, sleep disturbance and suicidal ideas  The patient is not nervous/anxious  PHQ-9 Depression Screening    PHQ-9:   Frequency of the following problems over the past two weeks:            Objective:  Vitals:    06/18/21 1046   BP: 132/80   Pulse: 79   Temp: 98 5 °F (36 9 °C)   SpO2: 98%   Weight: 83 1 kg (183 lb 4 oz)   Height: 5' 7" (1 702 m)     Body mass index is 28 7 kg/m²  Physical Exam  Vitals and nursing note reviewed  Constitutional:       Appearance: Normal appearance  He is not ill-appearing  HENT:      Head: Normocephalic and atraumatic  Mouth/Throat:      Mouth: Mucous membranes are moist    Eyes:      Extraocular Movements: Extraocular movements intact  Conjunctiva/sclera: Conjunctivae normal       Pupils: Pupils are equal, round, and reactive to light  Cardiovascular:      Rate and Rhythm: Normal rate and regular rhythm  Heart sounds: Normal heart sounds  Pulmonary:      Effort: Pulmonary effort is normal  No respiratory distress  Breath sounds: Normal breath sounds  No wheezing or rales  Abdominal:      General: Bowel sounds are normal  There is no distension  Palpations: Abdomen is soft  Tenderness: There is no abdominal tenderness  Neurological:      General: No focal deficit present  Mental Status: He is alert and oriented to person, place, and time  Mental status is at baseline  Psychiatric:         Mood and Affect: Mood normal          Behavior: Behavior normal          Thought Content:  Thought content normal          Judgment: Judgment normal

## 2021-06-22 ENCOUNTER — HOSPITAL ENCOUNTER (EMERGENCY)
Facility: HOSPITAL | Age: 21
Discharge: HOME | End: 2021-06-22
Attending: EMERGENCY MEDICINE
Payer: COMMERCIAL

## 2021-06-22 VITALS
DIASTOLIC BLOOD PRESSURE: 58 MMHG | OXYGEN SATURATION: 98 % | RESPIRATION RATE: 17 BRPM | TEMPERATURE: 97.2 F | BODY MASS INDEX: 29.19 KG/M2 | HEIGHT: 67 IN | HEART RATE: 60 BPM | SYSTOLIC BLOOD PRESSURE: 117 MMHG | WEIGHT: 186 LBS

## 2021-06-22 DIAGNOSIS — R11.2 NAUSEA AND VOMITING, INTRACTABILITY OF VOMITING NOT SPECIFIED, UNSPECIFIED VOMITING TYPE: Primary | ICD-10-CM

## 2021-06-22 LAB
ALBUMIN SERPL BCP-MCNC: 4.7 G/DL (ref 3.5–5.7)
ALP SERPL-CCNC: 108 U/L (ref 40–150)
ALT SERPL W P-5'-P-CCNC: 22 U/L (ref 7–52)
ANION GAP SERPL CALCULATED.3IONS-SCNC: 11 MMOL/L (ref 4–13)
AST SERPL W P-5'-P-CCNC: 24 U/L (ref 13–39)
BASOPHILS # BLD AUTO: 0 THOUSANDS/ΜL (ref 0–0.1)
BASOPHILS NFR BLD AUTO: 0 % (ref 0–2)
BILIRUB SERPL-MCNC: 0.8 MG/DL (ref 0.2–1)
BUN SERPL-MCNC: 14 MG/DL (ref 7–25)
CALCIUM SERPL-MCNC: 9.7 MG/DL (ref 8.6–10.5)
CHLORIDE SERPL-SCNC: 97 MMOL/L (ref 98–107)
CO2 SERPL-SCNC: 28 MMOL/L (ref 21–31)
CREAT SERPL-MCNC: 0.7 MG/DL (ref 0.7–1.3)
EOSINOPHIL # BLD AUTO: 0.1 THOUSAND/ΜL (ref 0–0.61)
EOSINOPHIL NFR BLD AUTO: 1 % (ref 0–5)
ERYTHROCYTE [DISTWIDTH] IN BLOOD BY AUTOMATED COUNT: 12.9 % (ref 11.5–14.5)
GFR SERPL CREATININE-BSD FRML MDRD: 136 ML/MIN/1.73SQ M
GLUCOSE SERPL-MCNC: 98 MG/DL (ref 65–99)
HCT VFR BLD AUTO: 43.5 % (ref 42–47)
HGB BLD-MCNC: 15.1 G/DL (ref 14–18)
LACTATE SERPL-SCNC: 1 MMOL/L (ref 0.5–2)
LIPASE SERPL-CCNC: 19 U/L (ref 11–82)
LYMPHOCYTES # BLD AUTO: 3.2 THOUSANDS/ΜL (ref 0.6–4.47)
LYMPHOCYTES NFR BLD AUTO: 21 % (ref 21–51)
MCH RBC QN AUTO: 28.8 PG (ref 26–34)
MCHC RBC AUTO-ENTMCNC: 34.7 G/DL (ref 31–37)
MCV RBC AUTO: 83 FL (ref 81–99)
MONOCYTES # BLD AUTO: 1.7 THOUSAND/ΜL (ref 0.17–1.22)
MONOCYTES NFR BLD AUTO: 11 % (ref 2–12)
NEUTROPHILS # BLD AUTO: 10.3 THOUSANDS/ΜL (ref 1.4–6.5)
NEUTS SEG NFR BLD AUTO: 67 % (ref 42–75)
PLATELET # BLD AUTO: 346 THOUSANDS/UL (ref 149–390)
PMV BLD AUTO: 7.2 FL (ref 8.6–11.7)
POTASSIUM SERPL-SCNC: 3.6 MMOL/L (ref 3.5–5.5)
PROT SERPL-MCNC: 7.7 G/DL (ref 6.4–8.9)
RBC # BLD AUTO: 5.23 MILLION/UL (ref 4.3–5.9)
SODIUM SERPL-SCNC: 136 MMOL/L (ref 134–143)
WBC # BLD AUTO: 15.4 THOUSAND/UL (ref 4.8–10.8)

## 2021-06-22 PROCEDURE — 99284 EMERGENCY DEPT VISIT MOD MDM: CPT | Performed by: EMERGENCY MEDICINE

## 2021-06-22 PROCEDURE — 96375 TX/PRO/DX INJ NEW DRUG ADDON: CPT

## 2021-06-22 PROCEDURE — 80053 COMPREHEN METABOLIC PANEL: CPT | Performed by: EMERGENCY MEDICINE

## 2021-06-22 PROCEDURE — 96361 HYDRATE IV INFUSION ADD-ON: CPT

## 2021-06-22 PROCEDURE — 99283 EMERGENCY DEPT VISIT LOW MDM: CPT

## 2021-06-22 PROCEDURE — 96374 THER/PROPH/DIAG INJ IV PUSH: CPT

## 2021-06-22 PROCEDURE — C9113 INJ PANTOPRAZOLE SODIUM, VIA: HCPCS | Performed by: EMERGENCY MEDICINE

## 2021-06-22 PROCEDURE — 36415 COLL VENOUS BLD VENIPUNCTURE: CPT | Performed by: EMERGENCY MEDICINE

## 2021-06-22 PROCEDURE — 85025 COMPLETE CBC W/AUTO DIFF WBC: CPT | Performed by: EMERGENCY MEDICINE

## 2021-06-22 PROCEDURE — 83605 ASSAY OF LACTIC ACID: CPT | Performed by: EMERGENCY MEDICINE

## 2021-06-22 PROCEDURE — 83690 ASSAY OF LIPASE: CPT | Performed by: EMERGENCY MEDICINE

## 2021-06-22 RX ORDER — PANTOPRAZOLE SODIUM 20 MG/1
20 TABLET, DELAYED RELEASE ORAL DAILY
Qty: 20 TABLET | Refills: 0 | Status: SHIPPED | OUTPATIENT
Start: 2021-06-22 | End: 2021-12-01 | Stop reason: HOSPADM

## 2021-06-22 RX ORDER — SUCRALFATE 1 G/1
1 TABLET ORAL 4 TIMES DAILY
Qty: 40 TABLET | Refills: 0 | Status: SHIPPED | OUTPATIENT
Start: 2021-06-22 | End: 2021-12-01 | Stop reason: HOSPADM

## 2021-06-22 RX ORDER — ONDANSETRON 2 MG/ML
4 INJECTION INTRAMUSCULAR; INTRAVENOUS ONCE
Status: COMPLETED | OUTPATIENT
Start: 2021-06-22 | End: 2021-06-22

## 2021-06-22 RX ORDER — PANTOPRAZOLE SODIUM 40 MG/1
40 INJECTION, POWDER, FOR SOLUTION INTRAVENOUS ONCE
Status: COMPLETED | OUTPATIENT
Start: 2021-06-22 | End: 2021-06-22

## 2021-06-22 RX ORDER — ONDANSETRON 4 MG/1
4 TABLET, ORALLY DISINTEGRATING ORAL EVERY 6 HOURS PRN
Qty: 12 TABLET | Refills: 0 | Status: SHIPPED | OUTPATIENT
Start: 2021-06-22 | End: 2021-12-01 | Stop reason: HOSPADM

## 2021-06-22 RX ADMIN — PANTOPRAZOLE SODIUM 40 MG: 40 INJECTION, POWDER, FOR SOLUTION INTRAVENOUS at 10:51

## 2021-06-22 RX ADMIN — ONDANSETRON 4 MG: 2 INJECTION INTRAMUSCULAR; INTRAVENOUS at 10:51

## 2021-06-22 RX ADMIN — SODIUM CHLORIDE 1000 ML: 0.9 INJECTION, SOLUTION INTRAVENOUS at 10:51

## 2021-06-22 NOTE — ED PROVIDER NOTES
History  Chief Complaint   Patient presents with    Vomiting     Patient reports vomiting, headaches, abdominal pain since Friday  44-year-old male presents emergency room complaining of nausea vomiting that has been ongoing significantly since Friday  Patient notes that he frequently vomits after eating but now it has become so frequent that any eating or drinking as well as times on related to ED here drinking have been met with abdominal discomfort and vomiting  Denies any fever chills  Denies any new medication  Patient notes that he is not been withdrawing on any opiates as he is on Suboxone and taking his medication as prescribed  Patient denies any fever and denies any change in stool  Patient is concerned he may have an ulcer  Patient notes that he does still smoke marijuana every day  Prior to Admission Medications   Prescriptions Last Dose Informant Patient Reported? Taking?    QUEtiapine (SEROquel) 100 mg tablet   No No   Sig: Take 1 tablet (100 mg total) by mouth daily at bedtime   QUEtiapine (SEROquel) 100 mg tablet   No No   Sig: TAKE 1 TABLET (100 MG TOTAL) BY MOUTH DAILY AT BEDTIME   QUEtiapine (SEROquel) 25 mg tablet   No No   Sig: Take 1 tablet (25 mg total) by mouth 2 (two) times a day   QUEtiapine (SEROquel) 25 mg tablet   No No   Sig: TAKE 1 TABLET (25 MG TOTAL) BY MOUTH 2 (TWO) TIMES A DAY   albuterol (PROVENTIL HFA,VENTOLIN HFA) 90 mcg/act inhaler   No No   Sig: Inhale 1-2 puffs every 4 (four) hours as needed for wheezing or shortness of breath   amLODIPine (NORVASC) 5 mg tablet   No No   Sig: Take 1 tablet (5 mg total) by mouth daily   buprenorphine-naloxone (SUBOXONE) 8-2 mg per SL tablet   No No   Sig: One and 3/4 tabs sl q day    gabapentin (NEURONTIN) 300 mg capsule   No No   Sig: TAKE 2 CAPSULES (600 MG TOTAL) BY MOUTH 3 (THREE) TIMES A DAY   naloxone (NARCAN) 1 mg/mL intranasal   No No   Si mL (2 mg total) into each nostril once for 1 dose   naloxone (Narcan) 4 mg/0 1 mL nasal spray   No No   Si 1 mL (4 mg total) into each nostril as needed (respiratory depression or possible OD)      Facility-Administered Medications: None       Past Medical History:   Diagnosis Date    ADHD (attention deficit hyperactivity disorder)     Bipolar disorder ([place] [id number] )     Depression     Hyperlipidemia     Hypertension     Psychiatric disorder     Substance abuse ([place] [id number] )     Tourette syndrome        Past Surgical History:   Procedure Laterality Date    TONSILECTOMY AND ADNOIDECTOMY      TYMPANOSTOMY TUBE PLACEMENT         Family History   Problem Relation Age of Onset    No Known Problems Mother     Substance Abuse Father     Cirrhosis Father     Coronary artery disease Father     Hepatitis Father     Substance Abuse Brother     Diabetes Neg Hx      I have reviewed and agree with the history as documented  E-Cigarette/Vaping    E-Cigarette Use Current Every Day User      E-Cigarette/Vaping Substances    Nicotine No     THC Yes     CBD No     Flavoring No     Other No     Unknown No      Social History     Tobacco Use    Smoking status: Current Every Day Smoker     Packs/day: 2 00     Years: 10 00     Pack years: 20 00     Types: Cigarettes    Smokeless tobacco: Never Used   Vaping Use    Vaping Use: Every day    Substances: THC   Substance Use Topics    Alcohol use: Not Currently    Drug use: Yes     Types: Marijuana     Comment: pills       Review of Systems   Constitutional: Negative for chills and fever  HENT: Negative for ear pain and sore throat  Eyes: Negative for pain and visual disturbance  Respiratory: Negative for cough and shortness of breath  Cardiovascular: Negative for chest pain and palpitations  Gastrointestinal: Positive for abdominal pain, nausea and vomiting  Negative for diarrhea  Genitourinary: Negative for dysuria and hematuria  Musculoskeletal: Negative for arthralgias and back pain     Skin: Negative for color change and rash    Neurological: Negative for seizures and syncope  All other systems reviewed and are negative  Physical Exam  Physical Exam  Constitutional:       General: He is not in acute distress  Appearance: Normal appearance  He is normal weight  He is not ill-appearing  HENT:      Head: Normocephalic and atraumatic  Right Ear: External ear normal       Left Ear: External ear normal       Nose: Nose normal       Mouth/Throat:      Mouth: Mucous membranes are moist    Eyes:      Conjunctiva/sclera: Conjunctivae normal    Cardiovascular:      Rate and Rhythm: Normal rate and regular rhythm  Pulses: Normal pulses  Heart sounds: Normal heart sounds  Pulmonary:      Effort: Pulmonary effort is normal       Breath sounds: Normal breath sounds  Abdominal:      General: Abdomen is flat  Bowel sounds are normal  There is no distension  Palpations: Abdomen is soft  There is no mass  Comments: No abdominal tenderness on evaluation   Musculoskeletal:         General: No swelling, tenderness or deformity  Normal range of motion  Cervical back: Normal range of motion  Skin:     General: Skin is warm and dry  Capillary Refill: Capillary refill takes 2 to 3 seconds  Coloration: Skin is not pale  Neurological:      General: No focal deficit present  Mental Status: He is alert and oriented to person, place, and time  Mental status is at baseline     Psychiatric:         Mood and Affect: Mood normal          Vital Signs  ED Triage Vitals   Temperature Pulse Respirations Blood Pressure SpO2   06/22/21 1013 06/22/21 1013 06/22/21 1013 06/22/21 1013 06/22/21 1013   (!) 97 2 °F (36 2 °C) 88 18 136/75 99 %      Temp Source Heart Rate Source Patient Position - Orthostatic VS BP Location FiO2 (%)   06/22/21 1013 06/22/21 1130 06/22/21 1013 06/22/21 1013 --   Temporal Monitor Sitting Left arm       Pain Score       --                  Vitals:    06/22/21 1013 06/22/21 1130 06/22/21 1200   BP: 136/75 112/60 117/58   Pulse: 88 65 60   Patient Position - Orthostatic VS: Sitting Lying Lying         Visual Acuity      ED Medications  Medications   sodium chloride 0 9 % bolus 1,000 mL (0 mL Intravenous Stopped 6/22/21 1212)   pantoprazole (PROTONIX) injection 40 mg (40 mg Intravenous Given 6/22/21 1051)   ondansetron (ZOFRAN) injection 4 mg (4 mg Intravenous Given 6/22/21 1051)       Diagnostic Studies  Results Reviewed     Procedure Component Value Units Date/Time    Comprehensive metabolic panel [[id number]]  (Abnormal) Collected: 06/22/21 1100    Lab Status: Final result Specimen: Blood from Arm, Left Updated: 06/22/21 1142     Sodium 136 mmol/L      Potassium 3 6 mmol/L      Chloride 97 mmol/L      CO2 28 mmol/L      ANION GAP 11 mmol/L      BUN 14 mg/dL      Creatinine 0 70 mg/dL      Glucose 98 mg/dL      Calcium 9 7 mg/dL      AST 24 U/L      ALT 22 U/L      Alkaline Phosphatase 108 U/L      Total Protein 7 7 g/dL      Albumin 4 7 g/dL      Total Bilirubin 0 80 mg/dL      eGFR 136 ml/min/1 73sq m     Narrative:      [place] guidelines for Chronic Kidney Disease (CKD):     Stage 1 with normal or high GFR (GFR > 90 mL/min/1 73 square meters)    Stage 2 Mild CKD (GFR = 60-89 mL/min/1 73 square meters)    Stage 3A Moderate CKD (GFR = 45-59 mL/min/1 73 square meters)    Stage 3B Moderate CKD (GFR = 30-44 mL/min/1 73 square meters)    Stage 4 Severe CKD (GFR = 15-29 mL/min/1 73 square meters)    Stage 5 End Stage CKD (GFR <15 mL/min/1 73 square meters)  Note: GFR calculation is accurate only with a steady state creatinine    Lipase [[id number]]  (Normal) Collected: 06/22/21 1100    Lab Status: Final result Specimen: Blood from Arm, Left Updated: 06/22/21 1130     Lipase 19 u/L     Lactic acid [[id number]]  (Normal) Collected: 06/22/21 1100    Lab Status: Final result Specimen: Blood from Arm, Left Updated: 06/22/21 1129     LACTIC ACID 1 0 mmol/L     Narrative: Result may be elevated if tourniquet was used during collection  CBC and differential [[id number]]  (Abnormal) Collected: 06/22/21 1100    Lab Status: Final result Specimen: Blood from Arm, Left Updated: 06/22/21 1111     WBC 15 40 Thousand/uL      RBC 5 23 Million/uL      Hemoglobin 15 1 g/dL      Hematocrit 43 5 %      MCV 83 fL      MCH 28 8 pg      MCHC 34 7 g/dL      RDW 12 9 %      MPV 7 2 fL      Platelets 923 Thousands/uL      Neutrophils Relative 67 %      Lymphocytes Relative 21 %      Monocytes Relative 11 %      Eosinophils Relative 1 %      Basophils Relative 0 %      Neutrophils Absolute 10 30 Thousands/µL      Lymphocytes Absolute 3 20 Thousands/µL      Monocytes Absolute 1 70 Thousand/µL      Eosinophils Absolute 0 10 Thousand/µL      Basophils Absolute 0 00 Thousands/µL                  No orders to display              Procedures  Procedures         ED Course  ED Course as of Jabari 22 1604   Tue Jun 22, 2021   1121 WBC(!): 15 40   3958 Patient resting comfortably in the emergency department with no abdominal pain  He states his nausea has resolved  I have discussed with him that although he may have an ulcer, he would need to see gastroenterology and consider getting an endoscopy to further evaluate it  Otherwise, chronic marijuana is also a possibility as to his symptoms    Patient will be discharged with a PPI as well as a prescription for Carafate as well as a GI referral                                               MDM    Disposition  Final diagnoses:   Nausea and vomiting, intractability of vomiting not specified, unspecified vomiting type     Time reflects when diagnosis was documented in both MDM as applicable and the Disposition within this note     Time User Action Codes Description Comment    6/22/2021 12:02 PM Axel Thurman Add [R11 2] Nausea and vomiting, intractability of vomiting not specified, unspecified vomiting type       ED Disposition     ED Disposition Condition Date/Time Comment    Discharge Stable Tue Jun 22, 2021 12:09 PM Hattie Ryan discharge to home/self care              Follow-up Information     Follow up With Specialties Details [address], DO Internal Medicine On 6/25/2021  [address]  [address] [facility] [address] [facility] [address]      Lynett Dakin, MD Gastroenterology On 6/28/2021  [address] [facility] [address] [facility]  [phone]            Discharge Medication List as of 6/22/2021 12:09 PM      START taking these medications    Details   ondansetron (ZOFRAN-ODT) 4 mg disintegrating tablet Take 1 tablet (4 mg total) by mouth every 6 (six) hours as needed for nausea or vomiting, Starting Tue 6/22/2021, Normal      pantoprazole (PROTONIX) 20 mg tablet Take 1 tablet (20 mg total) by mouth daily, Starting Tue 6/22/2021, Normal      sucralfate (CARAFATE) 1 g tablet Take 1 tablet (1 g total) by mouth 4 (four) times a day, Starting Tue 6/22/2021, Normal         CONTINUE these medications which have NOT CHANGED    Details   albuterol (PROVENTIL HFA,VENTOLIN HFA) 90 mcg/act inhaler Inhale 1-2 puffs every 4 (four) hours as needed for wheezing or shortness of breath, Starting Sun 1/17/2021, Normal      amLODIPine (NORVASC) 5 mg tablet Take 1 tablet (5 mg total) by mouth daily, Starting Fri 6/18/2021, Normal      buprenorphine-naloxone (SUBOXONE) 8-2 mg per SL tablet One and 3/4 tabs sl q day , Normal      gabapentin (NEURONTIN) 300 mg capsule TAKE 2 CAPSULES (600 MG TOTAL) BY MOUTH 3 (THREE) TIMES A DAY, Starting Fri 6/18/2021, Until Sun 7/18/2021, Normal      naloxone (NARCAN) 1 mg/mL intranasal 2 mL (2 mg total) into each nostril once for 1 dose, Starting Thu 6/4/2020, Print      naloxone (Narcan) 4 mg/0 1 mL nasal spray 0 1 mL (4 mg total) into each nostril as needed (respiratory depression or possible OD), Starting Wed 9/16/2020, Print      !! QUEtiapine (SEROquel) 100 mg tablet Take 1 tablet (100 mg total) by mouth daily at bedtime, Starting Fri 6/18/2021, Until Sun 7/18/2021, Normal      !! QUEtiapine (SEROquel) 100 mg tablet TAKE 1 TABLET (100 MG TOTAL) BY MOUTH DAILY AT BEDTIME, Starting Fri 6/18/2021, Until Sun 7/18/2021, Normal      !! QUEtiapine (SEROquel) 25 mg tablet Take 1 tablet (25 mg total) by mouth 2 (two) times a day, Starting Fri 6/18/2021, Until Sun 7/18/2021, Normal      !! QUEtiapine (SEROquel) 25 mg tablet TAKE 1 TABLET (25 MG TOTAL) BY MOUTH 2 (TWO) TIMES A DAY, Starting Fri 6/18/2021, Until Sun 7/18/2021, Normal       !! - Potential duplicate medications found  Please discuss with provider              PDMP Review       Value Time User    PDMP Reviewed  Yes 6/18/2021 10:51 AM Jose A Lagunas DO          ED Provider  Electronically Signed by           Vaishali Landry DO  06/22/21 6445

## 2021-06-23 LAB
BUPRENORPHINE UR CFM-MCNC: 224 NG/ML
BUPRENORPHINE UR QL CFM: NORMAL NG/ML
BUPRENORPHINE UR QL CFM: POSITIVE
BUPRENORPHINE+NOR UR QL: POSITIVE
NORBUPRENORPHINE UR CFM-MCNC: 688 NG/ML
NORBUPRENORPHINE UR QL CFM: POSITIVE

## 2021-07-16 ENCOUNTER — OFFICE VISIT (OUTPATIENT)
Dept: INTERNAL MEDICINE CLINIC | Facility: CLINIC | Age: 21
End: 2021-07-16
Payer: COMMERCIAL

## 2021-07-16 VITALS
HEIGHT: 67 IN | DIASTOLIC BLOOD PRESSURE: 70 MMHG | WEIGHT: 186 LBS | SYSTOLIC BLOOD PRESSURE: 110 MMHG | TEMPERATURE: 96 F | HEART RATE: 76 BPM | BODY MASS INDEX: 29.19 KG/M2 | OXYGEN SATURATION: 96 %

## 2021-07-16 DIAGNOSIS — Z51.81 ENCOUNTER FOR MONITORING SUBOXONE MAINTENANCE THERAPY: ICD-10-CM

## 2021-07-16 DIAGNOSIS — Z79.899 MEDICATION THERAPY CONTINUED: ICD-10-CM

## 2021-07-16 DIAGNOSIS — F19.20 POLYSUBSTANCE DEPENDENCE INCLUDING OPIOID TYPE DRUG WITH COMPLICATION, CONTINUOUS USE (HCC): Primary | ICD-10-CM

## 2021-07-16 DIAGNOSIS — Z79.899 ENCOUNTER FOR MONITORING SUBOXONE MAINTENANCE THERAPY: ICD-10-CM

## 2021-07-16 PROCEDURE — 4004F PT TOBACCO SCREEN RCVD TLK: CPT | Performed by: INTERNAL MEDICINE

## 2021-07-16 PROCEDURE — 99213 OFFICE O/P EST LOW 20 MIN: CPT | Performed by: INTERNAL MEDICINE

## 2021-07-16 PROCEDURE — 80307 DRUG TEST PRSMV CHEM ANLYZR: CPT | Performed by: INTERNAL MEDICINE

## 2021-07-16 PROCEDURE — 3008F BODY MASS INDEX DOCD: CPT | Performed by: INTERNAL MEDICINE

## 2021-07-16 RX ORDER — BUPRENORPHINE HYDROCHLORIDE AND NALOXONE HYDROCHLORIDE DIHYDRATE 8; 2 MG/1; MG/1
TABLET SUBLINGUAL
Qty: 53 TABLET | Refills: 0 | Status: SHIPPED | OUTPATIENT
Start: 2021-07-16 | End: 2021-08-13 | Stop reason: SDUPTHER

## 2021-07-16 NOTE — PATIENT INSTRUCTIONS
Buprenorphine/Naloxone (Into the mouth)   Buprenorphine (bue-pre-NOR-feen), Naloxone (nal-OX-one)  Treats narcotic dependence  Brand Name(s): Suboxone, Zubsolv   There may be other brand names for this medicine  When This Medicine Should Not Be Used: This medicine is not right for everyone  Do not use it if you had an allergic reaction to buprenorphine or naloxone  How to Use This Medicine: Thin Sheet, Tablet  · Take your medicine as directed  Your dose may need to be changed several times to find what works best for you  · You must let the medicine dissolve  Never swallow the film or tablet  Your body may not absorb enough of the medicine if you swallow it  · Your health caregiver should show you how to use the medicine  If you do not understand, ask for help  It is important to use the medicine correctly  · Do not talk while the medicine is inside your mouth  · Buccal film: Rinse your mouth with water to moisten it  Place the film against the inside of your cheek  If your doctor told you to use more than 1 film, place the second film inside your other cheek  Do not place more than 2 films inside of 1 cheek at a time  Do not move or touch the film  Do not eat or drink anything until the film is completely dissolved  · Sublingual tablet: Place the tablet under your tongue  If your doctor told you to use more than 1 tablet, place all of the tablets in different places under your tongue at the same time  You can use 2 tablets at a time until you have taken all of the medicine, if that is easier for you  Let the tablets dissolve completely in your mouth  Do not eat or drink anything until the tablets are completely dissolved  · Sublingual film: Drink some water to help moisten your mouth  Place the film under your tongue  If your doctor told you to use more than 1 film, place the second film on the opposite side from the first one  Do not move the film after you placed it under your tongue   If you are supposed to use more than 2 films, use them the same way, but do not start until the first 2 films are completely dissolved  · Do not break, crush, chew, or cut the film or tablet  · This medicine should come with a Medication Guide  Ask your pharmacist for a copy if you do not have one  · Missed dose: Take a dose as soon as you remember  If it is almost time for your next dose, wait until then and take a regular dose  Do not take extra medicine to make up for a missed dose  · Store the medicine in a closed container at room temperature, away from heat, moisture, and direct light  Drop off any unused narcotic medicine at a drug take-back location right away  If you do not have a drug take-back location near you, flush any unused narcotic medicine down the toilet  Check your local drug store and clinics for take-back locations  You can also check the [employer] web site for locations  Here is the link to the [facility] safe disposal of medicines [employer] in my Pocket (PRIMP) com ee  Drugs and Foods to Avoid:   Ask your doctor or pharmacist before using any other medicine, including over-the-counter medicines, vitamins, and herbal products  · Do not use this medicine if you are using or have used an MAO inhibitor within the past 14 days  · Some medicines can affect how buprenorphine/naloxone works  Tell your doctor if you are using the following:   ? Carbamazepine, cyclobenzaprine, erythromycin, ketoconazole, metaxalone, mirtazapine, phenobarbital, phenytoin, rifampin, tramadol, trazodone  ? Diuretic (water pill)  ? Medicine to treat depression, anxiety, and mental health illness  ? Medicine to treat HIV/AIDS (including atazanavir, delavirdine, efavirenz, etravirine, nevirapine, ritonavir)  ? Phenothiazine medicine  ?  Triptan medicine to treat migraine headaches  · Do not drink alcohol while you are using this medicine  · Tell your doctor if you use anything else that makes you sleepy  Some examples are allergy medicine, narcotic pain medicine, and alcohol  Tell your doctor if you are also using butorphanol, nalbuphine, pentazocine, or a muscle relaxer  Warnings While Using This Medicine:   · Tell your doctor if you are pregnant or breastfeeding, or if you have kidney disease, liver disease (including hepatitis), lung or breathing problems (including sleep apnea), adrenal gland problems, an enlarged prostate, trouble urinating, gallbladder problems, thyroid problems, stomach problems, or a history of depression, brain tumor, head injury, alcohol or drug abuse  · This medicine may cause the following problems:  ? High risk of overdose, which can lead to death  ? Respiratory depression (serious breathing problem that can be life-threatening)  ? Sleep-related breathing problems (including sleep apnea, sleep-related hypoxemia)  ? Liver problems  ? Serotonin syndrome, when used with certain medicines  · This medicine may make you dizzy or drowsy  Do not drive or do anything else that could be dangerous until you know how this medicine affects you  Stand or sit up slowly if you feel lightheaded or dizzy  · Tell any doctor or dentist who treats you that you are using this medicine  · This medicine can be habit-forming  Do not use more than your prescribed dose  Call your doctor if you think your medicine is not working  · This medicine may cause constipation, especially with long-term use  Ask your doctor if you should use a laxative to prevent and treat constipation  · Do not stop using this medicine suddenly  Your doctor will need to slowly decrease your dose before you stop it completely  · This medicine could cause infertility  Talk with your doctor before using this medicine if you plan to have children  · Your doctor will do lab tests at regular visits to check on the effects of this medicine   Keep all appointments  · Keep all medicine out of the reach of children  Never share your medicine with anyone  Possible Side Effects While Using This Medicine:   Call your doctor right away if you notice any of these side effects:  · Allergic reaction: Itching or hives, swelling in your face or hands, swelling or tingling in your mouth or throat, chest tightness, trouble breathing  · Blue lips, fingernails, or skin  · Changes in skin color, dark freckles  · Cold feeling, weakness or tiredness, weight loss  · Dark urine or pale stools, nausea, vomiting, loss of appetite, stomach pain, yellow skin or eyes  · Extreme dizziness or weakness, shallow breathing, sweating, seizures, cold or clammy skin  · Severe confusion, lightheadedness, dizziness, or fainting  · Trouble breathing or slow breathing  If you notice these less serious side effects, talk with your doctor:   · Constipation or upset stomach  · Headache, trouble sleeping  · Shaking, runny nose, watery eyes, diarrhea, muscle aches  If you notice other side effects that you think are caused by this medicine, tell your doctor  Call your doctor for medical advice about side effects  You may report side effects to FDA at 1-156-FDA-1002  © Copyright [employer] 2021 Information is for End User's use only and may not be sold, redistributed or otherwise used for commercial purposes  The above information is an  only  It is not intended as medical advice for individual conditions or treatments  Talk to your doctor, nurse or pharmacist before following any medical regimen to see if it is safe and effective for you

## 2021-07-16 NOTE — PROGRESS NOTES
Assessment/Plan:  Problem List Items Addressed This Visit        Other    Polysubstance dependence including opioid type drug with complication, continuous use (HCC) - Primary    Relevant Medications    buprenorphine-naloxone (SUBOXONE) 8-2 mg per SL tablet    Medication therapy continued    Relevant Medications    buprenorphine-naloxone (SUBOXONE) 8-2 mg per SL tablet    Other Relevant Orders    Suboxone    Toxicology screen, urine    Encounter for monitoring Suboxone maintenance therapy    Relevant Medications    buprenorphine-naloxone (SUBOXONE) 8-2 mg per SL tablet    Other Relevant Orders    Suboxone    Toxicology screen, urine           Diagnoses and all orders for this visit:    Polysubstance dependence including opioid type drug with complication, continuous use (HCC)  -     buprenorphine-naloxone (SUBOXONE) 8-2 mg per SL tablet; One and 3/4 tabs sl q day  Encounter for monitoring Suboxone maintenance therapy  -     Suboxone  -     Toxicology screen, urine  -     buprenorphine-naloxone (SUBOXONE) 8-2 mg per SL tablet; One and 3/4 tabs sl q day  Medication therapy continued  -     Suboxone  -     Toxicology screen, urine  -     buprenorphine-naloxone (SUBOXONE) 8-2 mg per SL tablet; One and 3/4 tabs sl q day  No problem-specific Assessment & Plan notes found for this encounter  Scheduled Medication Review:  Pt's scheduled medication use was reviewed by myself/staff via the [employer] website  Pt's use has been found to be appropriate w/o any concerns for misuse by the patient  Pt's current conditions require continued scheduled medication use at this time  Future review for continued appropriate medication use and misuse will continue  A/P: Doing well and will continue 14mg tabs, dose 5/6 and continue with counseling  Reminded to keep meds safe and out of reach of children  RTC 4 weeks  Subjective: WM presents for f/u suboxone  Doing well and no c/o's  Not using and no withdraw   Does attend counseling  UDT obtained today  Tolerating the meds and no side effects  Patient ID: Edi Frost is a 21 y o  male  HPI    The following portions of the patient's history were reviewed and updated as appropriate:   He has a past medical history of ADHD (attention deficit hyperactivity disorder), Bipolar disorder ([facility] [address] ), Depression, Hyperlipidemia, Hypertension, Psychiatric disorder, Substance abuse ([facility] [address] ), and Tourette syndrome  ,  does not have any pertinent problems on file  ,   has a past surgical history that includes Tympanostomy tube placement and TONSILECTOMY AND ADNOIDECTOMY  ,  family history includes Cirrhosis in his father; Coronary artery disease in his father; Hepatitis in his father; No Known Problems in his mother; Substance Abuse in his brother and father  ,   reports that he has been smoking cigarettes  He has a 20 00 pack-year smoking history  He has never used smokeless tobacco  He reports previous alcohol use  He reports current drug use  Drug: Marijuana  ,  is allergic to abilify [aripiprazole]     Current Outpatient Medications   Medication Sig Dispense Refill    albuterol (PROVENTIL HFA,VENTOLIN HFA) 90 mcg/act inhaler Inhale 1-2 puffs every 4 (four) hours as needed for wheezing or shortness of breath 1 Inhaler 0    amLODIPine (NORVASC) 5 mg tablet Take 1 tablet (5 mg total) by mouth daily 90 tablet 3    buprenorphine-naloxone (SUBOXONE) 8-2 mg per SL tablet One and 3/4 tabs sl q day   53 tablet 0    gabapentin (NEURONTIN) 300 mg capsule TAKE 2 CAPSULES (600 MG TOTAL) BY MOUTH 3 (THREE) TIMES A  capsule 0    ondansetron (ZOFRAN-ODT) 4 mg disintegrating tablet Take 1 tablet (4 mg total) by mouth every 6 (six) hours as needed for nausea or vomiting 12 tablet 0    pantoprazole (PROTONIX) 20 mg tablet Take 1 tablet (20 mg total) by mouth daily 20 tablet 0    QUEtiapine (SEROquel) 100 mg tablet TAKE 1 TABLET (100 MG TOTAL) BY MOUTH DAILY AT BEDTIME 30 tablet 0    QUEtiapine (SEROquel) 25 mg tablet TAKE 1 TABLET (25 MG TOTAL) BY MOUTH 2 (TWO) TIMES A DAY 60 tablet 0    sucralfate (CARAFATE) 1 g tablet Take 1 tablet (1 g total) by mouth 4 (four) times a day 40 tablet 0    naloxone (NARCAN) 1 mg/mL intranasal 2 mL (2 mg total) into each nostril once for 1 dose 2 mL 1    naloxone (Narcan) 4 mg/0 1 mL nasal spray 0 1 mL (4 mg total) into each nostril as needed (respiratory depression or possible OD) 1 each 1    QUEtiapine (SEROquel) 100 mg tablet Take 1 tablet (100 mg total) by mouth daily at bedtime 30 tablet 0    QUEtiapine (SEROquel) 25 mg tablet Take 1 tablet (25 mg total) by mouth 2 (two) times a day 60 tablet 0     No current facility-administered medications for this visit  Review of Systems   Constitutional: Negative for activity change, chills, diaphoresis, fatigue and fever  Respiratory: Negative for cough, chest tightness, shortness of breath and wheezing  Cardiovascular: Negative for chest pain, palpitations and leg swelling  Gastrointestinal: Negative for abdominal pain, constipation, diarrhea, nausea and vomiting  Genitourinary: Negative for difficulty urinating, dysuria and frequency  Musculoskeletal: Negative for arthralgias, gait problem and myalgias  Neurological: Negative for dizziness, seizures, syncope, weakness, light-headedness and headaches  Psychiatric/Behavioral: Negative for confusion, dysphoric mood, self-injury, sleep disturbance and suicidal ideas  The patient is not nervous/anxious  PHQ-9 Depression Screening    PHQ-9:   Frequency of the following problems over the past two weeks:            Objective:  Vitals:    07/16/21 1110   BP: 110/70   Pulse: 76   Temp: (!) 96 °F (35 6 °C)   SpO2: 96%   Weight: 84 4 kg (186 lb)   Height: 5' 7" (1 702 m)     Body mass index is 29 13 kg/m²  Physical Exam  Vitals and nursing note reviewed  Constitutional:       General: He is not in acute distress       Appearance: Normal appearance  He is not ill-appearing  HENT:      Head: Normocephalic and atraumatic  Mouth/Throat:      Mouth: Mucous membranes are moist    Eyes:      Extraocular Movements: Extraocular movements intact  Conjunctiva/sclera: Conjunctivae normal       Pupils: Pupils are equal, round, and reactive to light  Cardiovascular:      Rate and Rhythm: Normal rate and regular rhythm  Heart sounds: Normal heart sounds  Pulmonary:      Effort: Pulmonary effort is normal  No respiratory distress  Breath sounds: Normal breath sounds  No wheezing or rales  Abdominal:      General: Bowel sounds are normal  There is no distension  Palpations: Abdomen is soft  Tenderness: There is no abdominal tenderness  Musculoskeletal:      Right lower leg: No edema  Left lower leg: No edema  Neurological:      General: No focal deficit present  Mental Status: He is alert and oriented to person, place, and time  Mental status is at baseline  Psychiatric:         Mood and Affect: Mood normal          Behavior: Behavior normal          Thought Content:  Thought content normal          Judgment: Judgment normal

## 2021-07-21 LAB
AMPHETAMINES UR QL SCN: NEGATIVE NG/ML
BARBITURATES UR QL SCN: NEGATIVE NG/ML
BENZODIAZ UR QL: NEGATIVE NG/ML
BUPRENORPHINE UR CFM-MCNC: 156 NG/ML
BUPRENORPHINE UR QL CFM: NORMAL NG/ML
BUPRENORPHINE UR QL CFM: POSITIVE
BUPRENORPHINE+NOR UR QL: POSITIVE
BZE UR QL: NEGATIVE NG/ML
CANNABINOIDS UR QL SCN: POSITIVE
METHADONE UR QL SCN: NEGATIVE NG/ML
NORBUPRENORPHINE UR CFM-MCNC: 502 NG/ML
NORBUPRENORPHINE UR QL CFM: POSITIVE
OPIATES UR QL: NEGATIVE NG/ML
PCP UR QL: NEGATIVE NG/ML
PROPOXYPH UR QL SCN: NEGATIVE NG/ML

## 2021-08-13 ENCOUNTER — OFFICE VISIT (OUTPATIENT)
Dept: INTERNAL MEDICINE CLINIC | Facility: CLINIC | Age: 21
End: 2021-08-13
Payer: COMMERCIAL

## 2021-08-13 VITALS
WEIGHT: 195 LBS | BODY MASS INDEX: 30.61 KG/M2 | HEIGHT: 67 IN | HEART RATE: 83 BPM | DIASTOLIC BLOOD PRESSURE: 78 MMHG | TEMPERATURE: 97.9 F | SYSTOLIC BLOOD PRESSURE: 128 MMHG | OXYGEN SATURATION: 97 %

## 2021-08-13 DIAGNOSIS — Z51.81 ENCOUNTER FOR MONITORING SUBOXONE MAINTENANCE THERAPY: ICD-10-CM

## 2021-08-13 DIAGNOSIS — Z79.899 MEDICATION THERAPY CONTINUED: ICD-10-CM

## 2021-08-13 DIAGNOSIS — F19.20 POLYSUBSTANCE DEPENDENCE INCLUDING OPIOID TYPE DRUG WITH COMPLICATION, CONTINUOUS USE (HCC): Primary | ICD-10-CM

## 2021-08-13 DIAGNOSIS — Z79.899 ENCOUNTER FOR MONITORING SUBOXONE MAINTENANCE THERAPY: ICD-10-CM

## 2021-08-13 PROCEDURE — 80307 DRUG TEST PRSMV CHEM ANLYZR: CPT | Performed by: INTERNAL MEDICINE

## 2021-08-13 PROCEDURE — 99213 OFFICE O/P EST LOW 20 MIN: CPT | Performed by: INTERNAL MEDICINE

## 2021-08-13 RX ORDER — BUPRENORPHINE HYDROCHLORIDE AND NALOXONE HYDROCHLORIDE DIHYDRATE 8; 2 MG/1; MG/1
TABLET SUBLINGUAL
Qty: 53 TABLET | Refills: 0 | Status: SHIPPED | OUTPATIENT
Start: 2021-08-13 | End: 2021-09-10

## 2021-08-13 NOTE — PATIENT INSTRUCTIONS
Buprenorphine/Naloxone (Into the mouth)   Buprenorphine (bue-pre-NOR-feen), Naloxone (nal-OX-one)  Treats narcotic dependence  Brand Name(s): Suboxone, Zubsolv   There may be other brand names for this medicine  When This Medicine Should Not Be Used: This medicine is not right for everyone  Do not use it if you had an allergic reaction to buprenorphine or naloxone  How to Use This Medicine: Thin Sheet, Tablet  · Take your medicine as directed  Your dose may need to be changed several times to find what works best for you  · You must let the medicine dissolve  Never swallow the film or tablet  Your body may not absorb enough of the medicine if you swallow it  · Your health caregiver should show you how to use the medicine  If you do not understand, ask for help  It is important to use the medicine correctly  · Do not talk while the medicine is inside your mouth  · Buccal film: Rinse your mouth with water to moisten it  Place the film against the inside of your cheek  If your doctor told you to use more than 1 film, place the second film inside your other cheek  Do not place more than 2 films inside of 1 cheek at a time  Do not move or touch the film  Do not eat or drink anything until the film is completely dissolved  · Sublingual tablet: Place the tablet under your tongue  If your doctor told you to use more than 1 tablet, place all of the tablets in different places under your tongue at the same time  You can use 2 tablets at a time until you have taken all of the medicine, if that is easier for you  Let the tablets dissolve completely in your mouth  Do not eat or drink anything until the tablets are completely dissolved  · Sublingual film: Drink some water to help moisten your mouth  Place the film under your tongue  If your doctor told you to use more than 1 film, place the second film on the opposite side from the first one  Do not move the film after you placed it under your tongue   If you are supposed to use more than 2 films, use them the same way, but do not start until the first 2 films are completely dissolved  · Do not break, crush, chew, or cut the film or tablet  · This medicine should come with a Medication Guide  Ask your pharmacist for a copy if you do not have one  · Missed dose: Take a dose as soon as you remember  If it is almost time for your next dose, wait until then and take a regular dose  Do not take extra medicine to make up for a missed dose  · Store the medicine in a closed container at room temperature, away from heat, moisture, and direct light  Drop off any unused narcotic medicine at a drug take-back location right away  If you do not have a drug take-back location near you, flush any unused narcotic medicine down the toilet  Check your local drug store and clinics for take-back locations  You can also check the [employer] web site for locations  Here is the link to the [facility] safe disposal of medicines [employer] com ee  Drugs and Foods to Avoid:   Ask your doctor or pharmacist before using any other medicine, including over-the-counter medicines, vitamins, and herbal products  · Do not use this medicine if you are using or have used an MAO inhibitor within the past 14 days  · Some medicines can affect how buprenorphine/naloxone works  Tell your doctor if you are using the following:   ? Carbamazepine, cyclobenzaprine, erythromycin, ketoconazole, metaxalone, mirtazapine, phenobarbital, phenytoin, rifampin, tramadol, trazodone  ? Diuretic (water pill)  ? Medicine to treat depression, anxiety, and mental health illness  ? Medicine to treat HIV/AIDS (including atazanavir, delavirdine, efavirenz, etravirine, nevirapine, ritonavir)  ? Phenothiazine medicine  ?  Triptan medicine to treat migraine headaches  · Do not drink alcohol while you are using this medicine  · Tell your doctor if you use anything else that makes you sleepy  Some examples are allergy medicine, narcotic pain medicine, and alcohol  Tell your doctor if you are also using butorphanol, nalbuphine, pentazocine, or a muscle relaxer  Warnings While Using This Medicine:   · Tell your doctor if you are pregnant or breastfeeding, or if you have kidney disease, liver disease (including hepatitis), lung or breathing problems (including sleep apnea), adrenal gland problems, an enlarged prostate, trouble urinating, gallbladder problems, thyroid problems, stomach problems, or a history of depression, brain tumor, head injury, alcohol or drug abuse  · This medicine may cause the following problems:  ? High risk of overdose, which can lead to death  ? Respiratory depression (serious breathing problem that can be life-threatening)  ? Sleep-related breathing problems (including sleep apnea, sleep-related hypoxemia)  ? Liver problems  ? Serotonin syndrome, when used with certain medicines  · This medicine may make you dizzy or drowsy  Do not drive or do anything else that could be dangerous until you know how this medicine affects you  Stand or sit up slowly if you feel lightheaded or dizzy  · Tell any doctor or dentist who treats you that you are using this medicine  · This medicine can be habit-forming  Do not use more than your prescribed dose  Call your doctor if you think your medicine is not working  · This medicine may cause constipation, especially with long-term use  Ask your doctor if you should use a laxative to prevent and treat constipation  · Do not stop using this medicine suddenly  Your doctor will need to slowly decrease your dose before you stop it completely  · This medicine could cause infertility  Talk with your doctor before using this medicine if you plan to have children  · Your doctor will do lab tests at regular visits to check on the effects of this medicine   Keep all appointments  · Keep all medicine out of the reach of children  Never share your medicine with anyone  Possible Side Effects While Using This Medicine:   Call your doctor right away if you notice any of these side effects:  · Allergic reaction: Itching or hives, swelling in your face or hands, swelling or tingling in your mouth or throat, chest tightness, trouble breathing  · Blue lips, fingernails, or skin  · Changes in skin color, dark freckles  · Cold feeling, weakness or tiredness, weight loss  · Dark urine or pale stools, nausea, vomiting, loss of appetite, stomach pain, yellow skin or eyes  · Extreme dizziness or weakness, shallow breathing, sweating, seizures, cold or clammy skin  · Severe confusion, lightheadedness, dizziness, or fainting  · Trouble breathing or slow breathing  If you notice these less serious side effects, talk with your doctor:   · Constipation or upset stomach  · Headache, trouble sleeping  · Shaking, runny nose, watery eyes, diarrhea, muscle aches  If you notice other side effects that you think are caused by this medicine, tell your doctor  Call your doctor for medical advice about side effects  You may report side effects to FDA at 4-000-FDA-0083  © Copyright [employer] 2021 Information is for End User's use only and may not be sold, redistributed or otherwise used for commercial purposes  The above information is an  only  It is not intended as medical advice for individual conditions or treatments  Talk to your doctor, nurse or pharmacist before following any medical regimen to see if it is safe and effective for you

## 2021-08-13 NOTE — PROGRESS NOTES
Assessment/Plan:  Problem List Items Addressed This Visit        Other    Polysubstance dependence including opioid type drug with complication, continuous use (HCC) - Primary    Relevant Medications    buprenorphine-naloxone (SUBOXONE) 8-2 mg per SL tablet    Other Relevant Orders    Suboxone    Toxicology screen, urine    Medication therapy continued    Relevant Medications    buprenorphine-naloxone (SUBOXONE) 8-2 mg per SL tablet    Other Relevant Orders    Suboxone    Toxicology screen, urine    Encounter for monitoring Suboxone maintenance therapy    Relevant Medications    buprenorphine-naloxone (SUBOXONE) 8-2 mg per SL tablet    Other Relevant Orders    Suboxone    Toxicology screen, urine           Diagnoses and all orders for this visit:    Polysubstance dependence including opioid type drug with complication, continuous use (HCC)  -     buprenorphine-naloxone (SUBOXONE) 8-2 mg per SL tablet; One and 3/4 tabs sl q day  -     Suboxone  -     Toxicology screen, urine    Encounter for monitoring Suboxone maintenance therapy  -     buprenorphine-naloxone (SUBOXONE) 8-2 mg per SL tablet; One and 3/4 tabs sl q day  -     Suboxone  -     Toxicology screen, urine    Medication therapy continued  -     buprenorphine-naloxone (SUBOXONE) 8-2 mg per SL tablet; One and 3/4 tabs sl q day  -     Suboxone  -     Toxicology screen, urine        No problem-specific Assessment & Plan notes found for this encounter  Scheduled Medication Review:  Pt's scheduled medication use was reviewed by myself/staff via the [employer] website  Pt's use has been found to be appropriate w/o any concerns for misuse by the patient  Pt's current conditions require continued scheduled medication use at this time  Future review for continued appropriate medication use and misuse will continue  A/P: Doing well and will continue 14mg tabs, dose 6/6 and consider weaning next visit  Continue with counseling   Reminded to keep meds safe and out of reach of children  RTC 4 weeks  Subjective: WM presents for f/u suboxone  Doing well and no c/o's  Not using and no withdraw  Does attend counseling  UDT obtained today  Tolerating the meds and no side effects  Patient ID: Juju Nelson is a 21 y o  male  HPI    The following portions of the patient's history were reviewed and updated as appropriate:   He has a past medical history of ADHD (attention deficit hyperactivity disorder), Bipolar disorder ([facility] [address] ), Depression, Hyperlipidemia, Hypertension, Psychiatric disorder, Substance abuse ([facility] [address] ), and Tourette syndrome  ,  does not have any pertinent problems on file  ,   has a past surgical history that includes Tympanostomy tube placement and TONSILECTOMY AND ADNOIDECTOMY  ,  family history includes Cirrhosis in his father; Coronary artery disease in his father; Hepatitis in his father; No Known Problems in his mother; Substance Abuse in his brother and father  ,   reports that he has been smoking cigarettes  He has a 20 00 pack-year smoking history  He has never used smokeless tobacco  He reports previous alcohol use  He reports current drug use  Drug: Marijuana  ,  is allergic to abilify [aripiprazole]     Current Outpatient Medications   Medication Sig Dispense Refill    albuterol (PROVENTIL HFA,VENTOLIN HFA) 90 mcg/act inhaler Inhale 1-2 puffs every 4 (four) hours as needed for wheezing or shortness of breath 1 Inhaler 0    amLODIPine (NORVASC) 5 mg tablet Take 1 tablet (5 mg total) by mouth daily 90 tablet 3    naloxone (NARCAN) 1 mg/mL intranasal 2 mL (2 mg total) into each nostril once for 1 dose 2 mL 1    naloxone (Narcan) 4 mg/0 1 mL nasal spray 0 1 mL (4 mg total) into each nostril as needed (respiratory depression or possible OD) 1 each 1    ondansetron (ZOFRAN-ODT) 4 mg disintegrating tablet Take 1 tablet (4 mg total) by mouth every 6 (six) hours as needed for nausea or vomiting 12 tablet 0    pantoprazole (PROTONIX) 20 mg tablet Take 1 tablet (20 mg total) by mouth daily 20 tablet 0    QUEtiapine (SEROquel) 100 mg tablet TAKE 1 TABLET (100 MG TOTAL) BY MOUTH DAILY AT BEDTIME 30 tablet 0    QUEtiapine (SEROquel) 25 mg tablet Take 1 tablet (25 mg total) by mouth 2 (two) times a day 60 tablet 0    sucralfate (CARAFATE) 1 g tablet Take 1 tablet (1 g total) by mouth 4 (four) times a day 40 tablet 0    buprenorphine-naloxone (SUBOXONE) 8-2 mg per SL tablet One and 3/4 tabs sl q day  53 tablet 0    gabapentin (NEURONTIN) 300 mg capsule TAKE 2 CAPSULES (600 MG TOTAL) BY MOUTH 3 (THREE) TIMES A  capsule 0    QUEtiapine (SEROquel) 100 mg tablet Take 1 tablet (100 mg total) by mouth daily at bedtime 30 tablet 0    QUEtiapine (SEROquel) 25 mg tablet TAKE 1 TABLET (25 MG TOTAL) BY MOUTH 2 (TWO) TIMES A DAY 60 tablet 0     No current facility-administered medications for this visit  Review of Systems   Constitutional: Negative for activity change, chills, diaphoresis, fatigue and fever  HENT: Negative  Respiratory: Negative for cough, chest tightness, shortness of breath and wheezing  Cardiovascular: Negative for chest pain, palpitations and leg swelling  Gastrointestinal: Negative for abdominal pain, constipation, diarrhea, nausea and vomiting  Genitourinary: Negative for difficulty urinating, dysuria and frequency  Musculoskeletal: Negative for arthralgias, gait problem and myalgias  Neurological: Negative for dizziness, seizures, syncope, weakness, light-headedness and headaches  Psychiatric/Behavioral: Negative for confusion, dysphoric mood, self-injury, sleep disturbance and suicidal ideas  The patient is not nervous/anxious          PHQ-9 Depression Screening    PHQ-9:   Frequency of the following problems over the past two weeks:            Objective:  Vitals:    08/13/21 1457   BP: 128/78   Pulse: 83   Temp: 97 9 °F (36 6 °C)   SpO2: 97%   Weight: 88 5 kg (195 lb)   Height: 5' 7" (1 702 m)     Body mass index is 30 54 kg/m²  Physical Exam  Vitals and nursing note reviewed  Constitutional:       General: He is not in acute distress  Appearance: Normal appearance  He is not ill-appearing  HENT:      Head: Normocephalic and atraumatic  Mouth/Throat:      Mouth: Mucous membranes are moist    Eyes:      Extraocular Movements: Extraocular movements intact  Conjunctiva/sclera: Conjunctivae normal       Pupils: Pupils are equal, round, and reactive to light  Cardiovascular:      Rate and Rhythm: Normal rate and regular rhythm  Heart sounds: Normal heart sounds  Pulmonary:      Effort: Pulmonary effort is normal  No respiratory distress  Breath sounds: Normal breath sounds  No wheezing or rales  Abdominal:      General: Bowel sounds are normal  There is no distension  Palpations: Abdomen is soft  Tenderness: There is no abdominal tenderness  Neurological:      General: No focal deficit present  Mental Status: He is alert and oriented to person, place, and time  Mental status is at baseline  Psychiatric:         Mood and Affect: Mood normal          Behavior: Behavior normal          Thought Content:  Thought content normal          Judgment: Judgment normal

## 2021-08-17 LAB
BUPRENORPHINE UR CFM-MCNC: 34 NG/ML
BUPRENORPHINE UR QL CFM: NORMAL NG/ML
BUPRENORPHINE UR QL CFM: POSITIVE
BUPRENORPHINE+NOR UR QL: POSITIVE
NORBUPRENORPHINE UR CFM-MCNC: 180 NG/ML
NORBUPRENORPHINE UR QL CFM: POSITIVE

## 2021-09-10 ENCOUNTER — OFFICE VISIT (OUTPATIENT)
Dept: INTERNAL MEDICINE CLINIC | Facility: CLINIC | Age: 21
End: 2021-09-10
Payer: COMMERCIAL

## 2021-09-10 VITALS
BODY MASS INDEX: 30.98 KG/M2 | OXYGEN SATURATION: 96 % | SYSTOLIC BLOOD PRESSURE: 132 MMHG | HEIGHT: 67 IN | HEART RATE: 94 BPM | DIASTOLIC BLOOD PRESSURE: 78 MMHG | WEIGHT: 197.38 LBS | TEMPERATURE: 97.1 F

## 2021-09-10 DIAGNOSIS — Z79.899 ENCOUNTER FOR MONITORING SUBOXONE MAINTENANCE THERAPY: ICD-10-CM

## 2021-09-10 DIAGNOSIS — Z79.899 MEDICATION THERAPY CONTINUED: ICD-10-CM

## 2021-09-10 DIAGNOSIS — F19.20 POLYSUBSTANCE DEPENDENCE INCLUDING OPIOID TYPE DRUG WITH COMPLICATION, CONTINUOUS USE (HCC): Primary | ICD-10-CM

## 2021-09-10 DIAGNOSIS — Z51.81 ENCOUNTER FOR MONITORING SUBOXONE MAINTENANCE THERAPY: ICD-10-CM

## 2021-09-10 PROCEDURE — 99213 OFFICE O/P EST LOW 20 MIN: CPT | Performed by: INTERNAL MEDICINE

## 2021-09-10 PROCEDURE — 80307 DRUG TEST PRSMV CHEM ANLYZR: CPT | Performed by: INTERNAL MEDICINE

## 2021-09-10 RX ORDER — BUPRENORPHINE HYDROCHLORIDE AND NALOXONE HYDROCHLORIDE DIHYDRATE 8; 2 MG/1; MG/1
TABLET SUBLINGUAL
Qty: 45 TABLET | Refills: 0 | Status: SHIPPED | OUTPATIENT
Start: 2021-09-10 | End: 2021-10-08 | Stop reason: SDUPTHER

## 2021-09-10 NOTE — PATIENT INSTRUCTIONS
Buprenorphine/Naloxone (Into the mouth)   Buprenorphine (bue-pre-NOR-feen), Naloxone (nal-OX-one)  Treats narcotic dependence  Brand Name(s): Suboxone, Zubsolv   There may be other brand names for this medicine  When This Medicine Should Not Be Used: This medicine is not right for everyone  Do not use it if you had an allergic reaction to buprenorphine or naloxone  How to Use This Medicine: Thin Sheet, Tablet  · Take your medicine as directed  Your dose may need to be changed several times to find what works best for you  · You must let the medicine dissolve  Never swallow the film or tablet  Your body may not absorb enough of the medicine if you swallow it  · Your health caregiver should show you how to use the medicine  If you do not understand, ask for help  It is important to use the medicine correctly  · Do not talk while the medicine is inside your mouth  · Buccal film: Rinse your mouth with water to moisten it  Place the film against the inside of your cheek  If your doctor told you to use more than 1 film, place the second film inside your other cheek  Do not place more than 2 films inside of 1 cheek at a time  Do not move or touch the film  Do not eat or drink anything until the film is completely dissolved  · Sublingual tablet: Place the tablet under your tongue  If your doctor told you to use more than 1 tablet, place all of the tablets in different places under your tongue at the same time  You can use 2 tablets at a time until you have taken all of the medicine, if that is easier for you  Let the tablets dissolve completely in your mouth  Do not eat or drink anything until the tablets are completely dissolved  · Sublingual film: Drink some water to help moisten your mouth  Place the film under your tongue  If your doctor told you to use more than 1 film, place the second film on the opposite side from the first one  Do not move the film after you placed it under your tongue   If you are supposed to use more than 2 films, use them the same way, but do not start until the first 2 films are completely dissolved  · Do not break, crush, chew, or cut the film or tablet  · This medicine should come with a Medication Guide  Ask your pharmacist for a copy if you do not have one  · Missed dose: Take a dose as soon as you remember  If it is almost time for your next dose, wait until then and take a regular dose  Do not take extra medicine to make up for a missed dose  · Store the medicine in a closed container at room temperature, away from heat, moisture, and direct light  Drop off any unused narcotic medicine at a drug take-back location right away  If you do not have a drug take-back location near you, flush any unused narcotic medicine down the toilet  Check your local drug store and clinics for take-back locations  You can also check the [employer] web site for locations  Here is the link to the [facility] safe disposal of medicines [employer] com ee  Drugs and Foods to Avoid:   Ask your doctor or pharmacist before using any other medicine, including over-the-counter medicines, vitamins, and herbal products  · Do not use this medicine if you are using or have used an MAO inhibitor within the past 14 days  · Some medicines can affect how buprenorphine/naloxone works  Tell your doctor if you are using the following:   ? Carbamazepine, cyclobenzaprine, erythromycin, ketoconazole, metaxalone, mirtazapine, phenobarbital, phenytoin, rifampin, tramadol, trazodone  ? Diuretic (water pill)  ? Medicine to treat depression, anxiety, and mental health illness  ? Medicine to treat HIV/AIDS (including atazanavir, delavirdine, efavirenz, etravirine, nevirapine, ritonavir)  ? Phenothiazine medicine  ?  Triptan medicine to treat migraine headaches  · Do not drink alcohol while you are using this medicine  · Tell your doctor if you use anything else that makes you sleepy  Some examples are allergy medicine, narcotic pain medicine, and alcohol  Tell your doctor if you are also using butorphanol, nalbuphine, pentazocine, or a muscle relaxer  Warnings While Using This Medicine:   · Tell your doctor if you are pregnant or breastfeeding, or if you have kidney disease, liver disease (including hepatitis), lung or breathing problems (including sleep apnea), adrenal gland problems, an enlarged prostate, trouble urinating, gallbladder problems, thyroid problems, stomach problems, or a history of depression, brain tumor, head injury, alcohol or drug abuse  · This medicine may cause the following problems:  ? High risk of overdose, which can lead to death  ? Respiratory depression (serious breathing problem that can be life-threatening)  ? Sleep-related breathing problems (including sleep apnea, sleep-related hypoxemia)  ? Liver problems  ? Serotonin syndrome, when used with certain medicines  · This medicine may make you dizzy or drowsy  Do not drive or do anything else that could be dangerous until you know how this medicine affects you  Stand or sit up slowly if you feel lightheaded or dizzy  · Tell any doctor or dentist who treats you that you are using this medicine  · This medicine can be habit-forming  Do not use more than your prescribed dose  Call your doctor if you think your medicine is not working  · This medicine may cause constipation, especially with long-term use  Ask your doctor if you should use a laxative to prevent and treat constipation  · Do not stop using this medicine suddenly  Your doctor will need to slowly decrease your dose before you stop it completely  · This medicine could cause infertility  Talk with your doctor before using this medicine if you plan to have children  · Your doctor will do lab tests at regular visits to check on the effects of this medicine   Keep all appointments  · Keep all medicine out of the reach of children  Never share your medicine with anyone  Possible Side Effects While Using This Medicine:   Call your doctor right away if you notice any of these side effects:  · Allergic reaction: Itching or hives, swelling in your face or hands, swelling or tingling in your mouth or throat, chest tightness, trouble breathing  · Blue lips, fingernails, or skin  · Changes in skin color, dark freckles  · Cold feeling, weakness or tiredness, weight loss  · Dark urine or pale stools, nausea, vomiting, loss of appetite, stomach pain, yellow skin or eyes  · Extreme dizziness or weakness, shallow breathing, sweating, seizures, cold or clammy skin  · Severe confusion, lightheadedness, dizziness, or fainting  · Trouble breathing or slow breathing  If you notice these less serious side effects, talk with your doctor:   · Constipation or upset stomach  · Headache, trouble sleeping  · Shaking, runny nose, watery eyes, diarrhea, muscle aches  If you notice other side effects that you think are caused by this medicine, tell your doctor  Call your doctor for medical advice about side effects  You may report side effects to FDA at 5-754-FDA-2907  © Copyright [employer] 2021 Information is for End User's use only and may not be sold, redistributed or otherwise used for commercial purposes  The above information is an  only  It is not intended as medical advice for individual conditions or treatments  Talk to your doctor, nurse or pharmacist before following any medical regimen to see if it is safe and effective for you

## 2021-09-10 NOTE — PROGRESS NOTES
Assessment/Plan:  Problem List Items Addressed This Visit        Other    Polysubstance dependence including opioid type drug with complication, continuous use (HCC) - Primary    Relevant Medications    buprenorphine-naloxone (SUBOXONE) 8-2 mg per SL tablet    Other Relevant Orders    Suboxone    Toxicology screen, urine    Medication therapy continued    Relevant Medications    buprenorphine-naloxone (SUBOXONE) 8-2 mg per SL tablet    Other Relevant Orders    Suboxone    Toxicology screen, urine    Encounter for monitoring Suboxone maintenance therapy    Relevant Medications    buprenorphine-naloxone (SUBOXONE) 8-2 mg per SL tablet    Other Relevant Orders    Suboxone    Toxicology screen, urine           Diagnoses and all orders for this visit:    Polysubstance dependence including opioid type drug with complication, continuous use ([facility] [address] )  -     Suboxone  -     Toxicology screen, urine  -     buprenorphine-naloxone (SUBOXONE) 8-2 mg per SL tablet; One and half tabs sl q day  Encounter for monitoring Suboxone maintenance therapy  -     Suboxone  -     Toxicology screen, urine  -     buprenorphine-naloxone (SUBOXONE) 8-2 mg per SL tablet; One and half tabs sl q day  Medication therapy continued  -     Suboxone  -     Toxicology screen, urine  -     buprenorphine-naloxone (SUBOXONE) 8-2 mg per SL tablet; One and half tabs sl q day  No problem-specific Assessment & Plan notes found for this encounter  Scheduled Medication Review:  Pt's scheduled medication use was reviewed by myself/staff via the [employer] website  Pt's use has been found to be appropriate w/o any concerns for misuse by the patient  Pt's current conditions require continued scheduled medication use at this time  Future review for continued appropriate medication use and misuse will continue  A/P: Doing well and will wean to 12mg tabs, dose 1/6 and continue with  counseling  Reminded to keep meds safe and out of reach of children  RTC 4 weeks  Subjective: WM presents for f/u suboxone  Doing well and no c/o's  Not using and no withdraw  Does attend counseling  UDT obtained today  Tolerating the meds and no side effects  Patient ID: Jorge A Jaeger is a 21 y o  male  HPI    The following portions of the patient's history were reviewed and updated as appropriate:   He has a past medical history of ADHD (attention deficit hyperactivity disorder), Bipolar disorder ([facility] [address] ), Depression, Hyperlipidemia, Hypertension, Psychiatric disorder, Substance abuse ([facility] [address] ), and Tourette syndrome  ,  does not have any pertinent problems on file  ,   has a past surgical history that includes Tympanostomy tube placement and TONSILECTOMY AND ADNOIDECTOMY  ,  family history includes Cirrhosis in his father; Coronary artery disease in his father; Hepatitis in his father; No Known Problems in his mother; Substance Abuse in his brother and father  ,   reports that he has been smoking cigarettes  He has a 20 00 pack-year smoking history  He has never used smokeless tobacco  He reports previous alcohol use  He reports current drug use  Drug: Marijuana  ,  is allergic to abilify [aripiprazole]     Current Outpatient Medications   Medication Sig Dispense Refill    albuterol (PROVENTIL HFA,VENTOLIN HFA) 90 mcg/act inhaler Inhale 1-2 puffs every 4 (four) hours as needed for wheezing or shortness of breath 1 Inhaler 0    amLODIPine (NORVASC) 5 mg tablet Take 1 tablet (5 mg total) by mouth daily 90 tablet 3    gabapentin (NEURONTIN) 300 mg capsule TAKE 2 CAPSULES (600 MG TOTAL) BY MOUTH 3 (THREE) TIMES A  capsule 0    naloxone (NARCAN) 1 mg/mL intranasal 2 mL (2 mg total) into each nostril once for 1 dose 2 mL 1    naloxone (Narcan) 4 mg/0 1 mL nasal spray 0 1 mL (4 mg total) into each nostril as needed (respiratory depression or possible OD) 1 each 1    ondansetron (ZOFRAN-ODT) 4 mg disintegrating tablet Take 1 tablet (4 mg total) by mouth every 6 (six) hours as needed for nausea or vomiting 12 tablet 0    pantoprazole (PROTONIX) 20 mg tablet Take 1 tablet (20 mg total) by mouth daily 20 tablet 0    QUEtiapine (SEROquel) 100 mg tablet Take 1 tablet (100 mg total) by mouth daily at bedtime 30 tablet 0    QUEtiapine (SEROquel) 25 mg tablet Take 1 tablet (25 mg total) by mouth 2 (two) times a day 60 tablet 0    sucralfate (CARAFATE) 1 g tablet Take 1 tablet (1 g total) by mouth 4 (four) times a day 40 tablet 0    buprenorphine-naloxone (SUBOXONE) 8-2 mg per SL tablet One and half tabs sl q day  45 tablet 0    QUEtiapine (SEROquel) 100 mg tablet TAKE 1 TABLET (100 MG TOTAL) BY MOUTH DAILY AT BEDTIME 30 tablet 0    QUEtiapine (SEROquel) 25 mg tablet TAKE 1 TABLET (25 MG TOTAL) BY MOUTH 2 (TWO) TIMES A DAY 60 tablet 0     No current facility-administered medications for this visit  Review of Systems   Constitutional: Negative for activity change, chills, diaphoresis, fatigue and fever  Respiratory: Negative for cough, chest tightness, shortness of breath and wheezing  Cardiovascular: Negative for chest pain, palpitations and leg swelling  Gastrointestinal: Negative for abdominal pain, constipation, diarrhea, nausea and vomiting  Genitourinary: Negative for difficulty urinating, dysuria and frequency  Musculoskeletal: Negative for arthralgias, gait problem and myalgias  Neurological: Negative for dizziness, seizures, syncope, weakness, light-headedness and headaches  Psychiatric/Behavioral: Negative for confusion, dysphoric mood, self-injury, sleep disturbance and suicidal ideas  The patient is not nervous/anxious  PHQ-9 Depression Screening    PHQ-9:   Frequency of the following problems over the past two weeks:            Objective:  Vitals:    09/10/21 1501   BP: 132/78   Pulse: 94   Temp: (!) 97 1 °F (36 2 °C)   SpO2: 96%   Weight: 89 5 kg (197 lb 6 oz)   Height: 5' 7" (1 702 m)     Body mass index is 30 91 kg/m²       Physical Exam  Vitals and nursing note reviewed  Constitutional:       General: He is not in acute distress  Appearance: Normal appearance  He is not ill-appearing  HENT:      Head: Normocephalic and atraumatic  Mouth/Throat:      Mouth: Mucous membranes are moist    Eyes:      Extraocular Movements: Extraocular movements intact  Conjunctiva/sclera: Conjunctivae normal       Pupils: Pupils are equal, round, and reactive to light  Cardiovascular:      Rate and Rhythm: Regular rhythm  Heart sounds: Normal heart sounds  Pulmonary:      Effort: Pulmonary effort is normal  No respiratory distress  Breath sounds: Normal breath sounds  No wheezing or rales  Abdominal:      General: Bowel sounds are normal  There is no distension  Palpations: Abdomen is soft  Tenderness: There is no abdominal tenderness  Neurological:      General: No focal deficit present  Mental Status: He is alert and oriented to person, place, and time  Mental status is at baseline  Psychiatric:         Mood and Affect: Mood normal          Behavior: Behavior normal          Thought Content:  Thought content normal          Judgment: Judgment normal

## 2021-09-13 ENCOUNTER — APPOINTMENT (EMERGENCY)
Dept: CT IMAGING | Facility: HOSPITAL | Age: 21
End: 2021-09-13
Payer: COMMERCIAL

## 2021-09-13 ENCOUNTER — HOSPITAL ENCOUNTER (EMERGENCY)
Facility: HOSPITAL | Age: 21
Discharge: HOME | End: 2021-09-13
Attending: EMERGENCY MEDICINE | Admitting: EMERGENCY MEDICINE
Payer: COMMERCIAL

## 2021-09-13 VITALS
SYSTOLIC BLOOD PRESSURE: 132 MMHG | OXYGEN SATURATION: 97 % | DIASTOLIC BLOOD PRESSURE: 60 MMHG | HEART RATE: 77 BPM | RESPIRATION RATE: 18 BRPM | TEMPERATURE: 98.3 F

## 2021-09-13 DIAGNOSIS — D17.1 LIPOMA OF BACK: Primary | ICD-10-CM

## 2021-09-13 PROCEDURE — 99283 EMERGENCY DEPT VISIT LOW MDM: CPT

## 2021-09-13 PROCEDURE — G1004 CDSM NDSC: HCPCS

## 2021-09-13 PROCEDURE — 70490 CT SOFT TISSUE NECK W/O DYE: CPT

## 2021-09-13 PROCEDURE — 71250 CT THORAX DX C-: CPT

## 2021-09-13 PROCEDURE — 99284 EMERGENCY DEPT VISIT MOD MDM: CPT | Performed by: EMERGENCY MEDICINE

## 2021-09-13 NOTE — DISCHARGE INSTRUCTIONS
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 causing worsening symptoms  The treatment is often surgical excision  We are referring you to a general surgeon please contact the information listed above on your discharge papers to schedule an appointment with General surgery in the office to discuss management options  In the meantime you may treat with ibuprofen, Tylenol, over-the-counter medications for your symptoms  You may try ice or heat additionally  If you develop worsening symptoms fevers chills numbness tingling weakness severe pain trouble breathing chest pain or other concerns please return to the emergency department immediately

## 2021-09-13 NOTE — ED PROVIDER NOTES
History  Chief Complaint   Patient presents with    Abscess     abcess noted on back with pain noted  took double dose of suboxone to help with pain today     HPI  66-year-old male with past medical history significant for hypertension, bipolar disorder, substance abuse history with opioid use currently on Suboxone therapy who presents to the ER for evaluation of upper back/lower neck mass with worsening pain  Patient presents for evaluation of months long history of a mass involving the upper thoracic left paraspinal region  He reports this has been gradually worsening times months particularly bothersome in the last week or so resulting in burning discomfort  Patient denies fevers chills overlying skin changes constitutional symptoms fatigue weakness nausea vomiting rashes history of drainage from the lesion, history of abscesses  Reports no history of similar symptoms in the past   Denies a history of injecting drugs  Despite chart review patient denies a history of substance abuse at this time  Denies having this evaluated before the past   Denies a history of back surgery  Denies history of malignancy  Prior to Admission Medications   Prescriptions Last Dose Informant Patient Reported? Taking?    QUEtiapine (SEROquel) 100 mg tablet   No Yes   Sig: Take 1 tablet (100 mg total) by mouth daily at bedtime   QUEtiapine (SEROquel) 100 mg tablet   No No   Sig: TAKE 1 TABLET (100 MG TOTAL) BY MOUTH DAILY AT BEDTIME   QUEtiapine (SEROquel) 25 mg tablet   No Yes   Sig: Take 1 tablet (25 mg total) by mouth 2 (two) times a day   QUEtiapine (SEROquel) 25 mg tablet   No No   Sig: TAKE 1 TABLET (25 MG TOTAL) BY MOUTH 2 (TWO) TIMES A DAY   albuterol (PROVENTIL HFA,VENTOLIN HFA) 90 mcg/act inhaler   No Yes   Sig: Inhale 1-2 puffs every 4 (four) hours as needed for wheezing or shortness of breath   amLODIPine (NORVASC) 5 mg tablet   No Yes   Sig: Take 1 tablet (5 mg total) by mouth daily   buprenorphine-naloxone (SUBOXONE) 8-2 mg per SL tablet   No Yes   Sig: One and half tabs sl q day    gabapentin (NEURONTIN) 300 mg capsule   No Yes   Sig: TAKE 2 CAPSULES (600 MG TOTAL) BY MOUTH 3 (THREE) TIMES A DAY   naloxone (NARCAN) 1 mg/mL intranasal   No No   Si mL (2 mg total) into each nostril once for 1 dose   naloxone (Narcan) 4 mg/0 1 mL nasal spray   No No   Si 1 mL (4 mg total) into each nostril as needed (respiratory depression or possible OD)   ondansetron (ZOFRAN-ODT) 4 mg disintegrating tablet   No No   Sig: Take 1 tablet (4 mg total) by mouth every 6 (six) hours as needed for nausea or vomiting   pantoprazole (PROTONIX) 20 mg tablet   No Yes   Sig: Take 1 tablet (20 mg total) by mouth daily   sucralfate (CARAFATE) 1 g tablet   No No   Sig: Take 1 tablet (1 g total) by mouth 4 (four) times a day      Facility-Administered Medications: None       Past Medical History:   Diagnosis Date    ADHD (attention deficit hyperactivity disorder)     [facility])     Depression     Hyperlipidemia     Hypertension     Psychiatric disorder     Substance abuse ([facility] )     Tourette syndrome        Past Surgical History:   Procedure Laterality Date    TONSILECTOMY AND ADNOIDECTOMY      TYMPANOSTOMY TUBE PLACEMENT         Family History   Problem Relation Age of Onset    No Known Problems Mother     Substance Abuse Father     Cirrhosis Father     Coronary artery disease Father     Hepatitis Father     Substance Abuse Brother     Diabetes Neg Hx      I have reviewed and agree with the history as documented      E-Cigarette/Vaping    E-Cigarette Use Current Every Day User      E-Cigarette/Vaping Substances    Nicotine No     THC Yes     CBD No     Flavoring No     Other No     Unknown No      Social History     Tobacco Use    Smoking status: Current Every Day Smoker     Packs/day: 2 00     Years: 10 00     Pack years: 20 00     Types: Cigarettes    Smokeless tobacco: Never Used   Vaping Use    Vaping Use: Every day    Substances: THC   Substance Use Topics    Alcohol use: Not Currently    Drug use: Not Currently     Types: Marijuana     Comment: pills       Review of Systems   Constitutional: Negative for chills and fever  HENT: Negative for ear pain and sore throat  Eyes: Negative for pain and visual disturbance  Respiratory: Negative for cough and shortness of breath  Cardiovascular: Negative for chest pain and palpitations  Gastrointestinal: Negative for abdominal pain and vomiting  Genitourinary: Negative for dysuria and hematuria  Musculoskeletal: Positive for back pain  Negative for arthralgias  Skin: Negative for color change and rash  Neurological: Negative for seizures and syncope  All other systems reviewed and are negative  Physical Exam  Physical Exam  Vitals and nursing note reviewed  Constitutional:       Appearance: He is well-developed  HENT:      Head: Normocephalic and atraumatic  Eyes:      Conjunctiva/sclera: Conjunctivae normal    Cardiovascular:      Rate and Rhythm: Normal rate and regular rhythm  Heart sounds: No murmur heard  Pulmonary:      Effort: Pulmonary effort is normal  No respiratory distress  Breath sounds: Normal breath sounds  Abdominal:      Palpations: Abdomen is soft  Tenderness: There is no abdominal tenderness  Musculoskeletal:      Cervical back: Neck supple  Comments: There is an area of soft tissue swelling in the upper thoracic region to the left paraspinal musculature/soft tissues  There is not warm erythematous indurated or fluctuant  There is no evidence of skin breakdown or evidence of spontaneous drainage in the past   Is minimally tender to palpation  Lesion appears deep without evidence that it is near the surface clinically  No midline spinal tenderness over the area  No other lesions on the back side  Skin:     General: Skin is warm and dry        Capillary Refill: Capillary refill takes less than 2 seconds  Neurological:      General: No focal deficit present  Mental Status: He is alert  Mental status is at baseline  Vital Signs  ED Triage Vitals [09/13/21 1439]   Temperature Pulse Respirations Blood Pressure SpO2   98 3 °F (36 8 °C) 77 18 132/60 97 %      Temp Source Heart Rate Source Patient Position - Orthostatic VS BP Location FiO2 (%)   Temporal Monitor Sitting Right arm --      Pain Score       5           Vitals:    09/13/21 1439   BP: 132/60   Pulse: 77   Patient Position - Orthostatic VS: Sitting         Visual Acuity      ED Medications  Medications - No data to display    Diagnostic Studies  Results Reviewed     None                 CT chest without contrast   Final Result by Leonarda Cox MD (09/13 1619)      Left upper back lipoma  No suspicious mass or abscess  Geographic area of groundglass opacity in the left lower lobe which may be mild atelectasis  Correlate clinically for infectious/inflammatory symptoms  Workstation performed: [id number]         CT soft tissue neck wo contrast   Final Result by Srinivas Lovell DO (09/13 1602)      No soft tissue mass identified within the neck  At the inferior margin of this examination there is a possible lipoma superficial to the posterior upper thoracic musculature  See separate CT chest for more complete evaluation  Workstation performed: [id number]                    Procedures  Procedures         ED Course  ED Course as of Sep 13 1732   Mon Sep 13, 2021   1731 CT imaging demonstrates a 5 x 8 cm lipoma without evidence of other soft tissue masses or infections  Patient with no infectious symptoms  No indication for blood work  Patient is given referral to General surgery for outpatient evaluation and management including possible surgical excision  He is counseled on etiology of symptoms and supportive care at home using Tylenol Motrin topicals    Will discharge to outpatient management at this time patient given strict return precautions for new or worsening symptoms  He is agreeable to plan  MDM  Number of Diagnoses or Management Options  Lipoma of back: new and requires workup     Amount and/or Complexity of Data Reviewed  Tests in the radiology section of CPT®: ordered and reviewed  Decide to obtain previous medical records or to obtain history from someone other than the patient: yes  Review and summarize past medical records: yes  Independent visualization of images, tracings, or specimens: yes    Risk of Complications, Morbidity, and/or Mortality  Presenting problems: low  Diagnostic procedures: low  Management options: low    Patient Progress  Patient progress: stable    59-year-old male with history of drug abuse presenting for evaluation of a gradually progressive back mass times months  Unclear etiology at this time  No infectious symptoms  Abscess is a possibility however clinically seems less likely  I performed a bedside ultrasound with musculoskeletal probe looking up to 6 cm deep was unable to discern an obvious etiology  Will proceed with CT scan of the neck and chest to better characterize the lesion  My suspicion that this is a benign etiology such as a cyst over an infectious cause  However with his worsening symptoms likely secondary to progression of the size of the lesion he may require therapeutic interventions given its location  Vital signs reviewed and are appropriate without evidence of sirs criteria      Disposition  Final diagnoses:   Lipoma of back     Time reflects when diagnosis was documented in both MDM as applicable and the Disposition within this note     Time User Action Codes Description Comment    9/13/2021  5:28 PM Josh Olivares Add [D17 1] Lipoma of back       ED Disposition     ED Disposition Condition Date/Time Comment    Discharge Stable Mon Sep 13, 2021  5:28 PM Deya Chen discharge to home/self care  Follow-up Information     Follow up With Specialties Details Why Contact Info Additional Information    [facility] Schedule an appointment as soon as possible for a visit  To discuss management options such as elective surgery to remove the lipoma  [address] [id number]  [address] [facility] General Surgery [facility], [address], [place], [place], [facility]          Patient's Medications   Discharge Prescriptions    No medications on file     No discharge procedures on file      PDMP Review       Value Time User    PDMP Reviewed  Yes 9/10/2021  3:03 PM Tomás Webster DO          ED Provider  Electronically Signed by           Sadi Cifuentes DO  09/13/21 9675

## 2021-09-16 LAB
AMPHETAMINES UR QL SCN: NEGATIVE NG/ML
BARBITURATES UR QL SCN: NEGATIVE NG/ML
BENZODIAZ UR QL: NEGATIVE NG/ML
BUPRENORPHINE UR CFM-MCNC: 36 NG/ML
BUPRENORPHINE UR QL CFM: NORMAL NG/ML
BUPRENORPHINE UR QL CFM: POSITIVE
BUPRENORPHINE+NOR UR QL: POSITIVE
BZE UR QL: NEGATIVE NG/ML
CANNABINOIDS UR QL SCN: POSITIVE
METHADONE UR QL SCN: NEGATIVE NG/ML
NORBUPRENORPHINE UR CFM-MCNC: 264 NG/ML
NORBUPRENORPHINE UR QL CFM: POSITIVE
OPIATES UR QL: NEGATIVE NG/ML
PCP UR QL: NEGATIVE NG/ML
PROPOXYPH UR QL SCN: NEGATIVE NG/ML

## 2021-09-22 ENCOUNTER — OFFICE VISIT (OUTPATIENT)
Dept: INTERNAL MEDICINE CLINIC | Facility: CLINIC | Age: 21
End: 2021-09-22
Payer: COMMERCIAL

## 2021-09-22 VITALS
OXYGEN SATURATION: 98 % | WEIGHT: 193 LBS | SYSTOLIC BLOOD PRESSURE: 120 MMHG | DIASTOLIC BLOOD PRESSURE: 80 MMHG | TEMPERATURE: 97.1 F | HEIGHT: 67 IN | BODY MASS INDEX: 30.29 KG/M2 | HEART RATE: 92 BPM

## 2021-09-22 DIAGNOSIS — D17.0 LIPOMA OF NECK: Primary | ICD-10-CM

## 2021-09-22 PROCEDURE — 99212 OFFICE O/P EST SF 10 MIN: CPT | Performed by: INTERNAL MEDICINE

## 2021-09-22 NOTE — PROGRESS NOTES
Assessment/Plan:  Problem List Items Addressed This Visit     None      Visit Diagnoses     Lipoma of neck    -  Primary    Relevant Orders    Ambulatory referral to General Surgery           Diagnoses and all orders for this visit:    Lipoma of neck  -     Ambulatory referral to General Surgery; Future        No problem-specific Assessment & Plan notes found for this encounter  A/P  Discussed the CT scan  Needs mass dissected out so will refer to general sx  RTC as scheduled  Subjective:      Patient ID: Joao Aguilar is a 21 y o  male  WM presents due to pain in the left trap/neck area for several months with an enlarging mass  NO trauma  No fever or chills  No redness or increase temp  Went to the ER and CT with a lipoma  Reports that it is causing pain  No weakness of the arm or headaches  The following portions of the patient's history were reviewed and updated as appropriate:   He has a past medical history of ADHD (attention deficit hyperactivity disorder), Bipolar disorder ([facility] [address] ), Depression, Hyperlipidemia, Hypertension, Psychiatric disorder, Substance abuse ([facility] ), and Tourette syndrome  ,  does not have any pertinent problems on file  ,   has a past surgical history that includes Tympanostomy tube placement and TONSILECTOMY AND ADNOIDECTOMY  ,  family history includes Cirrhosis in his father; Coronary artery disease in his father; Hepatitis in his father; No Known Problems in his mother; Substance Abuse in his brother and father  ,   reports that he has been smoking cigarettes  He has a 20 00 pack-year smoking history  He has never used smokeless tobacco  He reports previous alcohol use  He reports previous drug use  Drug: Marijuana  ,  is allergic to abilify [aripiprazole]     Current Outpatient Medications   Medication Sig Dispense Refill    albuterol (PROVENTIL HFA,VENTOLIN HFA) 90 mcg/act inhaler Inhale 1-2 puffs every 4 (four) hours as needed for wheezing or shortness of breath 1 Inhaler 0  amLODIPine (NORVASC) 5 mg tablet Take 1 tablet (5 mg total) by mouth daily 90 tablet 3    buprenorphine-naloxone (SUBOXONE) 8-2 mg per SL tablet One and half tabs sl q day  45 tablet 0    gabapentin (NEURONTIN) 300 mg capsule TAKE 2 CAPSULES (600 MG TOTAL) BY MOUTH 3 (THREE) TIMES A  capsule 0    naloxone (NARCAN) 1 mg/mL intranasal 2 mL (2 mg total) into each nostril once for 1 dose 2 mL 1    naloxone (Narcan) 4 mg/0 1 mL nasal spray 0 1 mL (4 mg total) into each nostril as needed (respiratory depression or possible OD) 1 each 1    ondansetron (ZOFRAN-ODT) 4 mg disintegrating tablet Take 1 tablet (4 mg total) by mouth every 6 (six) hours as needed for nausea or vomiting 12 tablet 0    pantoprazole (PROTONIX) 20 mg tablet Take 1 tablet (20 mg total) by mouth daily 20 tablet 0    QUEtiapine (SEROquel) 100 mg tablet Take 1 tablet (100 mg total) by mouth daily at bedtime 30 tablet 0    QUEtiapine (SEROquel) 100 mg tablet TAKE 1 TABLET (100 MG TOTAL) BY MOUTH DAILY AT BEDTIME 30 tablet 0    QUEtiapine (SEROquel) 25 mg tablet Take 1 tablet (25 mg total) by mouth 2 (two) times a day 60 tablet 0    QUEtiapine (SEROquel) 25 mg tablet TAKE 1 TABLET (25 MG TOTAL) BY MOUTH 2 (TWO) TIMES A DAY 60 tablet 0    sucralfate (CARAFATE) 1 g tablet Take 1 tablet (1 g total) by mouth 4 (four) times a day 40 tablet 0     No current facility-administered medications for this visit  Review of Systems   Constitutional: Negative for activity change, chills, diaphoresis, fatigue and fever  Respiratory: Negative for cough, chest tightness, shortness of breath and wheezing  Cardiovascular: Negative for chest pain, palpitations and leg swelling  Gastrointestinal: Negative for abdominal pain, constipation, diarrhea, nausea and vomiting  Genitourinary: Negative for difficulty urinating, dysuria and frequency  Musculoskeletal: Negative for arthralgias, gait problem and myalgias     Skin:        Mass on the left neck/shoulder  Neurological: Negative for light-headedness and headaches  Psychiatric/Behavioral: Negative for confusion  The patient is not nervous/anxious  PHQ-9 Depression Screening    PHQ-9:   Frequency of the following problems over the past two weeks:            Objective:  Vitals:    09/22/21 1456   BP: 120/80   Pulse: 92   Temp: (!) 97 1 °F (36 2 °C)   SpO2: 98%   Weight: 87 5 kg (193 lb)   Height: 5' 7" (1 702 m)     Body mass index is 30 23 kg/m²  Physical Exam  Vitals and nursing note reviewed  Constitutional:       General: He is not in acute distress  Appearance: Normal appearance  He is not ill-appearing  HENT:      Head: Normocephalic and atraumatic  Mouth/Throat:      Mouth: Mucous membranes are moist    Eyes:      Extraocular Movements: Extraocular movements intact  Conjunctiva/sclera: Conjunctivae normal       Pupils: Pupils are equal, round, and reactive to light  Skin:     Comments: Left trap with well circumscribed mobile fluctuant mass  Neurological:      General: No focal deficit present  Mental Status: He is alert and oriented to person, place, and time  Mental status is at baseline  Psychiatric:         Mood and Affect: Mood normal          Behavior: Behavior normal          Thought Content:  Thought content normal          Judgment: Judgment normal

## 2021-09-29 ENCOUNTER — HOSPITAL ENCOUNTER (EMERGENCY)
Facility: HOSPITAL | Age: 21
Discharge: HOME | End: 2021-09-29
Attending: EMERGENCY MEDICINE
Payer: COMMERCIAL

## 2021-09-29 ENCOUNTER — CONSULT (OUTPATIENT)
Dept: SURGERY | Facility: CLINIC | Age: 21
End: 2021-09-29
Payer: COMMERCIAL

## 2021-09-29 VITALS
BODY MASS INDEX: 30.45 KG/M2 | DIASTOLIC BLOOD PRESSURE: 82 MMHG | WEIGHT: 194 LBS | SYSTOLIC BLOOD PRESSURE: 118 MMHG | TEMPERATURE: 98.5 F | HEIGHT: 67 IN | HEART RATE: 88 BPM

## 2021-09-29 VITALS
HEIGHT: 67 IN | TEMPERATURE: 98.6 F | DIASTOLIC BLOOD PRESSURE: 50 MMHG | RESPIRATION RATE: 18 BRPM | WEIGHT: 194 LBS | OXYGEN SATURATION: 98 % | BODY MASS INDEX: 30.45 KG/M2 | SYSTOLIC BLOOD PRESSURE: 113 MMHG | HEART RATE: 75 BPM

## 2021-09-29 DIAGNOSIS — D17.1 LIPOMA OF BACK: ICD-10-CM

## 2021-09-29 DIAGNOSIS — Z20.822 ENCOUNTER FOR LABORATORY TESTING FOR COVID-19 VIRUS: Primary | ICD-10-CM

## 2021-09-29 DIAGNOSIS — F17.210 CIGARETTE NICOTINE DEPENDENCE WITHOUT COMPLICATION: ICD-10-CM

## 2021-09-29 DIAGNOSIS — D17.0 LIPOMA OF NECK: ICD-10-CM

## 2021-09-29 DIAGNOSIS — R22.1 NECK MASS: Primary | ICD-10-CM

## 2021-09-29 LAB — SARS-COV-2 RNA RESP QL NAA+PROBE: NEGATIVE

## 2021-09-29 PROCEDURE — U0003 INFECTIOUS AGENT DETECTION BY NUCLEIC ACID (DNA OR RNA); SEVERE ACUTE RESPIRATORY SYNDROME CORONAVIRUS 2 (SARS-COV-2) (CORONAVIRUS DISEASE [COVID-19]), AMPLIFIED PROBE TECHNIQUE, MAKING USE OF HIGH THROUGHPUT TECHNOLOGIES AS DESCRIBED BY CMS-2020-01-R: HCPCS | Performed by: PHYSICIAN ASSISTANT

## 2021-09-29 PROCEDURE — 99282 EMERGENCY DEPT VISIT SF MDM: CPT

## 2021-09-29 PROCEDURE — 99244 OFF/OP CNSLTJ NEW/EST MOD 40: CPT | Performed by: SURGERY

## 2021-09-29 PROCEDURE — 99406 BEHAV CHNG SMOKING 3-10 MIN: CPT | Performed by: SURGERY

## 2021-09-29 PROCEDURE — 99282 EMERGENCY DEPT VISIT SF MDM: CPT | Performed by: PHYSICIAN ASSISTANT

## 2021-09-29 PROCEDURE — U0005 INFEC AGEN DETEC AMPLI PROBE: HCPCS | Performed by: PHYSICIAN ASSISTANT

## 2021-09-29 RX ORDER — SODIUM CHLORIDE, SODIUM LACTATE, POTASSIUM CHLORIDE, CALCIUM CHLORIDE 600; 310; 30; 20 MG/100ML; MG/100ML; MG/100ML; MG/100ML
125 INJECTION, SOLUTION INTRAVENOUS CONTINUOUS
Status: CANCELLED | OUTPATIENT
Start: 2021-10-26

## 2021-09-29 RX ORDER — CHLORHEXIDINE GLUCONATE 4 G/100ML
SOLUTION TOPICAL DAILY PRN
Status: CANCELLED | OUTPATIENT
Start: 2021-09-29

## 2021-09-29 RX ORDER — CEFAZOLIN SODIUM 2 G/50ML
2000 SOLUTION INTRAVENOUS ONCE
Status: CANCELLED | OUTPATIENT
Start: 2021-10-26 | End: 2021-09-29

## 2021-09-29 NOTE — H&P
Assessment/Plan:    Neck mass  Small palpable mass of the right anterior cervical triangle  Likely represents a lymph node  Patient currently denies any recent infection  Will obtain ultrasound for further evaluation to rule out lipoma versus lymph node  Lipoma of back  Symptomatic lipoma of left upper back measuring approximately 5 cm  Patient was that this removed  I think this is reasonable  On exam there is a slightly mobile subcutaneous mass likely lipomatous in nature based on CT scan this is likely lipoma as well  Patient will be scheduled for excision in the OR  The procedure self including all associated risks and benefits discussed the patient great length  The patient verbalized understands risks is willing to proceed, consent was signed  Who now require any other additional preoperative workup at this time  Diagnoses and all orders for this visit:    Neck mass  -     US head neck soft tissue; Future    Lipoma of neck  -     Ambulatory referral to General Surgery    Lipoma of back          Subjective:      Patient ID: Jorge Perez is a 21 y o  male  75-year-old gentleman with history of hypertension, psychiatric disease, presents today in consultation for evaluation of a likely back lipoma and potential lipoma of the neck  Patient states he has had this mass on his upper back in the left side for quite some time  Recently is large  Also causes some significant discomfort at times  Denies any redness or drainage  The pain is localized and marked ache  He was seen in the ER for evaluation underwent CT of the neck and chest which did reveal lipomatous type lesion in the left upper back  He also recently developed a small mass on the right side of his neck  He was seen by his PCP who was unclear if this is a lipoma versus potential lymph node  He was sent to General surgery for further evaluation  Patient states there is some mild soreness with the neck as well    At this time he denies any sick contacts or recent illness  No fevers or chills  No nausea vomiting  The following portions of the patient's history were reviewed and updated as appropriate:   He  has a past medical history of ADHD (attention deficit hyperactivity disorder), Bipolar disorder ([facility] [address] ), Depression, Hyperlipidemia, Hypertension, Psychiatric disorder, Substance abuse ([facility] [address] ), and Tourette syndrome  He   Patient Active Problem List    Diagnosis Date Noted    Neck mass 09/29/2021    Lipoma of neck 09/29/2021    Lipoma of back 09/29/2021    Polysubstance dependence including opioid type drug with complication, continuous [facility] ([facility] ) 09/16/2020    Encounter for monitoring Suboxone maintenance therapy 09/16/2020    Medication therapy continued 09/16/2020    History of drug overdose 09/16/2020    Electronic cigarette use 09/16/2020    Bipolar 1 disorder, depressed, moderate ([facility] [address] ) 08/05/2020    Cannabis use disorder, severe, dependence ([facility] ) 08/05/2020    Fatty liver 07/18/2018    Mixed hyperlipidemia 07/18/2018    Tobacco use 04/18/2018    Tourette's 03/28/2018    Attention deficit hyperactivity disorder 03/28/2018    Opiate abuse, episodic ([facility] ) 12/19/2016    Depression 03/18/2016     He  has a past surgical history that includes Tympanostomy tube placement and TONSILECTOMY AND ADNOIDECTOMY  His family history includes Cirrhosis in his father; Coronary artery disease in his father; Hepatitis in his father; No Known Problems in his mother; Substance Abuse in his brother and father  He  reports that he has been smoking cigarettes  He has a 20 00 pack-year smoking history  He has never used smokeless tobacco  He reports previous alcohol use  He reports previous drug use  Drug: Marijuana    Current Outpatient Medications   Medication Sig Dispense Refill    albuterol (PROVENTIL HFA,VENTOLIN HFA) 90 mcg/act inhaler Inhale 1-2 puffs every 4 (four) hours as needed for wheezing or shortness of breath 1 Inhaler 0    amLODIPine (NORVASC) 5 mg tablet Take 1 tablet (5 mg total) by mouth daily 90 tablet 3    buprenorphine-naloxone (SUBOXONE) 8-2 mg per SL tablet One and half tabs sl q day  45 tablet 0    gabapentin (NEURONTIN) 300 mg capsule TAKE 2 CAPSULES (600 MG TOTAL) BY MOUTH 3 (THREE) TIMES A  capsule 0    naloxone (NARCAN) 1 mg/mL intranasal 2 mL (2 mg total) into each nostril once for 1 dose 2 mL 1    naloxone (Narcan) 4 mg/0 1 mL nasal spray 0 1 mL (4 mg total) into each nostril as needed (respiratory depression or possible OD) 1 each 1    ondansetron (ZOFRAN-ODT) 4 mg disintegrating tablet Take 1 tablet (4 mg total) by mouth every 6 (six) hours as needed for nausea or vomiting 12 tablet 0    pantoprazole (PROTONIX) 20 mg tablet Take 1 tablet (20 mg total) by mouth daily 20 tablet 0    QUEtiapine (SEROquel) 100 mg tablet Take 1 tablet (100 mg total) by mouth daily at bedtime 30 tablet 0    QUEtiapine (SEROquel) 100 mg tablet TAKE 1 TABLET (100 MG TOTAL) BY MOUTH DAILY AT BEDTIME 30 tablet 0    QUEtiapine (SEROquel) 25 mg tablet Take 1 tablet (25 mg total) by mouth 2 (two) times a day 60 tablet 0    QUEtiapine (SEROquel) 25 mg tablet TAKE 1 TABLET (25 MG TOTAL) BY MOUTH 2 (TWO) TIMES A DAY 60 tablet 0    sucralfate (CARAFATE) 1 g tablet Take 1 tablet (1 g total) by mouth 4 (four) times a day 40 tablet 0     No current facility-administered medications for this visit  He is allergic to abilify [aripiprazole]       Review of Systems      Review systems completed, all negative except as noted above HPI  Objective:      /82   Pulse 88   Temp 98 5 °F (36 9 °C)   Ht 5' 7" (1 702 m)   Wt 88 kg (194 lb)   BMI 30 38 kg/m²          Physical Exam  Vitals reviewed  Constitutional:       General: He is not in acute distress  Appearance: Normal appearance  He is not ill-appearing, toxic-appearing or diaphoretic  HENT:      Head: Normocephalic and atraumatic     Eyes:      General: No scleral icterus  Right eye: No discharge  Left eye: No discharge  Neck:      Comments: Subcutaneous mass right anterior triangle, possible lymph node  Cardiovascular:      Rate and Rhythm: Normal rate and regular rhythm  Heart sounds: Normal heart sounds  No friction rub  No gallop  Pulmonary:      Effort: Pulmonary effort is normal  No respiratory distress  Breath sounds: No stridor  Wheezing (Bilateral) present  Abdominal:      General: Abdomen is flat  There is no distension  Palpations: There is no mass  Tenderness: There is no abdominal tenderness  Musculoskeletal:         General: Normal range of motion  Cervical back: Normal range of motion and neck supple  Tenderness present  Right lower leg: No edema  Left lower leg: No edema  Lymphadenopathy:      Cervical: Cervical adenopathy ( potential cervical adenopathy right anterior triangle) present  Skin:     General: Skin is warm and dry  Coloration: Skin is not jaundiced or pale  Findings: No bruising or erythema  Comments: Approximately 5 cm subcutaneous mass likely consistent lipoma of the left upper back  No erythema  No tenderness on palpation  No active drainage  Neurological:      General: No focal deficit present  Mental Status: He is alert and oriented to person, place, and time  Cranial Nerves: No cranial nerve deficit  Psychiatric:         Mood and Affect: Mood normal          Behavior: Behavior normal          Thought Content: Thought content normal          Judgment: Judgment normal            Note:     Notes from recent ER evaluation dated September 13, 2021 was personally reviewed by me  In addition recent office note from his PCP dated September 22, 2021 was also personally reviewed by me        Imaging:    CT scan of the neck and chest soft tissue dated September 13, 2020 was personally reviewed by me and also discussed and reviewed with the patient

## 2021-09-29 NOTE — ASSESSMENT & PLAN NOTE
Smoking cessation counseling completed for approximately 5 minutes  Patient states he is interested in quitting and trying to quit  However smoking is his crutch given his current rehab from illicit drugs  He has tried both a patch in the past with no success  Right now he is understands he needs to quit but is wanting to do this on his own time frame

## 2021-09-29 NOTE — PROGRESS NOTES
Assessment/Plan:    Neck mass  Small palpable mass of the right anterior cervical triangle  Likely represents a lymph node  Patient currently denies any recent infection  Will obtain ultrasound for further evaluation to rule out lipoma versus lymph node  Lipoma of back  Symptomatic lipoma of left upper back measuring approximately 5 cm  Patient was that this removed  I think this is reasonable  On exam there is a slightly mobile subcutaneous mass likely lipomatous in nature based on CT scan this is likely lipoma as well  Patient will be scheduled for excision in the OR  The procedure self including all associated risks and benefits discussed the patient great length  The patient verbalized understands risks is willing to proceed, consent was signed  Who now require any other additional preoperative workup at this time  Cigarette nicotine dependence without complication  Smoking cessation counseling completed for approximately 5 minutes  Patient states he is interested in quitting and trying to quit  However smoking is his crutch given his current rehab from illicit drugs  He has tried both a patch in the past with no success  Right now he is understands he needs to quit but is wanting to do this on his own time frame  Diagnoses and all orders for this visit:    Neck mass  -     US head neck soft tissue;  Future    Lipoma of neck  -     Ambulatory referral to General Surgery    Lipoma of back  -     Case request operating room: EXCISION LIPOMA; Standing  -     Case request operating room: EXCISION LIPOMA    Cigarette nicotine dependence without complication    Other orders  -     Ambulate patient; Standing  -     Diet NPO; Sips with meds; Standing  -     Apply Sequential Compression Device; Standing  -     Place sequential compression device; Standing  -     Vital signs; Standing  -     chlorhexidine (HIBICLENS) 4 % topical liquid  -     lactated ringers infusion  -     ceFAZolin (ANCEF) 2,000 mg in dextrose 5 % 100 mL IVPB          Subjective:      Patient ID: Beni Sellers is a 21 y o  male  27-year-old gentleman with history of hypertension, psychiatric disease, presents today in consultation for evaluation of a likely back lipoma and potential lipoma of the neck  Patient states he has had this mass on his upper back in the left side for quite some time  Recently is large  Also causes some significant discomfort at times  Denies any redness or drainage  The pain is localized and marked ache  He was seen in the ER for evaluation underwent CT of the neck and chest which did reveal lipomatous type lesion in the left upper back  He also recently developed a small mass on the right side of his neck  He was seen by his PCP who was unclear if this is a lipoma versus potential lymph node  He was sent to General surgery for further evaluation  Patient states there is some mild soreness with the neck as well  At this time he denies any sick contacts or recent illness  No fevers or chills  No nausea vomiting  The following portions of the patient's history were reviewed and updated as appropriate:   He  has a past medical history of ADHD (attention deficit hyperactivity disorder), Bipolar disorder ([address] ), Depression, Hyperlipidemia, Hypertension, Psychiatric disorder, Substance abuse ([address] ), and Tourette syndrome    He   Patient Active Problem List    Diagnosis Date Noted    Neck mass 09/29/2021    Lipoma of neck 09/29/2021    Lipoma of back 09/29/2021    Cigarette nicotine dependence without complication 03/00/2425    Polysubstance dependence including opioid type drug with complication, continuous use ([address] ) 09/16/2020    Encounter for monitoring Suboxone maintenance therapy 09/16/2020    Medication therapy continued 09/16/2020    History of drug overdose 09/16/2020    Electronic cigarette use 09/16/2020    Bipolar 1 disorder, depressed, moderate ([address] ) 08/05/2020    Cannabis use disorder, severe, dependence ([facility] ) 08/05/2020    Fatty liver 07/18/2018    Mixed hyperlipidemia 07/18/2018    Tobacco use 04/18/2018    Tourette's 03/28/2018    Attention deficit hyperactivity disorder 03/28/2018    Opiate abuse, episodic ([facility] ) 12/19/2016    Depression 03/18/2016     He  has a past surgical history that includes Tympanostomy tube placement and TONSILECTOMY AND ADNOIDECTOMY  His family history includes Cirrhosis in his father; Coronary artery disease in his father; Hepatitis in his father; No Known Problems in his mother; Substance Abuse in his brother and father  He  reports that he has been smoking cigarettes  He has a 20 00 pack-year smoking history  He has never used smokeless tobacco  He reports previous alcohol use  He reports previous drug use  Drug: Marijuana  Current Outpatient Medications   Medication Sig Dispense Refill    albuterol (PROVENTIL HFA,VENTOLIN HFA) 90 mcg/act inhaler Inhale 1-2 puffs every 4 (four) hours as needed for wheezing or shortness of breath 1 Inhaler 0    amLODIPine (NORVASC) 5 mg tablet Take 1 tablet (5 mg total) by mouth daily 90 tablet 3    buprenorphine-naloxone (SUBOXONE) 8-2 mg per SL tablet One and half tabs sl q day   45 tablet 0    gabapentin (NEURONTIN) 300 mg capsule TAKE 2 CAPSULES (600 MG TOTAL) BY MOUTH 3 (THREE) TIMES A  capsule 0    naloxone (NARCAN) 1 mg/mL intranasal 2 mL (2 mg total) into each nostril once for 1 dose 2 mL 1    naloxone (Narcan) 4 mg/0 1 mL nasal spray 0 1 mL (4 mg total) into each nostril as needed (respiratory depression or possible OD) 1 each 1    ondansetron (ZOFRAN-ODT) 4 mg disintegrating tablet Take 1 tablet (4 mg total) by mouth every 6 (six) hours as needed for nausea or vomiting 12 tablet 0    pantoprazole (PROTONIX) 20 mg tablet Take 1 tablet (20 mg total) by mouth daily 20 tablet 0    QUEtiapine (SEROquel) 100 mg tablet Take 1 tablet (100 mg total) by mouth daily at bedtime 30 tablet 0    QUEtiapine (SEROquel) 100 mg tablet TAKE 1 TABLET (100 MG TOTAL) BY MOUTH DAILY AT BEDTIME 30 tablet 0    QUEtiapine (SEROquel) 25 mg tablet Take 1 tablet (25 mg total) by mouth 2 (two) times a day 60 tablet 0    QUEtiapine (SEROquel) 25 mg tablet TAKE 1 TABLET (25 MG TOTAL) BY MOUTH 2 (TWO) TIMES A DAY 60 tablet 0    sucralfate (CARAFATE) 1 g tablet Take 1 tablet (1 g total) by mouth 4 (four) times a day 40 tablet 0     No current facility-administered medications for this visit  He is allergic to abilify [aripiprazole]       Review of Systems      Review systems completed, all negative except as noted above HPI  Objective:      /82   Pulse 88   Temp 98 5 °F (36 9 °C)   Ht 5' 7" (1 702 m)   Wt 88 kg (194 lb)   BMI 30 38 kg/m²          Physical Exam  Vitals reviewed  Constitutional:       General: He is not in acute distress  Appearance: Normal appearance  He is not ill-appearing, toxic-appearing or diaphoretic  HENT:      Head: Normocephalic and atraumatic  Eyes:      General: No scleral icterus  Right eye: No discharge  Left eye: No discharge  Neck:      Comments: Subcutaneous mass right anterior triangle, possible lymph node  Cardiovascular:      Rate and Rhythm: Normal rate and regular rhythm  Heart sounds: Normal heart sounds  No friction rub  No gallop  Pulmonary:      Effort: Pulmonary effort is normal  No respiratory distress  Breath sounds: No stridor  Wheezing (Bilateral) present  Abdominal:      General: Abdomen is flat  There is no distension  Palpations: There is no mass  Tenderness: There is no abdominal tenderness  Musculoskeletal:         General: Normal range of motion  Cervical back: Normal range of motion and neck supple  Tenderness present  Right lower leg: No edema  Left lower leg: No edema     Lymphadenopathy:      Cervical: Cervical adenopathy ( potential cervical adenopathy right anterior triangle) present  Skin:     General: Skin is warm and dry  Coloration: Skin is not jaundiced or pale  Findings: No bruising or erythema  Comments: Approximately 5 cm subcutaneous mass likely consistent lipoma of the left upper back  No erythema  No tenderness on palpation  No active drainage  Neurological:      General: No focal deficit present  Mental Status: He is alert and oriented to person, place, and time  Cranial Nerves: No cranial nerve deficit  Psychiatric:         Mood and Affect: Mood normal          Behavior: Behavior normal          Thought Content: Thought content normal          Judgment: Judgment normal            Note:     Notes from recent ER evaluation dated September 13, 2021 was personally reviewed by me  In addition recent office note from his PCP dated September 22, 2021 was also personally reviewed by me  Imaging:    CT scan of the neck and chest soft tissue dated September 13, 2020 was personally reviewed by me and also discussed and reviewed with the patient

## 2021-09-29 NOTE — ASSESSMENT & PLAN NOTE
Symptomatic lipoma of left upper back measuring approximately 5 cm  Patient was that this removed  I think this is reasonable  On exam there is a slightly mobile subcutaneous mass likely lipomatous in nature based on CT scan this is likely lipoma as well  Patient will be scheduled for excision in the OR  The procedure self including all associated risks and benefits discussed the patient great length  The patient verbalized understands risks is willing to proceed, consent was signed  Who now require any other additional preoperative workup at this time

## 2021-09-29 NOTE — ASSESSMENT & PLAN NOTE
Small palpable mass of the right anterior cervical triangle  Likely represents a lymph node  Patient currently denies any recent infection  Will obtain ultrasound for further evaluation to rule out lipoma versus lymph node

## 2021-10-01 ENCOUNTER — HOSPITAL ENCOUNTER (OUTPATIENT)
Dept: ULTRASOUND IMAGING | Facility: HOSPITAL | Age: 21
Discharge: HOME | End: 2021-10-01
Attending: SURGERY
Payer: COMMERCIAL

## 2021-10-01 DIAGNOSIS — R22.1 NECK MASS: ICD-10-CM

## 2021-10-01 PROCEDURE — 76536 US EXAM OF HEAD AND NECK: CPT

## 2021-10-05 ENCOUNTER — HOSPITAL ENCOUNTER (EMERGENCY)
Facility: HOSPITAL | Age: 21
Discharge: HOME | End: 2021-10-05
Attending: EMERGENCY MEDICINE
Payer: COMMERCIAL

## 2021-10-05 VITALS
DIASTOLIC BLOOD PRESSURE: 68 MMHG | WEIGHT: 194 LBS | RESPIRATION RATE: 18 BRPM | TEMPERATURE: 97.4 F | OXYGEN SATURATION: 100 % | HEIGHT: 67 IN | HEART RATE: 62 BPM | SYSTOLIC BLOOD PRESSURE: 108 MMHG | BODY MASS INDEX: 30.45 KG/M2

## 2021-10-05 DIAGNOSIS — Z20.822 EXPOSURE TO COVID-19 VIRUS: Primary | ICD-10-CM

## 2021-10-05 LAB — SARS-COV-2 RNA RESP QL NAA+PROBE: NEGATIVE

## 2021-10-05 PROCEDURE — 99283 EMERGENCY DEPT VISIT LOW MDM: CPT

## 2021-10-05 PROCEDURE — U0005 INFEC AGEN DETEC AMPLI PROBE: HCPCS | Performed by: EMERGENCY MEDICINE

## 2021-10-05 PROCEDURE — 99282 EMERGENCY DEPT VISIT SF MDM: CPT | Performed by: EMERGENCY MEDICINE

## 2021-10-05 PROCEDURE — U0003 INFECTIOUS AGENT DETECTION BY NUCLEIC ACID (DNA OR RNA); SEVERE ACUTE RESPIRATORY SYNDROME CORONAVIRUS 2 (SARS-COV-2) (CORONAVIRUS DISEASE [COVID-19]), AMPLIFIED PROBE TECHNIQUE, MAKING USE OF HIGH THROUGHPUT TECHNOLOGIES AS DESCRIBED BY CMS-2020-01-R: HCPCS | Performed by: EMERGENCY MEDICINE

## 2021-10-06 ENCOUNTER — PATIENT OUTREACH (OUTPATIENT)
Dept: CASE MANAGEMENT | Facility: HOSPITAL | Age: 21
End: 2021-10-06

## 2021-10-08 ENCOUNTER — OFFICE VISIT (OUTPATIENT)
Dept: INTERNAL MEDICINE CLINIC | Facility: CLINIC | Age: 21
End: 2021-10-08
Payer: COMMERCIAL

## 2021-10-08 VITALS
HEIGHT: 67 IN | SYSTOLIC BLOOD PRESSURE: 124 MMHG | TEMPERATURE: 96.4 F | HEART RATE: 78 BPM | OXYGEN SATURATION: 97 % | WEIGHT: 195.5 LBS | DIASTOLIC BLOOD PRESSURE: 78 MMHG | BODY MASS INDEX: 30.69 KG/M2

## 2021-10-08 DIAGNOSIS — Z51.81 ENCOUNTER FOR MONITORING SUBOXONE MAINTENANCE THERAPY: ICD-10-CM

## 2021-10-08 DIAGNOSIS — Z79.899 ENCOUNTER FOR MONITORING SUBOXONE MAINTENANCE THERAPY: ICD-10-CM

## 2021-10-08 DIAGNOSIS — Z79.899 MEDICATION THERAPY CONTINUED: ICD-10-CM

## 2021-10-08 DIAGNOSIS — F19.20 POLYSUBSTANCE DEPENDENCE INCLUDING OPIOID TYPE DRUG WITH COMPLICATION, CONTINUOUS USE (HCC): Primary | ICD-10-CM

## 2021-10-08 PROCEDURE — 99213 OFFICE O/P EST LOW 20 MIN: CPT | Performed by: INTERNAL MEDICINE

## 2021-10-08 PROCEDURE — 80307 DRUG TEST PRSMV CHEM ANLYZR: CPT | Performed by: INTERNAL MEDICINE

## 2021-10-08 RX ORDER — BUPRENORPHINE HYDROCHLORIDE AND NALOXONE HYDROCHLORIDE DIHYDRATE 8; 2 MG/1; MG/1
TABLET SUBLINGUAL
Qty: 45 TABLET | Refills: 0 | Status: SHIPPED | OUTPATIENT
Start: 2021-10-08 | End: 2021-11-05 | Stop reason: SDUPTHER

## 2021-10-13 ENCOUNTER — PATIENT OUTREACH (OUTPATIENT)
Dept: CASE MANAGEMENT | Facility: OTHER | Age: 21
End: 2021-10-13

## 2021-10-15 LAB
AMPHETAMINES UR QL SCN: NEGATIVE NG/ML
BARBITURATES UR QL SCN: NEGATIVE NG/ML
BENZODIAZ UR QL: NEGATIVE NG/ML
BUPRENORPHINE UR CFM-MCNC: 86 NG/ML
BUPRENORPHINE UR QL CFM: NORMAL NG/ML
BUPRENORPHINE UR QL CFM: POSITIVE
BUPRENORPHINE+NOR UR QL: POSITIVE
BZE UR QL: NEGATIVE NG/ML
CANNABINOIDS UR QL SCN: POSITIVE
METHADONE UR QL SCN: NEGATIVE NG/ML
NORBUPRENORPHINE UR CFM-MCNC: 542 NG/ML
NORBUPRENORPHINE UR QL CFM: POSITIVE
OPIATES UR QL: NEGATIVE NG/ML
PCP UR QL: NEGATIVE NG/ML
PROPOXYPH UR QL SCN: NEGATIVE NG/ML

## 2021-10-26 ENCOUNTER — ANESTHESIA (OUTPATIENT)
Dept: PERIOP | Facility: HOSPITAL | Age: 21
End: 2021-10-26
Payer: COMMERCIAL

## 2021-10-26 ENCOUNTER — HOSPITAL ENCOUNTER (EMERGENCY)
Facility: HOSPITAL | Age: 21
Discharge: HOME | End: 2021-10-26
Attending: EMERGENCY MEDICINE
Payer: COMMERCIAL

## 2021-10-26 ENCOUNTER — HOSPITAL ENCOUNTER (OUTPATIENT)
Facility: HOSPITAL | Age: 21
Setting detail: OUTPATIENT SURGERY
Discharge: HOME | End: 2021-10-26
Attending: SURGERY | Admitting: SURGERY
Payer: COMMERCIAL

## 2021-10-26 ENCOUNTER — ANESTHESIA EVENT (OUTPATIENT)
Dept: PERIOP | Facility: HOSPITAL | Age: 21
End: 2021-10-26
Payer: COMMERCIAL

## 2021-10-26 VITALS
TEMPERATURE: 97.5 F | DIASTOLIC BLOOD PRESSURE: 60 MMHG | RESPIRATION RATE: 20 BRPM | HEART RATE: 60 BPM | BODY MASS INDEX: 30.61 KG/M2 | WEIGHT: 195 LBS | SYSTOLIC BLOOD PRESSURE: 100 MMHG | HEIGHT: 67 IN | OXYGEN SATURATION: 98 %

## 2021-10-26 VITALS
RESPIRATION RATE: 18 BRPM | TEMPERATURE: 97.6 F | HEIGHT: 67 IN | DIASTOLIC BLOOD PRESSURE: 78 MMHG | HEART RATE: 78 BPM | WEIGHT: 195.11 LBS | BODY MASS INDEX: 30.62 KG/M2 | OXYGEN SATURATION: 98 % | SYSTOLIC BLOOD PRESSURE: 122 MMHG

## 2021-10-26 DIAGNOSIS — G89.18 POSTOPERATIVE PAIN: Primary | ICD-10-CM

## 2021-10-26 DIAGNOSIS — D17.1 LIPOMA OF BACK: ICD-10-CM

## 2021-10-26 PROCEDURE — 88304 TISSUE EXAM BY PATHOLOGIST: CPT | Performed by: PATHOLOGY

## 2021-10-26 PROCEDURE — 21931 EXC BACK LES SC 3 CM/>: CPT

## 2021-10-26 PROCEDURE — 99284 EMERGENCY DEPT VISIT MOD MDM: CPT | Performed by: EMERGENCY MEDICINE

## 2021-10-26 PROCEDURE — 21931 EXC BACK LES SC 3 CM/>: CPT | Performed by: SURGERY

## 2021-10-26 PROCEDURE — 99283 EMERGENCY DEPT VISIT LOW MDM: CPT

## 2021-10-26 RX ORDER — KETAMINE HCL IN NACL, ISO-OSM 100MG/10ML
SYRINGE (ML) INJECTION AS NEEDED
Status: DISCONTINUED | OUTPATIENT
Start: 2021-10-26 | End: 2021-10-26

## 2021-10-26 RX ORDER — HYDROCODONE BITARTRATE AND ACETAMINOPHEN 5; 325 MG/1; MG/1
1 TABLET ORAL ONCE
Status: COMPLETED | OUTPATIENT
Start: 2021-10-26 | End: 2021-10-26

## 2021-10-26 RX ORDER — SODIUM CHLORIDE, SODIUM LACTATE, POTASSIUM CHLORIDE, CALCIUM CHLORIDE 600; 310; 30; 20 MG/100ML; MG/100ML; MG/100ML; MG/100ML
125 INJECTION, SOLUTION INTRAVENOUS CONTINUOUS
Status: DISCONTINUED | OUTPATIENT
Start: 2021-10-26 | End: 2021-10-26 | Stop reason: HOSPADM

## 2021-10-26 RX ORDER — HYDROCODONE BITARTRATE AND ACETAMINOPHEN 5; 325 MG/1; MG/1
1 TABLET ORAL EVERY 6 HOURS PRN
Qty: 10 TABLET | Refills: 0 | Status: SHIPPED | OUTPATIENT
Start: 2021-10-26 | End: 2021-12-01 | Stop reason: HOSPADM

## 2021-10-26 RX ORDER — OXYCODONE HYDROCHLORIDE AND ACETAMINOPHEN 5; 325 MG/1; MG/1
2 TABLET ORAL EVERY 4 HOURS PRN
Status: DISCONTINUED | OUTPATIENT
Start: 2021-10-26 | End: 2021-10-26 | Stop reason: HOSPADM

## 2021-10-26 RX ORDER — SUCCINYLCHOLINE/SOD CL,ISO/PF 100 MG/5ML
SYRINGE (ML) INTRAVENOUS AS NEEDED
Status: DISCONTINUED | OUTPATIENT
Start: 2021-10-26 | End: 2021-10-26

## 2021-10-26 RX ORDER — DEXMEDETOMIDINE HYDROCHLORIDE 100 UG/ML
INJECTION, SOLUTION INTRAVENOUS AS NEEDED
Status: DISCONTINUED | OUTPATIENT
Start: 2021-10-26 | End: 2021-10-26

## 2021-10-26 RX ORDER — DEXAMETHASONE SODIUM PHOSPHATE 4 MG/ML
INJECTION, SOLUTION INTRA-ARTICULAR; INTRALESIONAL; INTRAMUSCULAR; INTRAVENOUS; SOFT TISSUE AS NEEDED
Status: DISCONTINUED | OUTPATIENT
Start: 2021-10-26 | End: 2021-10-26

## 2021-10-26 RX ORDER — ONDANSETRON 2 MG/ML
4 INJECTION INTRAMUSCULAR; INTRAVENOUS ONCE AS NEEDED
Status: DISCONTINUED | OUTPATIENT
Start: 2021-10-26 | End: 2021-10-26 | Stop reason: HOSPADM

## 2021-10-26 RX ORDER — CHLORHEXIDINE GLUCONATE 4 G/100ML
SOLUTION TOPICAL DAILY PRN
Status: DISCONTINUED | OUTPATIENT
Start: 2021-10-26 | End: 2021-10-26 | Stop reason: HOSPADM

## 2021-10-26 RX ORDER — ONDANSETRON 2 MG/ML
INJECTION INTRAMUSCULAR; INTRAVENOUS AS NEEDED
Status: DISCONTINUED | OUTPATIENT
Start: 2021-10-26 | End: 2021-10-26

## 2021-10-26 RX ORDER — KETOROLAC TROMETHAMINE 30 MG/ML
INJECTION, SOLUTION INTRAMUSCULAR; INTRAVENOUS AS NEEDED
Status: DISCONTINUED | OUTPATIENT
Start: 2021-10-26 | End: 2021-10-26

## 2021-10-26 RX ORDER — LIDOCAINE HYDROCHLORIDE 10 MG/ML
INJECTION, SOLUTION EPIDURAL; INFILTRATION; INTRACAUDAL; PERINEURAL AS NEEDED
Status: DISCONTINUED | OUTPATIENT
Start: 2021-10-26 | End: 2021-10-26

## 2021-10-26 RX ORDER — ONDANSETRON 2 MG/ML
4 INJECTION INTRAMUSCULAR; INTRAVENOUS EVERY 8 HOURS PRN
Status: DISCONTINUED | OUTPATIENT
Start: 2021-10-26 | End: 2021-10-26 | Stop reason: HOSPADM

## 2021-10-26 RX ORDER — CEFAZOLIN SODIUM 2 G/50ML
2000 SOLUTION INTRAVENOUS ONCE
Status: COMPLETED | OUTPATIENT
Start: 2021-10-26 | End: 2021-10-26

## 2021-10-26 RX ORDER — BUPIVACAINE HYDROCHLORIDE 5 MG/ML
INJECTION, SOLUTION PERINEURAL AS NEEDED
Status: DISCONTINUED | OUTPATIENT
Start: 2021-10-26 | End: 2021-10-26 | Stop reason: HOSPADM

## 2021-10-26 RX ORDER — PROPOFOL 10 MG/ML
INJECTION, EMULSION INTRAVENOUS AS NEEDED
Status: DISCONTINUED | OUTPATIENT
Start: 2021-10-26 | End: 2021-10-26

## 2021-10-26 RX ORDER — LIDOCAINE HYDROCHLORIDE AND EPINEPHRINE 10; 10 MG/ML; UG/ML
INJECTION, SOLUTION INFILTRATION; PERINEURAL AS NEEDED
Status: DISCONTINUED | OUTPATIENT
Start: 2021-10-26 | End: 2021-10-26 | Stop reason: HOSPADM

## 2021-10-26 RX ORDER — METOCLOPRAMIDE HYDROCHLORIDE 5 MG/ML
10 INJECTION INTRAMUSCULAR; INTRAVENOUS ONCE AS NEEDED
Status: DISCONTINUED | OUTPATIENT
Start: 2021-10-26 | End: 2021-10-26 | Stop reason: HOSPADM

## 2021-10-26 RX ORDER — SODIUM CHLORIDE, SODIUM LACTATE, POTASSIUM CHLORIDE, CALCIUM CHLORIDE 600; 310; 30; 20 MG/100ML; MG/100ML; MG/100ML; MG/100ML
INJECTION, SOLUTION INTRAVENOUS CONTINUOUS PRN
Status: DISCONTINUED | OUTPATIENT
Start: 2021-10-26 | End: 2021-10-26

## 2021-10-26 RX ADMIN — HYDROCODONE BITARTRATE AND ACETAMINOPHEN 1 TABLET: 5; 325 TABLET ORAL at 18:15

## 2021-10-26 RX ADMIN — KETOROLAC TROMETHAMINE 30 MG: 30 INJECTION, SOLUTION INTRAMUSCULAR; INTRAVENOUS at 12:17

## 2021-10-26 RX ADMIN — SODIUM CHLORIDE, SODIUM LACTATE, POTASSIUM CHLORIDE, AND CALCIUM CHLORIDE: .6; .31; .03; .02 INJECTION, SOLUTION INTRAVENOUS at 11:48

## 2021-10-26 RX ADMIN — LIDOCAINE HYDROCHLORIDE 50 MG: 10 INJECTION, SOLUTION EPIDURAL; INFILTRATION; INTRACAUDAL; PERINEURAL at 11:52

## 2021-10-26 RX ADMIN — DEXAMETHASONE SODIUM PHOSPHATE 4 MG: 4 INJECTION, SOLUTION INTRAMUSCULAR; INTRAVENOUS at 12:17

## 2021-10-26 RX ADMIN — DEXMEDETOMIDINE 20 MCG: 100 INJECTION, SOLUTION, CONCENTRATE INTRAVENOUS at 13:12

## 2021-10-26 RX ADMIN — ONDANSETRON HYDROCHLORIDE 4 MG: 2 INJECTION, SOLUTION INTRAVENOUS at 12:17

## 2021-10-26 RX ADMIN — Medication 100 MG: at 11:52

## 2021-10-26 RX ADMIN — PROPOFOL 200 MG: 10 INJECTION, EMULSION INTRAVENOUS at 11:52

## 2021-10-26 RX ADMIN — SODIUM CHLORIDE, SODIUM LACTATE, POTASSIUM CHLORIDE, AND CALCIUM CHLORIDE 125 ML/HR: .6; .31; .03; .02 INJECTION, SOLUTION INTRAVENOUS at 11:41

## 2021-10-26 RX ADMIN — CEFAZOLIN SODIUM 2000 MG: 2 SOLUTION INTRAVENOUS at 11:47

## 2021-10-26 RX ADMIN — Medication 50 MG: at 11:55

## 2021-10-29 ENCOUNTER — HOSPITAL ENCOUNTER (EMERGENCY)
Facility: HOSPITAL | Age: 21
Discharge: HOME | End: 2021-10-29
Attending: EMERGENCY MEDICINE | Admitting: EMERGENCY MEDICINE
Payer: COMMERCIAL

## 2021-10-29 VITALS
HEART RATE: 87 BPM | SYSTOLIC BLOOD PRESSURE: 140 MMHG | RESPIRATION RATE: 18 BRPM | TEMPERATURE: 98 F | OXYGEN SATURATION: 96 % | DIASTOLIC BLOOD PRESSURE: 67 MMHG | HEIGHT: 67 IN | BODY MASS INDEX: 30.62 KG/M2 | WEIGHT: 195.11 LBS

## 2021-10-29 DIAGNOSIS — M79.602 LEFT ARM PAIN: ICD-10-CM

## 2021-10-29 DIAGNOSIS — Z51.89 VISIT FOR WOUND CHECK: Primary | ICD-10-CM

## 2021-10-29 PROCEDURE — 99282 EMERGENCY DEPT VISIT SF MDM: CPT

## 2021-10-29 PROCEDURE — 99284 EMERGENCY DEPT VISIT MOD MDM: CPT | Performed by: EMERGENCY MEDICINE

## 2021-11-05 ENCOUNTER — OFFICE VISIT (OUTPATIENT)
Dept: INTERNAL MEDICINE CLINIC | Facility: CLINIC | Age: 21
End: 2021-11-05
Payer: COMMERCIAL

## 2021-11-05 VITALS
HEIGHT: 67 IN | DIASTOLIC BLOOD PRESSURE: 78 MMHG | SYSTOLIC BLOOD PRESSURE: 124 MMHG | TEMPERATURE: 96.8 F | WEIGHT: 195.38 LBS | HEART RATE: 74 BPM | BODY MASS INDEX: 30.66 KG/M2 | OXYGEN SATURATION: 98 %

## 2021-11-05 DIAGNOSIS — Z51.81 ENCOUNTER FOR MONITORING SUBOXONE MAINTENANCE THERAPY: ICD-10-CM

## 2021-11-05 DIAGNOSIS — F11.10 OPIATE ABUSE, EPISODIC (HCC): Primary | ICD-10-CM

## 2021-11-05 DIAGNOSIS — Z79.899 ENCOUNTER FOR MONITORING SUBOXONE MAINTENANCE THERAPY: ICD-10-CM

## 2021-11-05 DIAGNOSIS — Z79.899 MEDICATION THERAPY CONTINUED: ICD-10-CM

## 2021-11-05 DIAGNOSIS — F19.20 POLYSUBSTANCE DEPENDENCE INCLUDING OPIOID TYPE DRUG WITH COMPLICATION, CONTINUOUS USE (HCC): ICD-10-CM

## 2021-11-05 PROCEDURE — 80307 DRUG TEST PRSMV CHEM ANLYZR: CPT | Performed by: INTERNAL MEDICINE

## 2021-11-05 PROCEDURE — 99213 OFFICE O/P EST LOW 20 MIN: CPT | Performed by: INTERNAL MEDICINE

## 2021-11-05 RX ORDER — BUPRENORPHINE HYDROCHLORIDE AND NALOXONE HYDROCHLORIDE DIHYDRATE 8; 2 MG/1; MG/1
TABLET SUBLINGUAL
Qty: 30 TABLET | Refills: 0 | Status: SHIPPED | OUTPATIENT
Start: 2021-11-05 | End: 2021-12-03

## 2021-11-10 ENCOUNTER — OFFICE VISIT (OUTPATIENT)
Dept: SURGERY | Facility: CLINIC | Age: 21
End: 2021-11-10

## 2021-11-10 VITALS
HEIGHT: 67 IN | WEIGHT: 194 LBS | SYSTOLIC BLOOD PRESSURE: 112 MMHG | BODY MASS INDEX: 30.45 KG/M2 | TEMPERATURE: 98.8 F | HEART RATE: 82 BPM | DIASTOLIC BLOOD PRESSURE: 74 MMHG

## 2021-11-10 DIAGNOSIS — D17.1 LIPOMA OF BACK: Primary | ICD-10-CM

## 2021-11-10 LAB
BUPRENORPHINE UR QL CFM: NEGATIVE
BUPRENORPHINE UR QL CFM: NORMAL NG/ML
BUPRENORPHINE+NOR UR QL: POSITIVE
NORBUPRENORPHINE UR CFM-MCNC: 106 NG/ML
NORBUPRENORPHINE UR QL CFM: POSITIVE

## 2021-11-10 PROCEDURE — 99024 POSTOP FOLLOW-UP VISIT: CPT | Performed by: SURGERY

## 2021-11-11 ENCOUNTER — PATIENT OUTREACH (OUTPATIENT)
Dept: CASE MANAGEMENT | Facility: OTHER | Age: 21
End: 2021-11-11

## 2021-11-19 DIAGNOSIS — Z79.899 ENCOUNTER FOR MONITORING SUBOXONE MAINTENANCE THERAPY: ICD-10-CM

## 2021-11-19 DIAGNOSIS — Z51.81 ENCOUNTER FOR MONITORING SUBOXONE MAINTENANCE THERAPY: ICD-10-CM

## 2021-11-19 DIAGNOSIS — Z79.899 MEDICATION THERAPY CONTINUED: Primary | ICD-10-CM

## 2021-11-20 ENCOUNTER — HOSPITAL ENCOUNTER (EMERGENCY)
Facility: HOSPITAL | Age: 21
End: 2021-11-22
Attending: FAMILY MEDICINE
Payer: COMMERCIAL

## 2021-11-20 DIAGNOSIS — E78.2 MIXED HYPERLIPIDEMIA: ICD-10-CM

## 2021-11-20 DIAGNOSIS — K76.0 FATTY LIVER: ICD-10-CM

## 2021-11-20 DIAGNOSIS — F41.9 ANXIETY: ICD-10-CM

## 2021-11-20 DIAGNOSIS — Z00.8 MEDICAL CLEARANCE FOR PSYCHIATRIC ADMISSION: ICD-10-CM

## 2021-11-20 DIAGNOSIS — Z00.8 ENCOUNTER FOR PSYCHOLOGICAL EVALUATION: Primary | ICD-10-CM

## 2021-11-20 LAB
AMPHETAMINES SERPL QL SCN: POSITIVE
ATRIAL RATE: 67 BPM
BARBITURATES UR QL: NEGATIVE
BENZODIAZ UR QL: NEGATIVE
BILIRUB UR QL STRIP: NEGATIVE
CLARITY UR: CLEAR
COCAINE UR QL: NEGATIVE
COLOR UR: YELLOW
ETHANOL EXG-MCNC: NORMAL MG/DL
FLUAV RNA RESP QL NAA+PROBE: NEGATIVE
FLUBV RNA RESP QL NAA+PROBE: NEGATIVE
GLUCOSE UR STRIP-MCNC: NEGATIVE MG/DL
HGB UR QL STRIP.AUTO: NEGATIVE
KETONES UR STRIP-MCNC: NEGATIVE MG/DL
LEUKOCYTE ESTERASE UR QL STRIP: NEGATIVE
METHADONE UR QL: NEGATIVE
NITRITE UR QL STRIP: NEGATIVE
OPIATES UR QL SCN: NEGATIVE
OXYCODONE+OXYMORPHONE UR QL SCN: NEGATIVE
P AXIS: 49 DEGREES
PCP UR QL: NEGATIVE
PH UR STRIP.AUTO: 6 [PH]
PR INTERVAL: 128 MS
PROT UR STRIP-MCNC: NEGATIVE MG/DL
QRS AXIS: 79 DEGREES
QRSD INTERVAL: 92 MS
QT INTERVAL: 404 MS
QTC INTERVAL: 426 MS
RSV RNA RESP QL NAA+PROBE: NEGATIVE
SARS-COV-2 RNA RESP QL NAA+PROBE: NEGATIVE
SP GR UR STRIP.AUTO: <=1.005 (ref 1–1.03)
T WAVE AXIS: 20 DEGREES
THC UR QL: POSITIVE
UROBILINOGEN UR QL STRIP.AUTO: 0.2 E.U./DL
VENTRICULAR RATE: 67 BPM

## 2021-11-20 PROCEDURE — 82075 ASSAY OF BREATH ETHANOL: CPT | Performed by: FAMILY MEDICINE

## 2021-11-20 PROCEDURE — 81003 URINALYSIS AUTO W/O SCOPE: CPT | Performed by: FAMILY MEDICINE

## 2021-11-20 PROCEDURE — 93005 ELECTROCARDIOGRAM TRACING: CPT

## 2021-11-20 PROCEDURE — 0241U HB NFCT DS VIR RESP RNA 4 TRGT: CPT | Performed by: FAMILY MEDICINE

## 2021-11-20 PROCEDURE — 99285 EMERGENCY DEPT VISIT HI MDM: CPT

## 2021-11-20 PROCEDURE — 93010 ELECTROCARDIOGRAM REPORT: CPT | Performed by: INTERNAL MEDICINE

## 2021-11-20 PROCEDURE — 99284 EMERGENCY DEPT VISIT MOD MDM: CPT | Performed by: FAMILY MEDICINE

## 2021-11-20 PROCEDURE — 80307 DRUG TEST PRSMV CHEM ANLYZR: CPT | Performed by: FAMILY MEDICINE

## 2021-11-21 RX ORDER — BUPRENORPHINE AND NALOXONE 8; 2 MG/1; MG/1
8 FILM, SOLUBLE BUCCAL; SUBLINGUAL DAILY
Status: DISCONTINUED | OUTPATIENT
Start: 2021-11-21 | End: 2021-11-22 | Stop reason: HOSPADM

## 2021-11-21 RX ADMIN — BUPRENORPHINE AND NALOXONE 8 MG: 8; 2 FILM BUCCAL; SUBLINGUAL at 14:35

## 2021-11-22 ENCOUNTER — HOSPITAL ENCOUNTER (INPATIENT)
Facility: HOSPITAL | Age: 21
LOS: 9 days | Discharge: HOME | DRG: 753 | End: 2021-12-01
Attending: STUDENT IN AN ORGANIZED HEALTH CARE EDUCATION/TRAINING PROGRAM | Admitting: STUDENT IN AN ORGANIZED HEALTH CARE EDUCATION/TRAINING PROGRAM
Payer: COMMERCIAL

## 2021-11-22 ENCOUNTER — PATIENT OUTREACH (OUTPATIENT)
Dept: CASE MANAGEMENT | Facility: OTHER | Age: 21
End: 2021-11-22

## 2021-11-22 VITALS
WEIGHT: 187 LBS | DIASTOLIC BLOOD PRESSURE: 63 MMHG | BODY MASS INDEX: 30.05 KG/M2 | RESPIRATION RATE: 16 BRPM | OXYGEN SATURATION: 97 % | TEMPERATURE: 96.7 F | HEIGHT: 66 IN | SYSTOLIC BLOOD PRESSURE: 130 MMHG | HEART RATE: 84 BPM

## 2021-11-22 DIAGNOSIS — F31.32 BIPOLAR 1 DISORDER, DEPRESSED, MODERATE (HCC): Primary | ICD-10-CM

## 2021-11-22 DIAGNOSIS — Z72.0 TOBACCO USE: ICD-10-CM

## 2021-11-22 DIAGNOSIS — K21.9 GASTROESOPHAGEAL REFLUX DISEASE WITHOUT ESOPHAGITIS: ICD-10-CM

## 2021-11-22 DIAGNOSIS — Z00.8 MEDICAL CLEARANCE FOR PSYCHIATRIC ADMISSION: ICD-10-CM

## 2021-11-22 DIAGNOSIS — K76.0 FATTY LIVER: ICD-10-CM

## 2021-11-22 DIAGNOSIS — E78.2 MIXED HYPERLIPIDEMIA: ICD-10-CM

## 2021-11-22 RX ORDER — LORAZEPAM 2 MG/ML
2 INJECTION INTRAMUSCULAR
Status: DISCONTINUED | OUTPATIENT
Start: 2021-11-22 | End: 2021-12-01 | Stop reason: HOSPADM

## 2021-11-22 RX ORDER — HYDROXYZINE 50 MG/1
50 TABLET, FILM COATED ORAL
Status: DISCONTINUED | OUTPATIENT
Start: 2021-11-22 | End: 2021-12-01 | Stop reason: HOSPADM

## 2021-11-22 RX ORDER — BENZTROPINE MESYLATE 1 MG/1
1 TABLET ORAL
Status: DISCONTINUED | OUTPATIENT
Start: 2021-11-22 | End: 2021-12-01 | Stop reason: HOSPADM

## 2021-11-22 RX ORDER — BENZTROPINE MESYLATE 1 MG/ML
1 INJECTION INTRAMUSCULAR; INTRAVENOUS
Status: DISCONTINUED | OUTPATIENT
Start: 2021-11-22 | End: 2021-12-01 | Stop reason: HOSPADM

## 2021-11-22 RX ORDER — HALOPERIDOL 5 MG
5 TABLET ORAL
Status: DISCONTINUED | OUTPATIENT
Start: 2021-11-22 | End: 2021-12-01 | Stop reason: HOSPADM

## 2021-11-22 RX ORDER — HALOPERIDOL 2 MG/1
2 TABLET ORAL
Status: DISCONTINUED | OUTPATIENT
Start: 2021-11-22 | End: 2021-12-01 | Stop reason: HOSPADM

## 2021-11-22 RX ORDER — BENZTROPINE MESYLATE 1 MG/ML
1 INJECTION INTRAMUSCULAR; INTRAVENOUS
Status: CANCELLED | OUTPATIENT
Start: 2021-11-22

## 2021-11-22 RX ORDER — MINERAL OIL AND PETROLATUM 150; 830 MG/G; MG/G
1 OINTMENT OPHTHALMIC
Status: DISCONTINUED | OUTPATIENT
Start: 2021-11-22 | End: 2021-12-01 | Stop reason: HOSPADM

## 2021-11-22 RX ORDER — HYDROXYZINE HYDROCHLORIDE 25 MG/1
100 TABLET, FILM COATED ORAL
Status: CANCELLED | OUTPATIENT
Start: 2021-11-22

## 2021-11-22 RX ORDER — LORAZEPAM 2 MG/ML
1 INJECTION INTRAMUSCULAR
Status: DISCONTINUED | OUTPATIENT
Start: 2021-11-22 | End: 2021-12-01 | Stop reason: HOSPADM

## 2021-11-22 RX ORDER — IBUPROFEN 400 MG/1
400 TABLET ORAL EVERY 4 HOURS PRN
Status: CANCELLED | OUTPATIENT
Start: 2021-11-22

## 2021-11-22 RX ORDER — HALOPERIDOL 5 MG/ML
2.5 INJECTION INTRAMUSCULAR
Status: CANCELLED | OUTPATIENT
Start: 2021-11-22

## 2021-11-22 RX ORDER — LORAZEPAM 2 MG/ML
2 INJECTION INTRAMUSCULAR EVERY 6 HOURS PRN
Status: CANCELLED | OUTPATIENT
Start: 2021-11-22

## 2021-11-22 RX ORDER — NICOTINE 21 MG/24HR
1 PATCH, TRANSDERMAL 24 HOURS TRANSDERMAL DAILY
Status: CANCELLED | OUTPATIENT
Start: 2021-11-22

## 2021-11-22 RX ORDER — AMOXICILLIN 250 MG
1 CAPSULE ORAL DAILY PRN
Status: DISCONTINUED | OUTPATIENT
Start: 2021-11-22 | End: 2021-12-01 | Stop reason: HOSPADM

## 2021-11-22 RX ORDER — HYDROXYZINE 50 MG/1
100 TABLET, FILM COATED ORAL
Status: DISCONTINUED | OUTPATIENT
Start: 2021-11-22 | End: 2021-12-01 | Stop reason: HOSPADM

## 2021-11-22 RX ORDER — IBUPROFEN 400 MG/1
800 TABLET ORAL EVERY 8 HOURS PRN
Status: CANCELLED | OUTPATIENT
Start: 2021-11-22

## 2021-11-22 RX ORDER — DIPHENHYDRAMINE HYDROCHLORIDE 50 MG/ML
50 INJECTION INTRAMUSCULAR; INTRAVENOUS EVERY 6 HOURS PRN
Status: DISCONTINUED | OUTPATIENT
Start: 2021-11-22 | End: 2021-12-01 | Stop reason: HOSPADM

## 2021-11-22 RX ORDER — HALOPERIDOL 5 MG/ML
5 INJECTION INTRAMUSCULAR
Status: CANCELLED | OUTPATIENT
Start: 2021-11-22

## 2021-11-22 RX ORDER — BISACODYL 10 MG
10 SUPPOSITORY, RECTAL RECTAL DAILY PRN
Status: DISCONTINUED | OUTPATIENT
Start: 2021-11-22 | End: 2021-12-01 | Stop reason: HOSPADM

## 2021-11-22 RX ORDER — MAGNESIUM HYDROXIDE/ALUMINUM HYDROXICE/SIMETHICONE 120; 1200; 1200 MG/30ML; MG/30ML; MG/30ML
30 SUSPENSION ORAL EVERY 4 HOURS PRN
Status: DISCONTINUED | OUTPATIENT
Start: 2021-11-22 | End: 2021-12-01 | Stop reason: HOSPADM

## 2021-11-22 RX ORDER — LORAZEPAM 2 MG/ML
2 INJECTION INTRAMUSCULAR EVERY 6 HOURS PRN
Status: DISCONTINUED | OUTPATIENT
Start: 2021-11-22 | End: 2021-12-01 | Stop reason: HOSPADM

## 2021-11-22 RX ORDER — BENZTROPINE MESYLATE 1 MG/ML
0.5 INJECTION INTRAMUSCULAR; INTRAVENOUS
Status: DISCONTINUED | OUTPATIENT
Start: 2021-11-22 | End: 2021-12-01 | Stop reason: HOSPADM

## 2021-11-22 RX ORDER — IBUPROFEN 400 MG/1
400 TABLET ORAL EVERY 4 HOURS PRN
Status: DISCONTINUED | OUTPATIENT
Start: 2021-11-22 | End: 2021-12-01 | Stop reason: HOSPADM

## 2021-11-22 RX ORDER — TRAZODONE HYDROCHLORIDE 50 MG/1
50 TABLET ORAL
Status: DISCONTINUED | OUTPATIENT
Start: 2021-11-22 | End: 2021-12-01 | Stop reason: HOSPADM

## 2021-11-22 RX ORDER — TRAZODONE HYDROCHLORIDE 50 MG/1
50 TABLET ORAL
Status: CANCELLED | OUTPATIENT
Start: 2021-11-22

## 2021-11-22 RX ORDER — DIPHENHYDRAMINE HYDROCHLORIDE 50 MG/ML
50 INJECTION INTRAMUSCULAR; INTRAVENOUS EVERY 6 HOURS PRN
Status: CANCELLED | OUTPATIENT
Start: 2021-11-22

## 2021-11-22 RX ORDER — IBUPROFEN 600 MG/1
600 TABLET ORAL EVERY 6 HOURS PRN
Status: DISCONTINUED | OUTPATIENT
Start: 2021-11-22 | End: 2021-12-01 | Stop reason: HOSPADM

## 2021-11-22 RX ORDER — HALOPERIDOL 5 MG/ML
5 INJECTION INTRAMUSCULAR
Status: DISCONTINUED | OUTPATIENT
Start: 2021-11-22 | End: 2021-12-01 | Stop reason: HOSPADM

## 2021-11-22 RX ORDER — AMOXICILLIN 250 MG
1 CAPSULE ORAL DAILY PRN
Status: CANCELLED | OUTPATIENT
Start: 2021-11-22

## 2021-11-22 RX ORDER — HYDROXYZINE HYDROCHLORIDE 25 MG/1
25 TABLET, FILM COATED ORAL
Status: DISCONTINUED | OUTPATIENT
Start: 2021-11-22 | End: 2021-12-01 | Stop reason: HOSPADM

## 2021-11-22 RX ORDER — BISACODYL 10 MG
10 SUPPOSITORY, RECTAL RECTAL DAILY PRN
Status: CANCELLED | OUTPATIENT
Start: 2021-11-22

## 2021-11-22 RX ORDER — MAGNESIUM HYDROXIDE/ALUMINUM HYDROXICE/SIMETHICONE 120; 1200; 1200 MG/30ML; MG/30ML; MG/30ML
30 SUSPENSION ORAL EVERY 4 HOURS PRN
Status: CANCELLED | OUTPATIENT
Start: 2021-11-22

## 2021-11-22 RX ORDER — HYDROXYZINE HYDROCHLORIDE 25 MG/1
25 TABLET, FILM COATED ORAL
Status: CANCELLED | OUTPATIENT
Start: 2021-11-22

## 2021-11-22 RX ORDER — HALOPERIDOL 5 MG
5 TABLET ORAL
Status: CANCELLED | OUTPATIENT
Start: 2021-11-22

## 2021-11-22 RX ORDER — NICOTINE 21 MG/24HR
1 PATCH, TRANSDERMAL 24 HOURS TRANSDERMAL DAILY
Status: DISCONTINUED | OUTPATIENT
Start: 2021-11-22 | End: 2021-11-23 | Stop reason: SINTOL

## 2021-11-22 RX ORDER — BENZTROPINE MESYLATE 1 MG/ML
0.5 INJECTION INTRAMUSCULAR; INTRAVENOUS
Status: CANCELLED | OUTPATIENT
Start: 2021-11-22

## 2021-11-22 RX ORDER — LORAZEPAM 2 MG/ML
1 INJECTION INTRAMUSCULAR
Status: CANCELLED | OUTPATIENT
Start: 2021-11-22

## 2021-11-22 RX ORDER — POLYETHYLENE GLYCOL 3350 17 G/17G
17 POWDER, FOR SOLUTION ORAL DAILY PRN
Status: CANCELLED | OUTPATIENT
Start: 2021-11-22

## 2021-11-22 RX ORDER — HALOPERIDOL 2 MG/1
2 TABLET ORAL
Status: CANCELLED | OUTPATIENT
Start: 2021-11-22

## 2021-11-22 RX ORDER — HALOPERIDOL 5 MG/ML
2.5 INJECTION INTRAMUSCULAR
Status: DISCONTINUED | OUTPATIENT
Start: 2021-11-22 | End: 2021-12-01 | Stop reason: HOSPADM

## 2021-11-22 RX ORDER — BENZTROPINE MESYLATE 0.5 MG/1
1 TABLET ORAL
Status: CANCELLED | OUTPATIENT
Start: 2021-11-22

## 2021-11-22 RX ORDER — MINERAL OIL AND PETROLATUM 150; 830 MG/G; MG/G
1 OINTMENT OPHTHALMIC
Status: CANCELLED | OUTPATIENT
Start: 2021-11-22

## 2021-11-22 RX ORDER — BUPRENORPHINE AND NALOXONE 8; 2 MG/1; MG/1
8 FILM, SOLUBLE BUCCAL; SUBLINGUAL DAILY
Status: CANCELLED | OUTPATIENT
Start: 2021-11-22

## 2021-11-22 RX ORDER — LORAZEPAM 2 MG/ML
2 INJECTION INTRAMUSCULAR
Status: CANCELLED | OUTPATIENT
Start: 2021-11-22

## 2021-11-22 RX ORDER — POLYETHYLENE GLYCOL 3350 17 G/17G
17 POWDER, FOR SOLUTION ORAL DAILY PRN
Status: DISCONTINUED | OUTPATIENT
Start: 2021-11-22 | End: 2021-12-01 | Stop reason: HOSPADM

## 2021-11-22 RX ORDER — IBUPROFEN 800 MG/1
800 TABLET ORAL EVERY 8 HOURS PRN
Status: DISCONTINUED | OUTPATIENT
Start: 2021-11-22 | End: 2021-12-01 | Stop reason: HOSPADM

## 2021-11-22 RX ORDER — BUPRENORPHINE AND NALOXONE 8; 2 MG/1; MG/1
8 FILM, SOLUBLE BUCCAL; SUBLINGUAL DAILY
Status: DISCONTINUED | OUTPATIENT
Start: 2021-11-23 | End: 2021-12-01 | Stop reason: HOSPADM

## 2021-11-22 RX ORDER — HYDROXYZINE HYDROCHLORIDE 25 MG/1
50 TABLET, FILM COATED ORAL
Status: CANCELLED | OUTPATIENT
Start: 2021-11-22

## 2021-11-22 RX ADMIN — DOCUSATE SODIUM AND SENNOSIDES 1 TABLET: 8.6; 5 TABLET ORAL at 17:56

## 2021-11-22 RX ADMIN — NICOTINE 1 PATCH: 14 PATCH, EXTENDED RELEASE TRANSDERMAL at 16:26

## 2021-11-22 RX ADMIN — BUPRENORPHINE AND NALOXONE 8 MG: 8; 2 FILM BUCCAL; SUBLINGUAL at 14:45

## 2021-11-23 PROBLEM — F43.10 PTSD (POST-TRAUMATIC STRESS DISORDER): Status: ACTIVE | Noted: 2021-11-23

## 2021-11-23 PROBLEM — F41.9 ANXIETY: Status: ACTIVE | Noted: 2021-11-23

## 2021-11-23 PROBLEM — K21.9 GERD (GASTROESOPHAGEAL REFLUX DISEASE): Status: ACTIVE | Noted: 2021-11-23

## 2021-11-23 LAB
ALBUMIN SERPL BCP-MCNC: 3.4 G/DL (ref 3.5–5)
ALP SERPL-CCNC: 99 U/L (ref 46–116)
ALT SERPL W P-5'-P-CCNC: 29 U/L (ref 12–78)
ANION GAP SERPL CALCULATED.3IONS-SCNC: 9 MMOL/L (ref 4–13)
AST SERPL W P-5'-P-CCNC: 17 U/L (ref 5–45)
BASOPHILS # BLD AUTO: 0.04 THOUSANDS/ΜL (ref 0–0.1)
BASOPHILS NFR BLD AUTO: 1 % (ref 0–1)
BILIRUB SERPL-MCNC: 0.1 MG/DL (ref 0.2–1)
BUN SERPL-MCNC: 14 MG/DL (ref 5–25)
CALCIUM ALBUM COR SERPL-MCNC: 9.4 MG/DL (ref 8.3–10.1)
CALCIUM SERPL-MCNC: 8.9 MG/DL (ref 8.3–10.1)
CHLORIDE SERPL-SCNC: 106 MMOL/L (ref 100–108)
CHOLEST SERPL-MCNC: 114 MG/DL
CO2 SERPL-SCNC: 28 MMOL/L (ref 21–32)
CREAT SERPL-MCNC: 0.62 MG/DL (ref 0.6–1.3)
EOSINOPHIL # BLD AUTO: 0.28 THOUSAND/ΜL (ref 0–0.61)
EOSINOPHIL NFR BLD AUTO: 4 % (ref 0–6)
ERYTHROCYTE [DISTWIDTH] IN BLOOD BY AUTOMATED COUNT: 12.5 % (ref 11.6–15.1)
EST. AVERAGE GLUCOSE BLD GHB EST-MCNC: 108 MG/DL
GFR SERPL CREATININE-BSD FRML MDRD: 142 ML/MIN/1.73SQ M
GLUCOSE P FAST SERPL-MCNC: 88 MG/DL (ref 65–99)
GLUCOSE SERPL-MCNC: 88 MG/DL (ref 65–140)
HBA1C MFR BLD: 5.4 %
HCT VFR BLD AUTO: 44.1 % (ref 36.5–49.3)
HDLC SERPL-MCNC: 33 MG/DL
HGB BLD-MCNC: 14.6 G/DL (ref 12–17)
IMM GRANULOCYTES # BLD AUTO: 0.03 THOUSAND/UL (ref 0–0.2)
IMM GRANULOCYTES NFR BLD AUTO: 0 % (ref 0–2)
LDLC SERPL CALC-MCNC: 54 MG/DL (ref 0–100)
LYMPHOCYTES # BLD AUTO: 2.95 THOUSANDS/ΜL (ref 0.6–4.47)
LYMPHOCYTES NFR BLD AUTO: 45 % (ref 14–44)
MCH RBC QN AUTO: 28.5 PG (ref 26.8–34.3)
MCHC RBC AUTO-ENTMCNC: 33.1 G/DL (ref 31.4–37.4)
MCV RBC AUTO: 86 FL (ref 82–98)
MONOCYTES # BLD AUTO: 0.96 THOUSAND/ΜL (ref 0.17–1.22)
MONOCYTES NFR BLD AUTO: 14 % (ref 4–12)
NEUTROPHILS # BLD AUTO: 2.41 THOUSANDS/ΜL (ref 1.85–7.62)
NEUTS SEG NFR BLD AUTO: 36 % (ref 43–75)
NONHDLC SERPL-MCNC: 81 MG/DL
NRBC BLD AUTO-RTO: 0 /100 WBCS
PLATELET # BLD AUTO: 310 THOUSANDS/UL (ref 149–390)
PMV BLD AUTO: 10 FL (ref 8.9–12.7)
POTASSIUM SERPL-SCNC: 4.1 MMOL/L (ref 3.5–5.3)
PROT SERPL-MCNC: 7 G/DL (ref 6.4–8.2)
RBC # BLD AUTO: 5.13 MILLION/UL (ref 3.88–5.62)
SODIUM SERPL-SCNC: 143 MMOL/L (ref 136–145)
TRIGL SERPL-MCNC: 134 MG/DL
TSH SERPL DL<=0.05 MIU/L-ACNC: 1.4 UIU/ML (ref 0.36–3.74)
WBC # BLD AUTO: 6.67 THOUSAND/UL (ref 4.31–10.16)

## 2021-11-23 PROCEDURE — 99222 1ST HOSP IP/OBS MODERATE 55: CPT | Performed by: STUDENT IN AN ORGANIZED HEALTH CARE EDUCATION/TRAINING PROGRAM

## 2021-11-23 PROCEDURE — 80053 COMPREHEN METABOLIC PANEL: CPT | Performed by: PHYSICIAN ASSISTANT

## 2021-11-23 PROCEDURE — 80061 LIPID PANEL: CPT | Performed by: PHYSICIAN ASSISTANT

## 2021-11-23 PROCEDURE — 83036 HEMOGLOBIN GLYCOSYLATED A1C: CPT | Performed by: PHYSICIAN ASSISTANT

## 2021-11-23 PROCEDURE — 84443 ASSAY THYROID STIM HORMONE: CPT | Performed by: PHYSICIAN ASSISTANT

## 2021-11-23 PROCEDURE — 99253 IP/OBS CNSLTJ NEW/EST LOW 45: CPT | Performed by: PHYSICIAN ASSISTANT

## 2021-11-23 PROCEDURE — 86592 SYPHILIS TEST NON-TREP QUAL: CPT | Performed by: PHYSICIAN ASSISTANT

## 2021-11-23 PROCEDURE — 93005 ELECTROCARDIOGRAM TRACING: CPT

## 2021-11-23 PROCEDURE — 85025 COMPLETE CBC W/AUTO DIFF WBC: CPT | Performed by: PHYSICIAN ASSISTANT

## 2021-11-23 RX ORDER — PANTOPRAZOLE SODIUM 20 MG/1
20 TABLET, DELAYED RELEASE ORAL
Status: DISCONTINUED | OUTPATIENT
Start: 2021-11-23 | End: 2021-12-01 | Stop reason: HOSPADM

## 2021-11-23 RX ORDER — SUCRALFATE 1 G/1
1 TABLET ORAL
Status: DISCONTINUED | OUTPATIENT
Start: 2021-11-23 | End: 2021-12-01 | Stop reason: HOSPADM

## 2021-11-23 RX ORDER — QUETIAPINE FUMARATE 25 MG/1
50 TABLET, FILM COATED ORAL
Status: DISCONTINUED | OUTPATIENT
Start: 2021-11-23 | End: 2021-11-24

## 2021-11-23 RX ORDER — ALBUTEROL SULFATE 90 UG/1
2 AEROSOL, METERED RESPIRATORY (INHALATION) EVERY 4 HOURS PRN
Status: DISCONTINUED | OUTPATIENT
Start: 2021-11-23 | End: 2021-12-01 | Stop reason: HOSPADM

## 2021-11-23 RX ORDER — GABAPENTIN 300 MG/1
300 CAPSULE ORAL 3 TIMES DAILY
Status: DISCONTINUED | OUTPATIENT
Start: 2021-11-23 | End: 2021-11-24

## 2021-11-23 RX ADMIN — SUCRALFATE 1 G: 1 TABLET ORAL at 16:03

## 2021-11-23 RX ADMIN — NICOTINE POLACRILEX 4 MG: 4 GUM, CHEWING BUCCAL at 09:42

## 2021-11-23 RX ADMIN — QUETIAPINE FUMARATE 50 MG: 25 TABLET ORAL at 21:33

## 2021-11-23 RX ADMIN — GABAPENTIN 300 MG: 300 CAPSULE ORAL at 21:33

## 2021-11-23 RX ADMIN — POLYETHYLENE GLYCOL 3350 17 G: 17 POWDER, FOR SOLUTION ORAL at 08:54

## 2021-11-23 RX ADMIN — NICOTINE POLACRILEX 4 MG: 4 GUM, CHEWING BUCCAL at 17:48

## 2021-11-23 RX ADMIN — SUCRALFATE 1 G: 1 TABLET ORAL at 21:33

## 2021-11-23 RX ADMIN — NICOTINE 1 PATCH: 14 PATCH, EXTENDED RELEASE TRANSDERMAL at 08:53

## 2021-11-23 RX ADMIN — SUCRALFATE 1 G: 1 TABLET ORAL at 11:09

## 2021-11-23 RX ADMIN — PANTOPRAZOLE SODIUM 20 MG: 20 TABLET, DELAYED RELEASE ORAL at 08:52

## 2021-11-23 RX ADMIN — GABAPENTIN 300 MG: 300 CAPSULE ORAL at 09:42

## 2021-11-23 RX ADMIN — SUCRALFATE 1 G: 1 TABLET ORAL at 08:52

## 2021-11-23 RX ADMIN — GABAPENTIN 300 MG: 300 CAPSULE ORAL at 16:03

## 2021-11-23 RX ADMIN — BUPRENORPHINE AND NALOXONE 8 MG: 8; 2 FILM BUCCAL; SUBLINGUAL at 08:54

## 2021-11-24 LAB — RPR SER QL: NORMAL

## 2021-11-24 PROCEDURE — 99232 SBSQ HOSP IP/OBS MODERATE 35: CPT | Performed by: STUDENT IN AN ORGANIZED HEALTH CARE EDUCATION/TRAINING PROGRAM

## 2021-11-24 RX ORDER — QUETIAPINE FUMARATE 25 MG/1
25 TABLET, FILM COATED ORAL DAILY
Status: DISCONTINUED | OUTPATIENT
Start: 2021-11-25 | End: 2021-11-26

## 2021-11-24 RX ORDER — QUETIAPINE FUMARATE 100 MG/1
100 TABLET, FILM COATED ORAL
Status: DISCONTINUED | OUTPATIENT
Start: 2021-11-24 | End: 2021-12-01 | Stop reason: HOSPADM

## 2021-11-24 RX ORDER — GABAPENTIN 300 MG/1
600 CAPSULE ORAL 3 TIMES DAILY
Status: DISCONTINUED | OUTPATIENT
Start: 2021-11-24 | End: 2021-11-26

## 2021-11-24 RX ADMIN — PANTOPRAZOLE SODIUM 20 MG: 20 TABLET, DELAYED RELEASE ORAL at 06:35

## 2021-11-24 RX ADMIN — SUCRALFATE 1 G: 1 TABLET ORAL at 17:13

## 2021-11-24 RX ADMIN — GABAPENTIN 600 MG: 300 CAPSULE ORAL at 21:22

## 2021-11-24 RX ADMIN — BUPRENORPHINE AND NALOXONE 8 MG: 8; 2 FILM BUCCAL; SUBLINGUAL at 09:34

## 2021-11-24 RX ADMIN — GABAPENTIN 600 MG: 300 CAPSULE ORAL at 17:13

## 2021-11-24 RX ADMIN — SUCRALFATE 1 G: 1 TABLET ORAL at 11:28

## 2021-11-24 RX ADMIN — SUCRALFATE 1 G: 1 TABLET ORAL at 06:35

## 2021-11-24 RX ADMIN — QUETIAPINE FUMARATE 100 MG: 100 TABLET ORAL at 21:22

## 2021-11-24 RX ADMIN — GABAPENTIN 300 MG: 300 CAPSULE ORAL at 09:34

## 2021-11-24 RX ADMIN — SUCRALFATE 1 G: 1 TABLET ORAL at 21:22

## 2021-11-24 RX ADMIN — DOCUSATE SODIUM AND SENNOSIDES 1 TABLET: 8.6; 5 TABLET ORAL at 09:34

## 2021-11-25 LAB
ATRIAL RATE: 49 BPM
ATRIAL RATE: 49 BPM
P AXIS: 47 DEGREES
P AXIS: 47 DEGREES
PR INTERVAL: 128 MS
PR INTERVAL: 128 MS
QRS AXIS: 84 DEGREES
QRS AXIS: 84 DEGREES
QRSD INTERVAL: 92 MS
QRSD INTERVAL: 92 MS
QT INTERVAL: 426 MS
QT INTERVAL: 426 MS
QTC INTERVAL: 384 MS
QTC INTERVAL: 384 MS
T WAVE AXIS: 26 DEGREES
T WAVE AXIS: 26 DEGREES
VENTRICULAR RATE: 49 BPM
VENTRICULAR RATE: 49 BPM

## 2021-11-25 PROCEDURE — 99232 SBSQ HOSP IP/OBS MODERATE 35: CPT | Performed by: STUDENT IN AN ORGANIZED HEALTH CARE EDUCATION/TRAINING PROGRAM

## 2021-11-25 PROCEDURE — 93010 ELECTROCARDIOGRAM REPORT: CPT | Performed by: INTERNAL MEDICINE

## 2021-11-25 RX ADMIN — QUETIAPINE FUMARATE 100 MG: 100 TABLET ORAL at 21:18

## 2021-11-25 RX ADMIN — QUETIAPINE FUMARATE 25 MG: 25 TABLET ORAL at 09:05

## 2021-11-25 RX ADMIN — BUPRENORPHINE AND NALOXONE 8 MG: 8; 2 FILM BUCCAL; SUBLINGUAL at 09:06

## 2021-11-25 RX ADMIN — GABAPENTIN 600 MG: 300 CAPSULE ORAL at 09:05

## 2021-11-25 RX ADMIN — SUCRALFATE 1 G: 1 TABLET ORAL at 21:18

## 2021-11-25 RX ADMIN — SUCRALFATE 1 G: 1 TABLET ORAL at 06:09

## 2021-11-25 RX ADMIN — PANTOPRAZOLE SODIUM 20 MG: 20 TABLET, DELAYED RELEASE ORAL at 06:09

## 2021-11-25 RX ADMIN — GABAPENTIN 600 MG: 300 CAPSULE ORAL at 21:18

## 2021-11-25 RX ADMIN — SUCRALFATE 1 G: 1 TABLET ORAL at 17:17

## 2021-11-25 RX ADMIN — HYDROXYZINE HYDROCHLORIDE 100 MG: 50 TABLET, FILM COATED ORAL at 12:49

## 2021-11-25 RX ADMIN — HYDROXYZINE HYDROCHLORIDE 100 MG: 50 TABLET, FILM COATED ORAL at 18:30

## 2021-11-25 RX ADMIN — HALOPERIDOL 5 MG: 5 TABLET ORAL at 12:49

## 2021-11-25 RX ADMIN — GABAPENTIN 600 MG: 300 CAPSULE ORAL at 17:16

## 2021-11-26 PROCEDURE — 99232 SBSQ HOSP IP/OBS MODERATE 35: CPT | Performed by: STUDENT IN AN ORGANIZED HEALTH CARE EDUCATION/TRAINING PROGRAM

## 2021-11-26 RX ORDER — QUETIAPINE FUMARATE 25 MG/1
25 TABLET, FILM COATED ORAL 2 TIMES DAILY
Status: DISCONTINUED | OUTPATIENT
Start: 2021-11-26 | End: 2021-11-29

## 2021-11-26 RX ADMIN — PANTOPRAZOLE SODIUM 20 MG: 20 TABLET, DELAYED RELEASE ORAL at 06:07

## 2021-11-26 RX ADMIN — NICOTINE POLACRILEX 4 MG: 4 GUM, CHEWING BUCCAL at 16:17

## 2021-11-26 RX ADMIN — SUCRALFATE 1 G: 1 TABLET ORAL at 16:04

## 2021-11-26 RX ADMIN — HALOPERIDOL 5 MG: 5 TABLET ORAL at 16:05

## 2021-11-26 RX ADMIN — SUCRALFATE 1 G: 1 TABLET ORAL at 11:30

## 2021-11-26 RX ADMIN — SUCRALFATE 1 G: 1 TABLET ORAL at 21:32

## 2021-11-26 RX ADMIN — QUETIAPINE FUMARATE 25 MG: 25 TABLET ORAL at 08:57

## 2021-11-26 RX ADMIN — QUETIAPINE FUMARATE 25 MG: 25 TABLET ORAL at 18:57

## 2021-11-26 RX ADMIN — GABAPENTIN 700 MG: 400 CAPSULE ORAL at 16:04

## 2021-11-26 RX ADMIN — GABAPENTIN 700 MG: 400 CAPSULE ORAL at 21:32

## 2021-11-26 RX ADMIN — GABAPENTIN 600 MG: 300 CAPSULE ORAL at 08:57

## 2021-11-26 RX ADMIN — SUCRALFATE 1 G: 1 TABLET ORAL at 06:07

## 2021-11-26 RX ADMIN — QUETIAPINE FUMARATE 100 MG: 100 TABLET ORAL at 21:32

## 2021-11-26 RX ADMIN — BUPRENORPHINE AND NALOXONE 8 MG: 8; 2 FILM BUCCAL; SUBLINGUAL at 08:59

## 2021-11-27 PROCEDURE — 99233 SBSQ HOSP IP/OBS HIGH 50: CPT | Performed by: PSYCHIATRY & NEUROLOGY

## 2021-11-27 RX ADMIN — BUPRENORPHINE AND NALOXONE 8 MG: 8; 2 FILM BUCCAL; SUBLINGUAL at 09:05

## 2021-11-27 RX ADMIN — SUCRALFATE 1 G: 1 TABLET ORAL at 06:48

## 2021-11-27 RX ADMIN — HYDROXYZINE HYDROCHLORIDE 100 MG: 50 TABLET, FILM COATED ORAL at 09:10

## 2021-11-27 RX ADMIN — HALOPERIDOL 5 MG: 5 TABLET ORAL at 09:10

## 2021-11-27 RX ADMIN — PANTOPRAZOLE SODIUM 20 MG: 20 TABLET, DELAYED RELEASE ORAL at 06:48

## 2021-11-27 RX ADMIN — GABAPENTIN 700 MG: 400 CAPSULE ORAL at 16:21

## 2021-11-27 RX ADMIN — QUETIAPINE FUMARATE 25 MG: 25 TABLET ORAL at 17:11

## 2021-11-27 RX ADMIN — NICOTINE POLACRILEX 4 MG: 4 GUM, CHEWING BUCCAL at 10:21

## 2021-11-27 RX ADMIN — GABAPENTIN 700 MG: 400 CAPSULE ORAL at 21:26

## 2021-11-27 RX ADMIN — GABAPENTIN 700 MG: 400 CAPSULE ORAL at 09:05

## 2021-11-27 RX ADMIN — SUCRALFATE 1 G: 1 TABLET ORAL at 16:21

## 2021-11-27 RX ADMIN — HYDROXYZINE HYDROCHLORIDE 50 MG: 50 TABLET, FILM COATED ORAL at 18:51

## 2021-11-27 RX ADMIN — SUCRALFATE 1 G: 1 TABLET ORAL at 21:26

## 2021-11-27 RX ADMIN — QUETIAPINE FUMARATE 100 MG: 100 TABLET ORAL at 21:26

## 2021-11-27 RX ADMIN — QUETIAPINE FUMARATE 25 MG: 25 TABLET ORAL at 09:05

## 2021-11-27 RX ADMIN — ALUMINUM HYDROXIDE, MAGNESIUM HYDROXIDE, AND SIMETHICONE 30 ML: 200; 200; 20 SUSPENSION ORAL at 17:55

## 2021-11-28 PROCEDURE — 99233 SBSQ HOSP IP/OBS HIGH 50: CPT | Performed by: PSYCHIATRY & NEUROLOGY

## 2021-11-28 RX ADMIN — HALOPERIDOL 5 MG: 5 TABLET ORAL at 11:26

## 2021-11-28 RX ADMIN — SUCRALFATE 1 G: 1 TABLET ORAL at 06:16

## 2021-11-28 RX ADMIN — SUCRALFATE 1 G: 1 TABLET ORAL at 21:32

## 2021-11-28 RX ADMIN — GABAPENTIN 700 MG: 400 CAPSULE ORAL at 08:54

## 2021-11-28 RX ADMIN — QUETIAPINE FUMARATE 25 MG: 25 TABLET ORAL at 08:54

## 2021-11-28 RX ADMIN — NICOTINE POLACRILEX 4 MG: 4 GUM, CHEWING BUCCAL at 21:31

## 2021-11-28 RX ADMIN — BUPRENORPHINE AND NALOXONE 8 MG: 8; 2 FILM BUCCAL; SUBLINGUAL at 08:54

## 2021-11-28 RX ADMIN — SUCRALFATE 1 G: 1 TABLET ORAL at 11:03

## 2021-11-28 RX ADMIN — QUETIAPINE FUMARATE 25 MG: 25 TABLET ORAL at 17:06

## 2021-11-28 RX ADMIN — HYDROXYZINE HYDROCHLORIDE 100 MG: 50 TABLET, FILM COATED ORAL at 11:03

## 2021-11-28 RX ADMIN — SUCRALFATE 1 G: 1 TABLET ORAL at 17:06

## 2021-11-28 RX ADMIN — GABAPENTIN 700 MG: 400 CAPSULE ORAL at 17:06

## 2021-11-28 RX ADMIN — PANTOPRAZOLE SODIUM 20 MG: 20 TABLET, DELAYED RELEASE ORAL at 06:16

## 2021-11-28 RX ADMIN — GABAPENTIN 700 MG: 400 CAPSULE ORAL at 21:30

## 2021-11-28 RX ADMIN — QUETIAPINE FUMARATE 100 MG: 100 TABLET ORAL at 21:30

## 2021-11-28 RX ADMIN — POLYETHYLENE GLYCOL 3350 17 G: 17 POWDER, FOR SOLUTION ORAL at 09:49

## 2021-11-29 PROCEDURE — 99232 SBSQ HOSP IP/OBS MODERATE 35: CPT | Performed by: STUDENT IN AN ORGANIZED HEALTH CARE EDUCATION/TRAINING PROGRAM

## 2021-11-29 RX ORDER — GABAPENTIN 400 MG/1
800 CAPSULE ORAL 3 TIMES DAILY
Status: DISCONTINUED | OUTPATIENT
Start: 2021-11-29 | End: 2021-12-01 | Stop reason: HOSPADM

## 2021-11-29 RX ORDER — QUETIAPINE FUMARATE 25 MG/1
50 TABLET, FILM COATED ORAL 2 TIMES DAILY
Status: DISCONTINUED | OUTPATIENT
Start: 2021-11-29 | End: 2021-12-01 | Stop reason: HOSPADM

## 2021-11-29 RX ADMIN — QUETIAPINE FUMARATE 50 MG: 25 TABLET ORAL at 17:16

## 2021-11-29 RX ADMIN — GABAPENTIN 700 MG: 400 CAPSULE ORAL at 08:50

## 2021-11-29 RX ADMIN — PANTOPRAZOLE SODIUM 20 MG: 20 TABLET, DELAYED RELEASE ORAL at 06:21

## 2021-11-29 RX ADMIN — DOCUSATE SODIUM AND SENNOSIDES 1 TABLET: 8.6; 5 TABLET ORAL at 11:03

## 2021-11-29 RX ADMIN — HYDROXYZINE HYDROCHLORIDE 100 MG: 50 TABLET, FILM COATED ORAL at 12:23

## 2021-11-29 RX ADMIN — BUPRENORPHINE AND NALOXONE 8 MG: 8; 2 FILM BUCCAL; SUBLINGUAL at 08:51

## 2021-11-29 RX ADMIN — GABAPENTIN 800 MG: 400 CAPSULE ORAL at 17:15

## 2021-11-29 RX ADMIN — QUETIAPINE FUMARATE 25 MG: 25 TABLET ORAL at 08:50

## 2021-11-29 RX ADMIN — SUCRALFATE 1 G: 1 TABLET ORAL at 21:25

## 2021-11-29 RX ADMIN — SUCRALFATE 1 G: 1 TABLET ORAL at 10:55

## 2021-11-29 RX ADMIN — SUCRALFATE 1 G: 1 TABLET ORAL at 17:15

## 2021-11-29 RX ADMIN — SUCRALFATE 1 G: 1 TABLET ORAL at 06:21

## 2021-11-29 RX ADMIN — QUETIAPINE FUMARATE 100 MG: 100 TABLET ORAL at 21:25

## 2021-11-29 RX ADMIN — GABAPENTIN 800 MG: 400 CAPSULE ORAL at 21:25

## 2021-11-30 PROBLEM — Z00.8 MEDICAL CLEARANCE FOR PSYCHIATRIC ADMISSION: Status: RESOLVED | Noted: 2020-08-05 | Resolved: 2021-11-30

## 2021-11-30 PROCEDURE — 99232 SBSQ HOSP IP/OBS MODERATE 35: CPT | Performed by: PHYSICIAN ASSISTANT

## 2021-11-30 RX ORDER — LORAZEPAM 1 MG/1
1 TABLET ORAL ONCE
Status: DISCONTINUED | OUTPATIENT
Start: 2021-11-30 | End: 2021-12-01 | Stop reason: HOSPADM

## 2021-11-30 RX ADMIN — HYDROXYZINE HYDROCHLORIDE 100 MG: 50 TABLET, FILM COATED ORAL at 10:45

## 2021-11-30 RX ADMIN — GABAPENTIN 800 MG: 400 CAPSULE ORAL at 16:06

## 2021-11-30 RX ADMIN — QUETIAPINE FUMARATE 100 MG: 100 TABLET ORAL at 21:51

## 2021-11-30 RX ADMIN — SUCRALFATE 1 G: 1 TABLET ORAL at 11:26

## 2021-11-30 RX ADMIN — HYDROXYZINE HYDROCHLORIDE 100 MG: 50 TABLET, FILM COATED ORAL at 21:52

## 2021-11-30 RX ADMIN — QUETIAPINE FUMARATE 50 MG: 25 TABLET ORAL at 09:00

## 2021-11-30 RX ADMIN — SUCRALFATE 1 G: 1 TABLET ORAL at 06:16

## 2021-11-30 RX ADMIN — BUPRENORPHINE AND NALOXONE 8 MG: 8; 2 FILM BUCCAL; SUBLINGUAL at 09:00

## 2021-11-30 RX ADMIN — SUCRALFATE 1 G: 1 TABLET ORAL at 16:06

## 2021-11-30 RX ADMIN — NICOTINE POLACRILEX 4 MG: 4 GUM, CHEWING BUCCAL at 19:33

## 2021-11-30 RX ADMIN — GABAPENTIN 800 MG: 400 CAPSULE ORAL at 21:50

## 2021-11-30 RX ADMIN — NICOTINE POLACRILEX 4 MG: 4 GUM, CHEWING BUCCAL at 09:38

## 2021-11-30 RX ADMIN — TRAZODONE HYDROCHLORIDE 50 MG: 50 TABLET ORAL at 23:52

## 2021-11-30 RX ADMIN — GABAPENTIN 800 MG: 400 CAPSULE ORAL at 09:00

## 2021-11-30 RX ADMIN — QUETIAPINE FUMARATE 50 MG: 25 TABLET ORAL at 17:08

## 2021-11-30 RX ADMIN — PANTOPRAZOLE SODIUM 20 MG: 20 TABLET, DELAYED RELEASE ORAL at 06:16

## 2021-11-30 RX ADMIN — SUCRALFATE 1 G: 1 TABLET ORAL at 21:50

## 2021-12-01 VITALS
RESPIRATION RATE: 16 BRPM | OXYGEN SATURATION: 98 % | DIASTOLIC BLOOD PRESSURE: 55 MMHG | BODY MASS INDEX: 29.41 KG/M2 | SYSTOLIC BLOOD PRESSURE: 109 MMHG | HEART RATE: 68 BPM | WEIGHT: 187.39 LBS | TEMPERATURE: 97.6 F | HEIGHT: 67 IN

## 2021-12-01 PROCEDURE — 99239 HOSP IP/OBS DSCHRG MGMT >30: CPT | Performed by: PHYSICIAN ASSISTANT

## 2021-12-01 RX ORDER — HYDROXYZINE 50 MG/1
50 TABLET, FILM COATED ORAL EVERY 6 HOURS PRN
Qty: 30 TABLET | Refills: 0 | Status: SHIPPED | OUTPATIENT
Start: 2021-12-01 | End: 2021-12-23 | Stop reason: HOSPADM

## 2021-12-01 RX ORDER — QUETIAPINE FUMARATE 100 MG/1
100 TABLET, FILM COATED ORAL
Qty: 30 TABLET | Refills: 1 | Status: ON HOLD | OUTPATIENT
Start: 2021-12-01 | End: 2021-12-22 | Stop reason: ALTCHOICE

## 2021-12-01 RX ORDER — GABAPENTIN 400 MG/1
800 CAPSULE ORAL 3 TIMES DAILY
Qty: 180 CAPSULE | Refills: 1 | Status: ON HOLD | OUTPATIENT
Start: 2021-12-01 | End: 2021-12-23 | Stop reason: SDUPTHER

## 2021-12-01 RX ORDER — PANTOPRAZOLE SODIUM 20 MG/1
20 TABLET, DELAYED RELEASE ORAL
Qty: 30 TABLET | Refills: 0 | Status: SHIPPED | OUTPATIENT
Start: 2021-12-01 | End: 2021-12-23 | Stop reason: HOSPADM

## 2021-12-01 RX ORDER — NICOTINE 21 MG/24HR
1 PATCH, TRANSDERMAL 24 HOURS TRANSDERMAL DAILY
Qty: 28 PATCH | Refills: 0 | Status: SHIPPED | OUTPATIENT
Start: 2021-12-01 | End: 2021-12-23 | Stop reason: HOSPADM

## 2021-12-01 RX ORDER — SUCRALFATE 1 G/1
1 TABLET ORAL
Qty: 120 TABLET | Refills: 0 | Status: SHIPPED | OUTPATIENT
Start: 2021-12-01 | End: 2021-12-23 | Stop reason: HOSPADM

## 2021-12-01 RX ORDER — QUETIAPINE FUMARATE 50 MG/1
50 TABLET, FILM COATED ORAL 2 TIMES DAILY
Qty: 60 TABLET | Refills: 1 | Status: ON HOLD | OUTPATIENT
Start: 2021-12-01 | End: 2021-12-22 | Stop reason: ALTCHOICE

## 2021-12-01 RX ADMIN — QUETIAPINE FUMARATE 50 MG: 25 TABLET ORAL at 08:51

## 2021-12-01 RX ADMIN — BUPRENORPHINE AND NALOXONE 8 MG: 8; 2 FILM BUCCAL; SUBLINGUAL at 08:53

## 2021-12-01 RX ADMIN — SUCRALFATE 1 G: 1 TABLET ORAL at 06:18

## 2021-12-01 RX ADMIN — GABAPENTIN 800 MG: 400 CAPSULE ORAL at 08:52

## 2021-12-01 RX ADMIN — PANTOPRAZOLE SODIUM 20 MG: 20 TABLET, DELAYED RELEASE ORAL at 06:18

## 2021-12-03 ENCOUNTER — OFFICE VISIT (OUTPATIENT)
Dept: INTERNAL MEDICINE CLINIC | Facility: CLINIC | Age: 21
End: 2021-12-03
Payer: COMMERCIAL

## 2021-12-03 VITALS
SYSTOLIC BLOOD PRESSURE: 128 MMHG | DIASTOLIC BLOOD PRESSURE: 76 MMHG | HEART RATE: 108 BPM | HEIGHT: 67 IN | BODY MASS INDEX: 30.17 KG/M2 | WEIGHT: 192.25 LBS | OXYGEN SATURATION: 97 % | TEMPERATURE: 97.5 F

## 2021-12-03 DIAGNOSIS — F43.10 POSTTRAUMATIC STRESS DISORDER: ICD-10-CM

## 2021-12-03 DIAGNOSIS — Z79.899 ENCOUNTER FOR MONITORING SUBOXONE MAINTENANCE THERAPY: ICD-10-CM

## 2021-12-03 DIAGNOSIS — Z91.14 NONCOMPLIANCE WITH MEDICATION REGIMEN: ICD-10-CM

## 2021-12-03 DIAGNOSIS — Z79.899 MEDICATION THERAPY CONTINUED: ICD-10-CM

## 2021-12-03 DIAGNOSIS — Z51.81 ENCOUNTER FOR MONITORING SUBOXONE MAINTENANCE THERAPY: ICD-10-CM

## 2021-12-03 DIAGNOSIS — F19.20 POLYSUBSTANCE DEPENDENCE INCLUDING OPIOID TYPE DRUG WITH COMPLICATION, CONTINUOUS USE (HCC): Primary | ICD-10-CM

## 2021-12-03 PROCEDURE — 99214 OFFICE O/P EST MOD 30 MIN: CPT | Performed by: INTERNAL MEDICINE

## 2021-12-03 RX ORDER — NALOXONE HYDROCHLORIDE 4 MG/.1ML
1 SPRAY NASAL AS NEEDED
Qty: 1 EACH | Refills: 1 | Status: ON HOLD | OUTPATIENT
Start: 2021-12-03 | End: 2021-12-22 | Stop reason: ALTCHOICE

## 2021-12-03 RX ORDER — BUPRENORPHINE HYDROCHLORIDE AND NALOXONE HYDROCHLORIDE DIHYDRATE 8; 2 MG/1; MG/1
TABLET SUBLINGUAL
Qty: 12 TABLET | Refills: 0 | Status: SHIPPED | OUTPATIENT
Start: 2021-12-03 | End: 2021-12-09 | Stop reason: SDUPTHER

## 2021-12-09 ENCOUNTER — OFFICE VISIT (OUTPATIENT)
Dept: INTERNAL MEDICINE CLINIC | Facility: CLINIC | Age: 21
End: 2021-12-09
Payer: COMMERCIAL

## 2021-12-09 VITALS
WEIGHT: 194.38 LBS | OXYGEN SATURATION: 97 % | SYSTOLIC BLOOD PRESSURE: 144 MMHG | HEIGHT: 67 IN | HEART RATE: 91 BPM | DIASTOLIC BLOOD PRESSURE: 80 MMHG | TEMPERATURE: 97.7 F | BODY MASS INDEX: 30.51 KG/M2

## 2021-12-09 DIAGNOSIS — Z79.899 MEDICATION THERAPY CONTINUED: ICD-10-CM

## 2021-12-09 DIAGNOSIS — F43.10 POSTTRAUMATIC STRESS DISORDER: ICD-10-CM

## 2021-12-09 DIAGNOSIS — Z79.899 ENCOUNTER FOR MONITORING SUBOXONE MAINTENANCE THERAPY: ICD-10-CM

## 2021-12-09 DIAGNOSIS — F19.20 POLYSUBSTANCE DEPENDENCE INCLUDING OPIOID TYPE DRUG WITH COMPLICATION, CONTINUOUS USE (HCC): Primary | ICD-10-CM

## 2021-12-09 DIAGNOSIS — Z91.14 NONCOMPLIANCE WITH MEDICATION REGIMEN: ICD-10-CM

## 2021-12-09 DIAGNOSIS — Z51.81 ENCOUNTER FOR MONITORING SUBOXONE MAINTENANCE THERAPY: ICD-10-CM

## 2021-12-09 DIAGNOSIS — K59.03 DRUG-INDUCED CONSTIPATION: ICD-10-CM

## 2021-12-09 PROCEDURE — 80307 DRUG TEST PRSMV CHEM ANLYZR: CPT | Performed by: INTERNAL MEDICINE

## 2021-12-09 PROCEDURE — 99213 OFFICE O/P EST LOW 20 MIN: CPT | Performed by: INTERNAL MEDICINE

## 2021-12-09 RX ORDER — BUPRENORPHINE HYDROCHLORIDE AND NALOXONE HYDROCHLORIDE DIHYDRATE 8; 2 MG/1; MG/1
TABLET SUBLINGUAL
Qty: 30 TABLET | Refills: 0 | Status: SHIPPED | OUTPATIENT
Start: 2021-12-09 | End: 2021-12-29 | Stop reason: SDUPTHER

## 2021-12-09 RX ORDER — AMOXICILLIN 250 MG
1 CAPSULE ORAL DAILY
Qty: 90 TABLET | Refills: 0 | Status: ON HOLD | OUTPATIENT
Start: 2021-12-09 | End: 2021-12-22 | Stop reason: ALTCHOICE

## 2021-12-13 ENCOUNTER — HOSPITAL ENCOUNTER (EMERGENCY)
Facility: HOSPITAL | Age: 21
End: 2021-12-15
Attending: EMERGENCY MEDICINE | Admitting: EMERGENCY MEDICINE
Payer: COMMERCIAL

## 2021-12-13 DIAGNOSIS — F19.20 POLYSUBSTANCE DEPENDENCE INCLUDING OPIOID TYPE DRUG WITH COMPLICATION, CONTINUOUS USE (HCC): ICD-10-CM

## 2021-12-13 DIAGNOSIS — F32.A DEPRESSION: Primary | ICD-10-CM

## 2021-12-13 DIAGNOSIS — K21.9 GASTROESOPHAGEAL REFLUX DISEASE WITHOUT ESOPHAGITIS: ICD-10-CM

## 2021-12-13 DIAGNOSIS — Z51.81 ENCOUNTER FOR MONITORING SUBOXONE MAINTENANCE THERAPY: ICD-10-CM

## 2021-12-13 DIAGNOSIS — Z00.8 MEDICAL CLEARANCE FOR PSYCHIATRIC ADMISSION: ICD-10-CM

## 2021-12-13 DIAGNOSIS — F31.9 BIPOLAR 1 DISORDER (HCC): ICD-10-CM

## 2021-12-13 DIAGNOSIS — R45.6 VIOLENT BEHAVIOR WITH RESTRAINT USE: ICD-10-CM

## 2021-12-13 DIAGNOSIS — Z79.899 ENCOUNTER FOR MONITORING SUBOXONE MAINTENANCE THERAPY: ICD-10-CM

## 2021-12-13 LAB
AMPHETAMINES SERPL QL SCN: NEGATIVE
BARBITURATES UR QL: NEGATIVE
BENZODIAZ UR QL: NEGATIVE
COCAINE UR QL: NEGATIVE
ETHANOL EXG-MCNC: 0 MG/DL
FLUAV RNA RESP QL NAA+PROBE: NEGATIVE
FLUBV RNA RESP QL NAA+PROBE: NEGATIVE
METHADONE UR QL: NEGATIVE
OPIATES UR QL SCN: NEGATIVE
OXYCODONE+OXYMORPHONE UR QL SCN: NEGATIVE
PCP UR QL: NEGATIVE
RSV RNA RESP QL NAA+PROBE: NEGATIVE
SARS-COV-2 RNA RESP QL NAA+PROBE: NEGATIVE
THC UR QL: POSITIVE

## 2021-12-13 PROCEDURE — 80307 DRUG TEST PRSMV CHEM ANLYZR: CPT | Performed by: PHYSICIAN ASSISTANT

## 2021-12-13 PROCEDURE — 99285 EMERGENCY DEPT VISIT HI MDM: CPT | Performed by: PHYSICIAN ASSISTANT

## 2021-12-13 PROCEDURE — 96372 THER/PROPH/DIAG INJ SC/IM: CPT

## 2021-12-13 PROCEDURE — 82075 ASSAY OF BREATH ETHANOL: CPT | Performed by: PHYSICIAN ASSISTANT

## 2021-12-13 PROCEDURE — 99285 EMERGENCY DEPT VISIT HI MDM: CPT

## 2021-12-13 PROCEDURE — 0241U HB NFCT DS VIR RESP RNA 4 TRGT: CPT | Performed by: PHYSICIAN ASSISTANT

## 2021-12-13 RX ORDER — LORAZEPAM 2 MG/ML
1 INJECTION INTRAMUSCULAR ONCE
Status: COMPLETED | OUTPATIENT
Start: 2021-12-13 | End: 2021-12-13

## 2021-12-13 RX ORDER — HYDROXYZINE HYDROCHLORIDE 25 MG/1
25 TABLET, FILM COATED ORAL EVERY 6 HOURS PRN
Status: DISCONTINUED | OUTPATIENT
Start: 2021-12-13 | End: 2021-12-15 | Stop reason: HOSPADM

## 2021-12-13 RX ORDER — LORAZEPAM 2 MG/ML
1 INJECTION INTRAMUSCULAR ONCE
Status: DISCONTINUED | OUTPATIENT
Start: 2021-12-13 | End: 2021-12-15 | Stop reason: HOSPADM

## 2021-12-13 RX ORDER — QUETIAPINE FUMARATE 100 MG/1
100 TABLET, FILM COATED ORAL
Status: DISCONTINUED | OUTPATIENT
Start: 2021-12-13 | End: 2021-12-15 | Stop reason: HOSPADM

## 2021-12-13 RX ADMIN — GABAPENTIN 800 MG: 100 CAPSULE ORAL at 20:59

## 2021-12-13 RX ADMIN — LORAZEPAM 1 MG: 2 INJECTION INTRAMUSCULAR; INTRAVENOUS at 18:12

## 2021-12-14 PROCEDURE — 96372 THER/PROPH/DIAG INJ SC/IM: CPT

## 2021-12-14 RX ORDER — BISACODYL 10 MG
10 SUPPOSITORY, RECTAL RECTAL DAILY PRN
Status: CANCELLED | OUTPATIENT
Start: 2021-12-14

## 2021-12-14 RX ORDER — HALOPERIDOL 1 MG/1
2 TABLET ORAL
Status: CANCELLED | OUTPATIENT
Start: 2021-12-14

## 2021-12-14 RX ORDER — LORAZEPAM 2 MG/ML
1 INJECTION INTRAMUSCULAR
Status: CANCELLED | OUTPATIENT
Start: 2021-12-14

## 2021-12-14 RX ORDER — ZIPRASIDONE MESYLATE 20 MG/ML
20 INJECTION, POWDER, LYOPHILIZED, FOR SOLUTION INTRAMUSCULAR ONCE
Status: COMPLETED | OUTPATIENT
Start: 2021-12-14 | End: 2021-12-14

## 2021-12-14 RX ORDER — BENZTROPINE MESYLATE 1 MG/1
1 TABLET ORAL
Status: CANCELLED | OUTPATIENT
Start: 2021-12-14

## 2021-12-14 RX ORDER — HYDROXYZINE HYDROCHLORIDE 25 MG/1
50 TABLET, FILM COATED ORAL
Status: CANCELLED | OUTPATIENT
Start: 2021-12-14

## 2021-12-14 RX ORDER — LORAZEPAM 2 MG/ML
2 INJECTION INTRAMUSCULAR
Status: CANCELLED | OUTPATIENT
Start: 2021-12-14

## 2021-12-14 RX ORDER — HYDROXYZINE HYDROCHLORIDE 25 MG/1
100 TABLET, FILM COATED ORAL
Status: CANCELLED | OUTPATIENT
Start: 2021-12-14

## 2021-12-14 RX ORDER — MINERAL OIL AND PETROLATUM 150; 830 MG/G; MG/G
1 OINTMENT OPHTHALMIC
Status: CANCELLED | OUTPATIENT
Start: 2021-12-14

## 2021-12-14 RX ORDER — TRAZODONE HYDROCHLORIDE 50 MG/1
50 TABLET ORAL
Status: CANCELLED | OUTPATIENT
Start: 2021-12-14

## 2021-12-14 RX ORDER — QUETIAPINE FUMARATE 100 MG/1
100 TABLET, FILM COATED ORAL
Status: CANCELLED | OUTPATIENT
Start: 2021-12-14

## 2021-12-14 RX ORDER — AMOXICILLIN 250 MG
1 CAPSULE ORAL DAILY PRN
Status: CANCELLED | OUTPATIENT
Start: 2021-12-14

## 2021-12-14 RX ORDER — HALOPERIDOL 5 MG
5 TABLET ORAL
Status: CANCELLED | OUTPATIENT
Start: 2021-12-14

## 2021-12-14 RX ORDER — MAGNESIUM HYDROXIDE/ALUMINUM HYDROXICE/SIMETHICONE 120; 1200; 1200 MG/30ML; MG/30ML; MG/30ML
30 SUSPENSION ORAL EVERY 4 HOURS PRN
Status: CANCELLED | OUTPATIENT
Start: 2021-12-14

## 2021-12-14 RX ORDER — QUETIAPINE FUMARATE 25 MG/1
50 TABLET, FILM COATED ORAL
Status: DISCONTINUED | OUTPATIENT
Start: 2021-12-14 | End: 2021-12-15 | Stop reason: HOSPADM

## 2021-12-14 RX ORDER — DIPHENHYDRAMINE HYDROCHLORIDE 50 MG/ML
50 INJECTION INTRAMUSCULAR; INTRAVENOUS ONCE
Status: DISCONTINUED | OUTPATIENT
Start: 2021-12-14 | End: 2021-12-15

## 2021-12-14 RX ORDER — BENZTROPINE MESYLATE 1 MG/ML
1 INJECTION INTRAMUSCULAR; INTRAVENOUS
Status: CANCELLED | OUTPATIENT
Start: 2021-12-14

## 2021-12-14 RX ORDER — ACETAMINOPHEN 325 MG/1
650 TABLET ORAL EVERY 4 HOURS PRN
Status: CANCELLED | OUTPATIENT
Start: 2021-12-14

## 2021-12-14 RX ORDER — HALOPERIDOL 5 MG/ML
5 INJECTION INTRAMUSCULAR
Status: CANCELLED | OUTPATIENT
Start: 2021-12-14

## 2021-12-14 RX ORDER — DIPHENHYDRAMINE HYDROCHLORIDE 50 MG/ML
50 INJECTION INTRAMUSCULAR; INTRAVENOUS EVERY 6 HOURS PRN
Status: CANCELLED | OUTPATIENT
Start: 2021-12-14

## 2021-12-14 RX ORDER — NICOTINE 21 MG/24HR
1 PATCH, TRANSDERMAL 24 HOURS TRANSDERMAL DAILY
Status: CANCELLED | OUTPATIENT
Start: 2021-12-14

## 2021-12-14 RX ORDER — LORAZEPAM 2 MG/ML
2 INJECTION INTRAMUSCULAR EVERY 6 HOURS PRN
Status: CANCELLED | OUTPATIENT
Start: 2021-12-14

## 2021-12-14 RX ORDER — QUETIAPINE FUMARATE 25 MG/1
50 TABLET, FILM COATED ORAL
Status: CANCELLED | OUTPATIENT
Start: 2021-12-15

## 2021-12-14 RX ORDER — HALOPERIDOL 5 MG/ML
2.5 INJECTION INTRAMUSCULAR
Status: CANCELLED | OUTPATIENT
Start: 2021-12-14

## 2021-12-14 RX ORDER — HYDROXYZINE HYDROCHLORIDE 25 MG/1
25 TABLET, FILM COATED ORAL
Status: CANCELLED | OUTPATIENT
Start: 2021-12-14

## 2021-12-14 RX ORDER — ACETAMINOPHEN 325 MG/1
975 TABLET ORAL EVERY 6 HOURS PRN
Status: CANCELLED | OUTPATIENT
Start: 2021-12-14

## 2021-12-14 RX ORDER — LORAZEPAM 2 MG/ML
2 INJECTION INTRAMUSCULAR ONCE
Status: COMPLETED | OUTPATIENT
Start: 2021-12-14 | End: 2021-12-14

## 2021-12-14 RX ORDER — BENZTROPINE MESYLATE 1 MG/ML
0.5 INJECTION INTRAMUSCULAR; INTRAVENOUS
Status: CANCELLED | OUTPATIENT
Start: 2021-12-14

## 2021-12-14 RX ORDER — POLYETHYLENE GLYCOL 3350 17 G/17G
17 POWDER, FOR SOLUTION ORAL DAILY PRN
Status: CANCELLED | OUTPATIENT
Start: 2021-12-14

## 2021-12-14 RX ORDER — ACETAMINOPHEN 325 MG/1
650 TABLET ORAL EVERY 6 HOURS PRN
Status: CANCELLED | OUTPATIENT
Start: 2021-12-14

## 2021-12-14 RX ADMIN — BUPRENORPHINE AND NALOXONE 12 MG: 8; 2 FILM BUCCAL; SUBLINGUAL at 09:36

## 2021-12-14 RX ADMIN — GABAPENTIN 800 MG: 100 CAPSULE ORAL at 16:12

## 2021-12-14 RX ADMIN — GABAPENTIN 800 MG: 100 CAPSULE ORAL at 08:53

## 2021-12-14 RX ADMIN — ZIPRASIDONE MESYLATE 20 MG: 20 INJECTION, POWDER, LYOPHILIZED, FOR SOLUTION INTRAMUSCULAR at 20:15

## 2021-12-14 RX ADMIN — QUETIAPINE FUMARATE 50 MG: 25 TABLET ORAL at 09:35

## 2021-12-14 RX ADMIN — LORAZEPAM 2 MG: 2 INJECTION, SOLUTION INTRAMUSCULAR; INTRAVENOUS at 20:15

## 2021-12-15 ENCOUNTER — HOSPITAL ENCOUNTER (INPATIENT)
Facility: HOSPITAL | Age: 21
LOS: 8 days | Discharge: HOME | DRG: 753 | End: 2021-12-23
Attending: STUDENT IN AN ORGANIZED HEALTH CARE EDUCATION/TRAINING PROGRAM | Admitting: STUDENT IN AN ORGANIZED HEALTH CARE EDUCATION/TRAINING PROGRAM
Payer: COMMERCIAL

## 2021-12-15 VITALS
RESPIRATION RATE: 18 BRPM | SYSTOLIC BLOOD PRESSURE: 136 MMHG | HEART RATE: 80 BPM | OXYGEN SATURATION: 98 % | DIASTOLIC BLOOD PRESSURE: 78 MMHG | WEIGHT: 194.45 LBS | BODY MASS INDEX: 30.52 KG/M2 | HEIGHT: 67 IN | TEMPERATURE: 97.8 F

## 2021-12-15 DIAGNOSIS — Z51.81 ENCOUNTER FOR MONITORING SUBOXONE MAINTENANCE THERAPY: ICD-10-CM

## 2021-12-15 DIAGNOSIS — K21.9 GASTROESOPHAGEAL REFLUX DISEASE WITHOUT ESOPHAGITIS: ICD-10-CM

## 2021-12-15 DIAGNOSIS — F19.20 POLYSUBSTANCE DEPENDENCE INCLUDING OPIOID TYPE DRUG WITH COMPLICATION, CONTINUOUS USE (HCC): ICD-10-CM

## 2021-12-15 DIAGNOSIS — Z79.899 ENCOUNTER FOR MONITORING SUBOXONE MAINTENANCE THERAPY: ICD-10-CM

## 2021-12-15 DIAGNOSIS — Z72.0 TOBACCO USE: ICD-10-CM

## 2021-12-15 DIAGNOSIS — Z00.8 MEDICAL CLEARANCE FOR PSYCHIATRIC ADMISSION: ICD-10-CM

## 2021-12-15 DIAGNOSIS — F31.32 BIPOLAR 1 DISORDER, DEPRESSED, MODERATE (HCC): Primary | ICD-10-CM

## 2021-12-15 PROCEDURE — NC001 PR NO CHARGE: Performed by: EMERGENCY MEDICINE

## 2021-12-15 RX ORDER — MAGNESIUM HYDROXIDE/ALUMINUM HYDROXICE/SIMETHICONE 120; 1200; 1200 MG/30ML; MG/30ML; MG/30ML
30 SUSPENSION ORAL EVERY 4 HOURS PRN
Status: DISCONTINUED | OUTPATIENT
Start: 2021-12-15 | End: 2021-12-23 | Stop reason: HOSPADM

## 2021-12-15 RX ORDER — TRAZODONE HYDROCHLORIDE 50 MG/1
50 TABLET ORAL
Status: DISCONTINUED | OUTPATIENT
Start: 2021-12-15 | End: 2021-12-23 | Stop reason: HOSPADM

## 2021-12-15 RX ORDER — HALOPERIDOL 2 MG/1
2 TABLET ORAL
Status: DISCONTINUED | OUTPATIENT
Start: 2021-12-15 | End: 2021-12-23 | Stop reason: HOSPADM

## 2021-12-15 RX ORDER — HYDROXYZINE 50 MG/1
50 TABLET, FILM COATED ORAL
Status: DISCONTINUED | OUTPATIENT
Start: 2021-12-15 | End: 2021-12-15

## 2021-12-15 RX ORDER — LORAZEPAM 2 MG/ML
1 INJECTION INTRAMUSCULAR
Status: DISCONTINUED | OUTPATIENT
Start: 2021-12-15 | End: 2021-12-23 | Stop reason: HOSPADM

## 2021-12-15 RX ORDER — DIPHENHYDRAMINE HYDROCHLORIDE 50 MG/ML
50 INJECTION INTRAMUSCULAR; INTRAVENOUS EVERY 6 HOURS PRN
Status: DISCONTINUED | OUTPATIENT
Start: 2021-12-15 | End: 2021-12-15

## 2021-12-15 RX ORDER — QUETIAPINE FUMARATE 100 MG/1
100 TABLET, FILM COATED ORAL
Status: DISCONTINUED | OUTPATIENT
Start: 2021-12-15 | End: 2021-12-16

## 2021-12-15 RX ORDER — HALOPERIDOL 5 MG
5 TABLET ORAL
Status: DISCONTINUED | OUTPATIENT
Start: 2021-12-15 | End: 2021-12-23 | Stop reason: HOSPADM

## 2021-12-15 RX ORDER — HYDROXYZINE HYDROCHLORIDE 25 MG/1
25 TABLET, FILM COATED ORAL
Status: DISCONTINUED | OUTPATIENT
Start: 2021-12-15 | End: 2021-12-15

## 2021-12-15 RX ORDER — BISACODYL 10 MG
10 SUPPOSITORY, RECTAL RECTAL DAILY PRN
Status: DISCONTINUED | OUTPATIENT
Start: 2021-12-15 | End: 2021-12-15

## 2021-12-15 RX ORDER — ACETAMINOPHEN 325 MG/1
650 TABLET ORAL EVERY 4 HOURS PRN
Status: DISCONTINUED | OUTPATIENT
Start: 2021-12-15 | End: 2021-12-23 | Stop reason: HOSPADM

## 2021-12-15 RX ORDER — LORAZEPAM 0.5 MG/1
0.5 TABLET ORAL EVERY 8 HOURS PRN
Status: DISCONTINUED | OUTPATIENT
Start: 2021-12-15 | End: 2021-12-23 | Stop reason: HOSPADM

## 2021-12-15 RX ORDER — AMOXICILLIN 250 MG
1 CAPSULE ORAL DAILY PRN
Status: DISCONTINUED | OUTPATIENT
Start: 2021-12-15 | End: 2021-12-23 | Stop reason: HOSPADM

## 2021-12-15 RX ORDER — MINERAL OIL AND PETROLATUM 150; 830 MG/G; MG/G
1 OINTMENT OPHTHALMIC
Status: DISCONTINUED | OUTPATIENT
Start: 2021-12-15 | End: 2021-12-23 | Stop reason: HOSPADM

## 2021-12-15 RX ORDER — QUETIAPINE FUMARATE 25 MG/1
50 TABLET, FILM COATED ORAL
Status: DISCONTINUED | OUTPATIENT
Start: 2021-12-16 | End: 2021-12-16

## 2021-12-15 RX ORDER — LORAZEPAM 2 MG/ML
2 INJECTION INTRAMUSCULAR EVERY 6 HOURS PRN
Status: DISCONTINUED | OUTPATIENT
Start: 2021-12-15 | End: 2021-12-23 | Stop reason: HOSPADM

## 2021-12-15 RX ORDER — BISACODYL 10 MG
10 SUPPOSITORY, RECTAL RECTAL DAILY PRN
Status: DISCONTINUED | OUTPATIENT
Start: 2021-12-15 | End: 2021-12-23 | Stop reason: HOSPADM

## 2021-12-15 RX ORDER — BENZTROPINE MESYLATE 1 MG/ML
1 INJECTION INTRAMUSCULAR; INTRAVENOUS
Status: DISCONTINUED | OUTPATIENT
Start: 2021-12-15 | End: 2021-12-23 | Stop reason: HOSPADM

## 2021-12-15 RX ORDER — POLYETHYLENE GLYCOL 3350 17 G/17G
17 POWDER, FOR SOLUTION ORAL DAILY PRN
Status: DISCONTINUED | OUTPATIENT
Start: 2021-12-15 | End: 2021-12-23 | Stop reason: HOSPADM

## 2021-12-15 RX ORDER — BENZTROPINE MESYLATE 1 MG/1
1 TABLET ORAL
Status: DISCONTINUED | OUTPATIENT
Start: 2021-12-15 | End: 2021-12-23 | Stop reason: HOSPADM

## 2021-12-15 RX ORDER — HYDROXYZINE 50 MG/1
100 TABLET, FILM COATED ORAL
Status: DISCONTINUED | OUTPATIENT
Start: 2021-12-15 | End: 2021-12-23 | Stop reason: HOSPADM

## 2021-12-15 RX ORDER — LORAZEPAM 2 MG/ML
2 INJECTION INTRAMUSCULAR
Status: DISCONTINUED | OUTPATIENT
Start: 2021-12-15 | End: 2021-12-23 | Stop reason: HOSPADM

## 2021-12-15 RX ORDER — NICOTINE 21 MG/24HR
1 PATCH, TRANSDERMAL 24 HOURS TRANSDERMAL DAILY
Status: DISCONTINUED | OUTPATIENT
Start: 2021-12-15 | End: 2021-12-23 | Stop reason: HOSPADM

## 2021-12-15 RX ORDER — ACETAMINOPHEN 325 MG/1
975 TABLET ORAL EVERY 6 HOURS PRN
Status: DISCONTINUED | OUTPATIENT
Start: 2021-12-15 | End: 2021-12-23 | Stop reason: HOSPADM

## 2021-12-15 RX ORDER — ACETAMINOPHEN 325 MG/1
650 TABLET ORAL EVERY 6 HOURS PRN
Status: DISCONTINUED | OUTPATIENT
Start: 2021-12-15 | End: 2021-12-23 | Stop reason: HOSPADM

## 2021-12-15 RX ORDER — LORAZEPAM 1 MG/1
1 TABLET ORAL EVERY 6 HOURS PRN
Status: DISCONTINUED | OUTPATIENT
Start: 2021-12-15 | End: 2021-12-17

## 2021-12-15 RX ORDER — HALOPERIDOL 5 MG/ML
2.5 INJECTION INTRAMUSCULAR
Status: DISCONTINUED | OUTPATIENT
Start: 2021-12-15 | End: 2021-12-23 | Stop reason: HOSPADM

## 2021-12-15 RX ORDER — HALOPERIDOL 5 MG/ML
5 INJECTION INTRAMUSCULAR
Status: DISCONTINUED | OUTPATIENT
Start: 2021-12-15 | End: 2021-12-23 | Stop reason: HOSPADM

## 2021-12-15 RX ORDER — POLYETHYLENE GLYCOL 3350 17 G/17G
17 POWDER, FOR SOLUTION ORAL DAILY PRN
Status: DISCONTINUED | OUTPATIENT
Start: 2021-12-15 | End: 2021-12-15

## 2021-12-15 RX ORDER — BENZTROPINE MESYLATE 1 MG/ML
0.5 INJECTION INTRAMUSCULAR; INTRAVENOUS
Status: DISCONTINUED | OUTPATIENT
Start: 2021-12-15 | End: 2021-12-23 | Stop reason: HOSPADM

## 2021-12-15 RX ORDER — GABAPENTIN 400 MG/1
800 CAPSULE ORAL 3 TIMES DAILY
Status: DISCONTINUED | OUTPATIENT
Start: 2021-12-15 | End: 2021-12-23 | Stop reason: HOSPADM

## 2021-12-15 RX ADMIN — LORAZEPAM 1 MG: 1 TABLET ORAL at 12:48

## 2021-12-15 RX ADMIN — QUETIAPINE FUMARATE 50 MG: 25 TABLET ORAL at 09:05

## 2021-12-15 RX ADMIN — BUPRENORPHINE AND NALOXONE 12 MG: 8; 2 FILM BUCCAL; SUBLINGUAL at 09:06

## 2021-12-15 RX ADMIN — HALOPERIDOL 5 MG: 5 TABLET ORAL at 12:48

## 2021-12-15 RX ADMIN — BENZTROPINE MESYLATE 1 MG: 1 TABLET ORAL at 12:48

## 2021-12-15 RX ADMIN — GABAPENTIN 800 MG: 400 CAPSULE ORAL at 15:43

## 2021-12-15 RX ADMIN — GABAPENTIN 800 MG: 100 CAPSULE ORAL at 09:05

## 2021-12-15 RX ADMIN — GABAPENTIN 800 MG: 400 CAPSULE ORAL at 21:21

## 2021-12-15 RX ADMIN — QUETIAPINE FUMARATE 100 MG: 100 TABLET ORAL at 21:21

## 2021-12-15 RX ADMIN — HYDROXYZINE HYDROCHLORIDE 100 MG: 50 TABLET, FILM COATED ORAL at 16:30

## 2021-12-16 LAB
AMPHETAMINES UR QL SCN: NEGATIVE NG/ML
BARBITURATES UR QL SCN: NEGATIVE NG/ML
BENZODIAZ UR QL: NEGATIVE NG/ML
BUPRENORPHINE UR CFM-MCNC: 28 NG/ML
BUPRENORPHINE UR QL CFM: NORMAL NG/ML
BUPRENORPHINE UR QL CFM: POSITIVE
BUPRENORPHINE+NOR UR QL: POSITIVE
BZE UR QL: NEGATIVE NG/ML
CANNABINOIDS UR QL SCN: POSITIVE
METHADONE UR QL SCN: NEGATIVE NG/ML
NORBUPRENORPHINE UR CFM-MCNC: 58 NG/ML
NORBUPRENORPHINE UR QL CFM: POSITIVE
OPIATES UR QL: NEGATIVE NG/ML
PCP UR QL: NEGATIVE NG/ML
PROPOXYPH UR QL SCN: NEGATIVE NG/ML

## 2021-12-16 PROCEDURE — 99222 1ST HOSP IP/OBS MODERATE 55: CPT | Performed by: STUDENT IN AN ORGANIZED HEALTH CARE EDUCATION/TRAINING PROGRAM

## 2021-12-16 PROCEDURE — 99253 IP/OBS CNSLTJ NEW/EST LOW 45: CPT | Performed by: PHYSICIAN ASSISTANT

## 2021-12-16 RX ORDER — OLANZAPINE 5 MG/1
5 TABLET ORAL ONCE
Status: COMPLETED | OUTPATIENT
Start: 2021-12-16 | End: 2021-12-16

## 2021-12-16 RX ORDER — OLANZAPINE 10 MG/1
10 TABLET ORAL
Status: DISCONTINUED | OUTPATIENT
Start: 2021-12-16 | End: 2021-12-17

## 2021-12-16 RX ADMIN — BUPRENORPHINE AND NALOXONE 12 MG: 8; 2 FILM BUCCAL; SUBLINGUAL at 09:27

## 2021-12-16 RX ADMIN — BENZTROPINE MESYLATE 1 MG: 1 TABLET ORAL at 13:52

## 2021-12-16 RX ADMIN — GABAPENTIN 800 MG: 400 CAPSULE ORAL at 15:54

## 2021-12-16 RX ADMIN — OLANZAPINE 10 MG: 10 TABLET, FILM COATED ORAL at 21:25

## 2021-12-16 RX ADMIN — GABAPENTIN 800 MG: 400 CAPSULE ORAL at 21:24

## 2021-12-16 RX ADMIN — GABAPENTIN 800 MG: 400 CAPSULE ORAL at 08:22

## 2021-12-16 RX ADMIN — HALOPERIDOL 5 MG: 5 TABLET ORAL at 17:10

## 2021-12-16 RX ADMIN — OLANZAPINE 5 MG: 5 TABLET, FILM COATED ORAL at 11:24

## 2021-12-16 RX ADMIN — HYDROXYZINE HYDROCHLORIDE 100 MG: 50 TABLET, FILM COATED ORAL at 10:22

## 2021-12-16 RX ADMIN — QUETIAPINE FUMARATE 50 MG: 25 TABLET ORAL at 08:22

## 2021-12-16 RX ADMIN — LORAZEPAM 1 MG: 1 TABLET ORAL at 12:06

## 2021-12-17 LAB
ALBUMIN SERPL BCP-MCNC: 3.5 G/DL (ref 3.5–5)
ALP SERPL-CCNC: 97 U/L (ref 46–116)
ALT SERPL W P-5'-P-CCNC: 31 U/L (ref 12–78)
ANION GAP SERPL CALCULATED.3IONS-SCNC: 6 MMOL/L (ref 4–13)
AST SERPL W P-5'-P-CCNC: 12 U/L (ref 5–45)
BASOPHILS # BLD AUTO: 0.03 THOUSANDS/ΜL (ref 0–0.1)
BASOPHILS NFR BLD AUTO: 1 % (ref 0–1)
BILIRUB SERPL-MCNC: 0.2 MG/DL (ref 0.2–1)
BUN SERPL-MCNC: 15 MG/DL (ref 5–25)
CALCIUM SERPL-MCNC: 8.7 MG/DL (ref 8.3–10.1)
CHLORIDE SERPL-SCNC: 106 MMOL/L (ref 100–108)
CHOLEST SERPL-MCNC: 134 MG/DL
CO2 SERPL-SCNC: 28 MMOL/L (ref 21–32)
CREAT SERPL-MCNC: 0.71 MG/DL (ref 0.6–1.3)
EOSINOPHIL # BLD AUTO: 0.2 THOUSAND/ΜL (ref 0–0.61)
EOSINOPHIL NFR BLD AUTO: 3 % (ref 0–6)
ERYTHROCYTE [DISTWIDTH] IN BLOOD BY AUTOMATED COUNT: 12.5 % (ref 11.6–15.1)
GFR SERPL CREATININE-BSD FRML MDRD: 134 ML/MIN/1.73SQ M
GLUCOSE P FAST SERPL-MCNC: 89 MG/DL (ref 65–99)
GLUCOSE SERPL-MCNC: 89 MG/DL (ref 65–140)
HCT VFR BLD AUTO: 41.9 % (ref 36.5–49.3)
HDLC SERPL-MCNC: 36 MG/DL
HGB BLD-MCNC: 13.5 G/DL (ref 12–17)
IMM GRANULOCYTES # BLD AUTO: 0.05 THOUSAND/UL (ref 0–0.2)
IMM GRANULOCYTES NFR BLD AUTO: 1 % (ref 0–2)
LDLC SERPL CALC-MCNC: 72 MG/DL (ref 0–100)
LYMPHOCYTES # BLD AUTO: 2.63 THOUSANDS/ΜL (ref 0.6–4.47)
LYMPHOCYTES NFR BLD AUTO: 44 % (ref 14–44)
MCH RBC QN AUTO: 27.4 PG (ref 26.8–34.3)
MCHC RBC AUTO-ENTMCNC: 32.2 G/DL (ref 31.4–37.4)
MCV RBC AUTO: 85 FL (ref 82–98)
MONOCYTES # BLD AUTO: 0.91 THOUSAND/ΜL (ref 0.17–1.22)
MONOCYTES NFR BLD AUTO: 16 % (ref 4–12)
NEUTROPHILS # BLD AUTO: 2.04 THOUSANDS/ΜL (ref 1.85–7.62)
NEUTS SEG NFR BLD AUTO: 35 % (ref 43–75)
NONHDLC SERPL-MCNC: 98 MG/DL
NRBC BLD AUTO-RTO: 0 /100 WBCS
PLATELET # BLD AUTO: 302 THOUSANDS/UL (ref 149–390)
PMV BLD AUTO: 9.2 FL (ref 8.9–12.7)
POTASSIUM SERPL-SCNC: 4.3 MMOL/L (ref 3.5–5.3)
PROT SERPL-MCNC: 7.1 G/DL (ref 6.4–8.2)
RBC # BLD AUTO: 4.93 MILLION/UL (ref 3.88–5.62)
SODIUM SERPL-SCNC: 140 MMOL/L (ref 136–145)
TRIGL SERPL-MCNC: 132 MG/DL
TSH SERPL DL<=0.05 MIU/L-ACNC: 1.13 UIU/ML (ref 0.36–3.74)
WBC # BLD AUTO: 5.86 THOUSAND/UL (ref 4.31–10.16)

## 2021-12-17 PROCEDURE — 84443 ASSAY THYROID STIM HORMONE: CPT | Performed by: PHYSICIAN ASSISTANT

## 2021-12-17 PROCEDURE — 99232 SBSQ HOSP IP/OBS MODERATE 35: CPT | Performed by: STUDENT IN AN ORGANIZED HEALTH CARE EDUCATION/TRAINING PROGRAM

## 2021-12-17 PROCEDURE — 85025 COMPLETE CBC W/AUTO DIFF WBC: CPT | Performed by: PHYSICIAN ASSISTANT

## 2021-12-17 PROCEDURE — 86592 SYPHILIS TEST NON-TREP QUAL: CPT | Performed by: PHYSICIAN ASSISTANT

## 2021-12-17 PROCEDURE — 80061 LIPID PANEL: CPT | Performed by: PHYSICIAN ASSISTANT

## 2021-12-17 PROCEDURE — 80053 COMPREHEN METABOLIC PANEL: CPT | Performed by: PHYSICIAN ASSISTANT

## 2021-12-17 RX ORDER — PANTOPRAZOLE SODIUM 40 MG/1
40 TABLET, DELAYED RELEASE ORAL
Status: DISCONTINUED | OUTPATIENT
Start: 2021-12-17 | End: 2021-12-23 | Stop reason: HOSPADM

## 2021-12-17 RX ORDER — LORAZEPAM 1 MG/1
2 TABLET ORAL EVERY 6 HOURS PRN
Status: DISCONTINUED | OUTPATIENT
Start: 2021-12-17 | End: 2021-12-20

## 2021-12-17 RX ORDER — SUCRALFATE 1 G/1
1 TABLET ORAL
Status: DISCONTINUED | OUTPATIENT
Start: 2021-12-17 | End: 2021-12-23 | Stop reason: HOSPADM

## 2021-12-17 RX ADMIN — SUCRALFATE 1 G: 1 TABLET ORAL at 12:20

## 2021-12-17 RX ADMIN — NICOTINE POLACRILEX 4 MG: 4 GUM, CHEWING ORAL at 09:09

## 2021-12-17 RX ADMIN — BUPRENORPHINE AND NALOXONE 12 MG: 8; 2 FILM BUCCAL; SUBLINGUAL at 09:07

## 2021-12-17 RX ADMIN — TRAZODONE HYDROCHLORIDE 50 MG: 50 TABLET ORAL at 21:59

## 2021-12-17 RX ADMIN — LORAZEPAM 1 MG: 1 TABLET ORAL at 09:56

## 2021-12-17 RX ADMIN — GABAPENTIN 800 MG: 400 CAPSULE ORAL at 21:33

## 2021-12-17 RX ADMIN — PANTOPRAZOLE SODIUM 40 MG: 40 TABLET, DELAYED RELEASE ORAL at 12:20

## 2021-12-17 RX ADMIN — BENZTROPINE MESYLATE 1 MG: 1 TABLET ORAL at 09:56

## 2021-12-17 RX ADMIN — OLANZAPINE 15 MG: 10 TABLET, FILM COATED ORAL at 21:33

## 2021-12-17 RX ADMIN — SUCRALFATE 1 G: 1 TABLET ORAL at 21:33

## 2021-12-17 RX ADMIN — LORAZEPAM 2 MG: 1 TABLET ORAL at 16:00

## 2021-12-17 RX ADMIN — GABAPENTIN 800 MG: 400 CAPSULE ORAL at 09:06

## 2021-12-17 RX ADMIN — GABAPENTIN 800 MG: 400 CAPSULE ORAL at 15:58

## 2021-12-17 RX ADMIN — SUCRALFATE 1 G: 1 TABLET ORAL at 15:58

## 2021-12-17 RX ADMIN — HALOPERIDOL 5 MG: 5 TABLET ORAL at 09:56

## 2021-12-17 RX ADMIN — NICOTINE POLACRILEX 4 MG: 4 GUM, CHEWING ORAL at 11:16

## 2021-12-17 RX ADMIN — NICOTINE POLACRILEX 4 MG: 4 GUM, CHEWING ORAL at 21:33

## 2021-12-18 LAB — RPR SER QL: NORMAL

## 2021-12-18 PROCEDURE — 99232 SBSQ HOSP IP/OBS MODERATE 35: CPT | Performed by: PSYCHIATRY & NEUROLOGY

## 2021-12-18 RX ADMIN — GABAPENTIN 800 MG: 400 CAPSULE ORAL at 16:11

## 2021-12-18 RX ADMIN — PANTOPRAZOLE SODIUM 40 MG: 40 TABLET, DELAYED RELEASE ORAL at 06:28

## 2021-12-18 RX ADMIN — HYDROXYZINE HYDROCHLORIDE 100 MG: 50 TABLET, FILM COATED ORAL at 14:11

## 2021-12-18 RX ADMIN — GABAPENTIN 800 MG: 400 CAPSULE ORAL at 08:56

## 2021-12-18 RX ADMIN — HALOPERIDOL LACTATE 5 MG: 5 INJECTION, SOLUTION INTRAMUSCULAR at 09:54

## 2021-12-18 RX ADMIN — NICOTINE POLACRILEX 4 MG: 4 GUM, CHEWING ORAL at 11:14

## 2021-12-18 RX ADMIN — TRAZODONE HYDROCHLORIDE 50 MG: 50 TABLET ORAL at 21:38

## 2021-12-18 RX ADMIN — SUCRALFATE 1 G: 1 TABLET ORAL at 06:28

## 2021-12-18 RX ADMIN — GABAPENTIN 800 MG: 400 CAPSULE ORAL at 21:37

## 2021-12-18 RX ADMIN — HALOPERIDOL 5 MG: 5 TABLET ORAL at 14:11

## 2021-12-18 RX ADMIN — LORAZEPAM 2 MG: 1 TABLET ORAL at 17:27

## 2021-12-18 RX ADMIN — BUPRENORPHINE AND NALOXONE 12 MG: 8; 2 FILM BUCCAL; SUBLINGUAL at 08:57

## 2021-12-18 RX ADMIN — LORAZEPAM 2 MG: 1 TABLET ORAL at 09:41

## 2021-12-18 RX ADMIN — OLANZAPINE 15 MG: 10 TABLET, FILM COATED ORAL at 21:37

## 2021-12-18 RX ADMIN — BENZTROPINE MESYLATE 1 MG: 1 TABLET ORAL at 09:51

## 2021-12-18 RX ADMIN — SUCRALFATE 1 G: 1 TABLET ORAL at 11:14

## 2021-12-18 RX ADMIN — SUCRALFATE 1 G: 1 TABLET ORAL at 16:11

## 2021-12-18 RX ADMIN — SUCRALFATE 1 G: 1 TABLET ORAL at 21:37

## 2021-12-18 RX ADMIN — NICOTINE POLACRILEX 4 MG: 4 GUM, CHEWING ORAL at 09:08

## 2021-12-19 PROCEDURE — 99232 SBSQ HOSP IP/OBS MODERATE 35: CPT | Performed by: PSYCHIATRY & NEUROLOGY

## 2021-12-19 RX ADMIN — PANTOPRAZOLE SODIUM 40 MG: 40 TABLET, DELAYED RELEASE ORAL at 06:15

## 2021-12-19 RX ADMIN — SUCRALFATE 1 G: 1 TABLET ORAL at 21:43

## 2021-12-19 RX ADMIN — NICOTINE POLACRILEX 4 MG: 4 GUM, CHEWING ORAL at 11:24

## 2021-12-19 RX ADMIN — LORAZEPAM 2 MG: 1 TABLET ORAL at 17:13

## 2021-12-19 RX ADMIN — TRAZODONE HYDROCHLORIDE 50 MG: 50 TABLET ORAL at 21:44

## 2021-12-19 RX ADMIN — SUCRALFATE 1 G: 1 TABLET ORAL at 15:44

## 2021-12-19 RX ADMIN — LORAZEPAM 2 MG: 1 TABLET ORAL at 10:13

## 2021-12-19 RX ADMIN — BUPRENORPHINE AND NALOXONE 12 MG: 8; 2 FILM BUCCAL; SUBLINGUAL at 09:11

## 2021-12-19 RX ADMIN — SUCRALFATE 1 G: 1 TABLET ORAL at 06:15

## 2021-12-19 RX ADMIN — NICOTINE POLACRILEX 4 MG: 4 GUM, CHEWING ORAL at 14:44

## 2021-12-19 RX ADMIN — GABAPENTIN 800 MG: 400 CAPSULE ORAL at 21:43

## 2021-12-19 RX ADMIN — GABAPENTIN 800 MG: 400 CAPSULE ORAL at 15:44

## 2021-12-19 RX ADMIN — GABAPENTIN 800 MG: 400 CAPSULE ORAL at 09:10

## 2021-12-19 RX ADMIN — NICOTINE POLACRILEX 4 MG: 4 GUM, CHEWING ORAL at 09:16

## 2021-12-19 RX ADMIN — OLANZAPINE 15 MG: 10 TABLET, FILM COATED ORAL at 21:43

## 2021-12-19 RX ADMIN — SUCRALFATE 1 G: 1 TABLET ORAL at 11:23

## 2021-12-20 PROCEDURE — 99232 SBSQ HOSP IP/OBS MODERATE 35: CPT | Performed by: STUDENT IN AN ORGANIZED HEALTH CARE EDUCATION/TRAINING PROGRAM

## 2021-12-20 RX ORDER — HYDROXYZINE HYDROCHLORIDE 25 MG/1
25 TABLET, FILM COATED ORAL 2 TIMES DAILY
Status: DISCONTINUED | OUTPATIENT
Start: 2021-12-20 | End: 2021-12-21

## 2021-12-20 RX ORDER — LORAZEPAM 1 MG/1
1 TABLET ORAL EVERY 6 HOURS PRN
Status: DISCONTINUED | OUTPATIENT
Start: 2021-12-20 | End: 2021-12-23 | Stop reason: HOSPADM

## 2021-12-20 RX ORDER — OLANZAPINE 10 MG/1
20 TABLET ORAL
Status: DISCONTINUED | OUTPATIENT
Start: 2021-12-20 | End: 2021-12-23 | Stop reason: HOSPADM

## 2021-12-20 RX ADMIN — PANTOPRAZOLE SODIUM 40 MG: 40 TABLET, DELAYED RELEASE ORAL at 06:04

## 2021-12-20 RX ADMIN — NICOTINE POLACRILEX 4 MG: 4 GUM, CHEWING ORAL at 17:37

## 2021-12-20 RX ADMIN — BUPRENORPHINE AND NALOXONE 12 MG: 8; 2 FILM BUCCAL; SUBLINGUAL at 08:22

## 2021-12-20 RX ADMIN — POLYETHYLENE GLYCOL 3350 17 G: 17 POWDER, FOR SOLUTION ORAL at 18:05

## 2021-12-20 RX ADMIN — NICOTINE POLACRILEX 4 MG: 4 GUM, CHEWING ORAL at 20:48

## 2021-12-20 RX ADMIN — HYDROXYZINE HYDROCHLORIDE 25 MG: 25 TABLET ORAL at 17:36

## 2021-12-20 RX ADMIN — GABAPENTIN 800 MG: 400 CAPSULE ORAL at 08:18

## 2021-12-20 RX ADMIN — NICOTINE POLACRILEX 4 MG: 4 GUM, CHEWING ORAL at 08:22

## 2021-12-20 RX ADMIN — SUCRALFATE 1 G: 1 TABLET ORAL at 21:41

## 2021-12-20 RX ADMIN — OLANZAPINE 20 MG: 10 TABLET, FILM COATED ORAL at 21:41

## 2021-12-20 RX ADMIN — GABAPENTIN 800 MG: 400 CAPSULE ORAL at 15:41

## 2021-12-20 RX ADMIN — NICOTINE POLACRILEX 4 MG: 4 GUM, CHEWING ORAL at 11:26

## 2021-12-20 RX ADMIN — LORAZEPAM 0.5 MG: 0.5 TABLET ORAL at 09:23

## 2021-12-20 RX ADMIN — SUCRALFATE 1 G: 1 TABLET ORAL at 11:26

## 2021-12-20 RX ADMIN — SUCRALFATE 1 G: 1 TABLET ORAL at 15:41

## 2021-12-20 RX ADMIN — SUCRALFATE 1 G: 1 TABLET ORAL at 06:04

## 2021-12-20 RX ADMIN — GABAPENTIN 800 MG: 400 CAPSULE ORAL at 21:41

## 2021-12-20 RX ADMIN — TRAZODONE HYDROCHLORIDE 50 MG: 50 TABLET ORAL at 21:44

## 2021-12-20 RX ADMIN — ALUMINUM HYDROXIDE, MAGNESIUM HYDROXIDE, AND SIMETHICONE 30 ML: 200; 200; 20 SUSPENSION ORAL at 18:05

## 2021-12-21 ENCOUNTER — TELEPHONE (OUTPATIENT)
Dept: INTERNAL MEDICINE CLINIC | Facility: CLINIC | Age: 21
End: 2021-12-21

## 2021-12-21 PROCEDURE — 99232 SBSQ HOSP IP/OBS MODERATE 35: CPT | Performed by: PHYSICIAN ASSISTANT

## 2021-12-21 RX ORDER — HYDROXYZINE 50 MG/1
50 TABLET, FILM COATED ORAL 2 TIMES DAILY
Status: DISCONTINUED | OUTPATIENT
Start: 2021-12-21 | End: 2021-12-23 | Stop reason: HOSPADM

## 2021-12-21 RX ADMIN — NICOTINE POLACRILEX 4 MG: 4 GUM, CHEWING ORAL at 15:22

## 2021-12-21 RX ADMIN — HYDROXYZINE HYDROCHLORIDE 50 MG: 50 TABLET, FILM COATED ORAL at 17:05

## 2021-12-21 RX ADMIN — BUPRENORPHINE AND NALOXONE 12 MG: 8; 2 FILM BUCCAL; SUBLINGUAL at 08:54

## 2021-12-21 RX ADMIN — GABAPENTIN 800 MG: 400 CAPSULE ORAL at 08:53

## 2021-12-21 RX ADMIN — HYDROXYZINE HYDROCHLORIDE 25 MG: 25 TABLET ORAL at 08:53

## 2021-12-21 RX ADMIN — NICOTINE POLACRILEX 4 MG: 4 GUM, CHEWING ORAL at 19:22

## 2021-12-21 RX ADMIN — OLANZAPINE 20 MG: 10 TABLET, FILM COATED ORAL at 21:41

## 2021-12-21 RX ADMIN — NICOTINE POLACRILEX 4 MG: 4 GUM, CHEWING ORAL at 08:56

## 2021-12-21 RX ADMIN — GABAPENTIN 800 MG: 400 CAPSULE ORAL at 21:41

## 2021-12-21 RX ADMIN — NICOTINE POLACRILEX 4 MG: 4 GUM, CHEWING ORAL at 17:22

## 2021-12-21 RX ADMIN — SUCRALFATE 1 G: 1 TABLET ORAL at 06:32

## 2021-12-21 RX ADMIN — GABAPENTIN 800 MG: 400 CAPSULE ORAL at 15:21

## 2021-12-21 RX ADMIN — PANTOPRAZOLE SODIUM 40 MG: 40 TABLET, DELAYED RELEASE ORAL at 06:32

## 2021-12-21 RX ADMIN — SUCRALFATE 1 G: 1 TABLET ORAL at 15:21

## 2021-12-21 RX ADMIN — TRAZODONE HYDROCHLORIDE 50 MG: 50 TABLET ORAL at 21:41

## 2021-12-21 RX ADMIN — ALUMINUM HYDROXIDE, MAGNESIUM HYDROXIDE, AND SIMETHICONE 30 ML: 200; 200; 20 SUSPENSION ORAL at 09:06

## 2021-12-21 RX ADMIN — SUCRALFATE 1 G: 1 TABLET ORAL at 12:21

## 2021-12-21 RX ADMIN — NICOTINE POLACRILEX 4 MG: 4 GUM, CHEWING ORAL at 11:22

## 2021-12-21 RX ADMIN — SUCRALFATE 1 G: 1 TABLET ORAL at 21:41

## 2021-12-22 ENCOUNTER — TRANSITIONAL CARE MANAGEMENT (OUTPATIENT)
Dept: INTERNAL MEDICINE CLINIC | Facility: CLINIC | Age: 21
End: 2021-12-22

## 2021-12-22 PROBLEM — Z00.8 MEDICAL CLEARANCE FOR PSYCHIATRIC ADMISSION: Status: RESOLVED | Noted: 2020-08-05 | Resolved: 2021-12-22

## 2021-12-22 PROCEDURE — 99232 SBSQ HOSP IP/OBS MODERATE 35: CPT | Performed by: PHYSICIAN ASSISTANT

## 2021-12-22 RX ADMIN — OLANZAPINE 20 MG: 10 TABLET, FILM COATED ORAL at 21:21

## 2021-12-22 RX ADMIN — TRAZODONE HYDROCHLORIDE 50 MG: 50 TABLET ORAL at 21:23

## 2021-12-22 RX ADMIN — NICOTINE POLACRILEX 4 MG: 4 GUM, CHEWING ORAL at 19:40

## 2021-12-22 RX ADMIN — NICOTINE POLACRILEX 4 MG: 4 GUM, CHEWING ORAL at 17:12

## 2021-12-22 RX ADMIN — HYDROXYZINE HYDROCHLORIDE 50 MG: 50 TABLET, FILM COATED ORAL at 17:12

## 2021-12-22 RX ADMIN — SUCRALFATE 1 G: 1 TABLET ORAL at 11:28

## 2021-12-22 RX ADMIN — HYDROXYZINE HYDROCHLORIDE 50 MG: 50 TABLET, FILM COATED ORAL at 08:52

## 2021-12-22 RX ADMIN — GABAPENTIN 800 MG: 400 CAPSULE ORAL at 21:20

## 2021-12-22 RX ADMIN — NICOTINE POLACRILEX 4 MG: 4 GUM, CHEWING ORAL at 11:00

## 2021-12-22 RX ADMIN — PANTOPRAZOLE SODIUM 40 MG: 40 TABLET, DELAYED RELEASE ORAL at 06:05

## 2021-12-22 RX ADMIN — BUPRENORPHINE AND NALOXONE 12 MG: 8; 2 FILM BUCCAL; SUBLINGUAL at 08:52

## 2021-12-22 RX ADMIN — SUCRALFATE 1 G: 1 TABLET ORAL at 21:21

## 2021-12-22 RX ADMIN — GABAPENTIN 800 MG: 400 CAPSULE ORAL at 16:16

## 2021-12-22 RX ADMIN — SUCRALFATE 1 G: 1 TABLET ORAL at 06:05

## 2021-12-22 RX ADMIN — SUCRALFATE 1 G: 1 TABLET ORAL at 16:16

## 2021-12-22 RX ADMIN — GABAPENTIN 800 MG: 400 CAPSULE ORAL at 08:52

## 2021-12-22 RX ADMIN — NICOTINE POLACRILEX 4 MG: 4 GUM, CHEWING ORAL at 08:53

## 2021-12-22 RX ADMIN — NICOTINE POLACRILEX 4 MG: 4 GUM, CHEWING ORAL at 14:29

## 2021-12-23 VITALS
WEIGHT: 183 LBS | BODY MASS INDEX: 28.72 KG/M2 | OXYGEN SATURATION: 96 % | HEART RATE: 80 BPM | TEMPERATURE: 96.9 F | SYSTOLIC BLOOD PRESSURE: 116 MMHG | HEIGHT: 67 IN | DIASTOLIC BLOOD PRESSURE: 73 MMHG | RESPIRATION RATE: 17 BRPM

## 2021-12-23 PROCEDURE — 99238 HOSP IP/OBS DSCHRG MGMT 30/<: CPT | Performed by: STUDENT IN AN ORGANIZED HEALTH CARE EDUCATION/TRAINING PROGRAM

## 2021-12-23 RX ORDER — TRAZODONE HYDROCHLORIDE 50 MG/1
50 TABLET ORAL
Qty: 30 TABLET | Refills: 0 | Status: SHIPPED | OUTPATIENT
Start: 2021-12-23 | End: 2022-01-11 | Stop reason: HOSPADM

## 2021-12-23 RX ORDER — NICOTINE 21 MG/24HR
1 PATCH, TRANSDERMAL 24 HOURS TRANSDERMAL DAILY
Qty: 28 PATCH | Refills: 0 | Status: SHIPPED | OUTPATIENT
Start: 2021-12-23 | End: 2022-01-01

## 2021-12-23 RX ORDER — PANTOPRAZOLE SODIUM 40 MG/1
40 TABLET, DELAYED RELEASE ORAL
Qty: 30 TABLET | Refills: 1 | Status: ON HOLD | OUTPATIENT
Start: 2021-12-23 | End: 2022-01-11 | Stop reason: SDUPTHER

## 2021-12-23 RX ORDER — OLANZAPINE 20 MG/1
20 TABLET ORAL
Qty: 30 TABLET | Refills: 1 | Status: SHIPPED | OUTPATIENT
Start: 2021-12-23 | End: 2022-01-11 | Stop reason: HOSPADM

## 2021-12-23 RX ORDER — GABAPENTIN 400 MG/1
800 CAPSULE ORAL 3 TIMES DAILY
Qty: 180 CAPSULE | Refills: 1 | Status: SHIPPED | OUTPATIENT
Start: 2021-12-23 | End: 2022-01-11 | Stop reason: HOSPADM

## 2021-12-23 RX ORDER — HYDROXYZINE 50 MG/1
50 TABLET, FILM COATED ORAL 2 TIMES DAILY
Qty: 60 TABLET | Refills: 1 | Status: SHIPPED | OUTPATIENT
Start: 2021-12-23 | End: 2022-01-11 | Stop reason: HOSPADM

## 2021-12-23 RX ORDER — SUCRALFATE 1 G/1
1 TABLET ORAL
Qty: 120 TABLET | Refills: 0 | Status: SHIPPED | OUTPATIENT
Start: 2021-12-23 | End: 2022-01-11 | Stop reason: HOSPADM

## 2021-12-23 RX ADMIN — PANTOPRAZOLE SODIUM 40 MG: 40 TABLET, DELAYED RELEASE ORAL at 06:53

## 2021-12-23 RX ADMIN — BUPRENORPHINE AND NALOXONE 12 MG: 8; 2 FILM BUCCAL; SUBLINGUAL at 08:12

## 2021-12-23 RX ADMIN — HYDROXYZINE HYDROCHLORIDE 50 MG: 50 TABLET, FILM COATED ORAL at 08:12

## 2021-12-23 RX ADMIN — GABAPENTIN 800 MG: 400 CAPSULE ORAL at 08:12

## 2021-12-23 RX ADMIN — SUCRALFATE 1 G: 1 TABLET ORAL at 06:53

## 2021-12-23 RX ADMIN — NICOTINE POLACRILEX 4 MG: 4 GUM, CHEWING ORAL at 08:15

## 2021-12-29 ENCOUNTER — OFFICE VISIT (OUTPATIENT)
Dept: INTERNAL MEDICINE CLINIC | Facility: CLINIC | Age: 21
End: 2021-12-29
Payer: COMMERCIAL

## 2021-12-29 VITALS
SYSTOLIC BLOOD PRESSURE: 134 MMHG | OXYGEN SATURATION: 98 % | HEIGHT: 67 IN | WEIGHT: 198 LBS | BODY MASS INDEX: 31.08 KG/M2 | HEART RATE: 83 BPM | TEMPERATURE: 97.3 F | DIASTOLIC BLOOD PRESSURE: 80 MMHG

## 2021-12-29 DIAGNOSIS — Z51.81 ENCOUNTER FOR MONITORING SUBOXONE MAINTENANCE THERAPY: ICD-10-CM

## 2021-12-29 DIAGNOSIS — F19.20 POLYSUBSTANCE DEPENDENCE INCLUDING OPIOID TYPE DRUG WITH COMPLICATION, CONTINUOUS USE (HCC): ICD-10-CM

## 2021-12-29 DIAGNOSIS — Z79.899 MEDICATION THERAPY CONTINUED: ICD-10-CM

## 2021-12-29 DIAGNOSIS — Z79.899 ENCOUNTER FOR MONITORING SUBOXONE MAINTENANCE THERAPY: ICD-10-CM

## 2021-12-29 DIAGNOSIS — F43.10 POSTTRAUMATIC STRESS DISORDER: ICD-10-CM

## 2021-12-29 DIAGNOSIS — F90.9 ATTENTION DEFICIT HYPERACTIVITY DISORDER (ADHD), UNSPECIFIED ADHD TYPE: ICD-10-CM

## 2021-12-29 DIAGNOSIS — Z91.14 NONCOMPLIANCE WITH MEDICATION REGIMEN: ICD-10-CM

## 2021-12-29 DIAGNOSIS — F32.1 CURRENT MODERATE EPISODE OF MAJOR DEPRESSIVE DISORDER WITHOUT PRIOR EPISODE (HCC): ICD-10-CM

## 2021-12-29 DIAGNOSIS — F31.32 BIPOLAR 1 DISORDER, DEPRESSED, MODERATE (HCC): Primary | ICD-10-CM

## 2021-12-29 PROCEDURE — 80307 DRUG TEST PRSMV CHEM ANLYZR: CPT | Performed by: INTERNAL MEDICINE

## 2021-12-29 PROCEDURE — 99215 OFFICE O/P EST HI 40 MIN: CPT | Performed by: INTERNAL MEDICINE

## 2021-12-29 RX ORDER — BUPRENORPHINE HYDROCHLORIDE AND NALOXONE HYDROCHLORIDE DIHYDRATE 8; 2 MG/1; MG/1
TABLET SUBLINGUAL
Qty: 35 TABLET | Refills: 0 | Status: SHIPPED | OUTPATIENT
Start: 2021-12-29 | End: 2022-01-12 | Stop reason: SDUPTHER

## 2021-12-29 RX ORDER — NALOXONE HYDROCHLORIDE 4 MG/.1ML
1 SPRAY NASAL AS NEEDED
Qty: 1 EACH | Refills: 1 | Status: SHIPPED | OUTPATIENT
Start: 2021-12-29

## 2022-01-01 ENCOUNTER — HOSPITAL ENCOUNTER (EMERGENCY)
Facility: HOSPITAL | Age: 22
End: 2022-01-04
Attending: EMERGENCY MEDICINE | Admitting: EMERGENCY MEDICINE
Payer: COMMERCIAL

## 2022-01-01 DIAGNOSIS — E78.2 MIXED HYPERLIPIDEMIA: ICD-10-CM

## 2022-01-01 DIAGNOSIS — Z79.899 ENCOUNTER FOR MONITORING SUBOXONE MAINTENANCE THERAPY: ICD-10-CM

## 2022-01-01 DIAGNOSIS — Z51.81 ENCOUNTER FOR MONITORING SUBOXONE MAINTENANCE THERAPY: ICD-10-CM

## 2022-01-01 DIAGNOSIS — K21.9 GERD (GASTROESOPHAGEAL REFLUX DISEASE): ICD-10-CM

## 2022-01-01 DIAGNOSIS — R45.851 DEPRESSION WITH SUICIDAL IDEATION: Primary | ICD-10-CM

## 2022-01-01 DIAGNOSIS — Z00.8 MEDICAL CLEARANCE FOR PSYCHIATRIC ADMISSION: ICD-10-CM

## 2022-01-01 DIAGNOSIS — F32.A DEPRESSION WITH SUICIDAL IDEATION: Primary | ICD-10-CM

## 2022-01-01 DIAGNOSIS — K76.0 FATTY LIVER: ICD-10-CM

## 2022-01-01 PROCEDURE — 82075 ASSAY OF BREATH ETHANOL: CPT | Performed by: EMERGENCY MEDICINE

## 2022-01-01 PROCEDURE — 0241U HB NFCT DS VIR RESP RNA 4 TRGT: CPT | Performed by: EMERGENCY MEDICINE

## 2022-01-01 PROCEDURE — 80307 DRUG TEST PRSMV CHEM ANLYZR: CPT | Performed by: EMERGENCY MEDICINE

## 2022-01-01 PROCEDURE — 99285 EMERGENCY DEPT VISIT HI MDM: CPT

## 2022-01-01 PROCEDURE — 99285 EMERGENCY DEPT VISIT HI MDM: CPT | Performed by: EMERGENCY MEDICINE

## 2022-01-01 RX ORDER — HYDROXYZINE HYDROCHLORIDE 25 MG/1
50 TABLET, FILM COATED ORAL 2 TIMES DAILY PRN
Status: DISCONTINUED | OUTPATIENT
Start: 2022-01-01 | End: 2022-01-04 | Stop reason: HOSPADM

## 2022-01-01 RX ORDER — OLANZAPINE 5 MG/1
20 TABLET, ORALLY DISINTEGRATING ORAL
Status: DISCONTINUED | OUTPATIENT
Start: 2022-01-01 | End: 2022-01-04 | Stop reason: HOSPADM

## 2022-01-01 RX ORDER — TRAZODONE HYDROCHLORIDE 50 MG/1
50 TABLET ORAL
Status: DISCONTINUED | OUTPATIENT
Start: 2022-01-01 | End: 2022-01-03

## 2022-01-01 RX ORDER — LORAZEPAM 1 MG/1
1 TABLET ORAL ONCE
Status: COMPLETED | OUTPATIENT
Start: 2022-01-01 | End: 2022-01-01

## 2022-01-01 RX ADMIN — LORAZEPAM 1 MG: 1 TABLET ORAL at 15:40

## 2022-01-01 RX ADMIN — LORAZEPAM 1 MG: 1 TABLET ORAL at 21:12

## 2022-01-01 RX ADMIN — TRAZODONE HYDROCHLORIDE 50 MG: 50 TABLET ORAL at 19:58

## 2022-01-01 RX ADMIN — HYDROXYZINE HYDROCHLORIDE 50 MG: 25 TABLET ORAL at 19:58
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
## 2022-01-01 NOTE — ED NOTES
Bedsearch    [place],as per [place], no beds  [place], as per [place], no beds   [place], as per [facility], no male beds  [facility],no beds  [place] per Wander Delgado, no beds  until Monday  [facility], as per Nieves Harris, no beds   [facility] as per Jack Hull, they are full  [place], as per [facility] beds  [facility],as per Armando, they are at capacity  [place] no beds as per Shannon Peter    [facility]  [facility] per Ailyn Blue, jonathan voicemail  [place], no answer     No in network beds until Tuesday as per intake

## 2022-01-01 NOTE — ED NOTES
Patient provided with warm meal  Acting appropriately offering no complaints at this time        William Calles RN  01/01/22 0190
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
## 2022-01-01 NOTE — ED NOTES
Patient remains on q15 minute checks via paper charting        Eugenia Carter, [place]  01/01/22 0808

## 2022-01-02 RX ORDER — LORAZEPAM 1 MG/1
1 TABLET ORAL ONCE
Status: COMPLETED | OUTPATIENT
Start: 2022-01-02 | End: 2022-01-02

## 2022-01-02 RX ADMIN — LORAZEPAM 1 MG: 1 TABLET ORAL at 17:04

## 2022-01-02 NOTE — ED NOTES
Taking over care of the patient at this time  Patient appears to be sleeping and remains on virtual 1:1 via ipad        Law Golden RN  01/02/22 9260

## 2022-01-02 NOTE — ED NOTES
Patient pacing back and forth, now requesting ativan  I did inform him that I just gave him his night time meds, and he stated "well they aren't working"  Will ask provider        Kostas Anderson RN  01/01/22 2034

## 2022-01-02 NOTE — ED NOTES
[address] [facility]     No network beds    [facility] as per   [employer] as per [place] no beds  [place] as per [place] no beds  [place] as per [email or web address] - as per Ty  [employer] as per Supriya Malhotra no beds  [employer] as per Elijah Thornton no [employer] as per [facility] no male [employer] as per [place] no [employer] as per [employer]  [facility] no answes  [facility] as per [place] no beds   [facility] no answer  [employer] as per Fabio Garza at [place] as per April fax [facility] as per Kimberlyn Alberts no beds

## 2022-01-02 NOTE — ED NOTES
Patient remains on virtual 1:1  Appears to be sleeping at this time        Kostas Anderson RN  01/02/22 2018

## 2022-01-02 NOTE — ED NOTES
Patient placed on virtual 1:1 in addition to Q15 minute checks as patient was moved to behavioral health room  Patient requesting night time meds at this time        Fredo Ravi RN  01/01/22 1949

## 2022-01-03 PROCEDURE — 99203 OFFICE O/P NEW LOW 30 MIN: CPT | Performed by: PSYCHIATRY & NEUROLOGY

## 2022-01-03 RX ORDER — MAGNESIUM HYDROXIDE/ALUMINUM HYDROXICE/SIMETHICONE 120; 1200; 1200 MG/30ML; MG/30ML; MG/30ML
30 SUSPENSION ORAL EVERY 4 HOURS PRN
Status: CANCELLED | OUTPATIENT
Start: 2022-01-03

## 2022-01-03 RX ORDER — HALOPERIDOL 5 MG/ML
2.5 INJECTION INTRAMUSCULAR
Status: CANCELLED | OUTPATIENT
Start: 2022-01-03

## 2022-01-03 RX ORDER — AMOXICILLIN 250 MG
1 CAPSULE ORAL DAILY PRN
Status: CANCELLED | OUTPATIENT
Start: 2022-01-03

## 2022-01-03 RX ORDER — HYDROXYZINE HYDROCHLORIDE 25 MG/1
25 TABLET, FILM COATED ORAL
Status: CANCELLED | OUTPATIENT
Start: 2022-01-03

## 2022-01-03 RX ORDER — TRAZODONE HYDROCHLORIDE 50 MG/1
50 TABLET ORAL
Status: CANCELLED | OUTPATIENT
Start: 2022-01-03

## 2022-01-03 RX ORDER — HYDROXYZINE HYDROCHLORIDE 25 MG/1
50 TABLET, FILM COATED ORAL
Status: CANCELLED | OUTPATIENT
Start: 2022-01-03

## 2022-01-03 RX ORDER — BENZTROPINE MESYLATE 1 MG/ML
1 INJECTION INTRAMUSCULAR; INTRAVENOUS
Status: CANCELLED | OUTPATIENT
Start: 2022-01-03

## 2022-01-03 RX ORDER — HYDROXYZINE HYDROCHLORIDE 25 MG/1
50 TABLET, FILM COATED ORAL 2 TIMES DAILY
Status: DISCONTINUED | OUTPATIENT
Start: 2022-01-03 | End: 2022-01-03

## 2022-01-03 RX ORDER — TRAZODONE HYDROCHLORIDE 50 MG/1
50 TABLET ORAL
Status: DISCONTINUED | OUTPATIENT
Start: 2022-01-03 | End: 2022-01-04 | Stop reason: HOSPADM

## 2022-01-03 RX ORDER — IBUPROFEN 600 MG/1
600 TABLET ORAL EVERY 6 HOURS PRN
Status: CANCELLED | OUTPATIENT
Start: 2022-01-03

## 2022-01-03 RX ORDER — DIPHENHYDRAMINE HYDROCHLORIDE 50 MG/ML
50 INJECTION INTRAMUSCULAR; INTRAVENOUS EVERY 6 HOURS PRN
Status: CANCELLED | OUTPATIENT
Start: 2022-01-03

## 2022-01-03 RX ORDER — BENZTROPINE MESYLATE 1 MG/1
1 TABLET ORAL
Status: CANCELLED | OUTPATIENT
Start: 2022-01-03

## 2022-01-03 RX ORDER — HALOPERIDOL 5 MG/ML
5 INJECTION INTRAMUSCULAR
Status: CANCELLED | OUTPATIENT
Start: 2022-01-03

## 2022-01-03 RX ORDER — BISACODYL 10 MG
10 SUPPOSITORY, RECTAL RECTAL DAILY PRN
Status: CANCELLED | OUTPATIENT
Start: 2022-01-03

## 2022-01-03 RX ORDER — LORAZEPAM 2 MG/ML
2 INJECTION INTRAMUSCULAR EVERY 6 HOURS PRN
Status: CANCELLED | OUTPATIENT
Start: 2022-01-03

## 2022-01-03 RX ORDER — MINERAL OIL AND PETROLATUM 150; 830 MG/G; MG/G
1 OINTMENT OPHTHALMIC
Status: CANCELLED | OUTPATIENT
Start: 2022-01-03

## 2022-01-03 RX ORDER — HALOPERIDOL 5 MG
5 TABLET ORAL
Status: CANCELLED | OUTPATIENT
Start: 2022-01-03

## 2022-01-03 RX ORDER — PANTOPRAZOLE SODIUM 40 MG/1
40 TABLET, DELAYED RELEASE ORAL
Status: CANCELLED | OUTPATIENT
Start: 2022-01-03

## 2022-01-03 RX ORDER — BENZTROPINE MESYLATE 1 MG/ML
0.5 INJECTION INTRAMUSCULAR; INTRAVENOUS
Status: CANCELLED | OUTPATIENT
Start: 2022-01-03

## 2022-01-03 RX ORDER — OLANZAPINE 10 MG/1
20 TABLET ORAL DAILY
Status: DISCONTINUED | OUTPATIENT
Start: 2022-01-03 | End: 2022-01-03

## 2022-01-03 RX ORDER — LORAZEPAM 2 MG/ML
1 INJECTION INTRAMUSCULAR
Status: CANCELLED | OUTPATIENT
Start: 2022-01-03

## 2022-01-03 RX ORDER — POLYETHYLENE GLYCOL 3350 17 G/17G
17 POWDER, FOR SOLUTION ORAL DAILY PRN
Status: CANCELLED | OUTPATIENT
Start: 2022-01-03

## 2022-01-03 RX ORDER — OLANZAPINE 5 MG/1
20 TABLET, ORALLY DISINTEGRATING ORAL
Status: CANCELLED | OUTPATIENT
Start: 2022-01-03 | End: 2022-01-08

## 2022-01-03 RX ORDER — IBUPROFEN 800 MG/1
800 TABLET ORAL EVERY 8 HOURS PRN
Status: CANCELLED | OUTPATIENT
Start: 2022-01-03

## 2022-01-03 RX ORDER — NICOTINE 21 MG/24HR
1 PATCH, TRANSDERMAL 24 HOURS TRANSDERMAL DAILY
Status: CANCELLED | OUTPATIENT
Start: 2022-01-03

## 2022-01-03 RX ORDER — TRAZODONE HYDROCHLORIDE 50 MG/1
50 TABLET ORAL ONCE
Status: DISCONTINUED | OUTPATIENT
Start: 2022-01-03 | End: 2022-01-03

## 2022-01-03 RX ORDER — IBUPROFEN 400 MG/1
400 TABLET ORAL EVERY 4 HOURS PRN
Status: CANCELLED | OUTPATIENT
Start: 2022-01-03

## 2022-01-03 RX ORDER — HYDROXYZINE HYDROCHLORIDE 25 MG/1
100 TABLET, FILM COATED ORAL
Status: CANCELLED | OUTPATIENT
Start: 2022-01-03

## 2022-01-03 RX ORDER — HALOPERIDOL 1 MG/1
2 TABLET ORAL
Status: CANCELLED | OUTPATIENT
Start: 2022-01-03

## 2022-01-03 RX ORDER — LORAZEPAM 2 MG/ML
2 INJECTION INTRAMUSCULAR
Status: CANCELLED | OUTPATIENT
Start: 2022-01-03

## 2022-01-03 RX ADMIN — GABAPENTIN 800 MG: 100 CAPSULE ORAL at 15:34

## 2022-01-03 RX ADMIN — BUPRENORPHINE AND NALOXONE 12 MG: 8; 2 FILM BUCCAL; SUBLINGUAL at 09:03

## 2022-01-03 RX ADMIN — GABAPENTIN 800 MG: 100 CAPSULE ORAL at 09:02

## 2022-01-03 RX ADMIN — HYDROXYZINE HYDROCHLORIDE 50 MG: 25 TABLET ORAL at 17:25

## 2022-01-03 RX ADMIN — TRAZODONE HYDROCHLORIDE 50 MG: 50 TABLET ORAL at 03:46

## 2022-01-03 RX ADMIN — TRAZODONE HYDROCHLORIDE 50 MG: 50 TABLET ORAL at 17:31

## 2022-01-03 RX ADMIN — HYDROXYZINE HYDROCHLORIDE 50 MG: 25 TABLET ORAL at 08:41

## 2022-01-03 RX ADMIN — GABAPENTIN 800 MG: 100 CAPSULE ORAL at 22:25

## 2022-01-03 RX ADMIN — OLANZAPINE 20 MG: 5 TABLET, ORALLY DISINTEGRATING ORAL at 22:25

## 2022-01-03 NOTE — ED NOTES
Pt requesting also medication to help him sleep right now       Radha Pendleton, [place]  01/03/22 6755

## 2022-01-03 NOTE — ED NOTES
Patient remains on virtual 1:1  Appears to be sleeping at this time        Robson Lin, [place]  01/02/22 7796

## 2022-01-03 NOTE — ED NOTES
Pt getting agitated "wants to know if they found a place for me yet   And asking for something to calm him down" medicated for same     Deanna Mir RN  01/03/22 0072

## 2022-01-03 NOTE — ED NOTES
Insurance Authorization for admission:   Phone call placed to [facility]  Phone number: [phone]  Spoke to Jose  14 days approved  Level of care: Inpatient  Review on 01/17/22     Authorization # [id number]

## 2022-01-03 NOTE — ED NOTES
Patient is accepted at [facility]  Patient is accepted by Sabiha Setvens PA-C per [employer] [place]  Transportation is arranged with [employer]  Transportation is scheduled for 0830 on 1/4  Patient may go to the floor after 2200  Nurse report is to be called to [phone] prior to patient transfer

## 2022-01-03 NOTE — ED NOTES
Pt becoming aggressive and yelling in room he is going to punch the wall if he doesn't get a shower   Given medication to help relax pt     Suzan Peace RN  01/03/22 0997
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
## 2022-01-04 ENCOUNTER — HOSPITAL ENCOUNTER (INPATIENT)
Facility: HOSPITAL | Age: 22
LOS: 7 days | Discharge: HOME | DRG: 753 | End: 2022-01-11
Attending: STUDENT IN AN ORGANIZED HEALTH CARE EDUCATION/TRAINING PROGRAM | Admitting: STUDENT IN AN ORGANIZED HEALTH CARE EDUCATION/TRAINING PROGRAM
Payer: COMMERCIAL

## 2022-01-04 VITALS
WEIGHT: 180 LBS | HEART RATE: 85 BPM | SYSTOLIC BLOOD PRESSURE: 117 MMHG | DIASTOLIC BLOOD PRESSURE: 83 MMHG | OXYGEN SATURATION: 97 % | TEMPERATURE: 97.2 F | RESPIRATION RATE: 18 BRPM | BODY MASS INDEX: 28.19 KG/M2

## 2022-01-04 DIAGNOSIS — Z00.8 MEDICAL CLEARANCE FOR PSYCHIATRIC ADMISSION: ICD-10-CM

## 2022-01-04 DIAGNOSIS — K21.9 GERD (GASTROESOPHAGEAL REFLUX DISEASE): ICD-10-CM

## 2022-01-04 DIAGNOSIS — F31.32 BIPOLAR 1 DISORDER, DEPRESSED, MODERATE (HCC): Primary | ICD-10-CM

## 2022-01-04 DIAGNOSIS — F17.210 CIGARETTE NICOTINE DEPENDENCE WITHOUT COMPLICATION: ICD-10-CM

## 2022-01-04 DIAGNOSIS — K21.9 GASTROESOPHAGEAL REFLUX DISEASE WITHOUT ESOPHAGITIS: ICD-10-CM

## 2022-01-04 DIAGNOSIS — K76.0 FATTY LIVER: ICD-10-CM

## 2022-01-04 DIAGNOSIS — Z51.81 ENCOUNTER FOR MONITORING SUBOXONE MAINTENANCE THERAPY: ICD-10-CM

## 2022-01-04 DIAGNOSIS — Z79.899 ENCOUNTER FOR MONITORING SUBOXONE MAINTENANCE THERAPY: ICD-10-CM

## 2022-01-04 DIAGNOSIS — E78.2 MIXED HYPERLIPIDEMIA: ICD-10-CM

## 2022-01-04 LAB — MISCELLANEOUS LAB TEST RESULT: NORMAL

## 2022-01-04 PROCEDURE — NC001 PR NO CHARGE: Performed by: EMERGENCY MEDICINE

## 2022-01-04 PROCEDURE — 99222 1ST HOSP IP/OBS MODERATE 55: CPT | Performed by: STUDENT IN AN ORGANIZED HEALTH CARE EDUCATION/TRAINING PROGRAM

## 2022-01-04 PROCEDURE — 93005 ELECTROCARDIOGRAM TRACING: CPT

## 2022-01-04 RX ORDER — LORAZEPAM 2 MG/ML
2 INJECTION INTRAMUSCULAR EVERY 6 HOURS PRN
Status: DISCONTINUED | OUTPATIENT
Start: 2022-01-04 | End: 2022-01-11 | Stop reason: HOSPADM

## 2022-01-04 RX ORDER — HALOPERIDOL 5 MG
5 TABLET ORAL
Status: DISCONTINUED | OUTPATIENT
Start: 2022-01-04 | End: 2022-01-11 | Stop reason: HOSPADM

## 2022-01-04 RX ORDER — LORAZEPAM 2 MG/ML
1 INJECTION INTRAMUSCULAR
Status: DISCONTINUED | OUTPATIENT
Start: 2022-01-04 | End: 2022-01-11 | Stop reason: HOSPADM

## 2022-01-04 RX ORDER — GABAPENTIN 400 MG/1
800 CAPSULE ORAL 3 TIMES DAILY
Status: DISCONTINUED | OUTPATIENT
Start: 2022-01-04 | End: 2022-01-11 | Stop reason: HOSPADM

## 2022-01-04 RX ORDER — MINERAL OIL AND PETROLATUM 150; 830 MG/G; MG/G
1 OINTMENT OPHTHALMIC
Status: DISCONTINUED | OUTPATIENT
Start: 2022-01-04 | End: 2022-01-11 | Stop reason: HOSPADM

## 2022-01-04 RX ORDER — DIPHENHYDRAMINE HYDROCHLORIDE 50 MG/ML
50 INJECTION INTRAMUSCULAR; INTRAVENOUS EVERY 6 HOURS PRN
Status: DISCONTINUED | OUTPATIENT
Start: 2022-01-04 | End: 2022-01-04

## 2022-01-04 RX ORDER — BENZTROPINE MESYLATE 1 MG/1
1 TABLET ORAL
Status: DISCONTINUED | OUTPATIENT
Start: 2022-01-04 | End: 2022-01-11 | Stop reason: HOSPADM

## 2022-01-04 RX ORDER — HYDROXYZINE 50 MG/1
50 TABLET, FILM COATED ORAL
Status: DISCONTINUED | OUTPATIENT
Start: 2022-01-04 | End: 2022-01-04

## 2022-01-04 RX ORDER — IBUPROFEN 400 MG/1
400 TABLET ORAL EVERY 4 HOURS PRN
Status: DISCONTINUED | OUTPATIENT
Start: 2022-01-04 | End: 2022-01-11 | Stop reason: HOSPADM

## 2022-01-04 RX ORDER — BISACODYL 10 MG
10 SUPPOSITORY, RECTAL RECTAL DAILY PRN
Status: DISCONTINUED | OUTPATIENT
Start: 2022-01-04 | End: 2022-01-04

## 2022-01-04 RX ORDER — ESCITALOPRAM OXALATE 5 MG/1
5 TABLET ORAL DAILY
Status: DISCONTINUED | OUTPATIENT
Start: 2022-01-04 | End: 2022-01-11 | Stop reason: HOSPADM

## 2022-01-04 RX ORDER — IBUPROFEN 800 MG/1
800 TABLET ORAL EVERY 8 HOURS PRN
Status: DISCONTINUED | OUTPATIENT
Start: 2022-01-04 | End: 2022-01-11 | Stop reason: HOSPADM

## 2022-01-04 RX ORDER — BENZTROPINE MESYLATE 1 MG/ML
0.5 INJECTION INTRAMUSCULAR; INTRAVENOUS
Status: DISCONTINUED | OUTPATIENT
Start: 2022-01-04 | End: 2022-01-11 | Stop reason: HOSPADM

## 2022-01-04 RX ORDER — TRAZODONE HYDROCHLORIDE 50 MG/1
50 TABLET ORAL
Status: DISCONTINUED | OUTPATIENT
Start: 2022-01-04 | End: 2022-01-11 | Stop reason: HOSPADM

## 2022-01-04 RX ORDER — BENZTROPINE MESYLATE 1 MG/ML
1 INJECTION INTRAMUSCULAR; INTRAVENOUS
Status: DISCONTINUED | OUTPATIENT
Start: 2022-01-04 | End: 2022-01-11 | Stop reason: HOSPADM

## 2022-01-04 RX ORDER — LORAZEPAM 2 MG/ML
2 INJECTION INTRAMUSCULAR
Status: DISCONTINUED | OUTPATIENT
Start: 2022-01-04 | End: 2022-01-11 | Stop reason: HOSPADM

## 2022-01-04 RX ORDER — DIVALPROEX SODIUM 500 MG/1
500 TABLET, EXTENDED RELEASE ORAL DAILY
Status: DISCONTINUED | OUTPATIENT
Start: 2022-01-04 | End: 2022-01-07

## 2022-01-04 RX ORDER — POLYETHYLENE GLYCOL 3350 17 G/17G
17 POWDER, FOR SOLUTION ORAL DAILY PRN
Status: DISCONTINUED | OUTPATIENT
Start: 2022-01-04 | End: 2022-01-11 | Stop reason: HOSPADM

## 2022-01-04 RX ORDER — OLANZAPINE 10 MG/1
10 TABLET, ORALLY DISINTEGRATING ORAL
Status: DISCONTINUED | OUTPATIENT
Start: 2022-01-04 | End: 2022-01-06

## 2022-01-04 RX ORDER — HALOPERIDOL 2 MG/1
2 TABLET ORAL
Status: DISCONTINUED | OUTPATIENT
Start: 2022-01-04 | End: 2022-01-11 | Stop reason: HOSPADM

## 2022-01-04 RX ORDER — HALOPERIDOL 5 MG/ML
2.5 INJECTION INTRAMUSCULAR
Status: DISCONTINUED | OUTPATIENT
Start: 2022-01-04 | End: 2022-01-11 | Stop reason: HOSPADM

## 2022-01-04 RX ORDER — BISACODYL 10 MG
10 SUPPOSITORY, RECTAL RECTAL DAILY PRN
Status: DISCONTINUED | OUTPATIENT
Start: 2022-01-04 | End: 2022-01-11 | Stop reason: HOSPADM

## 2022-01-04 RX ORDER — HALOPERIDOL 5 MG/ML
5 INJECTION INTRAMUSCULAR
Status: DISCONTINUED | OUTPATIENT
Start: 2022-01-04 | End: 2022-01-11 | Stop reason: HOSPADM

## 2022-01-04 RX ORDER — MAGNESIUM HYDROXIDE/ALUMINUM HYDROXICE/SIMETHICONE 120; 1200; 1200 MG/30ML; MG/30ML; MG/30ML
30 SUSPENSION ORAL EVERY 4 HOURS PRN
Status: DISCONTINUED | OUTPATIENT
Start: 2022-01-04 | End: 2022-01-11 | Stop reason: HOSPADM

## 2022-01-04 RX ORDER — LORAZEPAM 0.5 MG/1
0.5 TABLET ORAL EVERY 8 HOURS PRN
Status: DISCONTINUED | OUTPATIENT
Start: 2022-01-04 | End: 2022-01-11 | Stop reason: HOSPADM

## 2022-01-04 RX ORDER — HYDROXYZINE HYDROCHLORIDE 25 MG/1
25 TABLET, FILM COATED ORAL
Status: DISCONTINUED | OUTPATIENT
Start: 2022-01-04 | End: 2022-01-11 | Stop reason: HOSPADM

## 2022-01-04 RX ORDER — PANTOPRAZOLE SODIUM 40 MG/1
40 TABLET, DELAYED RELEASE ORAL
Status: DISCONTINUED | OUTPATIENT
Start: 2022-01-04 | End: 2022-01-11 | Stop reason: HOSPADM

## 2022-01-04 RX ORDER — IBUPROFEN 600 MG/1
600 TABLET ORAL EVERY 6 HOURS PRN
Status: DISCONTINUED | OUTPATIENT
Start: 2022-01-04 | End: 2022-01-11 | Stop reason: HOSPADM

## 2022-01-04 RX ORDER — OLANZAPINE 10 MG/1
20 TABLET, ORALLY DISINTEGRATING ORAL
Status: DISCONTINUED | OUTPATIENT
Start: 2022-01-04 | End: 2022-01-04

## 2022-01-04 RX ORDER — AMOXICILLIN 250 MG
1 CAPSULE ORAL DAILY PRN
Status: DISCONTINUED | OUTPATIENT
Start: 2022-01-04 | End: 2022-01-11 | Stop reason: HOSPADM

## 2022-01-04 RX ORDER — QUETIAPINE FUMARATE 25 MG/1
50 TABLET, FILM COATED ORAL
Status: DISCONTINUED | OUTPATIENT
Start: 2022-01-04 | End: 2022-01-05

## 2022-01-04 RX ORDER — NICOTINE 21 MG/24HR
1 PATCH, TRANSDERMAL 24 HOURS TRANSDERMAL DAILY
Status: DISCONTINUED | OUTPATIENT
Start: 2022-01-04 | End: 2022-01-11 | Stop reason: HOSPADM

## 2022-01-04 RX ORDER — HYDROXYZINE 50 MG/1
100 TABLET, FILM COATED ORAL
Status: DISCONTINUED | OUTPATIENT
Start: 2022-01-04 | End: 2022-01-11 | Stop reason: HOSPADM

## 2022-01-04 RX ADMIN — OLANZAPINE 10 MG: 10 TABLET, ORALLY DISINTEGRATING ORAL at 21:34

## 2022-01-04 RX ADMIN — GABAPENTIN 800 MG: 100 CAPSULE ORAL at 08:22

## 2022-01-04 RX ADMIN — NICOTINE POLACRILEX 2 MG: 2 GUM, CHEWING BUCCAL at 13:54

## 2022-01-04 RX ADMIN — NICOTINE POLACRILEX 2 MG: 2 GUM, CHEWING BUCCAL at 11:31

## 2022-01-04 RX ADMIN — LORAZEPAM 0.5 MG: 0.5 TABLET ORAL at 22:04

## 2022-01-04 RX ADMIN — PANTOPRAZOLE SODIUM 40 MG: 40 TABLET, DELAYED RELEASE ORAL at 11:31

## 2022-01-04 RX ADMIN — ESCITALOPRAM 5 MG: 5 TABLET, FILM COATED ORAL at 13:54

## 2022-01-04 RX ADMIN — GABAPENTIN 800 MG: 400 CAPSULE ORAL at 21:34

## 2022-01-04 RX ADMIN — NICOTINE POLACRILEX 2 MG: 2 GUM, CHEWING BUCCAL at 16:57

## 2022-01-04 RX ADMIN — NICOTINE POLACRILEX 2 MG: 2 GUM, CHEWING BUCCAL at 19:12

## 2022-01-04 RX ADMIN — DIVALPROEX SODIUM 500 MG: 500 TABLET, EXTENDED RELEASE ORAL at 13:54

## 2022-01-04 RX ADMIN — BUPRENORPHINE AND NALOXONE 12 MG: 8; 2 FILM BUCCAL; SUBLINGUAL at 08:22

## 2022-01-04 RX ADMIN — GABAPENTIN 800 MG: 400 CAPSULE ORAL at 16:56

## 2022-01-04 RX ADMIN — LORAZEPAM 0.5 MG: 0.5 TABLET ORAL at 13:54

## 2022-01-04 RX ADMIN — NICOTINE POLACRILEX 2 MG: 2 GUM, CHEWING BUCCAL at 22:04

## 2022-01-04 RX ADMIN — QUETIAPINE FUMARATE 50 MG: 25 TABLET ORAL at 21:34

## 2022-01-04 NOTE — PROGRESS NOTES
1 pack of cigarettes  1 lighter  1 torch  1 cell phone  1 phone  with block  1 pair sneakers  8 pairs of socks  4 pants  2 underwear  7 shirts  1 knit hat  1 backpack  1 coat

## 2022-01-04 NOTE — ED PROVIDER NOTES
History  Chief Complaint   Patient presents with    Psychiatric Evaluation     wants to take his car and drive off [facility]    Prior to Admission Medications   Prescriptions Last Dose Informant Patient Reported? Taking? OLANZapine (ZyPREXA) 20 MG tablet   No Yes   Sig: Take 1 tablet (20 mg total) by mouth daily at bedtime   buprenorphine-naloxone (SUBOXONE) 8-2 mg per SL tablet   No Yes   Sig: One and half tabs sl q day    gabapentin (NEURONTIN) 400 mg capsule   No Yes   Sig: Take 2 capsules (800 mg total) by mouth 3 (three) times a day   hydrOXYzine HCL (ATARAX) 50 mg tablet   No Yes   Sig: Take 1 tablet (50 mg total) by mouth 2 (two) times a day   naloxone (Narcan) 4 mg/0 1 mL nasal spray   No No   Si 1 mL (4 mg total) into each nostril as needed (respiratory depression or possible OD)   pantoprazole (PROTONIX) 40 mg tablet   No Yes   Sig: Take 1 tablet (40 mg total) by mouth daily in the early morning   sucralfate (CARAFATE) 1 g tablet   No Yes   Sig: Take 1 tablet (1 g total) by mouth 4 (four) times a day (before meals and at bedtime)   traZODone (DESYREL) 50 mg tablet   No Yes   Sig: Take 1 tablet (50 mg total) by mouth daily at bedtime as needed for sleep      Facility-Administered Medications: None       Past Medical History:   Diagnosis Date    ADHD (attention deficit hyperactivity disorder)     Bipolar disorder (HCC)     Depression     GERD (gastroesophageal reflux disease)     Hyperlipidemia     Hypertension     Psychiatric disorder     Seizures ([facility] )     Substance abuse ([facility] )     Tourette syndrome        Past Surgical History:   Procedure Laterality Date    NV EXC SKIN BENIG >4 CM TRUNK,ARM,LEG Left 10/26/2021    Procedure: EXCISION LIPOMA (BACK);   Surgeon: Mansi Cabrera DO;  Location: [facility] MAIN OR;  Service: General    TONSILECTOMY AND ADNOIDECTOMY      TONSILLECTOMY      TYMPANOSTOMY TUBE PLACEMENT         Family History   Problem Relation Age of Onset    No Known Problems Mother     Substance Abuse Father     Cirrhosis Father     Coronary artery disease Father     Hepatitis Father     Substance Abuse Brother     Diabetes Neg Hx      I have reviewed and agree with the history as documented      E-Cigarette/Vaping    E-Cigarette Use Never User      E-Cigarette/Vaping Substances    Nicotine No     THC No     CBD No     Flavoring No     Other No     Unknown No      Social History     Tobacco Use    Smoking status: Current Every Day Smoker     Packs/day: 1 00     Years: 10 00     Pack years: 10 00     Types: Cigarettes    Smokeless tobacco: Never Used   Vaping Use    Vaping Use: Never used   Substance Use Topics    Alcohol use: Not Currently    Drug use: Yes     Types: Marijuana       Review of Systems    Physical Exam  Physical Exam    Vital Signs  ED Triage Vitals [01/01/22 1258]   Temperature Pulse Respirations Blood Pressure SpO2   98 7 °F (37 1 °C) 90 18 (!) 144/102 98 %      Temp Source Heart Rate Source Patient Position - Orthostatic VS BP Location FiO2 (%)   Temporal Monitor Sitting Right arm --      Pain Score       No Pain           Vitals:    01/02/22 1400 01/02/22 1800 01/03/22 0837 01/04/22 0820   BP: 142/82 136/72 137/65 117/83   Pulse: 100 90 85 85   Patient Position - Orthostatic VS: Sitting Sitting Sitting Sitting         Visual Acuity  Visual Acuity      Most Recent Value   R Pupil Size (mm) 3          ED Medications  Medications   LORazepam (ATIVAN) tablet 1 mg (1 mg Oral Given 1/1/22 1540)   LORazepam (ATIVAN) tablet 1 mg (1 mg Oral Given 1/1/22 2112)   LORazepam (ATIVAN) tablet 1 mg (1 mg Oral Given 1/2/22 1704)       Diagnostic Studies  Results Reviewed     Procedure Component Value Units Date/Time    COVID/FLU/RSV - 2 hour TAT [[id number]]  (Normal) Collected: 01/01/22 1316    Lab Status: Final result Specimen: Nasopharyngeal Swab Updated: 01/01/22 1402     SARS-CoV-2 Negative     INFLUENZA A PCR Negative     INFLUENZA B PCR Negative     RSV PCR Negative    Narrative:      FOR PEDIATRIC PATIENTS - copy/paste COVID Guidelines URL to browser: [email or web address]  [email or web address]     This test has been authorized by FDA under an EUA (Emergency Use Assay) for use by authorized laboratories  Clinical caution and judgement should be used with the interpretation of these results with consideration of the clinical impression and other laboratory testing  Testing reported as "Positive" or "Negative" has been proven to be accurate according to standard laboratory validation requirements  All testing is performed with control materials showing appropriate reactivity at standard intervals  Rapid drug screen, urine [[id number]]  (Abnormal) Collected: 01/01/22 1313    Lab Status: Final result Specimen: Urine, Clean Catch Updated: 01/01/22 1337     Amph/Meth UR Negative     Barbiturate Ur Negative     Benzodiazepine Urine Negative     Cocaine Urine Negative     Methadone Urine Negative     Opiate Urine Negative     PCP Ur Negative     THC Urine Positive     Oxycodone Urine Negative    Narrative:      Presumptive report  If requested, specimen will be sent to reference lab for confirmation  FOR MEDICAL PURPOSES ONLY  IF CONFIRMATION NEEDED PLEASE CONTACT THE LAB WITHIN 5 DAYS  Drug Screen Cutoff Levels:  AMPHETAMINE/METHAMPHETAMINES  1000 ng/mL  BARBITURATES     200 ng/mL  BENZODIAZEPINES     200 ng/mL  COCAINE      300 ng/mL  METHADONE      300 ng/mL  OPIATES      300 ng/mL  PHENCYCLIDINE     25 ng/mL  THC       50 ng/mL  OXYCODONE      100 ng/mL    POCT alcohol breath test [[id number]]  (Normal) Resulted: 01/01/22 1319    Lab Status: Final result Updated: 01/01/22 1319     EXTBreath Alcohol 0 000                 No orders to display              Procedures  Procedures         ED Course  ED Course as of 01/05/22 1359   Tue Jan 04, 2022   0132 Accepted sign-out from Dr Fito Schumacher   Patient case reviewed including hx/exam and diagnostic workup thus far  25 y/o male presenting with worsening depression +explicit SI  Inpatient psychiatric placement on voluntary basis  Pending transfer to [facility] at 0800          0700: No events overnight  Sign over to Dr Jourdan Solano at 0700 pending transfer to behavioral health [facility]  SBIRT 20yo+      Most Recent Value   SBIRT (22 yo +)    In order to provide better care to our patients, we are screening all of our patients for alcohol and drug use  Would it be okay to ask you these screening questions? Yes Filed at: 01/01/2022 1500   Initial Alcohol Screen: US AUDIT-C     1  How often do you have a drink containing alcohol? 0 Filed at: 01/01/2022 1500   2  How many drinks containing alcohol do you have on a typical day you are drinking? 0 Filed at: 01/01/2022 1500   3a  Male UNDER 65: How often do you have five or more drinks on one occasion? 0 Filed at: 01/01/2022 1500   Audit-C Score 0 Filed at: 01/01/2022 1500   JASON: How many times in the past year have you    Used an illegal drug or used a prescription medication for non-medical reasons? Once or Twice Filed at: 01/01/2022 1500   DAST-10: In the past 12 months       1  Have you used drugs other than those required for medical reasons? 0 Filed at: 01/01/2022 1500   2  Do you use more than one drug at a time? 0 Filed at: 01/01/2022 1500   3  Have you had medical problems as a result of your drug use (e g , memory loss, hepatitis, convulsions, bleeding, etc )? 0 Filed at: 01/01/2022 1500   4  Have you had "blackouts" or "flashbacks" as a result of drug use? YesNo 0 Filed at: 01/01/2022 1500   5  Do you ever feel bad or guilty about your drug use? 0 Filed at: 01/01/2022 1500   6  Does your spouse (or parent) ever complain about your involvement with drugs? 0 Filed at: 01/01/2022 1500   7  Have you neglected your family because of your use of drugs? 0 Filed at: 01/01/2022 1500   8  Have you engaged in illegal activities in order to obtain drugs?  0 Filed at: 01/01/2022 1500   9  Have you ever experienced withdrawal symptoms (felt sick) when you stopped taking drugs? 0 Filed at: 01/01/2022 1500   10  Are you always able to stop using drugs when you want to? 0 Filed at: 01/01/2022 1500   DAST-10 Score 0 Filed at: 01/01/2022 1500            MDM    Disposition  Final diagnoses:   Depression with suicidal ideation     Time reflects when diagnosis was documented in both MDM as applicable and the Disposition within this note     Time User Action Codes Description Comment    1/1/2022  1:17 PM Nelson Evans  Rafi Loza Depression with suicidal ideation     1/3/2022 11:29 AM Leata Iris Add [Z00 8] Medical clearance for psychiatric admission     1/3/2022 11:29 AM Leata Iris Add [E78 2] Mixed hyperlipidemia     1/3/2022 11:29 AM Leata Iris Add [K76 0] Fatty liver     1/3/2022 11:29 AM Leata Iris Add [Z51 81,  R07 415] Encounter for monitoring Suboxone maintenance therapy     1/3/2022 11:29 AM Leata Iris Add [K21 9] GERD (gastroesophageal reflux disease)       ED Disposition     ED Disposition Condition Date/Time Comment    Transfer to [facility] Bj 3, 2022 11:37 AM Bud Diamond should be transferred out to [facility] and has been medically cleared           MD Documentation      Most Recent Value   Patient Condition The patient has been stabilized such that within reasonable medical probability, no material deterioration of the patient condition or the condition of the unborn child(juan) is likely to result from the transfer   Reason for Transfer Level of Care needed not available at this facility   Benefits of Transfer Other benefits (Include comment)_______________________  [facility]]   Risks of Transfer Potential for delay in receiving treatment   Accepting Physician Sabiha Stevens PA-C   Accepting Facility Name, [address]    (Name & Tel number) [facility] Transported by [employer] and Unit #) [address] [facility]   Sending MD Bayron Salinas MD   Provider Certification General risk, such as traffic hazards, adverse weather conditions, rough terrain or turbulence, possible failure of equipment (including vehicle or aircraft), or consequences of actions of persons outside the control of the transport personnel      RN Documentation      Most [address] Name, [facility]    (Name & Tel number) Roselyn Hensley   Transport Mode Ambulance   Transported by [employer] and Unit #) [facility]   Level of Care Basic life support   Transfer Date 01/04/22   Transfer Time 0830      Follow-up Information    None         Discharge Medication List as of 1/4/2022  8:57 AM      CONTINUE these medications which have NOT CHANGED    Details   buprenorphine-naloxone (SUBOXONE) 8-2 mg per SL tablet One and half tabs sl q day , Normal      gabapentin (NEURONTIN) 400 mg capsule Take 2 capsules (800 mg total) by mouth 3 (three) times a day, Starting Thu 12/23/2021, Until Mon 2/21/2022, Print      hydrOXYzine HCL (ATARAX) 50 mg tablet Take 1 tablet (50 mg total) by mouth 2 (two) times a day, Starting Thu 12/23/2021, Until Mon 2/21/2022, Print      OLANZapine (ZyPREXA) 20 MG tablet Take 1 tablet (20 mg total) by mouth daily at bedtime, Starting Thu 12/23/2021, Until Mon 2/21/2022, Print      pantoprazole (PROTONIX) 40 mg tablet Take 1 tablet (40 mg total) by mouth daily in the early morning, Starting Thu 12/23/2021, Until Mon 2/21/2022, Print      sucralfate (CARAFATE) 1 g tablet Take 1 tablet (1 g total) by mouth 4 (four) times a day (before meals and at bedtime), Starting Thu 12/23/2021, Until Sat 1/22/2022, Print      traZODone (DESYREL) 50 mg tablet Take 1 tablet (50 mg total) by mouth daily at bedtime as needed for sleep, Starting Thu 12/23/2021, Until Sat 1/22/2022 at 2359, Print      naloxone (Narcan) 4 mg/0 1 mL nasal spray 0 1 mL (4 mg total) into each nostril as needed (respiratory depression or possible OD), Starting Wed 12/29/2021, Print             No discharge procedures on file      PDMP Review       Value Time User    PDMP Reviewed  Yes 12/29/2021 10:09 AM Pura Badillo DO          ED Provider  Electronically Signed by           David Casarez DO  01/05/22 7015

## 2022-01-04 NOTE — PLAN OF CARE
Patient participated in ½ groups for the day    Problem: Ineffective Coping  Goal: Participates in unit activities  Description: Interventions:  - Provide therapeutic environment   - Provide required programming   - Redirect inappropriate behaviors   Outcome: Progressing

## 2022-01-04 NOTE — ED NOTES
Breakfast at bedside  Patient asleep at this time       Ami Gonzalez, [place]  01/04/22 3185
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
## 2022-01-04 NOTE — CMS CERTIFICATION NOTE
Recertification: Based upon physical, mental and social evaluations, I certify that inpatient psychiatric services continue to be medically necessary for this patient for a duration of 7 midnights for the treatment of  Bipolar 1 disorder, depressed, moderate ([facility] [address] )   Available alternative community resources still do not meet the patient's mental health care needs  I further attest that an established written individualized plan of care has been updated and is outlined in the patient's medical records

## 2022-01-04 NOTE — PLAN OF CARE
Problem: Depression  Goal: Treatment Goal: Demonstrate behavioral control of depressive symptoms, verbalize feelings of improved mood/affect, and adopt new coping skills prior to discharge  Outcome: Progressing     Problem: ANXIETY  Goal: Will report anxiety at manageable levels  Description: INTERVENTIONS:  - Administer medication as ordered  - Teach and encourage coping skills  - Provide emotional support  - Assess patient/family for anxiety and ability to cope  Outcome: Progressing     Problem: SLEEP DISTURBANCE  Goal: Will exhibit normal sleeping pattern  Description: Interventions:  -  Assess the patients sleep pattern, noting recent changes  - Administer medication as ordered  - Decrease environmental stimuli, including noise, as appropriate during the night  - Encourage the patient to actively participate in unit groups and or exercise during the day to enhance ability to achieve adequate sleep at night  - Assess the patient, in the morning, encouraging a description of sleep experience  Outcome: Progressing

## 2022-01-04 NOTE — TREATMENT PLAN
[facility] RE[facility]  [address] 24 y o  2000 male MRN: [id number]    [address] [place] [address] Room / Bed: [facility] 260/[facility] 260-01 Encounter: [id number]          Admit Date/Time:  1/4/2022 10:15 AM    Treatment Team: Attending Provider: Farrah Jang MD; Registered Nurse: Nayana Rich RN; Licensed Practical Nurse: Chayo Hoyos LPN    Diagnosis: Principal Problem:    Bipolar 1 disorder, depressed, moderate ([place] [address] )  Active Problems:    Opiate abuse, episodic ([place] [address] )    Cannabis use disorder, severe, dependence ([place] [address] )    Attention deficit hyperactivity disorder    Posttraumatic stress disorder      Patient Strengths/Assets: cooperative, motivation for treatment/growth, patient is on a voluntary commitment    Patient Barriers/Limitations: lack of social/family support, marital/family conflict, substance abuse    Short Term Goals: decrease in depressive symptoms, decrease in anxiety symptoms, decrease in paranoid thoughts, decrease in suicidal thoughts, decrease in self abusive behaviors, decrease in level of agitation, improvement in self care, sleep improvement    Long Term Goals: improvement in depression, improvement in anxiety, resolution of depressive symptoms, stabilization of mood, free of suicidal thoughts, no self abusive behavior, adequate self care, adequate sleep    Progress Towards Goals: starting psychiatric medications as prescribed    Recommended Treatment: medication management, patient medication education, group therapy, milieu therapy, continued Behavioral Health psychiatric evaluation/assessment process    Treatment Frequency: daily medication monitoring, group and milieu therapy daily, monitoring through interdisciplinary rounds, monitoring through weekly patient care conferences    Expected Discharge Date:  5-7 days    Discharge Plan: referral for outpatient medication management with a psychiatrist, referral for outpatient psychotherapy    Treatment Plan Created/Updated By: Toyin Barnett MD

## 2022-01-04 NOTE — ED NOTES
[place] ambulance here for transport to [facility] ''    Report given to rachel Garcia RN  01/04/22 07

## 2022-01-04 NOTE — ED NOTES
Patient yelling and cursing at nurse saying "you people are fucking sick always watching me, what is wrong with you " No one else ever did this  Refused vital signs at this time        Kvng Johnson RN  01/04/22 1968

## 2022-01-04 NOTE — TREATMENT TEAM
01/04/22 1300   Activity/Group Checklist   Group Life Skills  (Art Therapy- [employer])   Attendance Attended   Attendance Duration (min) 16-30   Interactions Interacted appropriately   Affect/Mood Appropriate   Goals Achieved Identified feelings; Able to engage in interactions; Able to listen to others; Other (Comment)  (spontaneous self expression, connection, insight)     Patient had sporadic attendance throughout the session  While in group, he had appropriate interactions with peers and staff  He worked mostly quietly while engaging in the creative process

## 2022-01-05 LAB
ALBUMIN SERPL BCP-MCNC: 3.3 G/DL (ref 3.5–5)
ALP SERPL-CCNC: 86 U/L (ref 46–116)
ALT SERPL W P-5'-P-CCNC: 41 U/L (ref 12–78)
ANION GAP SERPL CALCULATED.3IONS-SCNC: 6 MMOL/L (ref 4–13)
AST SERPL W P-5'-P-CCNC: 19 U/L (ref 5–45)
ATRIAL RATE: 71 BPM
BASOPHILS # BLD AUTO: 0.05 THOUSANDS/ΜL (ref 0–0.1)
BASOPHILS NFR BLD AUTO: 1 % (ref 0–1)
BILIRUB SERPL-MCNC: 0.1 MG/DL (ref 0.2–1)
BUN SERPL-MCNC: 17 MG/DL (ref 5–25)
CALCIUM ALBUM COR SERPL-MCNC: 9.1 MG/DL (ref 8.3–10.1)
CALCIUM SERPL-MCNC: 8.5 MG/DL (ref 8.3–10.1)
CHLORIDE SERPL-SCNC: 103 MMOL/L (ref 100–108)
CHOLEST SERPL-MCNC: 143 MG/DL
CO2 SERPL-SCNC: 30 MMOL/L (ref 21–32)
CREAT SERPL-MCNC: 0.63 MG/DL (ref 0.6–1.3)
EOSINOPHIL # BLD AUTO: 0.26 THOUSAND/ΜL (ref 0–0.61)
EOSINOPHIL NFR BLD AUTO: 4 % (ref 0–6)
ERYTHROCYTE [DISTWIDTH] IN BLOOD BY AUTOMATED COUNT: 13 % (ref 11.6–15.1)
EST. AVERAGE GLUCOSE BLD GHB EST-MCNC: 105 MG/DL
GFR SERPL CREATININE-BSD FRML MDRD: 140 ML/MIN/1.73SQ M
GLUCOSE P FAST SERPL-MCNC: 85 MG/DL (ref 65–99)
GLUCOSE SERPL-MCNC: 85 MG/DL (ref 65–140)
HBA1C MFR BLD: 5.3 %
HCT VFR BLD AUTO: 39.5 % (ref 36.5–49.3)
HDLC SERPL-MCNC: 35 MG/DL
HGB BLD-MCNC: 12.9 G/DL (ref 12–17)
IMM GRANULOCYTES # BLD AUTO: 0.04 THOUSAND/UL (ref 0–0.2)
IMM GRANULOCYTES NFR BLD AUTO: 1 % (ref 0–2)
LDLC SERPL CALC-MCNC: 62 MG/DL (ref 0–100)
LYMPHOCYTES # BLD AUTO: 2.69 THOUSANDS/ΜL (ref 0.6–4.47)
LYMPHOCYTES NFR BLD AUTO: 38 % (ref 14–44)
MCH RBC QN AUTO: 27.6 PG (ref 26.8–34.3)
MCHC RBC AUTO-ENTMCNC: 32.7 G/DL (ref 31.4–37.4)
MCV RBC AUTO: 85 FL (ref 82–98)
MONOCYTES # BLD AUTO: 1.08 THOUSAND/ΜL (ref 0.17–1.22)
MONOCYTES NFR BLD AUTO: 15 % (ref 4–12)
NEUTROPHILS # BLD AUTO: 2.96 THOUSANDS/ΜL (ref 1.85–7.62)
NEUTS SEG NFR BLD AUTO: 41 % (ref 43–75)
NONHDLC SERPL-MCNC: 108 MG/DL
NRBC BLD AUTO-RTO: 0 /100 WBCS
P AXIS: 29 DEGREES
PLATELET # BLD AUTO: 277 THOUSANDS/UL (ref 149–390)
PMV BLD AUTO: 9.9 FL (ref 8.9–12.7)
POTASSIUM SERPL-SCNC: 3.6 MMOL/L (ref 3.5–5.3)
PR INTERVAL: 144 MS
PROT SERPL-MCNC: 6.6 G/DL (ref 6.4–8.2)
QRS AXIS: 71 DEGREES
QRSD INTERVAL: 80 MS
QT INTERVAL: 368 MS
QTC INTERVAL: 399 MS
RBC # BLD AUTO: 4.67 MILLION/UL (ref 3.88–5.62)
RPR SER QL: NORMAL
SODIUM SERPL-SCNC: 139 MMOL/L (ref 136–145)
T WAVE AXIS: 13 DEGREES
TRIGL SERPL-MCNC: 229 MG/DL
TSH SERPL DL<=0.05 MIU/L-ACNC: 1.76 UIU/ML (ref 0.36–3.74)
VENTRICULAR RATE: 71 BPM
WBC # BLD AUTO: 7.08 THOUSAND/UL (ref 4.31–10.16)

## 2022-01-05 PROCEDURE — 84443 ASSAY THYROID STIM HORMONE: CPT | Performed by: PHYSICIAN ASSISTANT

## 2022-01-05 PROCEDURE — 99232 SBSQ HOSP IP/OBS MODERATE 35: CPT | Performed by: STUDENT IN AN ORGANIZED HEALTH CARE EDUCATION/TRAINING PROGRAM

## 2022-01-05 PROCEDURE — 93010 ELECTROCARDIOGRAM REPORT: CPT | Performed by: INTERNAL MEDICINE

## 2022-01-05 PROCEDURE — 99253 IP/OBS CNSLTJ NEW/EST LOW 45: CPT | Performed by: PHYSICIAN ASSISTANT

## 2022-01-05 PROCEDURE — 85025 COMPLETE CBC W/AUTO DIFF WBC: CPT | Performed by: PHYSICIAN ASSISTANT

## 2022-01-05 PROCEDURE — 83036 HEMOGLOBIN GLYCOSYLATED A1C: CPT | Performed by: PHYSICIAN ASSISTANT

## 2022-01-05 PROCEDURE — 80061 LIPID PANEL: CPT | Performed by: PHYSICIAN ASSISTANT

## 2022-01-05 PROCEDURE — 86592 SYPHILIS TEST NON-TREP QUAL: CPT | Performed by: PHYSICIAN ASSISTANT

## 2022-01-05 PROCEDURE — 80053 COMPREHEN METABOLIC PANEL: CPT | Performed by: PHYSICIAN ASSISTANT

## 2022-01-05 RX ORDER — QUETIAPINE FUMARATE 100 MG/1
100 TABLET, FILM COATED ORAL
Status: DISCONTINUED | OUTPATIENT
Start: 2022-01-05 | End: 2022-01-06

## 2022-01-05 RX ADMIN — DIVALPROEX SODIUM 500 MG: 500 TABLET, EXTENDED RELEASE ORAL at 09:02

## 2022-01-05 RX ADMIN — PANTOPRAZOLE SODIUM 40 MG: 40 TABLET, DELAYED RELEASE ORAL at 06:24

## 2022-01-05 RX ADMIN — NICOTINE POLACRILEX 2 MG: 2 GUM, CHEWING BUCCAL at 12:04

## 2022-01-05 RX ADMIN — GABAPENTIN 800 MG: 400 CAPSULE ORAL at 21:18

## 2022-01-05 RX ADMIN — NICOTINE POLACRILEX 2 MG: 2 GUM, CHEWING BUCCAL at 09:02

## 2022-01-05 RX ADMIN — ESCITALOPRAM 5 MG: 5 TABLET, FILM COATED ORAL at 09:01

## 2022-01-05 RX ADMIN — QUETIAPINE FUMARATE 100 MG: 100 TABLET ORAL at 21:18

## 2022-01-05 RX ADMIN — ALUMINUM HYDROXIDE, MAGNESIUM HYDROXIDE, AND SIMETHICONE 30 ML: 200; 200; 20 SUSPENSION ORAL at 17:29

## 2022-01-05 RX ADMIN — GABAPENTIN 800 MG: 400 CAPSULE ORAL at 09:02

## 2022-01-05 RX ADMIN — NICOTINE POLACRILEX 2 MG: 2 GUM, CHEWING BUCCAL at 17:29

## 2022-01-05 RX ADMIN — LORAZEPAM 0.5 MG: 0.5 TABLET ORAL at 09:28

## 2022-01-05 RX ADMIN — IBUPROFEN 800 MG: 800 TABLET ORAL at 10:34

## 2022-01-05 RX ADMIN — OLANZAPINE 10 MG: 10 TABLET, ORALLY DISINTEGRATING ORAL at 21:18

## 2022-01-05 RX ADMIN — NICOTINE POLACRILEX 2 MG: 2 GUM, CHEWING BUCCAL at 15:27

## 2022-01-05 RX ADMIN — BUPRENORPHINE AND NALOXONE 12 MG: 8; 2 FILM BUCCAL; SUBLINGUAL at 09:02

## 2022-01-05 RX ADMIN — GABAPENTIN 800 MG: 400 CAPSULE ORAL at 15:27
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
## 2022-01-05 NOTE — TREATMENT TEAM
01/05/22 1000   Activity/Group Checklist   Group [facility]  (Building resilience, and goals)   Attendance Attended  (sporadic)   Attendance Duration (min) 16-30   Interactions Interacted appropriately   Affect/Mood Blunted/flat   Goals Achieved Identified feelings; Able to listen to others; Able to engage in interactions     Patient participated in the community group session  He assisted with passing out materials to peers  He had sporadic attendance  He shared his work at the end of the session while having an ice pack on his head  Had some insight on what changes he would like to make in his life, but had no response to building his resilience

## 2022-01-05 NOTE — PLAN OF CARE
Problem: Ineffective Coping  Goal: Participates in unit activities  Description: Interventions:  - Provide therapeutic environment   - Provide required programming   - Redirect inappropriate behaviors   Outcome: Progressing     Problem: Depression  Goal: Treatment Goal: Demonstrate behavioral control of depressive symptoms, verbalize feelings of improved mood/affect, and adopt new coping skills prior to discharge  Outcome: Progressing     Problem: ANXIETY  Goal: Will report anxiety at manageable levels  Description: INTERVENTIONS:  - Administer medication as ordered  - Teach and encourage coping skills  - Provide emotional support  - Assess patient/family for anxiety and ability to cope  Outcome: Progressing     Problem: SLEEP DISTURBANCE  Goal: Will exhibit normal sleeping pattern  Description: Interventions:  -  Assess the patients sleep pattern, noting recent changes  - Administer medication as ordered  - Decrease environmental stimuli, including noise, as appropriate during the night  - Encourage the patient to actively participate in unit groups and or exercise during the day to enhance ability to achieve adequate sleep at night  - Assess the patient, in the morning, encouraging a description of sleep experience  Outcome: Progressing     Problem: DISCHARGE PLANNING  Goal: Discharge to home or other facility with appropriate resources  Description: INTERVENTIONS:  - Identify barriers to discharge w/patient and caregiver  - Arrange for needed discharge resources and transportation as appropriate  - Identify discharge learning needs (meds, wound care, etc )  - Arrange for interpretive services to assist at discharge as needed  - Refer to Case Management Department for coordinating discharge planning if the patient needs post-hospital services based on physician/advanced practitioner order or complex needs related to functional status, cognitive ability, or social support system  Outcome: Progressing

## 2022-01-05 NOTE — ASSESSMENT & PLAN NOTE
 No admission labs available   CBC, CMP, RPR, HbA1c, lipid panel, TSH labs pending   Vitals stable   UDS positive for THC   COVID-19 negative   EKG reveals NSR, 71bpm    Medically stable for continued inpatient psychiatric treatment based on available results   Please contact [facility] with any questions or concerns

## 2022-01-05 NOTE — DISCHARGE INSTR - OTHER ORDERS
When you move to [place] you will have to apply for their [address] can apply by:   Once you apply for [employer], please call [place] Medicaid to inform them of your move and cancel your [place] insurance   For cancelling [place] insurance, you can call:     1) Online: Tred  mo gov/healthcare     2) Phone: [phone]    3) Or go to your [address] [place] [address] Medicaid   Welfare Office administrates your local program under [place] [employer] guidelines  Medicaid provides health care for low income people  Welfare program give assistance to those who have little or no income  While Supplemental Nutrition Assistance Program (SNAP) or Food [place] helps low-income families buy food  [place] HealthNet Division [facility] Coverage)  [place], [address]  Telephone: [phone]      [facility] Support Division)   [address] [place] [address] Alfa Gustafson  Phone: [phone]   Fax: [phone]      Suboxone Maintenance Provider     Dr Noah Kelly is a Suboxone Maintenance Provider and he accepts [facility] and is accepting new patients  [facility] (Suboxone Maintenance)   [address] [facility], [address] [place] [address]  Phone: [phone]      Dr Noah Kelly can also refer for Medical Marijuana if appropriate     Yun Roth MD (Medical Marijuana Provider)  [address]  Phone: [phone], [address] [facility] 911   24-hour Crisis [facility] [phone]   Borderline Personality Disorder Hotline 625-997-3704   [address] [phone] ([phone])   [place] Hotline 338-635-1528   Pregnancy Hotline  [facility], [facility] and Children's Services [facility]) [address] ([phone])   [address] 6-854-265-HELP (1238)   [address] [facility] [phone]   Suicide Prevention Hotline 469-325-4313  Text: 579822  [employer]   [place] Suicide Prevention Hotline 410-430-2911   Teen Text Crisis Line  3 p m  to midnight, every day 921-297-7104 (texting only)   [facility] [phone]       Anger Management  Howard Fox and [address] [facility] [address] [phone] [email or web address] [email or web address]  [address], [address] [facility] [phone] [address] [place] [address], [address] [place] [address] [phone] [email or web address]  [email or web address]  [address], [address], [address] [facility], [address] Hotline  Call: [phone]  New Perspectives Toll Free:  [place] Crisis Text Line: 419734    The WARM LINE is for persons from [place], [facility], [place] and [facility] [employer]  Phone: (828) 614-6693    Outpatient Drug & Alcohol Treatment  The [facility] currently operates outpatient treatment units:  [address] in [place], [place] as a functional unit of the [facility] in [place], [place] as a functional unit of the [address] treatment units are licensed by the [place] Department of Drug & Alcohol Programs to provide individual and group counseling for those with substance abuse and dependency problems  The clinical staff is experienced in a variety of therapeutic modalities and provides treatment that is individualized to meet the particular needs of each person  These units are drug-free treatment programs    The Commission accepts most major healthcare insurance coverage plans, [place] Medical Assistance and in those cases where the consumer has no third party healthcare coverage, a liberal sliding fee schedule is utilized  The length of service and type of outpatient service provided is based on the results of the Level of Care Assessment           There are currently three treatment protocols available: Outpatient  Intensive Outpatient  Contracted services for Partial Hospitalization  Therapy is provided in both Individual and Group counseling formats  The Outpatient department offers individual counseling for the family members of substance abusers to address co-dependent and enabling behaviors       Outpatient treatment services in BEHAVIORAL MEDICINE AT [facility] are purchased through a fee-for-service subcontract with:  [place] [facility] and [facility]  [address] [facility] [address]   Phone: [phone], [address] [place] [address], [address]  [address] [facility] [phone]  [facility] office [phone]

## 2022-01-05 NOTE — PROGRESS NOTES
Progress Note - [address] [place] [address] R Gaines 24 y o  male MRN: [id number]  Unit/Bed#: [facility] [place] 260-01 Encounter: [id number]    Assessment/Plan   Principal Problem:    Bipolar 1 disorder, depressed, moderate ([facility] )  Active Problems:    Opiate abuse, episodic ([facility])    Medical clearance for psychiatric admission    Cannabis use disorder, severe, dependence ([facility] )    Attention deficit hyperactivity disorder    Posttraumatic stress disorder      Subjective: Patient was seen, chart reviewed and case discussed with team  As per report patient received PRN medications for anxiety  Patient continues to endorse depressed mood, anhedonia, hopelessness, racing thoughts, mood swings, fluctuating anxiety and passive death wishes  Denies any thoughts or intent to hurt self  He is compliant with medications     Psychiatric Review of Systems:  Behavior over the last 24 hours:  improved  Sleep: normal  Appetite: normal  Medication side effects: No  ROS: no complaints, all others negative    Current Medications:  Current Facility-Administered Medications   Medication Dose Route Frequency    aluminum-magnesium hydroxide-simethicone (MYLANTA) oral suspension 30 mL  30 mL Oral Q4H PRN    artificial tear (LUBRIFRESH P M ) ophthalmic ointment 1 application  1 application Both Eyes I0S PRN    haloperidol lactate (HALDOL) injection 2 5 mg  2 5 mg Intramuscular Q6H PRN Max 4/day    And    LORazepam (ATIVAN) injection 1 mg  1 mg Intramuscular Q6H PRN Max 4/day    And    benztropine (COGENTIN) injection 0 5 mg  0 5 mg Intramuscular Q6H PRN Max 4/day    haloperidol lactate (HALDOL) injection 5 mg  5 mg Intramuscular Q4H PRN Max 4/day    And    LORazepam (ATIVAN) injection 2 mg  2 mg Intramuscular Q4H PRN Max 4/day    And    benztropine (COGENTIN) injection 1 mg  1 mg Intramuscular Q4H PRN Max 4/day    benztropine (COGENTIN) injection 1 mg  1 mg Intramuscular Q4H PRN Max 6/day    benztropine (COGENTIN) tablet 1 mg  1 mg Oral Q4H PRN Max 6/day    bisacodyl (DULCOLAX) rectal suppository 10 mg  10 mg Rectal Daily PRN    buprenorphine-naloxone (Suboxone) film 12 mg  12 mg Sublingual Daily    divalproex sodium (DEPAKOTE ER) 24 hr tablet 500 mg  500 mg Oral Daily    escitalopram (LEXAPRO) tablet 5 mg  5 mg Oral Daily    gabapentin (NEURONTIN) capsule 800 mg  800 mg Oral TID    haloperidol (HALDOL) tablet 2 mg  2 mg Oral Q4H PRN Max 6/day    haloperidol (HALDOL) tablet 5 mg  5 mg Oral Q6H PRN Max 4/day    haloperidol (HALDOL) tablet 5 mg  5 mg Oral Q4H PRN Max 4/day    hydrOXYzine HCL (ATARAX) tablet 100 mg  100 mg Oral Q6H PRN Max 4/day    Or    LORazepam (ATIVAN) injection 2 mg  2 mg Intramuscular Q6H PRN    hydrOXYzine HCL (ATARAX) tablet 25 mg  25 mg Oral Q6H PRN Max 4/day    ibuprofen (MOTRIN) tablet 400 mg  400 mg Oral Q4H PRN    ibuprofen (MOTRIN) tablet 600 mg  600 mg Oral Q6H PRN    ibuprofen (MOTRIN) tablet 800 mg  800 mg Oral Q8H PRN    LORazepam (ATIVAN) tablet 0 5 mg  0 5 mg Oral Q8H PRN    nicotine (NICODERM CQ) 14 mg/24hr TD 24 hr patch 1 patch  1 patch Transdermal Daily    nicotine polacrilex (NICORETTE) gum 2 mg  2 mg Oral Q2H PRN    OLANZapine (ZyPREXA ZYDIS) dispersible tablet 10 mg  10 mg Oral HS    pantoprazole (PROTONIX) EC tablet 40 mg  40 mg Oral Early Morning    polyethylene glycol (MIRALAX) packet 17 g  17 g Oral Daily PRN    QUEtiapine (SEROquel) tablet 50 mg  50 mg Oral HS    senna-docusate sodium (SENOKOT S) 8 6-50 mg per tablet 1 tablet  1 tablet Oral Daily PRN    traZODone (DESYREL) tablet 50 mg  50 mg Oral HS PRN       Behavioral Health Medications: all current active meds have been reviewed  Vitals:  Vitals:    01/05/22 0724   BP: 97/50   Pulse: 58   Resp: 16   Temp: 98 2 °F (36 8 °C)   SpO2:        Laboratory results:  I have personally reviewed all pertinent laboratory/tests results      Mental Status Evaluation:  Appearance:  age appropriate   Behavior:  restless and fidgety   Speech:  normal pitch and normal volume   Mood:  anxious and depressed   Affect:  constricted and labile   Language Appropriate   Thought Process:  goal directed and logical   Thought Content:  normal   Perceptual Disturbances: None   Risk Potential: Suicidal Ideations none, Homicidal Ideations none and Potential for Aggression No   Sensorium:  person, place, time/date, situation, day of week, month of year and year   Cognition:  recent and remote memory grossly intact   Consciousness:  alert and awake    Attention: attention span appeared shorter than expected for age   Insight:  fair   Judgment: fair   Gait/Station: normal gait/station   Motor Activity: no abnormal movements     Progress Toward Goals: Progressing    Recommended Treatment: Continue with pharmacotherapy, group therapy, milieu therapy and occupational therapy  1  Increase seroquel to 100 mg PO HS cross titration with zyprexa  2   Valproic acid level in coming days

## 2022-01-05 NOTE — DISCHARGE INSTR - APPOINTMENTS
Sae Watters RN, our [employer], will be calling you after your discharge, on the phone number that you provided  She will be available as an additional support, if needed  If you wish to speak with her, you may contact Naila Redmond at [phone]

## 2022-01-05 NOTE — CONSULTS
[address] [address], 24 y o  male MRN: [id number]  Unit/Bed#: [facility] 260-01 Encounter: [id number]  Primary Care Provider: Pablo Vizcarra DO   Date and time admitted to hospital: 1/4/2022 10:15 AM    Inpatient consult for Medical Clearance for [address] patient  Consult performed by: Lela Valencia PA-C  Consult ordered by: Bel Aguayo PA-C          Medical clearance for psychiatric admission  Assessment & Plan   No admission labs available   CBC, CMP, RPR, HbA1c, lipid panel, TSH labs pending   Vitals stable   UDS positive for THC   COVID-19 negative   EKG reveals NSR, 71bpm    Medically stable for continued inpatient psychiatric treatment based on available results   Please contact [facility] with any questions or concerns      Cannabis use disorder, severe, dependence ([facility] [address] )  Assessment & Plan  · UDS positive for THC  ·  on cessation    Opiate abuse, episodic ([facility] [address] )  Assessment & Plan  · None noted in UDS      VTE Prophylaxis:   Low Risk (Score 0-2) - Encourage Ambulation  Recommendations for Discharge:  · Discharge timeline per primary team   Routine follow-up with primary care provider   on cessation from marijuana and opiate use  Counseling / Coordination of Care Time: 30 minutes Greater than 50% of total time spent on patient counseling and coordination of care  Collaboration of Care: Were Recommendations Directly Discussed with Primary Treatment Team? No    History of Present Illness:  Huber Perez is a 24 y o  male who is originally admitted to the behavioral health service due to suicidal ideation  We are consulted for management of chronic medical conditions  Patient minimally interactive, denied changes to medical history since last admission Patient should continue all prior to admission medications as prescribed by primary care provider/outpatient specialists  UDA positive for THC   Unwilling to participate in remainder of interview  Available admission lab work and vitals are acceptable  Patient feels a baseline physical health  Patient appears medically stable for inpatient psychiatric treatment at this time  Please contact [facility] with any medical questions or concerns if issues should arise  Review of Systems:  Review of Systems   Psychiatric/Behavioral: Positive for suicidal ideas  All other systems reviewed and are negative  Past Medical and Surgical History:   Past Medical History:   Diagnosis Date    ADHD (attention deficit hyperactivity disorder)     Bipolar disorder (HCC)     Depression     GERD (gastroesophageal reflux disease)     Hyperlipidemia     Hypertension     Psychiatric disorder     Seizures ([facility] [address] )     Substance abuse ([facility] )     Tourette syndrome        Past Surgical History:   Procedure Laterality Date    IA EXC SKIN BENIG >4 CM TRUNK,ARM,LEG Left 10/26/2021    Procedure: EXCISION LIPOMA (BACK); Surgeon: Devendra Lisa DO;  Location: [facility];  Service: General    TONSILECTOMY AND ADNOIDECTOMY      TONSILLECTOMY      TYMPANOSTOMY TUBE PLACEMENT         Meds/Allergies:  PTA meds:   Prior to Admission Medications   Prescriptions Last Dose Informant Patient Reported? Taking?    OLANZapine (ZyPREXA) 20 MG tablet   No No   Sig: Take 1 tablet (20 mg total) by mouth daily at bedtime   buprenorphine-naloxone (SUBOXONE) 8-2 mg per SL tablet   No No   Sig: One and half tabs sl q day    gabapentin (NEURONTIN) 400 mg capsule   No No   Sig: Take 2 capsules (800 mg total) by mouth 3 (three) times a day   hydrOXYzine HCL (ATARAX) 50 mg tablet   No No   Sig: Take 1 tablet (50 mg total) by mouth 2 (two) times a day   naloxone (Narcan) 4 mg/0 1 mL nasal spray   No No   Si 1 mL (4 mg total) into each nostril as needed (respiratory depression or possible OD)   pantoprazole (PROTONIX) 40 mg tablet   No No   Sig: Take 1 tablet (40 mg total) by mouth daily in the early morning   sucralfate (CARAFATE) 1 g tablet   No No   Sig: Take 1 tablet (1 g total) by mouth 4 (four) times a day (before meals and at bedtime)   traZODone (DESYREL) 50 mg tablet   No No   Sig: Take 1 tablet (50 mg total) by mouth daily at bedtime as needed for sleep      Facility-Administered Medications: None       Allergies: Allergies   Allergen Reactions    Abilify [Aripiprazole] Other (See Comments)     Seizures and hyperglycemia       Social History:  Marital Status: Single  Substance Use History:   Social History     Substance and Sexual Activity   Alcohol Use Not Currently     Social History     Tobacco Use   Smoking Status Current Every Day Smoker    Packs/day: 1 00    Years: 10 00    Pack years: 10 00    Types: Cigarettes   Smokeless Tobacco Never Used     Social History     Substance and Sexual Activity   Drug Use Yes    Types: Marijuana       Family History:  Family History   Problem Relation Age of Onset    No Known Problems Mother     Substance Abuse Father     Cirrhosis Father     Coronary artery disease Father     Hepatitis Father     Substance Abuse Brother     Diabetes Neg Hx        Physical Exam:   Vitals:   Blood Pressure: 97/50 (01/05/22 0724)  Pulse: 58 (01/05/22 0724)  Temperature: 98 2 °F (36 8 °C) (01/05/22 0724)  Temp Source: Tympanic (01/05/22 0724)  Respirations: 16 (01/05/22 0724)  Height: 5' 6" (167 6 cm) (01/04/22 1024)  Weight - Scale: 86 9 kg (191 lb 9 6 oz) (01/04/22 1024)  SpO2: 96 % (01/04/22 1024)    Physical Exam  Vitals and nursing note reviewed  Constitutional:       General: He is sleeping  He is not in acute distress  HENT:      Head: Normocephalic and atraumatic  Cardiovascular:      Rate and Rhythm: Normal rate and regular rhythm  Heart sounds: No murmur heard  Pulmonary:      Effort: Pulmonary effort is normal       Breath sounds: Normal breath sounds  Abdominal:      General: There is no distension  Palpations: Abdomen is soft     Musculoskeletal: Right lower leg: No edema  Left lower leg: No edema  Skin:     General: Skin is warm and dry  Neurological:      Comments: Unwilling to participate in CN exam         Additional Data:   Lab Results:                    Lab Results   Component Value Date/Time    HGBA1C 5 4 11/23/2021 05:40 AM    HGBA1C 5 1 03/31/2021 12:50 PM               Imaging: No pertinent imaging reviewed  No orders to display       EKG, Pathology, and Other Studies Reviewed on Admission:   · EKG: NSR  HR 71bpm,   ** Please Note: This note may have been constructed using a voice recognition system   **

## 2022-01-06 PROCEDURE — 99232 SBSQ HOSP IP/OBS MODERATE 35: CPT | Performed by: PHYSICIAN ASSISTANT

## 2022-01-06 RX ORDER — OLANZAPINE 5 MG/1
5 TABLET, ORALLY DISINTEGRATING ORAL
Status: DISCONTINUED | OUTPATIENT
Start: 2022-01-06 | End: 2022-01-07

## 2022-01-06 RX ADMIN — GABAPENTIN 800 MG: 400 CAPSULE ORAL at 21:40

## 2022-01-06 RX ADMIN — QUETIAPINE FUMARATE 150 MG: 100 TABLET ORAL at 21:40

## 2022-01-06 RX ADMIN — BUPRENORPHINE AND NALOXONE 12 MG: 8; 2 FILM BUCCAL; SUBLINGUAL at 09:05

## 2022-01-06 RX ADMIN — DIVALPROEX SODIUM 500 MG: 500 TABLET, EXTENDED RELEASE ORAL at 09:05

## 2022-01-06 RX ADMIN — OLANZAPINE 5 MG: 5 TABLET, ORALLY DISINTEGRATING ORAL at 21:40

## 2022-01-06 RX ADMIN — PANTOPRAZOLE SODIUM 40 MG: 40 TABLET, DELAYED RELEASE ORAL at 06:29

## 2022-01-06 RX ADMIN — ESCITALOPRAM 5 MG: 5 TABLET, FILM COATED ORAL at 09:05

## 2022-01-06 RX ADMIN — NICOTINE POLACRILEX 2 MG: 2 GUM, CHEWING BUCCAL at 09:04

## 2022-01-06 RX ADMIN — NICOTINE POLACRILEX 2 MG: 2 GUM, CHEWING BUCCAL at 17:06

## 2022-01-06 RX ADMIN — HYDROXYZINE HYDROCHLORIDE 100 MG: 50 TABLET, FILM COATED ORAL at 09:44

## 2022-01-06 RX ADMIN — GABAPENTIN 800 MG: 400 CAPSULE ORAL at 09:04

## 2022-01-06 RX ADMIN — GABAPENTIN 800 MG: 400 CAPSULE ORAL at 17:04

## 2022-01-06 NOTE — PROGRESS NOTES
Pt comfortable on the unit  Denies SI  Reported depression and anxiety  Given Ativan PRN, slept and then came out of room  DC: TBD     01/06/22 0827   Team Meeting   Meeting Type Daily Rounds   Team Members Present   Team Members Present Physician;Nurse;; Other (Discipline and Name)   Physician Team Member Dr Baljit Jaeger / Natalya Hayes / Tri Ramsey Team Member [facility] Management Team Member Devika Carranza / Sharan Greenberg   Other (Discipline and Name) Dee Perla - Art Therapist   Patient/Family Present   Patient Present No   Patient's Family Present No

## 2022-01-06 NOTE — PROGRESS NOTES
Progress Note - [address] 24 y o  male MRN: [id number]   Unit/Bed#: [facility] 260-01 Encounter: [id number]    Behavior over the last 24 hours: unchanged  Pennelope Dandy is a 20-year-old male with a history of bipolar disorder who presents for psychiatric follow-up  Patient is calm and cooperative upon approach  He is depressed appearing and endorses depressed mood, hopelessness passive death wishes but denies any active SI and is able to contract for safety on the unit  He continues to endorse mood instability  He does have a more hopeful outlook on the future and states he will be traveling to [place] with his brother upon eventual discharge  States that he is done fighting with his girlfriend and is trying to remove himself from her life  No psychotic symptoms currently  Medication and meal compliant  Plans on attending group      Sleep: normal  Appetite: normal  Medication side effects: No   ROS: all other systems are negative    Mental Status Evaluation:    Appearance:  age appropriate, casually dressed, adequate grooming, bearded, tattooed   Behavior:  cooperative, calm   Speech:  normal rate and volume   Mood:  depressed   Affect:  blunted   Thought Process:  organized, goal directed, linear   Associations: intact associations   Thought Content:  no overt delusions, negative thinking, ruminations   Perceptual Disturbances: no auditory hallucinations, no visual hallucinations, does not appear responding to internal stimuli   Risk Potential: Suicidal ideation - Yes, passive death wish, contracts for safety on the unit, would not feel safe if discharged  Homicidal ideation - None at present  Potential for aggression - No   Sensorium:  oriented to person, place, time/date and situation   Memory:  recent and remote memory grossly intact   Consciousness:  alert and awake   Attention/Concentration: attention span and concentration appear shorter than expected for age   Insight:  fair Judgment: fair   Gait/Station: normal gait/station   Motor Activity: no abnormal movements     Vital signs in last 24 hours:    Temp:  [97 6 °F (36 4 °C)-97 8 °F (36 6 °C)] 97 6 °F (36 4 °C)  HR:  [70-85] 70  Resp:  [17-18] 18  BP: (117-134)/(65-80) 134/65    Laboratory results: I have personally reviewed all pertinent laboratory/tests results    Most Recent Labs:   Lab Results   Component Value Date    WBC 7 08 01/05/2022    RBC 4 67 01/05/2022    HGB 12 9 01/05/2022    HCT 39 5 01/05/2022     01/05/2022    RDW 13 0 01/05/2022    NEUTROABS 2 96 01/05/2022    SODIUM 139 01/05/2022    K 3 6 01/05/2022     01/05/2022    CO2 30 01/05/2022    BUN 17 01/05/2022    CREATININE 0 63 01/05/2022    GLUC 85 01/05/2022    CALCIUM 8 5 01/05/2022    AST 19 01/05/2022    ALT 41 01/05/2022    ALKPHOS 86 01/05/2022    TP 6 6 01/05/2022    ALB 3 3 (L) 01/05/2022    TBILI 0 10 (L) 01/05/2022    CHOLESTEROL 143 01/05/2022    HDL 35 (L) 01/05/2022    TRIG 229 (H) 01/05/2022    LDLCALC 62 01/05/2022    NONHDLC 108 01/05/2022    [id number] 1 763 01/05/2022    RPR Non-Reactive 01/05/2022    HGBA1C 5 3 01/05/2022     01/05/2022     Last Laboratory Results with Depakote and/or Tegretol levels:   Lab Results   Component Value Date    WBC 7 08 01/05/2022    RBC 4 67 01/05/2022    HGB 12 9 01/05/2022    HCT 39 5 01/05/2022     01/05/2022    RDW 13 0 01/05/2022    NEUTROABS 2 96 01/05/2022    SODIUM 139 01/05/2022    K 3 6 01/05/2022     01/05/2022    CO2 30 01/05/2022    BUN 17 01/05/2022    CREATININE 0 63 01/05/2022    GLUC 85 01/05/2022    CALCIUM 8 5 01/05/2022    AST 19 01/05/2022    ALT 41 01/05/2022    ALKPHOS 86 01/05/2022    TP 6 6 01/05/2022    ALB 3 3 (L) 01/05/2022    TBILI 0 10 (L) 01/05/2022       Progress Toward Goals: progressing    Assessment/Plan   Principal Problem:    Bipolar 1 disorder, depressed, moderate (HCC)  Active Problems:    Opiate abuse, episodic (HCC)    Medical clearance for psychiatric admission    Cannabis use disorder, severe, dependence ([facility] [address] )    Attention deficit hyperactivity disorder    Posttraumatic stress disorder      Recommended Treatment:     Planned medication and treatment changes: All current active medications have been reviewed  Encourage group therapy, milieu therapy and occupational therapy  Behavioral Health checks every 7 minutes    Continue cross taper with Zyprexa and Seroquel  Zyprexa decreased to 5 mg at bedtime, Seroquel increased to 150 mg at bedtime  Due for VPA level tomorrow morning  Continue remainder of medications      Current Facility-Administered Medications   Medication Dose Route Frequency Provider Last Rate    aluminum-magnesium hydroxide-simethicone  30 mL Oral Q4H PRN Leotha [place], PA-C      artificial tear  1 application Both Eyes T3S PRN Leotha [place], PA-C      haloperidol lactate  2 5 mg Intramuscular Q6H PRN Max 4/day Leotha [place], PA-C      And    LORazepam  1 mg Intramuscular Q6H PRN Max 4/day Leotha [place], PA-C      And    benztropine  0 5 mg Intramuscular Q6H PRN Max 4/day Leotha [place], PA-C      haloperidol lactate  5 mg Intramuscular Q4H PRN Max 4/day Leotha Phoenix, PA-C      And    LORazepam  2 mg Intramuscular Q4H PRN Max 4/day Leotha Phoenix, PA-C      And    benztropine  1 mg Intramuscular Q4H PRN Max 4/day Leotha Phoenix, PA-C      benztropine  1 mg Intramuscular Q4H PRN Max 6/day Leotha Phoenix, PA-C      benztropine  1 mg Oral Q4H PRN Max 6/day Leotha Phoenix, PA-C      bisacodyl  10 mg Rectal Daily PRN Leotha Phoenix, PA-C      buprenorphine-naloxone  12 mg Sublingual Daily Leotha Phoenix, PA-C      divalproex sodium  500 mg Oral Daily Simon Garcia MD      escitalopram  5 mg Oral Daily Simon Garcia MD      gabapentin  800 mg Oral TID Leotha Phoenix, PA-C      haloperidol  2 mg Oral Q4H PRN Max 6/day Leotha [place], PA-C      haloperidol  5 mg Oral Q6H PRN Max 4/day Marieta Sprung, ANGELA      haloperidol  5 mg Oral Q4H PRN Max 4/day Marieta Sprung, PA-CHRISTIANO      hydrOXYzine HCL  100 mg Oral Q6H PRN Max 4/day Marieta Sprung, ANGELA      Or    LORazepam  2 mg Intramuscular Q6H PRN Marieta Sprung, PA-C      hydrOXYzine HCL  25 mg Oral Q6H PRN Max 4/day Marieta Sprung, PA-C      ibuprofen  400 mg Oral Q4H PRN Marieta Sprung, PA-C      ibuprofen  600 mg Oral Q6H PRN Meliza Sprung, PA-C      ibuprofen  800 mg Oral Q8H PRN Marieta Sprung, ANGELA      LORazepam  0 5 mg Oral Q8H PRN Lottie Hanna MD      nicotine  1 patch Transdermal Daily Marieta Sprung, ANGELA      nicotine polacrilex  2 mg Oral Q2H PRN Marieta Sprung, ANGELA      OLANZapine  10 mg Oral HS Lottie Hanna MD      pantoprazole  40 mg Oral Early Morning Marieta Sprung, ANGELA      polyethylene glycol  17 g Oral Daily PRN Marieta Sprung, ANGELA      QUEtiapine  100 mg Oral HS Lottie Hanna MD      senna-docusate sodium  1 tablet Oral Daily PRN Marieta Sprung, ANGELA      traZODone  50 mg Oral HS PRN Marieta Sprung, ANGELA       Risks / Benefits of Treatment:    Risks, benefits, and possible side effects of medications explained to patient and patient verbalizes understanding and agreement for treatment  Counseling / Coordination of Care:    Patient's progress discussed with staff in treatment team meeting  Medications, treatment progress and treatment plan reviewed with patient      Meliza Alcaraz PA-C 01/06/22

## 2022-01-07 LAB
BUPRENORPHINE UR CFM-MCNC: 244 NG/ML
BUPRENORPHINE UR QL CFM: NORMAL NG/ML
BUPRENORPHINE UR QL CFM: POSITIVE
BUPRENORPHINE+NOR UR QL: POSITIVE
NORBUPRENORPHINE UR CFM-MCNC: 1682 NG/ML
NORBUPRENORPHINE UR QL CFM: POSITIVE
VALPROATE SERPL-MCNC: 37 UG/ML (ref 50–100)

## 2022-01-07 PROCEDURE — 99232 SBSQ HOSP IP/OBS MODERATE 35: CPT | Performed by: PHYSICIAN ASSISTANT

## 2022-01-07 PROCEDURE — 80164 ASSAY DIPROPYLACETIC ACD TOT: CPT | Performed by: PHYSICIAN ASSISTANT

## 2022-01-07 RX ORDER — QUETIAPINE FUMARATE 200 MG/1
200 TABLET, FILM COATED ORAL
Status: DISCONTINUED | OUTPATIENT
Start: 2022-01-07 | End: 2022-01-11 | Stop reason: HOSPADM

## 2022-01-07 RX ADMIN — NICOTINE POLACRILEX 2 MG: 2 GUM, CHEWING BUCCAL at 17:15

## 2022-01-07 RX ADMIN — GABAPENTIN 800 MG: 400 CAPSULE ORAL at 21:44

## 2022-01-07 RX ADMIN — PANTOPRAZOLE SODIUM 40 MG: 40 TABLET, DELAYED RELEASE ORAL at 05:50

## 2022-01-07 RX ADMIN — ESCITALOPRAM 5 MG: 5 TABLET, FILM COATED ORAL at 08:58

## 2022-01-07 RX ADMIN — NICOTINE POLACRILEX 2 MG: 2 GUM, CHEWING BUCCAL at 12:11

## 2022-01-07 RX ADMIN — QUETIAPINE FUMARATE 200 MG: 200 TABLET ORAL at 21:44

## 2022-01-07 RX ADMIN — DIVALPROEX SODIUM 500 MG: 500 TABLET, EXTENDED RELEASE ORAL at 08:58

## 2022-01-07 RX ADMIN — BUPRENORPHINE AND NALOXONE 12 MG: 8; 2 FILM BUCCAL; SUBLINGUAL at 09:00

## 2022-01-07 RX ADMIN — NICOTINE POLACRILEX 2 MG: 2 GUM, CHEWING BUCCAL at 09:00

## 2022-01-07 RX ADMIN — GABAPENTIN 800 MG: 400 CAPSULE ORAL at 08:58

## 2022-01-07 RX ADMIN — NICOTINE POLACRILEX 2 MG: 2 GUM, CHEWING BUCCAL at 06:19

## 2022-01-07 RX ADMIN — LORAZEPAM 0.5 MG: 0.5 TABLET ORAL at 10:35

## 2022-01-07 RX ADMIN — GABAPENTIN 800 MG: 400 CAPSULE ORAL at 17:13

## 2022-01-07 NOTE — PROGRESS NOTES
Pt continues to report he feels hopeless and heartbroken  Increased anxiety and depression  Denies SI  Using radio for music and sleeps during the day  Out and visible in the evening, walking the halls and social with peers  Attends some groups  DC: Next week    01/07/22 0805   Team Meeting   Meeting Type Daily Rounds   Team Members Present   Team Members Present Physician;Nurse;; Other (Discipline and Name)   Physician Team Member Dr Adrienne Jain / Kristian Ribeiro / Cy Wilson Team Member Pavan Olivas / Shayy Johnson Management Team Member Silvana Huggins / Salina Pepe   Other (Discipline and Name) Nighat Meuse - Art Therapy   Patient/Family Present   Patient Present No   Patient's Family Present No

## 2022-01-07 NOTE — PROGRESS NOTES
Progress Note - [address] 24 y o  male MRN: [id number]   Unit/Bed#: [facility] 260-01 Encounter: [id number]    Behavior over the last 24 hours: slight improvement  Boom Fernandez is a 51-year-old male with history of bipolar disorder who presents for psychiatric follow-up  Staff reports that he continues to feel hopeless and heart broken    Has been sleeping for much of the day  He is calm and cooperative upon approach but has a blunted affect and appears a bit sedated  His eyes are bloodshot and his forehead has beads of sweat on it  He is not confused or disoriented and states, I just get sweaty sometimes when I am sleeping    He reports they no longer feels suicidal, nor is he experiencing any passive death wish but he continues to feel a bit depressed  Mood is improving somewhat but he continues to experience irritability and mood swings  Had a VPA level drawn this morning  Medication and meal compliant  No AH or VH      Sleep: normal  Appetite: normal  Medication side effects: No   ROS: all other systems are negative    Mental Status Evaluation:    Appearance:  age appropriate, casually dressed, adequate grooming, bearded, eyes blood shot, forehead diaphoretic, appears a bit sedated   Behavior:  cooperative, calm   Speech:  slow   Mood:  depressed, anxious   Affect:  blunted   Thought Process:  organized, goal directed, linear   Associations: concrete associations   Thought Content:  no overt delusions, negative thinking, ruminating thoughts   Perceptual Disturbances: no auditory hallucinations, no visual hallucinations, does not appear responding to internal stimuli   Risk Potential: Suicidal ideation - None at present, contracts for safety on the unit  Homicidal ideation - None at present  Potential for aggression - No   Sensorium:  oriented to person, place, time/date and situation   Memory:  recent and remote memory grossly intact   Consciousness:  alert and awake Attention/Concentration: attention span and concentration appear shorter than expected for age   Insight:  fair   Judgment: fair   Gait/Station: normal gait/station   Motor Activity: no abnormal movements     Vital signs in last 24 hours:    Temp:  [97 4 °F (36 3 °C)-98 7 °F (37 1 °C)] 98 7 °F (37 1 °C)  HR:  [78-80] 80  Resp:  [16-17] 17  BP: (124-165)/(60-67) 165/67    Laboratory results: I have personally reviewed all pertinent laboratory/tests results    Most Recent Labs:   Lab Results   Component Value Date    WBC 7 08 01/05/2022    RBC 4 67 01/05/2022    HGB 12 9 01/05/2022    HCT 39 5 01/05/2022     01/05/2022    RDW 13 0 01/05/2022    NEUTROABS 2 96 01/05/2022    SODIUM 139 01/05/2022    K 3 6 01/05/2022     01/05/2022    CO2 30 01/05/2022    BUN 17 01/05/2022    CREATININE 0 63 01/05/2022    GLUC 85 01/05/2022    CALCIUM 8 5 01/05/2022    AST 19 01/05/2022    ALT 41 01/05/2022    ALKPHOS 86 01/05/2022    TP 6 6 01/05/2022    ALB 3 3 (L) 01/05/2022    TBILI 0 10 (L) 01/05/2022    CHOLESTEROL 143 01/05/2022    HDL 35 (L) 01/05/2022    TRIG 229 (H) 01/05/2022    LDLCALC 62 01/05/2022    [place] 108 01/05/2022    VALPROICTOT 37 (L) 01/07/2022    YKI1MYJKASWS 1 763 01/05/2022    RPR Non-Reactive 01/05/2022    HGBA1C 5 3 01/05/2022     01/05/2022       Progress Toward Goals: progressing    Assessment/Plan   Principal Problem:    Bipolar 1 disorder, depressed, moderate (HCC)  Active Problems:    Opiate abuse, episodic (HCC)    Medical clearance for psychiatric admission    Cannabis use disorder, severe, dependence ([facility] [address] )    Attention deficit hyperactivity disorder    Posttraumatic stress disorder      Recommended Treatment:     Planned medication and treatment changes:     All current active medications have been reviewed  Encourage group therapy, milieu therapy and occupational therapy  Behavioral Health checks every 7 minutes    VPA level 37 this morning, he continues to experience irritability mood swings  We will increase Depakote ER dose to 750 mg daily and recheck a VPA level in 3 days  Continue Zyprexa/Seroquel cross taper  Zyprexa will be discontinued tonight and Seroquel will increase to 200 mg tonight      Current Facility-Administered Medications   Medication Dose Route Frequency Provider Last Rate    aluminum-magnesium hydroxide-simethicone  30 mL Oral Q4H PRN Vera Bartholomew, PA-C      artificial tear  1 application Both Eyes Q4R PRN Vera Bartholomew, PA-C      haloperidol lactate  2 5 mg Intramuscular Q6H PRN Max 4/day Vera Bartholomew, PA-C      And    LORazepam  1 mg Intramuscular Q6H PRN Max 4/day Vera Amaya, PA-C      And    benztropine  0 5 mg Intramuscular Q6H PRN Max 4/day Vera Bartholomew, PA-C      haloperidol lactate  5 mg Intramuscular Q4H PRN Max 4/day Vera Amaya, PA-C      And    LORazepam  2 mg Intramuscular Q4H PRN Max 4/day Vera Bartholomew, PA-C      And    benztropine  1 mg Intramuscular Q4H PRN Max 4/day Vera Bartholomew, PA-C      benztropine  1 mg Intramuscular Q4H PRN Max 6/day Vera Amaya, PA-C      benztropine  1 mg Oral Q4H PRN Max 6/day Vera Bartholomew, PA-C      bisacodyl  10 mg Rectal Daily PRN Vera Bartholomew, PA-C      buprenorphine-naloxone  12 mg Sublingual Daily Vera Bartholomew, PA-C      divalproex sodium  500 mg Oral Daily Jean Worthy MD      escitalopram  5 mg Oral Daily Jean Worthy MD      gabapentin  800 mg Oral TID Vera Amaya, PA-C      haloperidol  2 mg Oral Q4H PRN Max 6/day Vera Bartholomew, PA-C      haloperidol  5 mg Oral Q6H PRN Max 4/day Vera Bartholomew, PA-C      haloperidol  5 mg Oral Q4H PRN Max 4/day Vera Amaya, PA-C      hydrOXYzine HCL  100 mg Oral Q6H PRN Max 4/day Vera Bartholomew, PA-C      Or    LORazepam  2 mg Intramuscular Q6H PRN Vera Amaya, PA-C      hydrOXYzine HCL  25 mg Oral Q6H PRN Max 4/day Vera Amaya, PA-C      ibuprofen  400 mg Oral Q4H PRN Mili Cadet, ANGELA      ibuprofen  600 mg Oral Q6H PRN Mili Cadet, ANGELA      ibuprofen  800 mg Oral Q8H PRN Mili Cadet, ANGELA      LORazepam  0 5 mg Oral Q8H PRN Uli Ahumada MD      nicotine  1 patch Transdermal Daily Nicole Niño      nicotine polacrilex  2 mg Oral Q2H PRN Mili Person, ANGELA      OLANZapine  5 mg Oral HS Mili Person, ANGELA      pantoprazole  40 mg Oral Early Morning Mili Raza, ANGELA      polyethylene glycol  17 g Oral Daily PRN Mili Cadet, ANGELA      QUEtiapine  150 mg Oral HS Mili Cadet, ANGELA      senna-docusate sodium  1 tablet Oral Daily PRN Mili Joanieet, ANGELA      traZODone  50 mg Oral HS PRN Mili Raza, ANGELA       Risks / Benefits of Treatment:    Risks, benefits, and possible side effects of medications explained to patient and patient verbalizes understanding and agreement for treatment  Counseling / Coordination of Care:    Patient's progress discussed with staff in treatment team meeting  Medications, treatment progress and treatment plan reviewed with patient      Mili Person PA-C 01/07/22

## 2022-01-07 NOTE — TREATMENT TEAM
01/07/22 1300   Activity/Group Checklist   Group Life Skills  (Art Therapy- [employer])   Attendance Attended   Attendance Duration (min) 46-60   Interactions Interacted appropriately   Affect/Mood Appropriate   Goals Achieved Identified feelings; Able to listen to others; Able to engage in interactions; Other (Comment)  (spontaneous self expression, connection, and insight)     Patient attended group a few minutes late, but immediately engaged himself in the given directive  He utilized the [employer] ([employer] throughout his creative process  He assisted with cleaning up at the end of the session

## 2022-01-07 NOTE — PLAN OF CARE
Patient attended 2/2 groups, but fully participated in 1/2 groups for the day       Problem: Ineffective Coping  Goal: Participates in unit activities  Description: Interventions:  - Provide therapeutic environment   - Provide required programming   - Redirect inappropriate behaviors   Outcome: Progressing

## 2022-01-07 NOTE — TREATMENT TEAM
01/07/22 1000   Activity/Group Checklist   Group [facility]  (Who are you, who do you want to be, goals)   Attendance Attended   Attendance Duration (min) 0-15   Interactions Did not interact   Affect/Mood Blunted/flat   Goals Achieved Able to listen to others     Patient attended community meeting late and for approximately 5 minutes  He participated in the Name 5 activity, but once the worksheet and goals were about to be handed out, he left group and did not return

## 2022-01-07 NOTE — PLAN OF CARE
Problem: Ineffective Coping  Goal: Participates in unit activities  Description: Interventions:  - Provide therapeutic environment   - Provide required programming   - Redirect inappropriate behaviors   Outcome: Progressing     Problem: Depression  Goal: Treatment Goal: Demonstrate behavioral control of depressive symptoms, verbalize feelings of improved mood/affect, and adopt new coping skills prior to discharge  Outcome: Progressing     Problem: ANXIETY  Goal: Will report anxiety at manageable levels  Description: INTERVENTIONS:  - Administer medication as ordered  - Teach and encourage coping skills  - Provide emotional support  - Assess patient/family for anxiety and ability to cope  Outcome: Progressing     Problem: SLEEP DISTURBANCE  Goal: Will exhibit normal sleeping pattern  Description: Interventions:  -  Assess the patients sleep pattern, noting recent changes  - Administer medication as ordered  - Decrease environmental stimuli, including noise, as appropriate during the night  - Encourage the patient to actively participate in unit groups and or exercise during the day to enhance ability to achieve adequate sleep at night  - Assess the patient, in the morning, encouraging a description of sleep experience  Outcome: Progressing
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
## 2022-01-07 NOTE — CASE MANAGEMENT
CM met with pt to check in  He reported that he still plans to move to live with his brother in [place], [place] when he is discharged from [facility]  Pt reported "we planned for me to go out on Friday 1/14/22 but not were thinking about the next week "     Pt reported that his brother lives out there with his wife and 4 kids  "He told me he would help me get a job and everything " Pt talked about needing a new start and getting away from his ex and stressors in this area  Pt spoke about his brother moving there years ago and pt reported "I wanted to go to, but my mom wanted me to come to [place] with her "     Pt asked CM to assist in finding resources in the [facility] area that he can call or go to when he moves there  Pt asked about [facility] doctor as well as information on [place] Medicaid  Pt reported that his brother will be able to help him out as well  CM will look into resources for pt and add them to his AVS for reference when he arrives in [place]       Pt reported that he will need a ride on day of dc and would like to return to his mothers home at: [address] [place], [address] [facility]

## 2022-01-08 PROCEDURE — 99232 SBSQ HOSP IP/OBS MODERATE 35: CPT | Performed by: PSYCHIATRY & NEUROLOGY

## 2022-01-08 RX ORDER — LANOLIN ALCOHOL/MO/W.PET/CERES
CREAM (GRAM) TOPICAL 3 TIMES DAILY PRN
Status: DISCONTINUED | OUTPATIENT
Start: 2022-01-08 | End: 2022-01-11 | Stop reason: HOSPADM

## 2022-01-08 RX ADMIN — GABAPENTIN 800 MG: 400 CAPSULE ORAL at 09:20

## 2022-01-08 RX ADMIN — PANTOPRAZOLE SODIUM 40 MG: 40 TABLET, DELAYED RELEASE ORAL at 06:07

## 2022-01-08 RX ADMIN — DIVALPROEX SODIUM 750 MG: 250 TABLET, FILM COATED, EXTENDED RELEASE ORAL at 09:20

## 2022-01-08 RX ADMIN — Medication: at 16:31

## 2022-01-08 RX ADMIN — QUETIAPINE FUMARATE 200 MG: 200 TABLET ORAL at 21:23

## 2022-01-08 RX ADMIN — NICOTINE POLACRILEX 2 MG: 2 GUM, CHEWING BUCCAL at 17:45

## 2022-01-08 RX ADMIN — NICOTINE POLACRILEX 2 MG: 2 GUM, CHEWING BUCCAL at 09:25

## 2022-01-08 RX ADMIN — NICOTINE POLACRILEX 2 MG: 2 GUM, CHEWING BUCCAL at 21:24

## 2022-01-08 RX ADMIN — LORAZEPAM 0.5 MG: 0.5 TABLET ORAL at 17:22

## 2022-01-08 RX ADMIN — GABAPENTIN 800 MG: 400 CAPSULE ORAL at 16:07

## 2022-01-08 RX ADMIN — ESCITALOPRAM 5 MG: 5 TABLET, FILM COATED ORAL at 09:20

## 2022-01-08 RX ADMIN — GABAPENTIN 800 MG: 400 CAPSULE ORAL at 21:23

## 2022-01-08 RX ADMIN — BUPRENORPHINE AND NALOXONE 12 MG: 8; 2 FILM BUCCAL; SUBLINGUAL at 09:20

## 2022-01-08 RX ADMIN — NICOTINE POLACRILEX 2 MG: 2 GUM, CHEWING BUCCAL at 11:59

## 2022-01-08 NOTE — PROGRESS NOTES
Progress Note - Behavioral Health   Medardo Gant 24 y o  male MRN: [id number]  Unit/Bed#: [facility] 260-01 Encounter: [id number]    Assessment/Plan   Principal Problem:    Bipolar 1 disorder, depressed, moderate ([place] [address] )  Active Problems:    Opiate abuse, episodic ([place] [address] )    Medical clearance for psychiatric admission    Cannabis use disorder, severe, dependence ([place] [address] )    Attention deficit hyperactivity disorder    Posttraumatic stress disorder      Recommended Treatment:    Continue current medications    Continue with group therapy, milieu therapy and occupational therapy  Continue frequent safety checks and vitals per unit protocol  Case discussed with treatment team   Risks, benefits and possible side effects of Medications: Risks, benefits, and possible side effects of medications have previously been explained  No new medications at this time  ------------------------------------------------------------    Subjective: Per nursing report, Chema Tenorio has been cooperative on the unit and compliant with medications  Today, Chema Tenorio is consenting for safety on the unit  He reports feeling much better since admission  He reports having a better perspective on his girlfriend and not eating her in his life  He reports having less stress as a result of this  He states he slept well overnight and this planning to move to [place] to live with his brother following discharge  He states he would like to be discharged on Wednesday because his brother only has Fridays off of work, and he would need a couple of days to pack to be ready for his brother to move him on Friday  Progress Toward Goals: slow improvement    Psychiatric Review of Systems:  Behavior over the last 24 hours: unchanged  Sleep: normal  Appetite: adequate  Medication side effects: none verbalized  ROS: Complete review of systems is negative except as noted above      Vital signs in last 24 hours:  Temp:  [96 8 °F (36 °C)-97 7 °F (36 5 °C)] 96 8 °F (36 °C)  HR:  [64-68] 68  Resp:  [14-17] 14  BP: ()/(55-59) 99/55    Mental Status Exam:  Appearance:  alert, good eye contact, appears stated age, casually dressed and appropriate grooming and hygiene   Behavior:  calm and cooperative   Motor: no abnormal movements and normal gait and balance   Speech:  spontaneous and coherent   Mood:  Mildly anxious   Affect:  mood-congruent and appropriate range   Thought Process:  Organized, logical, goal-directed   Thought Content: no verbalized delusions or overt paranoia   Perceptual disturbances: no reported hallucinations and does not appear to be responding to internal stimuli at this time   Risk Potential: No active or passive suicidal or homicidal ideation was verbalized during interview   Cognition: oriented to self and situation, appears to be of average intelligence and cognition not formally tested   Insight:  Fair   Judgment: Fair     Current Medications:  Current Facility-Administered Medications   Medication Dose Route Frequency Provider Last Rate    aluminum-magnesium hydroxide-simethicone  30 mL Oral Q4H PRN [place] Heys, PA-C      artificial tear  1 application Both Eyes L9X PRN [place] Heys, PA-C      haloperidol lactate  2 5 mg Intramuscular Q6H PRN Max 4/day [place] Heys, PA-C      And    LORazepam  1 mg Intramuscular Q6H PRN Max 4/day [place] Heys, PA-C      And    benztropine  0 5 mg Intramuscular Q6H PRN Max 4/day [place] Heys, PA-C      haloperidol lactate  5 mg Intramuscular Q4H PRN Max 4/day [place] Heys, PA-C      And    LORazepam  2 mg Intramuscular Q4H PRN Max 4/day [place] Heys, PA-C      And    benztropine  1 mg Intramuscular Q4H PRN Max 4/day [place] Heys, PA-C      benztropine  1 mg Intramuscular Q4H PRN Max 6/day [place] Heys, PA-C      benztropine  1 mg Oral Q4H PRN Max 6/day [place] Heys, PA-C      bisacodyl  10 mg Rectal Daily PRN [place] Heys, PA-C      buprenorphine-naloxone  12 mg Sublingual Daily [place]al Stallion, PA-C      divalproex sodium  750 mg Oral Daily [place] Stallion, PA-C      escitalopram  5 mg Oral Daily Kyra Thorpe MD      gabapentin  800 mg Oral TID [place] Stallion, PA-C      haloperidol  2 mg Oral Q4H PRN Max 6/day [place] Stallion, PA-C      haloperidol  5 mg Oral Q6H PRN Max 4/day [place] Stallion, PA-C      haloperidol  5 mg Oral Q4H PRN Max 4/day [place]al Stallion, PA-C      hydrOXYzine HCL  100 mg Oral Q6H PRN Max 4/day [place] Stallion, PA-C      Or    LORazepam  2 mg Intramuscular Q6H PRN [place] Stallion, PA-C      hydrOXYzine HCL  25 mg Oral Q6H PRN Max 4/day [place]al Stallion, PA-C      ibuprofen  400 mg Oral Q4H PRN [place] Stallion, PA-C      ibuprofen  600 mg Oral Q6H PRN [place] Stallion, PA-C      ibuprofen  800 mg Oral Q8H PRN [place] Stallion, PA-C      LORazepam  0 5 mg Oral Q8H PRN Kyra Thorpe MD      nicotine  1 patch Transdermal Daily [place] Stallion, PA-C      nicotine polacrilex  2 mg Oral Q2H PRN [place] Stallion, PA-C      pantoprazole  40 mg Oral Early Morning [place] Stallion, PA-C      polyethylene glycol  17 g Oral Daily PRN [place] Stallion, PA-C      QUEtiapine  200 mg Oral HS [place] Stallion, PA-C      senna-docusate sodium  1 tablet Oral Daily PRN [place] Stallion, PA-C      traZODone  50 mg Oral HS PRN [place] Stallion, PA-C      white petrolatum-mineral oil   Topical TID PRN Severiano Plumb, MD         Behavioral Health Medications: all current active meds have been reviewed  Changes as in plan section above  Laboratory results:  I have personally reviewed all pertinent laboratory/tests results    Recent Results (from the past 48 hour(s))   Valproic acid level, total    Collection Time: 01/07/22  5:40 AM   Result Value Ref Range    Valproic Acid, Total 37 (L) 50 - 100 ug/mL        Severiano Plumb, MD

## 2022-01-08 NOTE — TREATMENT TEAM
01/08/22 0700   Team Meeting   Meeting Type Daily Rounds   Team Members Present   Team Members Present Physician;Nurse   Physician Team Member Dr Jenny Almeida   Nursing Team Member [facility]   Patient/Family Present   Patient Present No   Patient's Family Present No     Daily Rounds: Pt denies SI, fluctuating anxiety   Received PRNs Atarax and Ativan  Will order cream for dry skin to b/l feet

## 2022-01-09 PROCEDURE — 99232 SBSQ HOSP IP/OBS MODERATE 35: CPT | Performed by: PSYCHIATRY & NEUROLOGY

## 2022-01-09 RX ADMIN — DIVALPROEX SODIUM 750 MG: 250 TABLET, FILM COATED, EXTENDED RELEASE ORAL at 09:05

## 2022-01-09 RX ADMIN — GABAPENTIN 800 MG: 400 CAPSULE ORAL at 21:42

## 2022-01-09 RX ADMIN — NICOTINE POLACRILEX 2 MG: 2 GUM, CHEWING BUCCAL at 17:02

## 2022-01-09 RX ADMIN — ESCITALOPRAM 5 MG: 5 TABLET, FILM COATED ORAL at 09:05

## 2022-01-09 RX ADMIN — NICOTINE POLACRILEX 2 MG: 2 GUM, CHEWING BUCCAL at 07:40

## 2022-01-09 RX ADMIN — NICOTINE POLACRILEX 2 MG: 2 GUM, CHEWING BUCCAL at 11:01

## 2022-01-09 RX ADMIN — BUPRENORPHINE AND NALOXONE 12 MG: 8; 2 FILM BUCCAL; SUBLINGUAL at 09:06

## 2022-01-09 RX ADMIN — NICOTINE POLACRILEX 2 MG: 2 GUM, CHEWING BUCCAL at 21:43

## 2022-01-09 RX ADMIN — GABAPENTIN 800 MG: 400 CAPSULE ORAL at 16:58

## 2022-01-09 RX ADMIN — PANTOPRAZOLE SODIUM 40 MG: 40 TABLET, DELAYED RELEASE ORAL at 06:25

## 2022-01-09 RX ADMIN — GABAPENTIN 800 MG: 400 CAPSULE ORAL at 09:05

## 2022-01-09 RX ADMIN — QUETIAPINE FUMARATE 200 MG: 200 TABLET ORAL at 21:42

## 2022-01-09 NOTE — PROGRESS NOTES
Progress Note - Behavioral Health   Cat Lao 24 y o  male MRN: [id number]  Unit/Bed#: U 260-01 Encounter: [id number]    Assessment/Plan   Principal Problem:    Bipolar 1 disorder, depressed, moderate ([place] [id number] )  Active Problems:    Opiate abuse, episodic ([facility] )    Medical clearance for psychiatric admission    Cannabis use disorder, severe, dependence ([place] [address] )    Attention deficit hyperactivity disorder    Posttraumatic stress disorder      Recommended Treatment:    Continue current medications    Continue with group therapy, milieu therapy and occupational therapy  Continue frequent safety checks and vitals per unit protocol  Case discussed with treatment team   Risks, benefits and possible side effects of Medications: Risks, benefits, and possible side effects of medications have previously been explained  No new medications at this time  ------------------------------------------------------------    Subjective: Per nursing report, Lit Bolanos has been social and cooperative on the unit and compliant with medications  Today, Lit Bolanos is consenting for safety on the unit  He reports improvement in mood today was good sleep overnight  He reports he is feeling better now that his girlfriend is out of his life  He was observed being social with peers on the unit  He states he is more upbeat today and is looking forward to discharge  He states his brother plans to fly in from [place] on Thursday night with plans for the patient to return with him on Friday, so the patient would like to be discharged by Wednesday if possible  Progress Toward Goals: slow improvement    Psychiatric Review of Systems:  Behavior over the last 24 hours: improved  Sleep: improving  Appetite: adequate  Medication side effects: none verbalized  ROS: Complete review of systems is negative except as noted above      Vital signs in last 24 hours:  Temp:  [96 8 °F (36 °C)-97 3 °F (36 3 °C)] 97 3 °F (36 3 °C)  HR:  [68-88] 88  Resp: [14-18] 18  BP: ()/(55-58) 128/58    Mental Status Exam:  Appearance:  alert, good eye contact, appears stated age, casually dressed, appropriate grooming and hygiene and overweight   Behavior:  calm and cooperative   Motor: no abnormal movements and normal gait and balance   Speech:  spontaneous and coherent   Mood:  euthymic   Affect:  appropriate range   Thought Process:  Organized, logical, goal-directed   Thought Content: no verbalized delusions or overt paranoia   Perceptual disturbances: no reported hallucinations and does not appear to be responding to internal stimuli at this time   Risk Potential: No active or passive suicidal or homicidal ideation was verbalized during interview   Cognition: oriented to self and situation, appears to be of average intelligence and cognition not formally tested   Insight:  Fair   Judgment: Fair     Current Medications:  Current Facility-Administered Medications   Medication Dose Route Frequency Provider Last Rate    aluminum-magnesium hydroxide-simethicone  30 mL Oral Q4H PRN Meliza Anne-Marie, PA-C      artificial tear  1 application Both Eyes P5P PRN Meliza Anne-Marie, PA-C      haloperidol lactate  2 5 mg Intramuscular Q6H PRN Max 4/day Meliza Anne-Marie, PA-C      And    LORazepam  1 mg Intramuscular Q6H PRN Max 4/day Meliza Anne-Marie, PA-C      And    benztropine  0 5 mg Intramuscular Q6H PRN Max 4/day Meliza Anne-Marie, PA-C      haloperidol lactate  5 mg Intramuscular Q4H PRN Max 4/day Meliza Anne-Marie, PA-C      And    LORazepam  2 mg Intramuscular Q4H PRN Max 4/day Meliza Anne-Marie, PA-C      And    benztropine  1 mg Intramuscular Q4H PRN Max 4/day Meliza Anne-Marie, PA-C      benztropine  1 mg Intramuscular Q4H PRN Max 6/day Meliza Anne-Marie, PA-C      benztropine  1 mg Oral Q4H PRN Max 6/day Meliza Anne-Marie, PA-C      bisacodyl  10 mg Rectal Daily PRN Meliza Anne-Marie, PA-C      buprenorphine-naloxone  12 mg Sublingual Daily Meliza Anne-Marie, PA-C      divalproex sodium  750 mg Oral Daily Cloteal Stallion, PA-C      escitalopram  5 mg Oral Daily Kyra Thorpe MD      gabapentin  800 mg Oral TID Cloteal Stallion, PA-C      haloperidol  2 mg Oral Q4H PRN Max 6/day Cloteal Stallion, PA-C      haloperidol  5 mg Oral Q6H PRN Max 4/day Cloteal Stallion, PA-C      haloperidol  5 mg Oral Q4H PRN Max 4/day Cloteal Stallion, PA-C      hydrOXYzine HCL  100 mg Oral Q6H PRN Max 4/day Cloteal Stallion, PA-C      Or    LORazepam  2 mg Intramuscular Q6H PRN Cloteal Stallion, PA-C      hydrOXYzine HCL  25 mg Oral Q6H PRN Max 4/day Cloteal Stallion, PA-C      ibuprofen  400 mg Oral Q4H PRN Cloteal Stallion, PA-C      ibuprofen  600 mg Oral Q6H PRN Cloteal Stallion, PA-C      ibuprofen  800 mg Oral Q8H PRN Cloteal Stallion, PA-C      LORazepam  0 5 mg Oral Q8H PRN Kyra Thorpe MD      nicotine  1 patch Transdermal Daily Cloteal Stallion, PA-C      nicotine polacrilex  2 mg Oral Q2H PRN Cloteal Stallion, PA-C      pantoprazole  40 mg Oral Early Morning Cloteal Stallion, PA-C      polyethylene glycol  17 g Oral Daily PRN Cloteal Stallion, PA-C      QUEtiapine  200 mg Oral HS Cloteal Stallion, PA-C      senna-docusate sodium  1 tablet Oral Daily PRN Cloteal Stallion, PA-C      traZODone  50 mg Oral HS PRN Cloteal Stallion, PA-C      white petrolatum-mineral oil   Topical TID PRN Severiano Plumb, MD         Behavioral Health Medications: all current active meds have been reviewed  Changes as in plan section above  Laboratory results:  I have personally reviewed all pertinent laboratory/tests results  No results found for this or any previous visit (from the past 48 hour(s))       Severiano Plumb, MD
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
## 2022-01-10 PROBLEM — Z00.8 MEDICAL CLEARANCE FOR PSYCHIATRIC ADMISSION: Status: RESOLVED | Noted: 2020-08-05 | Resolved: 2022-01-10

## 2022-01-10 LAB
AMPHETAMINES UR QL SCN: POSITIVE
BARBITURATES UR QL SCN: NEGATIVE NG/ML
BENZODIAZ UR QL: NEGATIVE NG/ML
BZE UR QL: NEGATIVE NG/ML
CANNABINOIDS UR QL SCN: POSITIVE
METHADONE UR QL SCN: NEGATIVE NG/ML
OPIATES UR QL: NEGATIVE NG/ML
PCP UR QL: NEGATIVE NG/ML
PROPOXYPH UR QL SCN: NEGATIVE NG/ML

## 2022-01-10 PROCEDURE — 99232 SBSQ HOSP IP/OBS MODERATE 35: CPT | Performed by: PSYCHIATRY & NEUROLOGY

## 2022-01-10 RX ADMIN — DIVALPROEX SODIUM 750 MG: 250 TABLET, FILM COATED, EXTENDED RELEASE ORAL at 09:05

## 2022-01-10 RX ADMIN — NICOTINE POLACRILEX 2 MG: 2 GUM, CHEWING BUCCAL at 17:06

## 2022-01-10 RX ADMIN — PANTOPRAZOLE SODIUM 40 MG: 40 TABLET, DELAYED RELEASE ORAL at 05:17

## 2022-01-10 RX ADMIN — GABAPENTIN 800 MG: 400 CAPSULE ORAL at 21:24

## 2022-01-10 RX ADMIN — NICOTINE POLACRILEX 2 MG: 2 GUM, CHEWING BUCCAL at 09:17

## 2022-01-10 RX ADMIN — GABAPENTIN 800 MG: 400 CAPSULE ORAL at 16:25

## 2022-01-10 RX ADMIN — BUPRENORPHINE AND NALOXONE 12 MG: 8; 2 FILM BUCCAL; SUBLINGUAL at 09:06

## 2022-01-10 RX ADMIN — GABAPENTIN 800 MG: 400 CAPSULE ORAL at 09:06

## 2022-01-10 RX ADMIN — ESCITALOPRAM 5 MG: 5 TABLET, FILM COATED ORAL at 09:06

## 2022-01-10 RX ADMIN — QUETIAPINE FUMARATE 200 MG: 200 TABLET ORAL at 21:24

## 2022-01-10 RX ADMIN — NICOTINE POLACRILEX 2 MG: 2 GUM, CHEWING BUCCAL at 07:30
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
## 2022-01-10 NOTE — PROGRESS NOTES
Progress Note - [address] [facility] [address] [place] 24 y o  male MRN: [id number]   Unit/Bed#: [facility] 260-01 Encounter: [id number]    Behavior over the last 24 hours: improved    Alexa Estrada was seen in follow-up for continuation of care  Per report, has been stable in his mood and denies any suicidal or homicidal thoughts  Has not made any reference to any auditory or visual hallucinations  Medication med compliant and is active in the community  On presentation, patient was initially seen in [facility] for interview  He is dressed in regular attire with fair self-care and is cooperative with structured interview  He endorses improvement in his mood as well as anxiety and denies any suicidal thoughts, intent or plan  He expresses some goal-directed and optimistic thoughts towards the future and is looking forward to living with his cousin  He feels that this is an adequate support for him  He denies feelings of hopelessness, helplessness and worthlessness  He is not overtly psychotic or manic  He is tolerating medications as side effects  Able to identify coping skills and setting of increased stress  Depakote ordered for tomorrow for monitoring      Sleep: normal  Appetite: normal  Medication side effects: No   ROS: no complaints, all other systems are negative    Mental Status Evaluation:    Appearance:  age appropriate, casually dressed   Behavior:  pleasant, cooperative, calm   Speech:  normal rate, normal volume, normal pitch   Mood:  euthymic   Affect:  mood-congruent   Thought Process:  organized, logical   Associations: intact associations   Thought Content:  normal, no overt delusions   Perceptual Disturbances: none   Risk Potential: Suicidal ideation - None at present  Homicidal ideation - None at present  Potential for aggression - No   Sensorium:  oriented to person, place and time/date   Memory:  recent and remote memory grossly intact   Consciousness:  alert and awake   Attention/Concentration: attention span and concentration are age appropriate   Insight:  fair   Judgment: fair   Gait/Station: normal gait/station   Motor Activity: no abnormal movements     Vital signs in last 24 hours:    Temp:  [97 7 °F (36 5 °C)-98 5 °F (36 9 °C)] 97 7 °F (36 5 °C)  HR:  [67-82] 82  Resp:  [17-18] 18  BP: (125-131)/(54-66) 131/66    Laboratory results: I have personally reviewed all pertinent laboratory/tests results    Most Recent Labs:   Lab Results   Component Value Date    WBC 7 08 01/05/2022    RBC 4 67 01/05/2022    HGB 12 9 01/05/2022    HCT 39 5 01/05/2022     01/05/2022    RDW 13 0 01/05/2022    NEUTROABS 2 96 01/05/2022    SODIUM 139 01/05/2022    K 3 6 01/05/2022     01/05/2022    CO2 30 01/05/2022    BUN 17 01/05/2022    CREATININE 0 63 01/05/2022    GLUC 85 01/05/2022    CALCIUM 8 5 01/05/2022    AST 19 01/05/2022    ALT 41 01/05/2022    ALKPHOS 86 01/05/2022    TP 6 6 01/05/2022    ALB 3 3 (L) 01/05/2022    TBILI 0 10 (L) 01/05/2022    CHOLESTEROL 143 01/05/2022    HDL 35 (L) 01/05/2022    TRIG 229 (H) 01/05/2022    LDLCALC 62 01/05/2022    [place] 108 01/05/2022    VALPROICTOT 37 (L) 01/07/2022    JDY2FJZVHEGG 1 763 01/05/2022    RPR Non-Reactive 01/05/2022    HGBA1C 5 3 01/05/2022     01/05/2022       Progress Toward Goals: improving    Assessment/Plan   Principal Problem:    Bipolar 1 disorder, depressed, moderate (HCC)  Active Problems:    Opiate abuse, episodic (HCC)    Medical clearance for psychiatric admission    Cannabis use disorder, severe, dependence ([facility] [address] )    Attention deficit hyperactivity disorder    Posttraumatic stress disorder      Recommended Treatment:     Planned medication and treatment changes:     All current active medications have been reviewed  Encourage group therapy, milieu therapy and occupational therapy  Behavioral Health checks every 7 minutes  Continue current medications and therapy  Depakote level ordered for tomorrow prior to discharge to ensure within therapeutic range    Current Facility-Administered Medications   Medication Dose Route Frequency Provider Last Rate    aluminum-magnesium hydroxide-simethicone  30 mL Oral Q4H PRN Valeria ANGELA Weems      artificial tear  1 application Both Eyes U5G PRN Valeria ANGELA Weems      haloperidol lactate  2 5 mg Intramuscular Q6H PRN Max 4/day Valeria ANGELA Weems      And    LORazepam  1 mg Intramuscular Q6H PRN Max 4/day Valeria ANGELA Weems      And    benztropine  0 5 mg Intramuscular Q6H PRN Max 4/day Valeria ANDREW Weems-CHRISTIANO      haloperidol lactate  5 mg Intramuscular Q4H PRN Max 4/day Valeria ANGELA Weems      And    LORazepam  2 mg Intramuscular Q4H PRN Max 4/day Valeria ANGELA Weems      And    benztropine  1 mg Intramuscular Q4H PRN Max 4/day Valeria ANGELA Weems      benztropine  1 mg Intramuscular Q4H PRN Max 6/day Valeria ANGELA Weems      benztropine  1 mg Oral Q4H PRN Max 6/day Valeria ANGELA Weems      bisacodyl  10 mg Rectal Daily PRN Valeria ANGELA Weems      buprenorphine-naloxone  12 mg Sublingual Daily Valeria ANGELA Weems      divalproex sodium  750 mg Oral Daily Valeria ANGELA Weems      escitalopram  5 mg Oral Daily Renuka Horton MD      gabapentin  800 mg Oral TID Valeria ANGELA Weems      haloperidol  2 mg Oral Q4H PRN Max 6/day Valeria ANGELA Weems      haloperidol  5 mg Oral Q6H PRN Max 4/day Valeria ANGELA Weems      haloperidol  5 mg Oral Q4H PRN Max 4/day Valeria ANGELA Weems      hydrOXYzine HCL  100 mg Oral Q6H PRN Max 4/day Valeria ANGELA Weems      Or    LORazepam  2 mg Intramuscular Q6H PRN Valeria Faustina, ANGELA      hydrOXYzine HCL  25 mg Oral Q6H PRN Max 4/day Valeria JUAN WeemsC      ibuprofen  400 mg Oral Q4H PRN Valeria ANDREW Weems-CHRISTIANO      ibuprofen  600 mg Oral Q6H PRN Valeria JUAN WeemsC      ibuprofen  800 mg Oral Q8H PRN Valeria Weems PA-C      LORazepam  0 5 mg Oral Q8H PRN Renuka Horton, MD      nicotine  1 patch Transdermal Daily Brayan Amador, ANGELA      nicotine polacrilex  2 mg Oral Q2H PRN Daril Marjorie, ANGELA      pantoprazole  40 mg Oral Early Morning Daril Marjorie, ANGELA      polyethylene glycol  17 g Oral Daily PRN Daril Pick, ANGELA      QUEtiapine  200 mg Oral HS Daril Pick, ANGELA      senna-docusate sodium  1 tablet Oral Daily PRN Daril Pick, ANGELA      traZODone  50 mg Oral HS PRN Jay Jayil Pick, ANGELA      white petrolatum-mineral oil   Topical TID PRN Israel Coelho MD       Risks / Benefits of Treatment:    Risks, benefits, and possible side effects of medications explained to patient and patient verbalizes understanding and agreement for treatment  Counseling / Coordination of Care: Total floor / unit time spent today 25 minutes  Greater than 50% of total time was spent with the patient and / or family counseling and / or coordination of care  A description of counseling / coordination of care:  Patient's progress discussed with staff in treatment team meeting  Medications, treatment progress and treatment plan reviewed with patient      Trena Gómez PA-C 01/10/22

## 2022-01-10 NOTE — TREATMENT TEAM
01/10/22 1000   Activity/Group Checklist   Group Community meeting  (Who packs your parachute (supports) and goals)   Attendance Attended   Attendance Duration (min) 16-30   Interactions Interacted appropriately   Affect/Mood Appropriate;Calm;Bright   Goals Achieved Identified feelings; Increased hopefulness; Able to listen to others; Able to engage in interactions; Other (Comment); Identified resources and support systems     Patient fully participated in community meeting  He identified his supports and expressed increased hopefulness for a better year  He further shared he will be moving with his brother, and that he will focus on himself more this year  He had positive interactions throughout

## 2022-01-10 NOTE — CASE MANAGEMENT
MARE researched Suboxone maintenance providers in [place], [place] area that pt plans to move to  CM was able to find Dr Arden Ritchie with [facility] and he is a Suboxone provider  MARE called ([phone]) and spoke to Jorge Levin  She reported that Dr Kari Dominguez accepts [facility] and is accepting new patients  [facility] (Suboxone Maintenance)   [address], [address] [place] [address]  Phone: [phone]      Jorge Levin also reported that they have a Medical Marijuana office that Dr Kari Dominguez can refer pt to if interested   Ankush Solano MD - [phone])  Carlos Miller MD   [address]  [facility], [address] [place] [address]  Phone: [phone]

## 2022-01-10 NOTE — BH TRANSITION RECORD
Contact Information: If you have any questions, concerns, pended studies, tests and/or procedures, or emergencies regarding your inpatient behavioral health visit  Please contact [facility] [phone] and ask to speak to a , nurse or physician  A contact is available 24 hours/ 7 days a week at this number  Summary of Procedures Performed During your Stay:  Below is a list of major procedures performed during your hospital stay and a summary of results:  - Cardiac Procedures/Studies: EKG  Pending Studies (From admission, onward)     Start     Ordered    01/11/22 0600  Valproic acid level, total  Morning draw         01/07/22 1012              If studies are pending at discharge, follow up with your PCP and/or referring provider
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
## 2022-01-10 NOTE — CASE MANAGEMENT
CM called Dr Colletta Leavell office - [facility] ([phone]) and CM spoke to Marquita Waddell to see if pt has upcoming suboxone appointment  Marquita Waddell reported that pt has appointment on Wednesday 1/12/2022 @ 1:45pm     CM added information to pt AVS and will inform him of the appointment

## 2022-01-10 NOTE — CASE MANAGEMENT
CM sent transport request to [address] [place] [address] transport for dc on Tuesday 1/11/22       Pt will dc to [address] [facility] [place], [address] [facility]

## 2022-01-11 VITALS
RESPIRATION RATE: 16 BRPM | HEART RATE: 65 BPM | BODY MASS INDEX: 33.23 KG/M2 | TEMPERATURE: 96.8 F | OXYGEN SATURATION: 96 % | SYSTOLIC BLOOD PRESSURE: 109 MMHG | DIASTOLIC BLOOD PRESSURE: 53 MMHG | HEIGHT: 66 IN | WEIGHT: 206.79 LBS

## 2022-01-11 LAB — VALPROATE SERPL-MCNC: 51 UG/ML (ref 50–100)

## 2022-01-11 PROCEDURE — 80164 ASSAY DIPROPYLACETIC ACD TOT: CPT | Performed by: PHYSICIAN ASSISTANT

## 2022-01-11 PROCEDURE — 99239 HOSP IP/OBS DSCHRG MGMT >30: CPT | Performed by: PSYCHIATRY & NEUROLOGY

## 2022-01-11 RX ORDER — PANTOPRAZOLE SODIUM 40 MG/1
40 TABLET, DELAYED RELEASE ORAL
Qty: 60 TABLET | Refills: 0 | Status: SHIPPED | OUTPATIENT
Start: 2022-01-11 | End: 2022-03-12

## 2022-01-11 RX ORDER — QUETIAPINE FUMARATE 200 MG/1
200 TABLET, FILM COATED ORAL
Qty: 60 TABLET | Refills: 0 | Status: SHIPPED | OUTPATIENT
Start: 2022-01-11 | End: 2022-08-05

## 2022-01-11 RX ORDER — DIVALPROEX SODIUM 250 MG/1
750 TABLET, EXTENDED RELEASE ORAL DAILY
Qty: 180 TABLET | Refills: 0 | Status: SHIPPED | OUTPATIENT
Start: 2022-01-11 | End: 2022-08-05

## 2022-01-11 RX ORDER — ESCITALOPRAM OXALATE 5 MG/1
5 TABLET ORAL DAILY
Qty: 60 TABLET | Refills: 0 | Status: SHIPPED | OUTPATIENT
Start: 2022-01-11 | End: 2022-03-12

## 2022-01-11 RX ORDER — NICOTINE 21 MG/24HR
1 PATCH, TRANSDERMAL 24 HOURS TRANSDERMAL DAILY
Qty: 28 PATCH | Refills: 0 | Status: SHIPPED | OUTPATIENT
Start: 2022-01-11 | End: 2022-02-08

## 2022-01-11 RX ADMIN — NICOTINE POLACRILEX 2 MG: 2 GUM, CHEWING BUCCAL at 07:54

## 2022-01-11 RX ADMIN — BUPRENORPHINE AND NALOXONE 12 MG: 8; 2 FILM BUCCAL; SUBLINGUAL at 09:04

## 2022-01-11 RX ADMIN — NICOTINE POLACRILEX 2 MG: 2 GUM, CHEWING BUCCAL at 09:06

## 2022-01-11 RX ADMIN — GABAPENTIN 800 MG: 400 CAPSULE ORAL at 09:03

## 2022-01-11 RX ADMIN — DIVALPROEX SODIUM 750 MG: 250 TABLET, FILM COATED, EXTENDED RELEASE ORAL at 09:02

## 2022-01-11 RX ADMIN — ESCITALOPRAM 5 MG: 5 TABLET, FILM COATED ORAL at 09:02

## 2022-01-11 RX ADMIN — PANTOPRAZOLE SODIUM 40 MG: 40 TABLET, DELAYED RELEASE ORAL at 05:45

## 2022-01-11 NOTE — PROGRESS NOTES
Diagnosis of  Bipolar 1 disorder, depressed, moderate reviewed  Short term goals for decrease in depressive symptoms, decrease in anxiety symptoms, decrease in paranoid thoughts, decrease in suicidal thoughts, decrease in self abusive behaviors, decrease in level of agitation, improvement in self care, sleep improvement discussed  All present parties in agreement and treatment plan signed      01/07/22 4930   Team Meeting   Meeting Type Tx Team Meeting   Team Members Present   Team Members Present Nurse;Physician;   Physician Team Member Dr Rodriguez Watson Team Member [facility] Management Team Member Tomasa   Patient/Family Present   Patient Present No  (Pt declined to meet with team)   Patient's Family Present No

## 2022-01-11 NOTE — PLAN OF CARE
Problem: Ineffective Coping  Goal: Participates in unit activities  Description: Interventions:  - Provide therapeutic environment   - Provide required programming   - Redirect inappropriate behaviors   Outcome: Adequate for Discharge     Problem: Depression  Goal: Treatment Goal: Demonstrate behavioral control of depressive symptoms, verbalize feelings of improved mood/affect, and adopt new coping skills prior to discharge  Outcome: Adequate for Discharge     Problem: ANXIETY  Goal: Will report anxiety at manageable levels  Description: INTERVENTIONS:  - Administer medication as ordered  - Teach and encourage coping skills  - Provide emotional support  - Assess patient/family for anxiety and ability to cope  Outcome: Adequate for Discharge     Problem: SLEEP DISTURBANCE  Goal: Will exhibit normal sleeping pattern  Description: Interventions:  -  Assess the patients sleep pattern, noting recent changes  - Administer medication as ordered  - Decrease environmental stimuli, including noise, as appropriate during the night  - Encourage the patient to actively participate in unit groups and or exercise during the day to enhance ability to achieve adequate sleep at night  - Assess the patient, in the morning, encouraging a description of sleep experience  Outcome: Adequate for Discharge     Problem: DISCHARGE PLANNING  Goal: Discharge to home or other facility with appropriate resources  Description: INTERVENTIONS:  - Identify barriers to discharge w/patient and caregiver  - Arrange for needed discharge resources and transportation as appropriate  - Identify discharge learning needs (meds, wound care, etc )  - Arrange for interpretive services to assist at discharge as needed  - Refer to Case Management Department for coordinating discharge planning if the patient needs post-hospital services based on physician/advanced practitioner order or complex needs related to functional status, cognitive ability, or social support system  Outcome: Adequate for Discharge

## 2022-01-11 NOTE — TREATMENT TEAM
01/11/22 0900   Activity/Group Checklist   Group Admission/Discharge   Attendance Attended   Attendance Duration (min) 0-15   Interactions Interacted appropriately   Affect/Mood Appropriate   Goals Achieved Identified feelings; Able to engage in interactions; Able to listen to others     Patient independently filled out the relapse prevention plan form  He asked questions when needed  He expressed readiness for discharge, and discussed his plans of when he is moving  He had no further questions when form was completed, and he transitioned back to his previous activity

## 2022-01-11 NOTE — PROGRESS NOTES
Pt reported mild anxiety but that it is manageable  Denies SI and reported that he is feeling ready to dc today to prepare for his move to [place] to his brother's home later this week  DC: Today via [facility] at 10am      01/11/22 0803   Team Meeting   Meeting Type Daily Rounds   Team Members Present   Team Members Present Physician;Nurse;; Other (Discipline and Name)   Physician Team Member Dr Baljit Jaeger / Anayeli Chacon / Yuki Rae Team Member [facility] / Ana Garcia Management Team Member Devika Carranza / Sharan Greenberg   Other (Discipline and Name) Dee Lips - Art Therapy   Patient/Family Present   Patient Present No   Patient's Family Present No

## 2022-01-11 NOTE — BH TRANSITION RECORD
Contact Information: If you have any questions, concerns, pended studies, tests and/or procedures, or emergencies regarding your inpatient behavioral health visit  Please contact [place] behavioral [facility] [phone] and ask to speak to a , nurse or physician  A contact is available 24 hours/ 7 days a week at this number  Summary of Procedures Performed During your Stay:  Below is a list of major procedures performed during your hospital stay and a summary of results:  - No major procedures performed  Pending Studies (From admission, onward)    None        If studies are pending at discharge, follow up with your PCP and/or referring provider

## 2022-01-12 ENCOUNTER — OFFICE VISIT (OUTPATIENT)
Dept: INTERNAL MEDICINE CLINIC | Facility: CLINIC | Age: 22
End: 2022-01-12
Payer: COMMERCIAL

## 2022-01-12 VITALS
OXYGEN SATURATION: 98 % | HEIGHT: 66 IN | DIASTOLIC BLOOD PRESSURE: 70 MMHG | RESPIRATION RATE: 12 BRPM | BODY MASS INDEX: 33.97 KG/M2 | WEIGHT: 211.4 LBS | TEMPERATURE: 97.6 F | SYSTOLIC BLOOD PRESSURE: 132 MMHG | HEART RATE: 100 BPM

## 2022-01-12 DIAGNOSIS — F19.20 POLYSUBSTANCE DEPENDENCE INCLUDING OPIOID TYPE DRUG WITH COMPLICATION, CONTINUOUS USE (HCC): Primary | ICD-10-CM

## 2022-01-12 DIAGNOSIS — Z91.14 NONCOMPLIANCE WITH MEDICATION REGIMEN: ICD-10-CM

## 2022-01-12 DIAGNOSIS — Z51.81 ENCOUNTER FOR MONITORING SUBOXONE MAINTENANCE THERAPY: ICD-10-CM

## 2022-01-12 DIAGNOSIS — Z79.899 MEDICATION THERAPY CONTINUED: ICD-10-CM

## 2022-01-12 DIAGNOSIS — Z79.899 ENCOUNTER FOR MONITORING SUBOXONE MAINTENANCE THERAPY: ICD-10-CM

## 2022-01-12 DIAGNOSIS — F43.10 POSTTRAUMATIC STRESS DISORDER: ICD-10-CM

## 2022-01-12 PROCEDURE — 80307 DRUG TEST PRSMV CHEM ANLYZR: CPT | Performed by: INTERNAL MEDICINE

## 2022-01-12 PROCEDURE — 99213 OFFICE O/P EST LOW 20 MIN: CPT | Performed by: INTERNAL MEDICINE

## 2022-01-12 RX ORDER — BUPRENORPHINE HYDROCHLORIDE AND NALOXONE HYDROCHLORIDE DIHYDRATE 8; 2 MG/1; MG/1
TABLET SUBLINGUAL
Qty: 35 TABLET | Refills: 0 | Status: SHIPPED | OUTPATIENT
Start: 2022-01-12 | End: 2022-08-08 | Stop reason: SDUPTHER

## 2022-01-12 NOTE — PATIENT INSTRUCTIONS
Buprenorphine/Naloxone (Into the mouth)   Buprenorphine (bue-pre-NOR-feen), Naloxone (nal-OX-one)  Treats narcotic dependence  Brand Name(s): Suboxone, Zubsolv   There may be other brand names for this medicine  When This Medicine Should Not Be Used: This medicine is not right for everyone  Do not use it if you had an allergic reaction to buprenorphine or naloxone  How to Use This Medicine: Thin Sheet, Tablet  · Take your medicine as directed  Your dose may need to be changed several times to find what works best for you  · You must let the medicine dissolve  Never swallow the film or tablet  Your body may not absorb enough of the medicine if you swallow it  · Your health caregiver should show you how to use the medicine  If you do not understand, ask for help  It is important to use the medicine correctly  · Do not talk while the medicine is inside your mouth  · Buccal film: Rinse your mouth with water to moisten it  Place the film against the inside of your cheek  If your doctor told you to use more than 1 film, place the second film inside your other cheek  Do not place more than 2 films inside of 1 cheek at a time  Do not move or touch the film  Do not eat or drink anything until the film is completely dissolved  · Sublingual tablet: Place the tablet under your tongue  If your doctor told you to use more than 1 tablet, place all of the tablets in different places under your tongue at the same time  You can use 2 tablets at a time until you have taken all of the medicine, if that is easier for you  Let the tablets dissolve completely in your mouth  Do not eat or drink anything until the tablets are completely dissolved  · Sublingual film: Drink some water to help moisten your mouth  Place the film under your tongue  If your doctor told you to use more than 1 film, place the second film on the opposite side from the first one  Do not move the film after you placed it under your tongue   If you are supposed to use more than 2 films, use them the same way, but do not start until the first 2 films are completely dissolved  · Do not break, crush, chew, or cut the film or tablet  · This medicine should come with a Medication Guide  Ask your pharmacist for a copy if you do not have one  · Missed dose: Take a dose as soon as you remember  If it is almost time for your next dose, wait until then and take a regular dose  Do not take extra medicine to make up for a missed dose  · Store the medicine in a closed container at room temperature, away from heat, moisture, and direct light  Drop off any unused narcotic medicine at a drug take-back location right away  If you do not have a drug take-back location near you, flush any unused narcotic medicine down the toilet  Check your local drug store and clinics for take-back locations  You can also check the [employer] web site for locations  Here is the link to the [facility] safe disposal of medicines [employer] com ee  Drugs and Foods to Avoid:   Ask your doctor or pharmacist before using any other medicine, including over-the-counter medicines, vitamins, and herbal products  · Do not use this medicine if you are using or have used an MAO inhibitor within the past 14 days  · Some medicines can affect how buprenorphine/naloxone works  Tell your doctor if you are using the following:   ? Carbamazepine, cyclobenzaprine, erythromycin, ketoconazole, metaxalone, mirtazapine, phenobarbital, phenytoin, rifampin, tramadol, trazodone  ? Diuretic (water pill)  ? Medicine to treat depression, anxiety, and mental health illness  ? Medicine to treat HIV/AIDS (including atazanavir, delavirdine, efavirenz, etravirine, nevirapine, ritonavir)  ? Phenothiazine medicine  ?  Triptan medicine to treat migraine headaches  · Do not drink alcohol while you are using this medicine  · Tell your doctor if you use anything else that makes you sleepy  Some examples are allergy medicine, narcotic pain medicine, and alcohol  Tell your doctor if you are also using butorphanol, nalbuphine, pentazocine, or a muscle relaxer  Warnings While Using This Medicine:   · Tell your doctor if you are pregnant or breastfeeding, or if you have kidney disease, liver disease (including hepatitis), lung or breathing problems (including sleep apnea), adrenal gland problems, an enlarged prostate, trouble urinating, gallbladder problems, thyroid problems, stomach problems, or a history of depression, brain tumor, head injury, alcohol or drug abuse  · This medicine may cause the following problems:  ? High risk of overdose, which can lead to death  ? Respiratory depression (serious breathing problem that can be life-threatening)  ? Sleep-related breathing problems (including sleep apnea, sleep-related hypoxemia)  ? Liver problems  ? Serotonin syndrome, when used with certain medicines  · This medicine may make you dizzy or drowsy  Do not drive or do anything else that could be dangerous until you know how this medicine affects you  Stand or sit up slowly if you feel lightheaded or dizzy  · Tell any doctor or dentist who treats you that you are using this medicine  · This medicine can be habit-forming  Do not use more than your prescribed dose  Call your doctor if you think your medicine is not working  · This medicine may cause constipation, especially with long-term use  Ask your doctor if you should use a laxative to prevent and treat constipation  · Do not stop using this medicine suddenly  Your doctor will need to slowly decrease your dose before you stop it completely  · This medicine could cause infertility  Talk with your doctor before using this medicine if you plan to have children  · Your doctor will do lab tests at regular visits to check on the effects of this medicine   Keep all appointments  · Keep all medicine out of the reach of children  Never share your medicine with anyone  Possible Side Effects While Using This Medicine:   Call your doctor right away if you notice any of these side effects:  · Allergic reaction: Itching or hives, swelling in your face or hands, swelling or tingling in your mouth or throat, chest tightness, trouble breathing  · Blue lips, fingernails, or skin  · Changes in skin color, dark freckles  · Cold feeling, weakness or tiredness, weight loss  · Dark urine or pale stools, nausea, vomiting, loss of appetite, stomach pain, yellow skin or eyes  · Extreme dizziness or weakness, shallow breathing, sweating, seizures, cold or clammy skin  · Severe confusion, lightheadedness, dizziness, or fainting  · Trouble breathing or slow breathing  If you notice these less serious side effects, talk with your doctor:   · Constipation or upset stomach  · Headache, trouble sleeping  · Shaking, runny nose, watery eyes, diarrhea, muscle aches  If you notice other side effects that you think are caused by this medicine, tell your doctor  Call your doctor for medical advice about side effects  You may report side effects to FDA at 8-703-FDA-6922    © Copyright [employer] 2021 Information is for End User's use only and may not be sold, redistributed or otherwise used for commercial purposes  The above information is an  only  It is not intended as medical advice for individual conditions or treatments  Talk to your doctor, nurse or pharmacist before following any medical regimen to see if it is safe and effective for you
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
## 2022-01-19 LAB
AMPHETAMINES UR QL SCN: NEGATIVE NG/ML
BARBITURATES UR QL SCN: NEGATIVE NG/ML
BENZODIAZ UR QL: NEGATIVE NG/ML
BUPRENORPHINE UR CFM-MCNC: 134 NG/ML
BUPRENORPHINE UR QL CFM: NORMAL NG/ML
BUPRENORPHINE UR QL CFM: POSITIVE
BUPRENORPHINE+NOR UR QL: POSITIVE
BZE UR QL: NEGATIVE NG/ML
CANNABINOIDS UR QL SCN: NEGATIVE NG/ML
METHADONE UR QL SCN: NEGATIVE NG/ML
NORBUPRENORPHINE UR CFM-MCNC: 166 NG/ML
NORBUPRENORPHINE UR QL CFM: POSITIVE
OPIATES UR QL: NEGATIVE NG/ML
PCP UR QL: NEGATIVE NG/ML
PROPOXYPH UR QL SCN: NEGATIVE NG/ML
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
## 2022-07-13 ENCOUNTER — HOSPITAL ENCOUNTER (EMERGENCY)
Facility: HOSPITAL | Age: 22
Discharge: HOME | End: 2022-07-13
Attending: EMERGENCY MEDICINE | Admitting: EMERGENCY MEDICINE
Payer: COMMERCIAL

## 2022-07-13 ENCOUNTER — APPOINTMENT (EMERGENCY)
Dept: RADIOLOGY | Facility: HOSPITAL | Age: 22
End: 2022-07-13
Payer: COMMERCIAL

## 2022-07-13 VITALS
DIASTOLIC BLOOD PRESSURE: 72 MMHG | RESPIRATION RATE: 18 BRPM | OXYGEN SATURATION: 96 % | WEIGHT: 211.42 LBS | SYSTOLIC BLOOD PRESSURE: 142 MMHG | HEART RATE: 104 BPM | BODY MASS INDEX: 34.12 KG/M2

## 2022-07-13 DIAGNOSIS — S90.31XA CONTUSION OF RIGHT FOOT, INITIAL ENCOUNTER: Primary | ICD-10-CM

## 2022-07-13 PROCEDURE — 99284 EMERGENCY DEPT VISIT MOD MDM: CPT | Performed by: EMERGENCY MEDICINE

## 2022-07-13 PROCEDURE — 99283 EMERGENCY DEPT VISIT LOW MDM: CPT

## 2022-07-13 PROCEDURE — 73630 X-RAY EXAM OF FOOT: CPT

## 2022-07-13 RX ORDER — IBUPROFEN 800 MG/1
800 TABLET ORAL ONCE
Status: COMPLETED | OUTPATIENT
Start: 2022-07-13 | End: 2022-07-13

## 2022-07-13 RX ORDER — ACETAMINOPHEN 325 MG/1
650 TABLET ORAL ONCE
Status: COMPLETED | OUTPATIENT
Start: 2022-07-13 | End: 2022-07-13

## 2022-07-13 RX ORDER — IBUPROFEN 800 MG/1
800 TABLET ORAL EVERY 8 HOURS PRN
Qty: 30 TABLET | Refills: 0 | Status: SHIPPED | OUTPATIENT
Start: 2022-07-13 | End: 2022-08-26

## 2022-07-13 RX ADMIN — IBUPROFEN 800 MG: 800 TABLET, FILM COATED ORAL at 06:35

## 2022-07-13 RX ADMIN — ACETAMINOPHEN 650 MG: 325 TABLET, FILM COATED ORAL at 06:35

## 2022-07-13 NOTE — DISCHARGE INSTRUCTIONS
Thank you for visiting the Emergency Department today  X-ray does not reveal an obvious fracture a question a possible small nondisplaced fracture to the big toe bone  This should not need surgery as it appears small  Treatment is immobilization with a surgical shoe and crutches  Tylenol and Motrin for pain  Contact Orthopedics for follow-up appointment if symptoms persist for further management and reassessment  Return here for severe symptoms

## 2022-07-13 NOTE — ED NOTES
Instructed on use of crutches, demonstrated proper and safe use, also instructed in application and use of surgical shoe       Noreen Medeiros RN  07/13/22 9649
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
## 2022-07-20 ENCOUNTER — HOSPITAL ENCOUNTER (EMERGENCY)
Facility: HOSPITAL | Age: 22
End: 2022-07-21
Attending: EMERGENCY MEDICINE
Payer: COMMERCIAL

## 2022-07-20 ENCOUNTER — APPOINTMENT (EMERGENCY)
Dept: RADIOLOGY | Facility: HOSPITAL | Age: 22
End: 2022-07-20
Payer: COMMERCIAL

## 2022-07-20 DIAGNOSIS — F41.9 ANXIETY: Primary | ICD-10-CM

## 2022-07-20 DIAGNOSIS — F31.32 BIPOLAR 1 DISORDER, DEPRESSED, MODERATE (HCC): ICD-10-CM

## 2022-07-20 LAB
AMPHETAMINES SERPL QL SCN: NEGATIVE
BARBITURATES UR QL: NEGATIVE
BENZODIAZ UR QL: NEGATIVE
COCAINE UR QL: NEGATIVE
ETHANOL EXG-MCNC: 0 MG/DL
FLUAV RNA RESP QL NAA+PROBE: NEGATIVE
FLUBV RNA RESP QL NAA+PROBE: NEGATIVE
GLUCOSE SERPL-MCNC: 88 MG/DL (ref 65–140)
METHADONE UR QL: NEGATIVE
OPIATES UR QL SCN: NEGATIVE
OXYCODONE+OXYMORPHONE UR QL SCN: NEGATIVE
PCP UR QL: NEGATIVE
RSV RNA RESP QL NAA+PROBE: NEGATIVE
SARS-COV-2 RNA RESP QL NAA+PROBE: NEGATIVE
THC UR QL: NEGATIVE

## 2022-07-20 PROCEDURE — 71045 X-RAY EXAM CHEST 1 VIEW: CPT

## 2022-07-20 PROCEDURE — 0241U HB NFCT DS VIR RESP RNA 4 TRGT: CPT | Performed by: EMERGENCY MEDICINE

## 2022-07-20 PROCEDURE — 99284 EMERGENCY DEPT VISIT MOD MDM: CPT | Performed by: EMERGENCY MEDICINE

## 2022-07-20 PROCEDURE — 99285 EMERGENCY DEPT VISIT HI MDM: CPT

## 2022-07-20 PROCEDURE — 82075 ASSAY OF BREATH ETHANOL: CPT | Performed by: EMERGENCY MEDICINE

## 2022-07-20 PROCEDURE — 80307 DRUG TEST PRSMV CHEM ANLYZR: CPT | Performed by: EMERGENCY MEDICINE

## 2022-07-20 PROCEDURE — 82948 REAGENT STRIP/BLOOD GLUCOSE: CPT

## 2022-07-20 NOTE — ED NOTES
[facility] bedside for 1:1  Report update given by leaving ed tech  Pt calm cooperative no outbursts pt was and currently is nonviolent  Pt sleeping in no apparent distress        Mirna Lau RN  07/20/22 1958

## 2022-07-20 NOTE — ED NOTES
A psych consult will be ordered for the patient to find if he meets criteria for admit to the psychiatric unit
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
## 2022-07-20 NOTE — LETTER
[address] [facility] [address]  Dept: [phone]      EMTALA TRANSFER CONSENT    NAME Mukesh Lorenz                                         2000                              MRN [id number]    I have been informed of my rights regarding examination, treatment, and transfer   by Dr Blessing Blunt DO    Benefits: Continuity of care    Risks: Potential for delay in receiving treatment      Consent for Transfer:  I acknowledge that my medical condition has been evaluated and explained to me by the emergency department physician or other qualified medical person and/or my attending physician, who has recommended that I be transferred to the service of  Accepting Physician: Dr Pedro Rios at [address] Name, [address] : Laurita Alcantar  The above potential benefits of such transfer, the potential risks associated with such transfer, and the probable risks of not being transferred have been explained to me, and I fully understand them  The doctor has explained that, in my case, the benefits of transfer outweigh the risks  I agree to be transferred  I authorize the performance of emergency medical procedures and treatments upon me in both transit and upon arrival at the receiving facility  Additionally, I authorize the release of any and all medical records to the receiving facility and request they be transported with me, if possible  I understand that the safest mode of transportation during a medical emergency is an ambulance and that the Hospital advocates the use of this mode of transport  Risks of traveling to the receiving facility by car, including absence of medical control, life sustaining equipment, such as oxygen, and medical personnel has been explained to me and I fully understand them  (SILVANA CORRECT BOX BELOW)  [ X ]  I consent to the stated transfer and to be transported by ambulance/helicopter    [  ] I consent to the stated transfer, but refuse transportation by ambulance and accept full responsibility for my transportation by car  I understand the risks of non-ambulance transfers and I exonerate the Hospital and its staff from any deterioration in my condition that results from this refusal     X___________________________________________    DATE  22  TIME________  Signature of patient or legally responsible individual signing on patient behalf           RELATIONSHIP TO PATIENT__SELF_______________________                    Provider Certification    NAME Erica Post                                         2000                              MRN [id number]    A medical screening exam was performed on the above named patient  Based on the examination:    Condition Necessitating Transfer The primary encounter diagnosis was Anxiety  A diagnosis of Bipolar 1 disorder, depressed, moderate (HCC) was also pertinent to this visit  Patient Condition: The patient has been stabilized such that within reasonable medical probability, no material deterioration of the patient condition or the condition of the unborn child(juan) is likely to result from the transfer    Reason for Transfer: Level of Care needed not available at this facility    Transfer Requirements: [facility]   · [facility] available and qualified personnel available for treatment as acknowledged by Ezio Paz [phone]  · Agreed to accept transfer and to provide appropriate medical treatment as acknowledged by       Dr Jason Helton  · Appropriate medical records of the examination and treatment of the patient are provided at the time of transfer   [facility], [address] ___GO____  · Transfer will be performed by qualified personnel from [address] [place] [address]  and appropriate transfer equipment as required, including the use of necessary and appropriate life support measures      Provider Certification: I have examined the patient and explained the following risks and benefits of being transferred/refusing transfer to the patient/family:  The patient is stable for psychiatric transfer because they are medically stable, and is protected from harming him/herself or others during transport      Based on these reasonable risks and benefits to the patient and/or the unborn child(juan), and based upon the information available at the time of the patients examination, I certify that the medical benefits reasonably to be expected from the provision of appropriate medical treatments at another medical facility outweigh the increasing risks, if any, to the individuals medical condition, and in the case of labor to the unborn child, from effecting the transfer      X____________________________________________ DATE 07/21/22        TIME_______      ORIGINAL - SEND TO MEDICAL RECORDS   COPY - SEND WITH PATIENT DURING TRANSFER

## 2022-07-20 NOTE — ED NOTES
This writer and other ED tech observed patient changing into paper scrubs       Melani Call  07/20/22 4199
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
## 2022-07-21 VITALS
SYSTOLIC BLOOD PRESSURE: 143 MMHG | OXYGEN SATURATION: 99 % | WEIGHT: 175 LBS | HEART RATE: 76 BPM | BODY MASS INDEX: 28.12 KG/M2 | DIASTOLIC BLOOD PRESSURE: 85 MMHG | TEMPERATURE: 98.1 F | RESPIRATION RATE: 18 BRPM | HEIGHT: 66 IN

## 2022-07-21 PROCEDURE — NC001 PR NO CHARGE: Performed by: EMERGENCY MEDICINE

## 2022-07-21 NOTE — ED NOTES
Crisis prepared 201 and obtained signatures  Pt informed about his rights and inpatient hospitalization procedure  Pt given in witting his rights and explanation of 72 hour notice  No questions or concerns at this time  Pt has no restrictions with placement

## 2022-07-21 NOTE — ED NOTES
Rosemarie Suicide Risk Assessment deferred, as unable to assess while patient sleeping  Behavioral Health Assessment deferred as patient is sleeping and would benefit from additional rest   Vital signs deferred until patient awake, no signs or symptoms of respiratory distress at this time  Once patient is awake and able to participate, will complete assessments           Ambrosio Aviles RN  07/21/22 0863

## 2022-07-21 NOTE — ED NOTES
CW spoke with Dorene Rid[facility] admissions per Lm Randolph, and was able to pass on cc insurance per Josselyn Devries #[id number] for 7/21/22-8/3/22 review being on 8/3/22  CTS arrived at [facility] for pickup is scheduled for 15:45  Dorene Ridley was informed of pickup and likely arrival time

## 2022-07-21 NOTE — ED NOTES
Patient given breakfast tray and a new pair of scrub pants upon request       Ryder Anderson  07/21/22 7813

## 2022-07-21 NOTE — ED NOTES
Insurance Authorization for admission:   Phone call placed to [facility]  Phone number: [phone]  Spoke to [employer]      14 days approved  Level of care: 201  Review on 08/03/22  Authorization # [id number]

## 2022-07-21 NOTE — ED NOTES
[address] per [facility] FAXED  BG per Tia Brenda FAXED  Marycruz Rosue per [facility] FAXED  Friends per [facility] per [address] per [place] per Lawson Horowitz FAXED  [place] per [address] per [facility] per [address]   [facility] per [address] per [facility] per [address] per [place] FULL

## 2022-07-21 NOTE — ED NOTES
Received call from [facility] stating they could not accommodate for [facility] at this time after receiving fax

## 2022-07-21 NOTE — ED NOTES
This tech on 1:1 at this time  Patient is currently asleep with no apparent distress  Will obtain vitals when awake       Gabriela Sandoval  07/21/22 0815       Kelley Sandoval  07/21/22 8441

## 2022-07-21 NOTE — ED NOTES
Patient is accepted at [facility]  Patient is accepted by Dr Enid Mathew; per Cat/ admissions  Transportation is arranged with CTS  Transportation is scheduled for 1545  Patient may go to the floor at anytime  Nurse report is to be called to [phone] prior to patient transfer

## 2022-08-05 ENCOUNTER — OFFICE VISIT (OUTPATIENT)
Dept: INTERNAL MEDICINE CLINIC | Facility: CLINIC | Age: 22
End: 2022-08-05
Payer: COMMERCIAL

## 2022-08-05 ENCOUNTER — APPOINTMENT (OUTPATIENT)
Dept: RADIOLOGY | Facility: CLINIC | Age: 22
End: 2022-08-05
Payer: COMMERCIAL

## 2022-08-05 VITALS
TEMPERATURE: 98.3 F | SYSTOLIC BLOOD PRESSURE: 130 MMHG | DIASTOLIC BLOOD PRESSURE: 80 MMHG | OXYGEN SATURATION: 95 % | HEIGHT: 66 IN | HEART RATE: 95 BPM | WEIGHT: 215.25 LBS | BODY MASS INDEX: 34.59 KG/M2

## 2022-08-05 DIAGNOSIS — F31.32 BIPOLAR 1 DISORDER, DEPRESSED, MODERATE (HCC): ICD-10-CM

## 2022-08-05 DIAGNOSIS — F19.20 POLYSUBSTANCE DEPENDENCE INCLUDING OPIOID TYPE DRUG WITH COMPLICATION, CONTINUOUS USE (HCC): ICD-10-CM

## 2022-08-05 DIAGNOSIS — M25.511 ACUTE PAIN OF RIGHT SHOULDER: Primary | ICD-10-CM
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
## 2022-08-05 DIAGNOSIS — M25.511 ACUTE PAIN OF RIGHT SHOULDER: ICD-10-CM

## 2022-08-05 DIAGNOSIS — F90.9 ATTENTION DEFICIT HYPERACTIVITY DISORDER (ADHD), UNSPECIFIED ADHD TYPE: ICD-10-CM

## 2022-08-05 DIAGNOSIS — F43.10 POSTTRAUMATIC STRESS DISORDER: ICD-10-CM

## 2022-08-05 PROCEDURE — 99213 OFFICE O/P EST LOW 20 MIN: CPT | Performed by: INTERNAL MEDICINE

## 2022-08-05 PROCEDURE — 73030 X-RAY EXAM OF SHOULDER: CPT

## 2022-08-05 RX ORDER — MELOXICAM 15 MG/1
15 TABLET ORAL DAILY
Qty: 30 TABLET | Refills: 1 | Status: SHIPPED | OUTPATIENT
Start: 2022-08-05 | End: 2022-08-26

## 2022-08-05 RX ORDER — QUETIAPINE FUMARATE 50 MG/1
50 TABLET, FILM COATED ORAL
COMMUNITY
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
## 2022-08-08 ENCOUNTER — OFFICE VISIT (OUTPATIENT)
Dept: INTERNAL MEDICINE CLINIC | Facility: CLINIC | Age: 22
End: 2022-08-08
Payer: COMMERCIAL

## 2022-08-08 VITALS
SYSTOLIC BLOOD PRESSURE: 150 MMHG | WEIGHT: 219.4 LBS | HEART RATE: 92 BPM | BODY MASS INDEX: 35.26 KG/M2 | DIASTOLIC BLOOD PRESSURE: 90 MMHG | HEIGHT: 66 IN | TEMPERATURE: 98 F | OXYGEN SATURATION: 98 %

## 2022-08-08 DIAGNOSIS — Z79.899 MEDICATION THERAPY CONTINUED: ICD-10-CM

## 2022-08-08 DIAGNOSIS — Z79.899 ENCOUNTER FOR MONITORING SUBOXONE MAINTENANCE THERAPY: ICD-10-CM

## 2022-08-08 DIAGNOSIS — Z91.14 NONCOMPLIANCE WITH MEDICATION REGIMEN: ICD-10-CM

## 2022-08-08 DIAGNOSIS — F19.20 POLYSUBSTANCE DEPENDENCE INCLUDING OPIOID TYPE DRUG WITH COMPLICATION, CONTINUOUS USE (HCC): Primary | ICD-10-CM

## 2022-08-08 DIAGNOSIS — Z51.81 ENCOUNTER FOR MONITORING SUBOXONE MAINTENANCE THERAPY: ICD-10-CM

## 2022-08-08 DIAGNOSIS — F43.10 POSTTRAUMATIC STRESS DISORDER: ICD-10-CM

## 2022-08-08 DIAGNOSIS — F11.10 OPIATE ABUSE, EPISODIC (HCC): ICD-10-CM

## 2022-08-08 PROCEDURE — 99213 OFFICE O/P EST LOW 20 MIN: CPT | Performed by: INTERNAL MEDICINE

## 2022-08-08 RX ORDER — HYDROXYZINE PAMOATE 50 MG/1
50 CAPSULE ORAL AS NEEDED
COMMUNITY
Start: 2022-08-03

## 2022-08-08 RX ORDER — BUPRENORPHINE HYDROCHLORIDE AND NALOXONE HYDROCHLORIDE DIHYDRATE 8; 2 MG/1; MG/1
TABLET SUBLINGUAL
Qty: 60 TABLET | Refills: 0 | Status: SHIPPED | OUTPATIENT
Start: 2022-08-08 | End: 2022-09-02 | Stop reason: SDUPTHER

## 2022-08-08 RX ORDER — CLONIDINE HYDROCHLORIDE 0.1 MG/1
0.1 TABLET ORAL ONCE
COMMUNITY
Start: 2022-08-03

## 2022-08-08 RX ORDER — DIVALPROEX SODIUM 500 MG/1
500 TABLET, DELAYED RELEASE ORAL EVERY 12 HOURS SCHEDULED
COMMUNITY
Start: 2022-08-03

## 2022-08-08 NOTE — PROGRESS NOTES
BMI Counseling: There is no height or weight on file to calculate BMI  The BMI is above normal  Nutrition recommendations include decreasing portion sizes and encouraging healthy choices of fruits and vegetables  Exercise recommendations include moderate physical activity 150 minutes/week  No pharmacotherapy was ordered  Rationale for BMI follow-up plan is due to patient being overweight or obese       Assessment/Plan:  Problem List Items Addressed This Visit        Other    Posttraumatic stress disorder    Relevant Medications    hydrOXYzine pamoate (VISTARIL) 50 mg capsule    buprenorphine-naloxone (SUBOXONE) 8-2 mg per SL tablet    Other Relevant Orders    [facility] Prescribed Meds    Amphetamines, Methamphetamines    Butalbital    Phenobarbital    Secobarbital    Temazepam    Alprazolam    Clonazepam    Diazepam    Lorazepam    Oxazepam    Gabapentin    Pregabalin    Cocaine    Heroin    Buprenorphine    Levorphanol    Meperidine    Naltrexone    Fentanyl    Methadone    Oxycodone    Oxymorphone    Tapentadol    THC    Tramadol    Codeine, Hydrocodone, Hydropmorphone, Morphine    Bath Salts    Ethyl Glucuronide/Ethyl Sulfate    Kratom    Spice    Methylphenidate    Phentermine    Validity Oxidant    Validity Creatinine    Validity pH    Validity Specific    MM DT_Buprenorphine Definitive Test    Polysubstance dependence including opioid type drug with complication, continuous use (HCC) - Primary    Relevant Medications    buprenorphine-naloxone (SUBOXONE) 8-2 mg per SL tablet    Other Relevant Orders    [facility] Prescribed Meds    Amphetamines, Methamphetamines    Butalbital    Phenobarbital    Secobarbital    Temazepam    Alprazolam    Clonazepam    Diazepam    Lorazepam    Oxazepam    Gabapentin    Pregabalin    Cocaine    Heroin    Buprenorphine    Levorphanol    Meperidine    Naltrexone    Fentanyl    Methadone    Oxycodone    Oxymorphone    Tapentadol    THC    Tramadol    Codeine, Hydrocodone, Hydropmorphone, Morphine    Bath Salts    Ethyl Glucuronide/Ethyl Sulfate    Kratom    Spice    Methylphenidate    Phentermine    Validity Oxidant    Validity Creatinine    Validity pH    Validity Specific    MM DT_Buprenorphine Definitive Test    Opiate abuse, episodic (HCC)    Relevant Orders    [facility] Prescribed Meds    Amphetamines, Methamphetamines    Butalbital    Phenobarbital    Secobarbital    Temazepam    Alprazolam    Clonazepam    Diazepam    Lorazepam    Oxazepam    Gabapentin    Pregabalin    Cocaine    Heroin    Buprenorphine    Levorphanol    Meperidine    Naltrexone    Fentanyl    Methadone    Oxycodone    Oxymorphone    Tapentadol    THC    Tramadol    Codeine, Hydrocodone, Hydropmorphone, Morphine    Bath Salts    Ethyl Glucuronide/Ethyl Sulfate    Kratom    Spice    Methylphenidate    Phentermine    Validity Oxidant    Validity Creatinine    Validity pH    Validity Specific    MM DT_Buprenorphine Definitive Test    Medication therapy continued    Relevant Medications    buprenorphine-naloxone (SUBOXONE) 8-2 mg per SL tablet    Other Relevant Orders    [facility] Prescribed Meds    Amphetamines, Methamphetamines    Butalbital    Phenobarbital    Secobarbital    Temazepam    Alprazolam    Clonazepam    Diazepam    Lorazepam    Oxazepam    Gabapentin    Pregabalin    Cocaine    Heroin    Buprenorphine    Levorphanol    Meperidine    Naltrexone    Fentanyl    Methadone    Oxycodone    Oxymorphone    Tapentadol    THC    Tramadol    Codeine, Hydrocodone, Hydropmorphone, Morphine    Bath Salts    Ethyl Glucuronide/Ethyl Sulfate    Kratom    Spice    Methylphenidate    Phentermine    Validity Oxidant    Validity Creatinine    Validity pH    Validity Specific    MM DT_Buprenorphine Definitive Test    Encounter for monitoring Suboxone maintenance therapy    Relevant Medications    buprenorphine-naloxone (SUBOXONE) 8-2 mg per SL tablet    Other Relevant Orders    [facility] Prescribed Meds Amphetamines, Methamphetamines    Butalbital    Phenobarbital    Secobarbital    Temazepam    Alprazolam    Clonazepam    Diazepam    Lorazepam    Oxazepam    Gabapentin    Pregabalin    Cocaine    Heroin    Buprenorphine    Levorphanol    Meperidine    Naltrexone    Fentanyl    Methadone    Oxycodone    Oxymorphone    Tapentadol    THC    Tramadol    Codeine, Hydrocodone, Hydropmorphone, Morphine    Bath Salts    Ethyl Glucuronide/Ethyl Sulfate    Kratom    Spice    Methylphenidate    Phentermine    Validity Oxidant    Validity Creatinine    Validity pH    Validity Specific    MM DT_Buprenorphine Definitive Test      Other Visit Diagnoses     Noncompliance with medication regimen        Relevant Medications    buprenorphine-naloxone (SUBOXONE) 8-2 mg per SL tablet    Other Relevant Orders    [facility] Prescribed Meds    Amphetamines, Methamphetamines    Butalbital    Phenobarbital    Secobarbital    Temazepam    Alprazolam    Clonazepam    Diazepam    Lorazepam    Oxazepam    Gabapentin    Pregabalin    Cocaine    Heroin    Buprenorphine    Levorphanol    Meperidine    Naltrexone    Fentanyl    Methadone    Oxycodone    Oxymorphone    Tapentadol    THC    Tramadol    Codeine, Hydrocodone, Hydropmorphone, Morphine    Bath Salts    Ethyl Glucuronide/Ethyl Sulfate    Kratom    Spice    Methylphenidate    Phentermine    Validity Oxidant    Validity Creatinine    Validity pH    Validity Specific    MM DT_Buprenorphine Definitive Test           Diagnoses and all orders for this visit:    Polysubstance dependence including opioid type drug with complication, continuous use (HCC)  -     buprenorphine-naloxone (SUBOXONE) 8-2 mg per SL tablet; Two tabs sl q day    -     [facility] Prescribed Meds  -     Amphetamines, Methamphetamines  -     Butalbital  -     Phenobarbital  -     Secobarbital  -     Temazepam  -     Alprazolam  -     Clonazepam  -     Diazepam  -     Lorazepam  -     Oxazepam  -     Gabapentin  - Pregabalin  -     Cocaine  -     Heroin  -     Buprenorphine  -     Levorphanol  -     Meperidine  -     Naltrexone  -     Fentanyl  -     Methadone  -     Oxycodone  -     Oxymorphone  -     Tapentadol  -     THC  -     Tramadol  -     Codeine, Hydrocodone, Hydropmorphone, Morphine  -     Bath Salts  -     Ethyl Glucuronide/Ethyl Sulfate  -     Kratom  -     Spice  -     Methylphenidate  -     Phentermine  -     Validity Oxidant  -     Validity Creatinine  -     Validity pH  -     Validity Specific  -     MM DT_Buprenorphine Definitive Test    Medication therapy continued  -     buprenorphine-naloxone (SUBOXONE) 8-2 mg per SL tablet; Two tabs sl q day    -     [facility] Prescribed Meds  -     Amphetamines, Methamphetamines  -     Butalbital  -     Phenobarbital  -     Secobarbital  -     Temazepam  -     Alprazolam  -     Clonazepam  -     Diazepam  -     Lorazepam  -     Oxazepam  -     Gabapentin  -     Pregabalin  -     Cocaine  -     Heroin  -     Buprenorphine  -     Levorphanol  -     Meperidine  -     Naltrexone  -     Fentanyl  -     Methadone  -     Oxycodone  -     Oxymorphone  -     Tapentadol  -     THC  -     Tramadol  -     Codeine, Hydrocodone, Hydropmorphone, Morphine  -     Bath Salts  -     Ethyl Glucuronide/Ethyl Sulfate  -     Kratom  -     Spice  -     Methylphenidate  -     Phentermine  -     Validity Oxidant  -     Validity Creatinine  -     Validity pH  -     Validity Specific  -     MM DT_Buprenorphine Definitive Test    Opiate abuse, episodic (HCC)  -     [facility] Prescribed Meds  -     Amphetamines, Methamphetamines  -     Butalbital  -     Phenobarbital  -     Secobarbital  -     Temazepam  -     Alprazolam  -     Clonazepam  -     Diazepam  -     Lorazepam  -     Oxazepam  -     Gabapentin  -     Pregabalin  -     Cocaine  -     Heroin  -     Buprenorphine  -     Levorphanol  -     Meperidine  -     Naltrexone  -     Fentanyl  -     Methadone  -     Oxycodone  - Oxymorphone  -     Tapentadol  -     THC  -     Tramadol  -     Codeine, Hydrocodone, Hydropmorphone, Morphine  -     Bath Salts  -     Ethyl Glucuronide/Ethyl Sulfate  -     Kratom  -     Spice  -     Methylphenidate  -     Phentermine  -     Validity Oxidant  -     Validity Creatinine  -     Validity pH  -     Validity Specific  -     MM DT_Buprenorphine Definitive Test    Encounter for monitoring Suboxone maintenance therapy  -     buprenorphine-naloxone (SUBOXONE) 8-2 mg per SL tablet; Two tabs sl q day  -     [facility] Prescribed Meds  -     Amphetamines, Methamphetamines  -     Butalbital  -     Phenobarbital  -     Secobarbital  -     Temazepam  -     Alprazolam  -     Clonazepam  -     Diazepam  -     Lorazepam  -     Oxazepam  -     Gabapentin  -     Pregabalin  -     Cocaine  -     Heroin  -     Buprenorphine  -     Levorphanol  -     Meperidine  -     Naltrexone  -     Fentanyl  -     Methadone  -     Oxycodone  -     Oxymorphone  -     Tapentadol  -     THC  -     Tramadol  -     Codeine, Hydrocodone, Hydropmorphone, Morphine  -     Bath Salts  -     Ethyl Glucuronide/Ethyl Sulfate  -     Kratom  -     Spice  -     Methylphenidate  -     Phentermine  -     Validity Oxidant  -     Validity Creatinine  -     Validity pH  -     Validity Specific  -     MM DT_Buprenorphine Definitive Test    Posttraumatic stress disorder  -     buprenorphine-naloxone (SUBOXONE) 8-2 mg per SL tablet; Two tabs sl q day    -     [facility] Prescribed Meds  -     Amphetamines, Methamphetamines  -     Butalbital  -     Phenobarbital  -     Secobarbital  -     Temazepam  -     Alprazolam  -     Clonazepam  -     Diazepam  -     Lorazepam  -     Oxazepam  -     Gabapentin  -     Pregabalin  -     Cocaine  -     Heroin  -     Buprenorphine  -     Levorphanol  -     Meperidine  -     Naltrexone  -     Fentanyl  -     Methadone  -     Oxycodone  -     Oxymorphone  -     Tapentadol  -     THC  -     Tramadol  -     Codeine, Hydrocodone, Hydropmorphone, Morphine  -     Bath Salts  -     Ethyl Glucuronide/Ethyl Sulfate  -     Kratom  -     Spice  -     Methylphenidate  -     Phentermine  -     Validity Oxidant  -     Validity Creatinine  -     Validity pH  -     Validity Specific  -     MM DT_Buprenorphine Definitive Test    Noncompliance with medication regimen  -     buprenorphine-naloxone (SUBOXONE) 8-2 mg per SL tablet; Two tabs sl q day  -     [facility] Prescribed Meds  -     Amphetamines, Methamphetamines  -     Butalbital  -     Phenobarbital  -     Secobarbital  -     Temazepam  -     Alprazolam  -     Clonazepam  -     Diazepam  -     Lorazepam  -     Oxazepam  -     Gabapentin  -     Pregabalin  -     Cocaine  -     Heroin  -     Buprenorphine  -     Levorphanol  -     Meperidine  -     Naltrexone  -     Fentanyl  -     Methadone  -     Oxycodone  -     Oxymorphone  -     Tapentadol  -     THC  -     Tramadol  -     Codeine, Hydrocodone, Hydropmorphone, Morphine  -     Bath Salts  -     Ethyl Glucuronide/Ethyl Sulfate  -     Kratom  -     Spice  -     Methylphenidate  -     Phentermine  -     Validity Oxidant  -     Validity Creatinine  -     Validity pH  -     Validity Specific  -     MM DT_Buprenorphine Definitive Test    Other orders  -     cloNIDine (CATAPRES) 0 1 mg tablet; Take 0 1 mg by mouth once  -     divalproex sodium (DEPAKOTE) 500 mg EC tablet; Take 500 mg by mouth every 12 (twelve) hours  -     hydrOXYzine pamoate (VISTARIL) 50 mg capsule; Take 50 mg by mouth if needed      No problem-specific Assessment & Plan notes found for this encounter  Scheduled Medication Review:  Pt's scheduled medication use was reviewed by myself/staff via the [facility] website  Pt's use has been found to be appropriate w/o any concerns for misuse by the patient  Pt's current conditions require continued scheduled medication use at this time  Future review for continued appropriate medication use and misuse will continue  A/P: Doing well and will re-start 16mg fo;,s, dose 1/6 and continue with  counseling  Reminded to keep meds safe and out of reach of children  RTC 4 weeks  Subjective: WM presents to restart suboxone  Doing well and no c/o's  Just got out of psych inpatient    Not using and no withdraw  Does attend counseling  UDT obtained today  Tolerating the meds and no side effects  Patient ID: Dwaine Maldonado is a 24 y o  male  HPI    The following portions of the patient's history were reviewed and updated as appropriate:   He has a past medical history of ADHD (attention deficit hyperactivity disorder), Bipolar disorder ([facility] [address] ), Depression, GERD (gastroesophageal reflux disease), Hyperlipidemia, Hypertension, Psychiatric disorder, Seizures ([facility] [address] ), Substance abuse ([facility] [address] ), and Tourette syndrome  ,  does not have any pertinent problems on file  ,   has a past surgical history that includes Tympanostomy tube placement; TONSILECTOMY AND ADNOIDECTOMY; Tonsillectomy; and pr exc skin benig >4 cm trunk,arm,leg (Left, 10/26/2021)  ,  family history includes Cirrhosis in his father; Coronary artery disease in his father; Hepatitis in his father; No Known Problems in his mother; Substance Abuse in his brother and father  ,   reports that he has been smoking cigarettes  He has a 15 00 pack-year smoking history  He has never used smokeless tobacco  He reports previous alcohol use  He reports current drug use  Drugs: Marijuana, Heroin, and Methamphetamines  ,  is allergic to abilify [aripiprazole]     Current Outpatient Medications   Medication Sig Dispense Refill    buprenorphine-naloxone (SUBOXONE) 8-2 mg per SL tablet Two tabs sl q day   60 tablet 0    cloNIDine (CATAPRES) 0 1 mg tablet Take 0 1 mg by mouth once      divalproex sodium (DEPAKOTE) 500 mg EC tablet Take 500 mg by mouth every 12 (twelve) hours      hydrOXYzine pamoate (VISTARIL) 50 mg capsule Take 50 mg by mouth if needed      ibuprofen (IBU) 800 mg tablet Take 1 tablet (800 mg total) by mouth every 8 (eight) hours as needed for mild pain 30 tablet 0    QUEtiapine (SEROquel) 50 mg tablet Take 50 mg by mouth daily at bedtime      divalproex sodium (DEPAKOTE ER) 250 mg 24 hr tablet Take 3 tablets (750 mg total) by mouth daily (Patient taking differently: Take 750 mg by mouth daily 500mg am and 1000 at pm) 180 tablet 0    escitalopram (LEXAPRO) 5 mg tablet Take 1 tablet (5 mg total) by mouth daily 60 tablet 0    meloxicam (MOBIC) 15 mg tablet Take 1 tablet (15 mg total) by mouth daily (Patient not taking: Reported on 8/8/2022) 30 tablet 1    naloxone (Narcan) 4 mg/0 1 mL nasal spray 0 1 mL (4 mg total) into each nostril as needed (respiratory depression or possible OD) (Patient not taking: No sig reported) 1 each 1    pantoprazole (PROTONIX) 40 mg tablet Take 1 tablet (40 mg total) by mouth daily in the early morning 60 tablet 0    QUEtiapine (SEROquel) 200 mg tablet Take 1 tablet (200 mg total) by mouth daily at bedtime (Patient taking differently: Take 400 mg by mouth daily at bedtime) 60 tablet 0     No current facility-administered medications for this visit  Review of Systems   Constitutional: Negative for activity change, chills, diaphoresis, fatigue and fever  Respiratory: Negative for cough, chest tightness, shortness of breath and wheezing  Cardiovascular: Negative for chest pain, palpitations and leg swelling  Gastrointestinal: Negative for abdominal pain, constipation, diarrhea, nausea and vomiting  Genitourinary: Negative for difficulty urinating, dysuria and frequency  Musculoskeletal: Negative for arthralgias, gait problem and myalgias  Neurological: Negative for dizziness, seizures, syncope, weakness, light-headedness and headaches  Psychiatric/Behavioral: Positive for dysphoric mood  Negative for confusion, self-injury, sleep disturbance and suicidal ideas  The patient is nervous/anxious          PHQ-2/9 Depression Screening Objective:  Vitals:    08/08/22 0826   BP: 150/90   Pulse: 92   Temp: 98 °F (36 7 °C)   TempSrc: Tympanic   SpO2: 98%   Weight: 99 5 kg (219 lb 6 4 oz)   Height: 5' 6" (1 676 m)     Body mass index is 35 41 kg/m²  Physical Exam  Vitals and nursing note reviewed  Constitutional:       General: He is not in acute distress  Appearance: Normal appearance  He is not ill-appearing  HENT:      Head: Normocephalic and atraumatic  Mouth/Throat:      Mouth: Mucous membranes are moist    Eyes:      Extraocular Movements: Extraocular movements intact  Conjunctiva/sclera: Conjunctivae normal       Pupils: Pupils are equal, round, and reactive to light  Cardiovascular:      Rate and Rhythm: Normal rate and regular rhythm  Heart sounds: Normal heart sounds  Pulmonary:      Effort: Pulmonary effort is normal  No respiratory distress  Breath sounds: Normal breath sounds  No wheezing or rales  Abdominal:      General: Bowel sounds are normal  There is no distension  Palpations: Abdomen is soft  Tenderness: There is no abdominal tenderness  Neurological:      General: No focal deficit present  Mental Status: He is alert and oriented to person, place, and time  Mental status is at baseline  Psychiatric:         Mood and Affect: Mood normal          Behavior: Behavior normal          Thought Content: Thought content normal          Judgment: Judgment normal          BMI Counseling: Body mass index is 35 41 kg/m²  The BMI is above normal  Nutrition recommendations include reducing portion sizes, decreasing overall calorie intake, reducing intake of saturated fat and trans fat and reducing intake of cholesterol  Exercise recommendations include moderate aerobic physical activity for 150 minutes/week

## 2022-08-08 NOTE — PATIENT INSTRUCTIONS
Buprenorphine/Naloxone (Into the mouth)   Buprenorphine (bue-pre-NOR-feen), Naloxone (nal-OX-one)  Treats narcotic dependence  Brand Name(s): Suboxone, Zubsolv   There may be other brand names for this medicine  When This Medicine Should Not Be Used: This medicine is not right for everyone  Do not use it if you had an allergic reaction to buprenorphine or naloxone  How to Use This Medicine: Thin Sheet, Tablet  · Take your medicine as directed  Your dose may need to be changed several times to find what works best for you  · You must let the medicine dissolve  Never swallow the film or tablet  Your body may not absorb enough of the medicine if you swallow it  · Your health caregiver should show you how to use the medicine  If you do not understand, ask for help  It is important to use the medicine correctly  · Do not talk while the medicine is inside your mouth  · Buccal film: Rinse your mouth with water to moisten it  Place the film against the inside of your cheek  If your doctor told you to use more than 1 film, place the second film inside your other cheek  Do not place more than 2 films inside of 1 cheek at a time  Do not move or touch the film  Do not eat or drink anything until the film is completely dissolved  · Sublingual tablet: Place the tablet under your tongue  If your doctor told you to use more than 1 tablet, place all of the tablets in different places under your tongue at the same time  You can use 2 tablets at a time until you have taken all of the medicine, if that is easier for you  Let the tablets dissolve completely in your mouth  Do not eat or drink anything until the tablets are completely dissolved  · Sublingual film: Drink some water to help moisten your mouth  Place the film under your tongue  If your doctor told you to use more than 1 film, place the second film on the opposite side from the first one  Do not move the film after you placed it under your tongue   If you are supposed to use more than 2 films, use them the same way, but do not start until the first 2 films are completely dissolved  Do not eat or drink anything until the film is completely dissolved  · Do not break, crush, chew, or cut the film or tablet  · This medicine should come with a Medication Guide  Ask your pharmacist for a copy if you do not have one  · Missed dose: Take a dose as soon as you remember  If it is almost time for your next dose, wait until then and take a regular dose  Do not take extra medicine to make up for a missed dose  · Store the medicine in a closed container at room temperature, away from heat, moisture, and direct light  Drop off any unused narcotic medicine at a drug take-back location right away  If you do not have a drug take-back location near you, flush any unused narcotic medicine down the toilet  Check your local drug store and clinics for take-back locations  You can also check the [employer] web site for locations  Here is the link to the [facility] safe disposal of medicines [employer] com ee  Drugs and Foods to Avoid:   Ask your doctor or pharmacist before using any other medicine, including over-the-counter medicines, vitamins, and herbal products  1  Do not use this medicine if you are using or have used an MAO inhibitor within the past 14 days  2  Some medicines can affect how buprenorphine/naloxone works  Tell your doctor if you are using the following:   ? Carbamazepine, cyclobenzaprine, erythromycin, ketoconazole, metaxalone, mirtazapine, phenobarbital, phenytoin, rifampin, tramadol, trazodone  ? Diuretic (water pill)  ? Medicine to treat depression or mental health problems (including SNRIs, SSRIs, TCAs)  ? Medicine to treat HIV/AIDS (including atazanavir, delavirdine, efavirenz, etravirine, nevirapine, ritonavir)  ? Phenothiazine medicine  ?  Triptan medicine to treat migraine headaches  3  Do not drink alcohol while you are using this medicine  4  Tell your doctor if you use anything else that makes you sleepy  Some examples are allergy medicine, narcotic pain medicine, and alcohol  Tell your doctor if you are also using butorphanol, nalbuphine, pentazocine, or a muscle relaxer  Warnings While Using This Medicine:   1  Tell your doctor if you are pregnant or breastfeeding, or if you have kidney disease, liver disease (including hepatitis), lung or breathing problems (including sleep apnea), adrenal gland problems, an enlarged prostate, trouble urinating, gallbladder problems, thyroid problems, stomach problems, or a history of depression, brain tumor, head injury, or alcohol or drug abuse  2  This medicine may cause the following problems:  ? High risk of overdose, which can lead to death  ? Respiratory depression (serious breathing problem that can be life-threatening)  ? Adrenal gland problems  ? Sleep-related breathing problems (including sleep apnea, sleep-related hypoxemia)  ? Low blood pressure  ? Liver problems  ? Serotonin syndrome, when used with certain medicines  3  This medicine may make you dizzy or drowsy  Do not drive or do anything else that could be dangerous until you know how this medicine affects you  Sit or lie down if you feel dizzy  Stand up carefully  4  Tell any doctor or dentist who treats you that you are using this medicine  5  This medicine can be habit-forming  Do not use more than your prescribed dose  Call your doctor if you think your medicine is not working  6  This medicine may cause constipation, especially with long-term use  Ask your doctor if you should use a laxative to prevent and treat constipation  7  Do not stop using this medicine suddenly  Your doctor will need to slowly decrease your dose before you stop it completely  8  This medicine could cause infertility   Talk with your doctor before using this medicine if you plan to have children  9  Your doctor will do lab tests at regular visits to check on the effects of this medicine  Keep all appointments  10  Keep all medicine out of the reach of children  Never share your medicine with anyone  Possible Side Effects While Using This Medicine:   Call your doctor right away if you notice any of these side effects:  · Allergic reaction: Itching or hives, swelling in your face or hands, swelling or tingling in your mouth or throat, chest tightness, trouble breathing  · Blue lips, fingernails, or skin, trouble breathing  · Changes in skin color, dark freckles, cold feeling, tiredness, weight loss  · Dark urine or pale stools, nausea, vomiting, loss of appetite, stomach pain, yellow skin or eyes  · Extreme dizziness, drowsiness, or weakness, slow heartbeat, sweating, seizures, cold or clammy skin  · Severe confusion, lightheadedness, dizziness, or fainting  If you notice these less serious side effects, talk with your doctor:   · Constipation, diarrhea, nausea, vomiting, stomach upset  · Headache  · Stuffy or runny nose  If you notice other side effects that you think are caused by this medicine, tell your doctor  Call your doctor for medical advice about side effects  You may report side effects to FDA at 1-353-FDA-6799    © Copyright [employer] 2022 Information is for End User's use only and may not be sold, redistributed or otherwise used for commercial purposes  The above information is an  only  It is not intended as medical advice for individual conditions or treatments  Talk to your doctor, nurse or pharmacist before following any medical regimen to see if it is safe and effective for you  Obesity   AMBULATORY CARE:   Obesity  means your body mass index (BMI) is greater than 30  Your healthcare provider will use your height and weight to measure your BMI    The risks of obesity include  many health problems, including injuries or physical disability  · Diabetes (high blood sugar level)    · High blood pressure or high cholesterol    · Heart disease    · Stroke    · Gallbladder or liver disease    · Cancer of the colon, breast, prostate, liver, or kidney    · Sleep apnea    · Arthritis or gout    Screening  is done to check for health conditions before you have signs or symptoms  If you are 28to 79years old, your blood sugar level may be checked every 3 years for signs of prediabetes or diabetes  Your healthcare provider will check your blood pressure at each visit  High blood pressure can lead to a stroke or other problems  Your provider may check for signs of heart disease, cancer, or other health problems  Seek care immediately if:   · You have a severe headache, confusion, or difficulty speaking  · You have weakness on one side of your body  · You have chest pain, sweating, or shortness of breath  Call your doctor if:   · You have symptoms of gallbladder or liver disease, such as pain in your upper abdomen  · You have knee or hip pain and discomfort while walking  · You have symptoms of diabetes, such as intense hunger and thirst, and frequent urination  · You have symptoms of sleep apnea, such as snoring or daytime sleepiness  · You have questions or concerns about your condition or care  Treatment for obesity  focuses on helping you lose weight to improve your health  Even a small decrease in BMI can reduce the risk for many health problems  Your healthcare provider will help you set a weight-loss goal   · Lifestyle changes  are the first step in treating obesity  These include making healthy food choices and getting regular physical activity  Your healthcare provider may suggest a weight-loss program that involves coaching, education, and therapy  · Medicine  may help you lose weight when it is used with a healthy foods and physical activity      · Surgery  can help you lose weight if you are very obese and have other health problems  There are several types of weight-loss surgery  Ask your healthcare provider for more information  Tips for safe weight loss:   · Set small, realistic goals  An example of a small goal is to walk for 20 minutes 5 days a week  Geri goal is to lose 5% of your body weight  · Tell friends, family members, and coworkers about your goals  and ask for their support  Ask a friend to lose weight with you, or join a weight-loss support group  · Identify foods or triggers that may cause you to overeat , and find ways to avoid them  Remove tempting high-calorie foods from your home and workplace  Place a bowl of fresh fruit on your kitchen counter  If stress causes you to eat, then find other ways to cope with stress  A counselor or therapist may be able to help you  · Keep a diary to track what you eat and drink  Also write down how many minutes of physical activity you do each day  Weigh yourself once a week and record it in your diary  Eating changes: You will need to eat 500 to 1,000 fewer calories each day than you currently eat to lose 1 to 2 pounds a week  The following changes will help you cut calories:  · Eat smaller portions  Use small plates, no larger than 9 inches in diameter  Fill your plate half full of fruits and vegetables  Measure your food using measuring cups until you know what a serving size looks like  · Eat 3 meals and 1 or 2 snacks each day  Plan your meals in advance  [place] [employer] and eat at home most of the time  Eat slowly  Do not skip meals  Skipping meals can lead to overeating later in the day  This can make it harder for you to lose weight  Talk with a dietitian to help you make a meal plan and schedule that is right for you  · Eat fruits and vegetables at every meal   They are low in calories and high in fiber, which makes you feel full  Do not add butter, margarine, or cream sauce to vegetables   Use herbs to season steamed vegetables  · Eat less fat and fewer fried foods  Eat more baked or grilled chicken and fish  These protein sources are lower in calories and fat than red meat  Limit fast food  Dress your salads with olive oil and vinegar instead of bottled dressing  · Limit the amount of sugar you eat  Do not drink sugary beverages  Limit alcohol  Activity changes:  Physical activity is good for your body in many ways  It helps you burn calories and build strong muscles  It decreases stress and depression, and improves your mood  It can also help you sleep better  Talk to your healthcare provider before you begin an exercise program   · Exercise for at least 30 minutes 5 days a week  Start slowly  Set aside time each day for physical activity that you enjoy and that is convenient for you  It is best to do both weight training and an activity that increases your heart rate, such as walking, bicycling, or swimming  · Find ways to be more active  Do yard work and housecleaning  Walk up the stairs instead of using elevators  Spend your leisure time going to events that require walking, such as outdoor festivals or fairs  This extra physical activity can help you lose weight and keep it off  Follow up with your doctor as directed: You may need to meet with a dietitian  Write down your questions so you remember to ask them during your visits  © [employer] 2022 Information is for End User's use only and may not be sold, redistributed or otherwise used for commercial purposes  All illustrations and images included in CareNotes® are the copyrighted property of [employer] A M [employer] Inc  or Aidan Elliott   The above information is an  only  It is not intended as medical advice for individual conditions or treatments  Talk to your doctor, nurse or pharmacist before following any medical regimen to see if it is safe and effective for you      Low Fat Diet   AMBULATORY CARE:   A low-fat diet  is an eating plan that is low in total fat, unhealthy fat, and cholesterol  You may need to follow a low-fat diet if you have trouble digesting or absorbing fat  You may also need to follow this diet if you have high cholesterol  You can also lower your cholesterol by increasing the amount of fiber in your diet  Soluble fiber is a type of fiber that helps to decrease cholesterol levels  Different types of fat in food:   · Limit unhealthy fats  A diet that is high in cholesterol, saturated fat, and trans fat may cause unhealthy cholesterol levels  Unhealthy cholesterol levels increase your risk of heart disease  ? Cholesterol:  Limit intake of cholesterol to less than 200 mg per day  Cholesterol is found in meat, eggs, and dairy  ? Saturated fat:  Limit saturated fat to less than 7% of your total daily calories  Ask your dietitian how many calories you need each day  Saturated fat is found in butter, cheese, ice cream, whole milk, and palm oil  Saturated fat is also found in meat, such as beef, pork, chicken skin, and processed meats  Processed meats include sausage, hot dogs, and bologna  ? Trans fat:  Avoid trans fat as much as possible  Trans fat is used in fried and baked foods  Foods that say trans fat free on the label may still have up to 0 5 grams of trans fat per serving  · Include healthy fats  Replace foods that are high in saturated and trans fat with foods high in healthy fats  This may help to decrease high cholesterol levels  ? Monounsaturated fats: These are found in avocados, nuts, and vegetable oils, such as olive, canola, and sunflower oil  ? Polyunsaturated fats: These can be found in vegetable oils, such as soybean or corn oil  Omega-3 fats can help to decrease the risk of heart disease  Omega-3 fats are found in fish, such as salmon, herring, trout, and tuna  Omega-3 fats can also be found in plant foods, such as walnuts, flaxseed, soybeans, and canola oil  Foods to limit or avoid:   · Grains:      ? Snacks that are made with partially hydrogenated oils, such as chips, regular crackers, and butter-flavored popcorn    ? High-fat baked goods, such as biscuits, croissants, doughnuts, pies, cookies, and pastries    · Dairy:      ? Whole milk, 2% milk, and yogurt and ice cream made with whole milk    ? Half and half creamer, heavy cream, and whipping cream    ? Cheese, cream cheese, and sour cream    · Meats and proteins:      ? High-fat cuts of meat (T-bone steak, regular hamburger, and ribs)    ? Fried meat, poultry (turkey and chicken), and fish    ? Poultry (chicken and turkey) with skin    ? Cold cuts (salami or bologna), hot dogs, calderon, and sausage    ? Whole eggs and egg yolks    · Vegetables and fruits with added fat:      ? Fried vegetables or vegetables in butter or high-fat sauces, such as cream or cheese sauces    ? Fried fruit or fruit served with butter or cream    · Fats:      ? Butter, stick margarine, and shortening    ? Coconut, palm oil, and palm kernel oil    Foods to include:   · Grains:      ? Whole-grain breads, cereals, pasta, and brown rice    ? Low-fat crackers and pretzels    · Vegetables and fruits:      ? Fresh, frozen, or canned vegetables (no salt or low-sodium)    ? Fresh, frozen, dried, or canned fruit (canned in light syrup or fruit juice)    ? Avocado    · Low-fat dairy products:      ? Nonfat (skim) or 1% milk    ? Nonfat or low-fat cheese, yogurt, and cottage cheese    · Meats and proteins:      ? Chicken or turkey with no skin    ? Baked or broiled fish    ? Lean beef and pork (loin, round, extra lean hamburger)    ? Beans and peas, unsalted nuts, soy products    ? Egg whites and substitutes    ? Seeds and nuts    · Fats:      ? Unsaturated oil, such as canola, olive, peanut, soybean, or sunflower oil    ? Soft or liquid margarine and vegetable oil spread    ?  Low-fat salad dressing    Other ways to decrease fat:   · Read food labels before you buy foods  Choose foods that have less than 30% of calories from fat  Choose low-fat or fat-free dairy products  Remember that fat free does not mean calorie free  These foods still contain calories, and too many calories can lead to weight gain  · Trim fat from meat and avoid fried food  Trim all visible fat from meat before you cook it  Remove the skin from poultry  Do not gambino meat, fish, or poultry  Bake, roast, boil, or broil these foods instead  Avoid fried foods  Eat a baked potato instead of [place] fries  Steam vegetables instead of sautéing them in butter  · Add less fat to foods  Use imitation calderon bits on salads and baked potatoes instead of regular calderon bits  Use fat-free or low-fat salad dressings instead of regular dressings  Use low-fat or nonfat butter-flavored topping instead of regular butter or margarine on popcorn and other foods  Ways to decrease fat in recipes:  Replace high-fat ingredients with low-fat or nonfat ones  This may cause baked goods to be drier than usual  You may need to use nonfat cooking spray on pans to prevent food from sticking  You also may need to change the amount of other ingredients, such as water, in the recipe  Try the following:  · Use low-fat or light margarine instead of regular margarine or shortening  · Use lean ground turkey breast or chicken, or lean ground beef (less than 5% fat) instead of hamburger  · Add 1 teaspoon of canola oil to 8 ounces of skim milk instead of using cream or half and half  · Use grated zucchini, carrots, or apples in breads instead of coconut  · Use blenderized, low-fat cottage cheese, plain tofu, or low-fat ricotta cheese instead of cream cheese  · Use 1 egg white and 1 teaspoon of canola oil, or use ¼ cup (2 ounces) of fat-free egg substitute instead of a whole egg  · Replace half of the oil that is called for in a recipe with applesauce when you bake   Use 3 tablespoons of cocoa powder and 1 tablespoon of canola oil instead of a square of baking chocolate  How to increase fiber:  Eat enough high-fiber foods to get 20 to 30 grams of fiber every day  Slowly increase your fiber intake to avoid stomach cramps, gas, and other problems  · Eat 3 ounces of whole-grain foods each day  An ounce is about 1 slice of bread  Eat whole-grain breads, such as whole-wheat bread  Whole wheat, whole-wheat flour, or other whole grains should be listed as the first ingredient on the food label  Replace white flour with whole-grain flour or use half of each in recipes  Whole-grain flour is heavier than white flour, so you may have to add more yeast or baking powder  · Eat a high-fiber cereal for breakfast   Oatmeal is a good source of soluble fiber  Look for cereals that have bran or fiber in the name  Choose whole-grain products, such as brown rice, barley, and whole-wheat pasta  · Eat more beans, peas, and lentils  For example, add beans to soups or salads  Eat at least 5 cups of fruits and vegetables each day  Eat fruits and vegetables with the peel because the peel is high in fiber  © Copyright [employer] 2022 Information is for End User's use only and may not be sold, redistributed or otherwise used for commercial purposes  All illustrations and images included in CareNotes® are the copyrighted property of [employer] A M , Inc  or [address] [place], [place] [id number]   The above information is an  only  It is not intended as medical advice for individual conditions or treatments  Talk to your doctor, nurse or pharmacist before following any medical regimen to see if it is safe and effective for you  Heart Healthy Diet   AMBULATORY CARE:   A heart healthy diet  is an eating plan low in unhealthy fats and sodium (salt)  The plan is high in healthy fats and fiber  A heart healthy diet helps improve your cholesterol levels and lowers your risk for heart disease and stroke   A dietitian will teach you how to read and understand food labels  Heart healthy diet guidelines to follow:   · Choose foods that contain healthy fats  ? Unsaturated fats  include monounsaturated and polyunsaturated fats  Unsaturated fat is found in foods such as soybean, canola, olive, corn, and safflower oils  It is also found in soft tub margarine that is made with liquid vegetable oil  ? Omega-3 fat  is found in certain fish, such as salmon, tuna, and trout, and in walnuts and flaxseed  Eat fish high in omega-3 fats at least 2 times a week  · Get 20 to 30 grams of fiber each day  Fruits, vegetables, whole-grain foods, and legumes (cooked beans) are good sources of fiber  · Limit or do not have unhealthy fats  ? Cholesterol  is found in animal foods, such as eggs and lobster, and in dairy products made from whole milk  Limit cholesterol to less than 200 mg each day  ? Saturated fat  is found in meats, such as calderon and hamburger  It is also found in chicken or turkey skin, whole milk, and butter  Limit saturated fat to less than 7% of your total daily calories  ? Trans fat  is found in packaged foods, such as potato chips and cookies  It is also in hard margarine, some fried foods, and shortening  Do not eat foods that contain trans fats  · Limit sodium as directed  You may be told to limit sodium to 2,000 to 2,300 mg each day  Choose low-sodium or no-salt-added foods  Add little or no salt to food you prepare  Use herbs and spices in place of salt  Include the following in your heart healthy plan:  Ask your dietitian or healthcare provider how many servings to have from each of the following food groups:  · Grains:      ? Whole-wheat breads, cereals, and pastas, and brown rice    ? Low-fat, low-sodium crackers and chips    · Vegetables:      ? Broccoli, green beans, green peas, and spinach    ? Collards, kale, and lima beans    ? Carrots, sweet potatoes, tomatoes, and peppers    ?  Canned vegetables with no salt added    · Fruits:      ? Bananas, peaches, pears, and pineapple    ? Grapes, raisins, and dates    ? Oranges, tangerines, grapefruit, orange juice, and grapefruit juice    ? Apricots, mangoes, melons, and papaya    ? Raspberries and strawberries    ? Canned fruit with no added sugar    · Low-fat dairy:      ? Nonfat (skim) milk, 1% milk, and low-fat almond, cashew, or soy milks fortified with calcium    ? Low-fat cheese, regular or frozen yogurt, and cottage cheese    · Meats and proteins:      ? Lean cuts of beef and pork (loin, leg, round), skinless chicken and turkey    ? Legumes, soy products, egg whites, or nuts    Limit or do not include the following in your heart healthy plan:   · Unhealthy fats and oils:      ? Whole or 2% milk, cream cheese, sour cream, or cheese    ? High-fat cuts of beef (T-bone steaks, ribs), chicken or turkey with skin, and organ meats such as liver    ? Butter, stick margarine, shortening, and cooking oils such as coconut or palm oil    · Foods and liquids high in sodium:      ? Packaged foods, such as frozen dinners, cookies, macaroni and cheese, and cereals with more than 300 mg of sodium per serving    ? Vegetables with added sodium, such as instant potatoes, vegetables with added sauces, or regular canned vegetables    ? Cured or smoked meats, such as hot dogs, calderon, and sausage    ? High-sodium ketchup, barbecue sauce, salad dressing, pickles, olives, soy sauce, or miso    · Foods and liquids high in sugar:      ? Candy, cake, cookies, pies, or doughnuts    ? Soft drinks (soda), sports drinks, or sweetened tea    ? Canned or dry mixes for cakes, soups, sauces, or gravies    Other healthy heart guidelines:   · Do not smoke  Nicotine and other chemicals in cigarettes and cigars can cause lung and heart damage  Ask your healthcare provider for information if you currently smoke and need help to quit   E-cigarettes or smokeless tobacco still contain nicotine  Talk to your healthcare provider before you use these products  · Limit or do not drink alcohol as directed  Alcohol can damage your heart and raise your blood pressure  Your healthcare provider may give you specific daily and weekly limits  The general recommended limit is 1 drink a day for women 21 or older and for men 72 or older  Do not have more than 3 drinks in a day or 7 in a week  The recommended limit is 2 drinks a day for men 24to 59years of age  Do not have more than 4 drinks in a day or 14 in a week  A drink of alcohol is 12 ounces of beer, 5 ounces of wine, or 1½ ounces of liquor  · Exercise regularly  Exercise can help you maintain a healthy weight and improve your blood pressure and cholesterol levels  Regular exercise can also decrease your risk for heart problems  Ask your healthcare provider about the best exercise plan for you  Do not start an exercise program without asking your healthcare provider  Follow up with your doctor or cardiologist as directed:  Write down your questions so you remember to ask them during your visits  © Copyright [employer] 2022 Information is for End User's use only and may not be sold, redistributed or otherwise used for commercial purposes  All illustrations and images included in CareNotes® are the copyrighted property of [employer]  or [facility] Elliott   The above information is an  only  It is not intended as medical advice for individual conditions or treatments  Talk to your doctor, nurse or pharmacist before following any medical regimen to see if it is safe and effective for you

## 2022-08-08 NOTE — PROGRESS NOTES
Assessment/Plan:  Problem List Items Addressed This Visit        Other    Posttraumatic stress disorder    Polysubstance dependence including opioid type drug with complication, continuous use ([facility] [address] ) - Primary    Opiate abuse, episodic ([facility] )    Medication therapy continued    Encounter for monitoring Suboxone maintenance therapy      Other Visit Diagnoses     Noncompliance with medication regimen               Diagnoses and all orders for this visit:    Polysubstance dependence including opioid type drug with complication, continuous use ([facility] [address] )    Medication therapy continued    Opiate abuse, episodic ([facility] )    Encounter for monitoring Suboxone maintenance therapy    Posttraumatic stress disorder    Noncompliance with medication regimen        No problem-specific Assessment & Plan notes found for this encounter  ***    Subjective:      Patient ID: Cat Lao is a 24 y o  male  HPI    The following portions of the patient's history were reviewed and updated as appropriate:   He has a past medical history of ADHD (attention deficit hyperactivity disorder), Bipolar disorder ([facility] [address] ), Depression, GERD (gastroesophageal reflux disease), Hyperlipidemia, Hypertension, Psychiatric disorder, Seizures ([facility] [address] ), Substance abuse ([facility] ), and Tourette syndrome  ,  does not have any pertinent problems on file  ,   has a past surgical history that includes Tympanostomy tube placement; TONSILECTOMY AND ADNOIDECTOMY; Tonsillectomy; and pr exc skin benig >4 cm trunk,arm,leg (Left, 10/26/2021)  ,  family history includes Cirrhosis in his father; Coronary artery disease in his father; Hepatitis in his father; No Known Problems in his mother; Substance Abuse in his brother and father  ,   reports that he has been smoking cigarettes  He has a 15 00 pack-year smoking history  He has never used smokeless tobacco  He reports previous alcohol use  He reports current drug use  Drugs: Marijuana, Heroin, and Methamphetamines  ,  is allergic to abilify [aripiprazole]     Current Outpatient Medications   Medication Sig Dispense Refill    buprenorphine-naloxone (SUBOXONE) 8-2 mg per SL tablet One and half tabs sl q day  (Patient not taking: No sig reported) 35 tablet 0    divalproex sodium (DEPAKOTE ER) 250 mg 24 hr tablet Take 3 tablets (750 mg total) by mouth daily (Patient taking differently: Take 750 mg by mouth daily 500mg am and 1000 at pm) 180 tablet 0    escitalopram (LEXAPRO) 5 mg tablet Take 1 tablet (5 mg total) by mouth daily 60 tablet 0    ibuprofen (IBU) 800 mg tablet Take 1 tablet (800 mg total) by mouth every 8 (eight) hours as needed for mild pain 30 tablet 0    meloxicam (MOBIC) 15 mg tablet Take 1 tablet (15 mg total) by mouth daily 30 tablet 1    naloxone (Narcan) 4 mg/0 1 mL nasal spray 0 1 mL (4 mg total) into each nostril as needed (respiratory depression or possible OD) (Patient not taking: Reported on 8/5/2022) 1 each 1    pantoprazole (PROTONIX) 40 mg tablet Take 1 tablet (40 mg total) by mouth daily in the early morning 60 tablet 0    QUEtiapine (SEROquel) 200 mg tablet Take 1 tablet (200 mg total) by mouth daily at bedtime (Patient taking differently: Take 400 mg by mouth daily at bedtime) 60 tablet 0    QUEtiapine (SEROquel) 50 mg tablet Take 50 mg by mouth daily at bedtime       No current facility-administered medications for this visit  Review of Systems    PHQ-2/9 Depression Screening          Objective: There were no vitals filed for this visit  There is no height or weight on file to calculate BMI       Physical Exam

## 2022-08-10 LAB
6MAM UR QL CFM: NEGATIVE NG/ML
7AMINOCLONAZEPAM UR QL CFM: NEGATIVE NG/ML
A-OH ALPRAZ UR QL CFM: ABNORMAL NG/ML
ACCEPTABLE CREAT UR QL: NORMAL MG/DL
ACCEPTIBLE SP GR UR QL: NORMAL
AMPHET UR QL CFM: NEGATIVE NG/ML
BUPRENORPHINE UR QL CFM: ABNORMAL NG/ML
BUTALBITAL UR QL CFM: NEGATIVE NG/ML
BZE UR QL CFM: NEGATIVE NG/ML
CODEINE UR QL CFM: NEGATIVE NG/ML
EDDP UR QL CFM: NEGATIVE NG/ML
ETHYL GLUCURONIDE UR QL CFM: NEGATIVE NG/ML
ETHYL SULFATE UR QL SCN: NEGATIVE NG/ML
EUTYLONE UR QL: NEGATIVE NG/ML
FENTANYL UR QL CFM: NEGATIVE NG/ML
GLIADIN IGG SER IA-ACNC: NEGATIVE NG/ML
HYDROCODONE UR QL CFM: NEGATIVE NG/ML
HYDROMORPHONE UR QL CFM: NEGATIVE NG/ML
LORAZEPAM UR QL CFM: NEGATIVE NG/ML
ME-PHENIDATE UR QL CFM: NEGATIVE NG/ML
MEPERIDINE UR QL CFM: NEGATIVE NG/ML
METHADONE UR QL CFM: NEGATIVE NG/ML
METHAMPHET UR QL CFM: NEGATIVE NG/ML
MORPHINE UR QL CFM: NEGATIVE NG/ML
NALTREXONE UR QL CFM: NEGATIVE NG/ML
NITRITE UR QL: NORMAL UG/ML
NORBUPRENORPHINE UR QL CFM: ABNORMAL NG/ML
NORDIAZEPAM UR QL CFM: NEGATIVE NG/ML
NORFENTANYL UR QL CFM: NEGATIVE NG/ML
NORHYDROCODONE UR QL CFM: NEGATIVE NG/ML
NORMEPERIDINE UR QL CFM: NEGATIVE NG/ML
NOROXYCODONE UR QL CFM: NEGATIVE NG/ML
OXAZEPAM UR QL CFM: NEGATIVE NG/ML
OXYCODONE UR QL CFM: NEGATIVE NG/ML
OXYMORPHONE UR QL CFM: NEGATIVE NG/ML
OXYMORPHONE UR QL CFM: NEGATIVE NG/ML
PHENOBARB UR QL CFM: NEGATIVE NG/ML
RESULT ALL_PRESCRIBED MEDS AND SPECIAL INSTRUCTIONS: NORMAL
SECOBARBITAL UR QL CFM: NEGATIVE NG/ML
SL AMB 3-METHYL-FENTANYL QUANTIFICATION: NORMAL NG/ML
SL AMB 4-ANPP QUANTIFICATION: NORMAL NG/ML
SL AMB 4-FIBF QUANTIFICATION: NORMAL NG/ML
SL AMB 5F-ADB-M7 METABOLITE QUANTIFICATION: NEGATIVE NG/ML
SL AMB 7-OH-MITRAGYNINE (KRATOM ALKALOID) QUANTIFICATION: NEGATIVE NG/ML
SL AMB AB-FUBINACA-M3 METABOLITE QUANTIFICATION: NEGATIVE NG/ML
SL AMB ACETYL FENTANYL QUANTIFICATION: NORMAL NG/ML
SL AMB ACETYL NORFENTANYL QUANTIFICATION: NORMAL NG/ML
SL AMB ACRYL FENTANYL QUANTIFICATION: NORMAL NG/ML
SL AMB BUTRYL FENTANYL QUANTIFICATION: NORMAL NG/ML
SL AMB CARFENTANIL QUANTIFICATION: NORMAL NG/ML
SL AMB CTHC (MARIJUANA METABOLITE) QUANTIFICATION: ABNORMAL NG/ML
SL AMB CYCLOPROPYL FENTANYL QUANTIFICATION: NORMAL NG/ML
SL AMB DEXTRORPHAN (DEXTROMETHORPHAN METABOLITE) QUANT: NEGATIVE NG/ML
SL AMB FURANYL FENTANYL QUANTIFICATION: NORMAL NG/ML
SL AMB GABAPENTIN QUANTIFICATION: NEGATIVE
SL AMB JWH018 METABOLITE QUANTIFICATION: NEGATIVE NG/ML
SL AMB JWH073 METABOLITE QUANTIFICATION: NEGATIVE NG/ML
SL AMB MDMB-FUBINACA-M1 METABOLITE QUANTIFICATION: NEGATIVE NG/ML
SL AMB METHOXYACETYL FENTANYL QUANTIFICATION: NORMAL NG/ML
SL AMB METHYLONE QUANTIFICATION: NEGATIVE NG/ML
SL AMB N-DESMETHYL U-47700 QUANTIFICATION: NORMAL NG/ML
SL AMB N-DESMETHYL-TRAMADOL QUANTIFICATION: NEGATIVE NG/ML
SL AMB PHENTERMINE QUANTIFICATION: NEGATIVE NG/ML
SL AMB PREGABALIN QUANTIFICATION: NEGATIVE
SL AMB RCS4 METABOLITE QUANTIFICATION: NEGATIVE NG/ML
SL AMB RITALINIC ACID QUANTIFICATION: NEGATIVE NG/ML
SL AMB U-47700 QUANTIFICATION: NORMAL NG/ML
SMOOTH MUSCLE AB TITR SER IF: NEGATIVE NG/ML
SPECIMEN DRAWN SERPL: NEGATIVE NG/ML
SPECIMEN PH ACCEPTABLE UR: NORMAL
TAPENTADOL UR QL CFM: NEGATIVE NG/ML
TEMAZEPAM UR QL CFM: NEGATIVE NG/ML
TEMAZEPAM UR QL CFM: NEGATIVE NG/ML
TRAMADOL UR QL CFM: NEGATIVE NG/ML
URATE/CREAT 24H UR: NEGATIVE NG/ML

## 2022-08-26 ENCOUNTER — OFFICE VISIT (OUTPATIENT)
Dept: INTERNAL MEDICINE CLINIC | Facility: CLINIC | Age: 22
End: 2022-08-26
Payer: COMMERCIAL

## 2022-08-26 VITALS
WEIGHT: 214 LBS | HEIGHT: 66 IN | TEMPERATURE: 97.6 F | BODY MASS INDEX: 34.39 KG/M2 | HEART RATE: 74 BPM | SYSTOLIC BLOOD PRESSURE: 126 MMHG | OXYGEN SATURATION: 98 % | DIASTOLIC BLOOD PRESSURE: 68 MMHG

## 2022-08-26 DIAGNOSIS — M25.511 ACUTE PAIN OF RIGHT SHOULDER: Primary | ICD-10-CM

## 2022-08-26 PROCEDURE — 96372 THER/PROPH/DIAG INJ SC/IM: CPT | Performed by: INTERNAL MEDICINE

## 2022-08-26 PROCEDURE — 99213 OFFICE O/P EST LOW 20 MIN: CPT | Performed by: INTERNAL MEDICINE

## 2022-08-26 RX ORDER — KETOROLAC TROMETHAMINE 30 MG/ML
60 INJECTION, SOLUTION INTRAMUSCULAR; INTRAVENOUS ONCE
Status: COMPLETED | OUTPATIENT
Start: 2022-08-26 | End: 2022-08-26

## 2022-08-26 RX ORDER — MELOXICAM 15 MG/1
15 TABLET ORAL DAILY
Qty: 30 TABLET | Refills: 1 | Status: SHIPPED | OUTPATIENT
Start: 2022-08-26

## 2022-08-26 RX ADMIN — KETOROLAC TROMETHAMINE 60 MG: 30 INJECTION, SOLUTION INTRAMUSCULAR; INTRAVENOUS at 08:48

## 2022-08-26 NOTE — PROGRESS NOTES
Assessment/Plan:  Problem List Items Addressed This Visit    None     Visit Diagnoses     Acute pain of right shoulder    -  Primary    Relevant Medications    meloxicam (MOBIC) 15 mg tablet    ketorolac (TORADOL) 60 mg/2 mL IM injection 60 mg (Start on 8/26/2022  8:45 AM)    Other Relevant Orders    Ambulatory referral to Physical Therapy           Diagnoses and all orders for this visit:    Acute pain of right shoulder  -     meloxicam (MOBIC) 15 mg tablet; Take 1 tablet (15 mg total) by mouth daily  -     Ambulatory referral to Physical Therapy; Future  -     ketorolac (TORADOL) 60 mg/2 mL IM injection 60 mg      No problem-specific Assessment & Plan notes found for this encounter  A/P: Stable  Discussed imaging  Still suspect partial rotator cuff injury  Pt to start NSAID and PT  RTC two weeks for f/u  May need to see ortho or obtain an MRI if fails to improve  Subjective:      Patient ID: Cele Fenton is a 24 y o  male  Right handed WM RTC for f/u right shoulder pain  Was suppose to start NSAID's and PT, but failed to do so  Pain slightly better  Xray was negative  No weakness  The following portions of the patient's history were reviewed and updated as appropriate:   He has a past medical history of ADHD (attention deficit hyperactivity disorder), Bipolar disorder ([facility] [address] ), Depression, GERD (gastroesophageal reflux disease), Hyperlipidemia, Hypertension, Psychiatric disorder, Seizures ([address] ), Substance abuse ([facility] [address] ), and Tourette syndrome  ,  does not have any pertinent problems on file  ,   has a past surgical history that includes Tympanostomy tube placement; TONSILECTOMY AND ADNOIDECTOMY; Tonsillectomy; and pr exc skin benig >4 cm trunk,arm,leg (Left, 10/26/2021)  ,  family history includes Cirrhosis in his father; Coronary artery disease in his father; Hepatitis in his father; No Known Problems in his mother; Substance Abuse in his brother and father  ,   reports that he has been smoking cigarettes  He has a 15 00 pack-year smoking history  He has never used smokeless tobacco  He reports previous alcohol use  He reports current drug use  Drugs: Marijuana, Heroin, and Methamphetamines  ,  is allergic to abilify [aripiprazole]     Current Outpatient Medications   Medication Sig Dispense Refill    buprenorphine-naloxone (SUBOXONE) 8-2 mg per SL tablet Two tabs sl q day  60 tablet 0    cloNIDine (CATAPRES) 0 1 mg tablet Take 0 1 mg by mouth once      divalproex sodium (DEPAKOTE ER) 250 mg 24 hr tablet Take 3 tablets (750 mg total) by mouth daily (Patient taking differently: Take 750 mg by mouth daily 500mg am and 1000 at pm) 180 tablet 0    divalproex sodium (DEPAKOTE) 500 mg EC tablet Take 500 mg by mouth every 12 (twelve) hours      hydrOXYzine pamoate (VISTARIL) 50 mg capsule Take 50 mg by mouth if needed      meloxicam (MOBIC) 15 mg tablet Take 1 tablet (15 mg total) by mouth daily 30 tablet 1    naloxone (Narcan) 4 mg/0 1 mL nasal spray 0 1 mL (4 mg total) into each nostril as needed (respiratory depression or possible OD) 1 each 1    QUEtiapine (SEROquel) 200 mg tablet Take 1 tablet (200 mg total) by mouth daily at bedtime (Patient taking differently: Take 400 mg by mouth daily at bedtime) 60 tablet 0    QUEtiapine (SEROquel) 50 mg tablet Take 50 mg by mouth daily at bedtime      pantoprazole (PROTONIX) 40 mg tablet Take 1 tablet (40 mg total) by mouth daily in the early morning (Patient not taking: Reported on 8/26/2022) 60 tablet 0     Current Facility-Administered Medications   Medication Dose Route Frequency Provider Last Rate Last Admin    ketorolac (TORADOL) 60 mg/2 mL IM injection 60 mg  60 mg Intramuscular Once Shaw Rodriguez DO           Review of Systems   Constitutional: Positive for activity change  Negative for chills, diaphoresis, fatigue and fever  Respiratory: Negative for cough, chest tightness, shortness of breath and wheezing      Cardiovascular: Negative for chest pain, palpitations and leg swelling  Gastrointestinal: Negative for abdominal pain, constipation, diarrhea, nausea and vomiting  Genitourinary: Negative for difficulty urinating, dysuria and frequency  Musculoskeletal: Positive for arthralgias  Negative for gait problem, joint swelling and myalgias  Neurological: Negative for weakness, light-headedness, numbness and headaches  Psychiatric/Behavioral: Negative for confusion  The patient is not nervous/anxious  PHQ-2/9 Depression Screening          Objective:  Vitals:    08/26/22 0816   BP: 126/68   Pulse: 74   Temp: 97 6 °F (36 4 °C)   SpO2: 98%   Weight: 97 1 kg (214 lb)   Height: 5' 6" (1 676 m)     Body mass index is 34 54 kg/m²  Physical Exam  Vitals and nursing note reviewed  Constitutional:       General: He is not in acute distress  Appearance: Normal appearance  He is not ill-appearing  HENT:      Head: Normocephalic and atraumatic  Mouth/Throat:      Mouth: Mucous membranes are moist    Eyes:      Extraocular Movements: Extraocular movements intact  Conjunctiva/sclera: Conjunctivae normal       Pupils: Pupils are equal, round, and reactive to light  Cardiovascular:      Rate and Rhythm: Normal rate  Heart sounds: Normal heart sounds  Pulmonary:      Effort: Pulmonary effort is normal  No respiratory distress  Breath sounds: Normal breath sounds  No wheezing or rales  Abdominal:      General: Bowel sounds are normal  There is no distension  Palpations: Abdomen is soft  Tenderness: There is no abdominal tenderness  Musculoskeletal:         General: Tenderness present  No swelling  Normal range of motion  Right lower leg: No edema  Left lower leg: No edema  Comments: Right shoulder joint w/o any gross deformities, increase temp, erythema, or swelling  No crepitus  No effusions or ballotment  Joint integrity intact  Joel Po ROM wnl  Tenderness over the posterior rotator cuff area  ? positive drop test    Neurological:      General: No focal deficit present  Mental Status: He is alert and oriented to person, place, and time  Mental status is at baseline  Psychiatric:         Mood and Affect: Mood normal          Behavior: Behavior normal          Thought Content:  Thought content normal          Judgment: Judgment normal

## 2022-08-26 NOTE — PATIENT INSTRUCTIONS
Meloxicam (By injection)   Meloxicam ([id number])  Treats moderate to severe pain  Brand Name(s): Jamey   There may be other brand names for this medicine  When This Medicine Should Not Be Used: This medicine is not right for everyone  You should not receive it if you had an allergic reaction to meloxicam or another NSAID drug, including aspirin  Do not use it right before or after a heart surgery, called coronary artery bypass graft (CABG)  How to Use This Medicine:   Injectable  Your doctor will prescribe your dose and schedule  This medicine is given through a needle placed in a vein  A nurse or other health provider will give you this medicine  Drink extra fluids so you will urinate more often and help prevent kidney problems  Drugs and Foods to Avoid:   Ask your doctor or pharmacist before using any other medicine, including over-the-counter medicines, vitamins, and herbal products  Do not use aspirin or any other NSAID medicine (including diflunisal, salsalate) unless your doctor says it is okay  Some medicines can affect how meloxicam works  Tell your doctor if you are also using any of the following:  Amiodarone, cholestyramine, cyclosporine, digoxin, fluconazole, lithium, methotrexate, pemetrexed, phenytoin, sulphaphenazole  Blood pressure medicine  Blood thinner (including warfarin)  Diuretic (water pill)  Medicine to treat depression  Steroid medicine  Warnings While Using This Medicine:   Tell your doctor if you are pregnant or breastfeeding  Do not use this medicine during the later part of a pregnancy unless your doctor tells you it is okay  Tell your doctor if you have kidney disease, liver disease, heart disease, heart failure, asthma, bleeding problems, high blood pressure, any heart or blood vessel problems, phenylketonuria, a recent heart attack, or a history of ulcers or other stomach problems  Tell your doctor if you smoke or drink alcohol regularly    This medicine may cause the following problems:  Increased risk of blood clots, heart attack, stroke, or heart failure  Bleeding problems, including stomach or bowel bleeding or ulcer  Liver problems  High blood pressure  Kidney problems  Serious skin reactions  This medicine may cause a delay in ovulation for women and may decrease sperm count in men, which can affect their ability to have children  If you plan to have children, talk with your doctor before using this medicine  Your doctor will do lab tests at regular visits to check on the effects of this medicine  Keep all appointments  Possible Side Effects While Using This Medicine:   Call your doctor right away if you notice any of these side effects: Allergic reaction: Itching or hives, swelling in your face or hands, swelling or tingling in your mouth or throat, chest tightness, trouble breathing  Blistering, peeling, red skin rash  Change in how much or how often you urinate, painful urination  Chest pain, trouble breathing, coughing up blood  Dark urine or pale stools, nausea, vomiting, loss of appetite, stomach pain, yellow skin or eyes  Numbness or weakness on one side of your body, sudden or severe headache, problems with vision, speech, or walking  Rapid weight gain, swelling in your hands, ankles, or feet  Severe stomach pain, vomiting blood or material that looks like coffee grounds, bloody or black, tarry stools  Unusual bleeding, bruising, or weakness  If you notice these less serious side effects, talk with your doctor:   Constipation  Pain, itching, burning, swelling, or a lump under your skin where the needle is placed  If you notice other side effects that you think are caused by this medicine, tell your doctor  Call your doctor for medical advice about side effects   You may report side effects to FDA at 5-742-FDA-6959    © Copyright [employer] 2022 Information is for End User's use only and may not be sold, redistributed or otherwise used for commercial purposes  The above information is an  only  It is not intended as medical advice for individual conditions or treatments  Talk to your doctor, nurse or pharmacist before following any medical regimen to see if it is safe and effective for you

## 2022-09-02 ENCOUNTER — OFFICE VISIT (OUTPATIENT)
Dept: INTERNAL MEDICINE CLINIC | Facility: CLINIC | Age: 22
End: 2022-09-02
Payer: COMMERCIAL

## 2022-09-02 VITALS
HEART RATE: 93 BPM | OXYGEN SATURATION: 99 % | SYSTOLIC BLOOD PRESSURE: 120 MMHG | BODY MASS INDEX: 33.79 KG/M2 | WEIGHT: 210.25 LBS | TEMPERATURE: 97.8 F | HEIGHT: 66 IN | DIASTOLIC BLOOD PRESSURE: 72 MMHG

## 2022-09-02 DIAGNOSIS — F19.20 POLYSUBSTANCE DEPENDENCE INCLUDING OPIOID TYPE DRUG WITH COMPLICATION, CONTINUOUS USE (HCC): Primary | ICD-10-CM

## 2022-09-02 DIAGNOSIS — Z79.899 ENCOUNTER FOR MONITORING SUBOXONE MAINTENANCE THERAPY: ICD-10-CM

## 2022-09-02 DIAGNOSIS — F43.10 POSTTRAUMATIC STRESS DISORDER: ICD-10-CM

## 2022-09-02 DIAGNOSIS — Z51.81 ENCOUNTER FOR MONITORING SUBOXONE MAINTENANCE THERAPY: ICD-10-CM

## 2022-09-02 DIAGNOSIS — Z79.899 MEDICATION THERAPY CONTINUED: ICD-10-CM

## 2022-09-02 DIAGNOSIS — Z91.14 NONCOMPLIANCE WITH MEDICATION REGIMEN: ICD-10-CM

## 2022-09-02 PROCEDURE — 99213 OFFICE O/P EST LOW 20 MIN: CPT | Performed by: INTERNAL MEDICINE

## 2022-09-02 RX ORDER — BUPRENORPHINE HYDROCHLORIDE AND NALOXONE HYDROCHLORIDE DIHYDRATE 8; 2 MG/1; MG/1
TABLET SUBLINGUAL
Qty: 60 TABLET | Refills: 0 | Status: SHIPPED | OUTPATIENT
Start: 2022-09-02

## 2022-09-02 NOTE — PATIENT INSTRUCTIONS
Buprenorphine/Naloxone (Into the mouth)   Buprenorphine (bue-pre-NOR-feen), Naloxone (nal-OX-one)  Treats narcotic dependence  Brand Name(s): Suboxone, Zubsolv   There may be other brand names for this medicine  When This Medicine Should Not Be Used: This medicine is not right for everyone  Do not use it if you had an allergic reaction to buprenorphine or naloxone  How to Use This Medicine: Thin Sheet, Tablet  Take your medicine as directed  Your dose may need to be changed several times to find what works best for you  You must let the medicine dissolve  Never swallow the film or tablet  Your body may not absorb enough of the medicine if you swallow it  Your health caregiver should show you how to use the medicine  If you do not understand, ask for help  It is important to use the medicine correctly  Do not talk while the medicine is inside your mouth  Buccal film: Rinse your mouth with water to moisten it  Place the film against the inside of your cheek  If your doctor told you to use more than 1 film, place the second film inside your other cheek  Do not place more than 2 films inside of 1 cheek at a time  Do not move or touch the film  Do not eat or drink anything until the film is completely dissolved  Sublingual tablet: Place the tablet under your tongue  If your doctor told you to use more than 1 tablet, place all of the tablets in different places under your tongue at the same time  You can use 2 tablets at a time until you have taken all of the medicine, if that is easier for you  Let the tablets dissolve completely in your mouth  Do not eat or drink anything until the tablets are completely dissolved  Sublingual film: Drink some water to help moisten your mouth  Place the film under your tongue  If your doctor told you to use more than 1 film, place the second film on the opposite side from the first one  Do not move the film after you placed it under your tongue   If you are supposed to use more than 2 films, use them the same way, but do not start until the first 2 films are completely dissolved  Do not eat or drink anything until the film is completely dissolved  Do not break, crush, chew, or cut the film or tablet  This medicine should come with a Medication Guide  Ask your pharmacist for a copy if you do not have one  Missed dose: Take a dose as soon as you remember  If it is almost time for your next dose, wait until then and take a regular dose  Do not take extra medicine to make up for a missed dose  Store the medicine in a closed container at room temperature, away from heat, moisture, and direct light  Drop off any unused narcotic medicine at a drug take-back location right away  If you do not have a drug take-back location near you, flush any unused narcotic medicine down the toilet  Check your local drug store and clinics for take-back locations  You can also check the [employer] web site for locations  Here is the link to the [employer] safe disposal of medicines [employer] ee  Drugs and Foods to Avoid:   Ask your doctor or pharmacist before using any other medicine, including over-the-counter medicines, vitamins, and herbal products  Do not use this medicine if you are using or have used an MAO inhibitor within the past 14 days  Some medicines can affect how buprenorphine/naloxone works   Tell your doctor if you are using the following:   Carbamazepine, cyclobenzaprine, erythromycin, ketoconazole, metaxalone, mirtazapine, phenobarbital, phenytoin, rifampin, tramadol, trazodone  Diuretic (water pill)  Medicine to treat depression or mental health problems (including SNRIs, SSRIs, TCAs)  Medicine to treat HIV/AIDS (including atazanavir, delavirdine, efavirenz, etravirine, nevirapine, ritonavir)  Phenothiazine medicine  Triptan medicine to treat migraine headaches  Do not drink alcohol while you are using this medicine  Tell your doctor if you use anything else that makes you sleepy  Some examples are allergy medicine, narcotic pain medicine, and alcohol  Tell your doctor if you are also using butorphanol, nalbuphine, pentazocine, or a muscle relaxer  Warnings While Using This Medicine:   Tell your doctor if you are pregnant or breastfeeding, or if you have kidney disease, liver disease (including hepatitis), lung or breathing problems (including sleep apnea), adrenal gland problems, an enlarged prostate, trouble urinating, gallbladder problems, thyroid problems, stomach problems, or a history of depression, brain tumor, head injury, or alcohol or drug abuse  This medicine may cause the following problems:  High risk of overdose, which can lead to death  Respiratory depression (serious breathing problem that can be life-threatening)  Adrenal gland problems  Sleep-related breathing problems (including sleep apnea, sleep-related hypoxemia)  Low blood pressure  Liver problems  Serotonin syndrome, when used with certain medicines  This medicine may make you dizzy or drowsy  Do not drive or do anything else that could be dangerous until you know how this medicine affects you  Sit or lie down if you feel dizzy  Stand up carefully  Tell any doctor or dentist who treats you that you are using this medicine  This medicine can be habit-forming  Do not use more than your prescribed dose  Call your doctor if you think your medicine is not working  This medicine may cause constipation, especially with long-term use  Ask your doctor if you should use a laxative to prevent and treat constipation  Do not stop using this medicine suddenly  Your doctor will need to slowly decrease your dose before you stop it completely  This medicine could cause infertility  Talk with your doctor before using this medicine if you plan to have children    Your doctor will do lab tests at regular visits to check on the effects of this medicine  Keep all appointments  Keep all medicine out of the reach of children  Never share your medicine with anyone  Possible Side Effects While Using This Medicine:   Call your doctor right away if you notice any of these side effects: Allergic reaction: Itching or hives, swelling in your face or hands, swelling or tingling in your mouth or throat, chest tightness, trouble breathing  Blue lips, fingernails, or skin, trouble breathing  Changes in skin color, dark freckles, cold feeling, tiredness, weight loss  Dark urine or pale stools, nausea, vomiting, loss of appetite, stomach pain, yellow skin or eyes  Extreme dizziness, drowsiness, or weakness, slow heartbeat, sweating, seizures, cold or clammy skin  Severe confusion, lightheadedness, dizziness, or fainting  If you notice these less serious side effects, talk with your doctor:   Constipation, diarrhea, nausea, vomiting, stomach upset  Headache  Stuffy or runny nose  If you notice other side effects that you think are caused by this medicine, tell your doctor  Call your doctor for medical advice about side effects  You may report side effects to FDA at 0-926-FDA-1194    © Copyright [employer] 2022 Information is for End User's use only and may not be sold, redistributed or otherwise used for commercial purposes  The above information is an  only  It is not intended as medical advice for individual conditions or treatments  Talk to your doctor, nurse or pharmacist before following any medical regimen to see if it is safe and effective for you

## 2022-09-02 NOTE — PROGRESS NOTES
Assessment/Plan:  Problem List Items Addressed This Visit        Other    Posttraumatic stress disorder    Relevant Medications    buprenorphine-naloxone (SUBOXONE) 8-2 mg per SL tablet    Other Relevant Orders    [facility] All Prescribed Meds and Special Instructions    Amphetamines, Methamphetamines    Butalbital    Phenobarbital    Secobarbital    Temazepam    Alprazolam    Clonazepam    Diazepam    Lorazepam    Oxazepam    Gabapentin    Pregabalin    Cocaine    Heroin    Buprenorphine    Levorphanol    Meperidine    Naltrexone    Fentanyl    Methadone    Oxycodone    Oxymorphone    Tapentadol    THC    Tramadol    Codeine, Hydrocodone, Hydropmorphone, Morphine    Bath Salts    Ethyl Glucuronide/Ethyl Sulfate    Kratom    Spice    Methylphenidate    Phentermine    Validity Oxidant    Validity Creatinine    Validity pH    Validity Specific    Polysubstance dependence including opioid type drug with complication, continuous use (HCC) - Primary    Relevant Medications    buprenorphine-naloxone (SUBOXONE) 8-2 mg per SL tablet    Other Relevant Orders    [facility] Prescribed Meds and Special Instructions    Amphetamines, Methamphetamines    Butalbital    Phenobarbital    Secobarbital    Temazepam    Alprazolam    Clonazepam    Diazepam    Lorazepam    Oxazepam    Gabapentin    Pregabalin    Cocaine    Heroin    Buprenorphine    Levorphanol    Meperidine    Naltrexone    Fentanyl    Methadone    Oxycodone    Oxymorphone    Tapentadol    THC    Tramadol    Codeine, Hydrocodone, Hydropmorphone, Morphine    Bath Salts    Ethyl Glucuronide/Ethyl Sulfate    Kratom    Spice    Methylphenidate    Phentermine    Validity Oxidant    Validity Creatinine    Validity pH    Validity Specific    Medication therapy continued    Relevant Medications    buprenorphine-naloxone (SUBOXONE) 8-2 mg per SL tablet    Other Relevant Orders    [facility] Prescribed Meds and Special Instructions    Amphetamines, Methamphetamines Butalbital    Phenobarbital    Secobarbital    Temazepam    Alprazolam    Clonazepam    Diazepam    Lorazepam    Oxazepam    Gabapentin    Pregabalin    Cocaine    Heroin    Buprenorphine    Levorphanol    Meperidine    Naltrexone    Fentanyl    Methadone    Oxycodone    Oxymorphone    Tapentadol    THC    Tramadol    Codeine, Hydrocodone, Hydropmorphone, Morphine    Bath Salts    Ethyl Glucuronide/Ethyl Sulfate    Kratom    Spice    Methylphenidate    Phentermine    Validity Oxidant    Validity Creatinine    Validity pH    Validity Specific    Encounter for monitoring Suboxone maintenance therapy    Relevant Medications    buprenorphine-naloxone (SUBOXONE) 8-2 mg per SL tablet    Other Relevant Orders    [facility] Pres[facility] Med[facility] Special Instructions    Amphetamines, Methamphetamines    Butalbital    Phenobarbital    Secobarbital    Temazepam    Alprazolam    Clonazepam    Diazepam    Lorazepam    Oxazepam    Gabapentin    Pregabalin    Cocaine    Heroin    Buprenorphine    Levorphanol    Meperidine    Naltrexone    Fentanyl    Methadone    Oxycodone    Oxymorphone    Tapentadol    THC    Tramadol    Codeine, Hydrocodone, Hydropmorphone, Morphine    Bath Salts    Ethyl Glucuronide/Ethyl Sulfate    Kratom    Spice    Methylphenidate    Phentermine    Validity Oxidant    Validity Creatinine    Validity pH    Validity Specific      Other Visit Diagnoses     Noncompliance with medication regimen        Relevant Medications    buprenorphine-naloxone (SUBOXONE) 8-2 mg per SL tablet    Other Relevant Orders    [facility] All Prescribed Meds and Special Instructions    Amphetamines, Methamphetamines    Butalbital    Phenobarbital    Secobarbital    Temazepam    Alprazolam    Clonazepam    Diazepam    Lorazepam    Oxazepam    Gabapentin    Pregabalin    Cocaine    Heroin    Buprenorphine    Levorphanol    Meperidine    Naltrexone    Fentanyl    Methadone    Oxycodone    Oxymorphone    Tapentadol    THC    Tramadol Codeine, Hydrocodone, Hydropmorphone, Morphine    Bath Salts    Ethyl Glucuronide/Ethyl Sulfate    Kratom    Spice    Methylphenidate    Phentermine    Validity Oxidant    Validity Creatinine    Validity pH    Validity Specific           Diagnoses and all orders for this visit:    Polysubstance dependence including opioid type drug with complication, continuous use (HCC)  -     buprenorphine-naloxone (SUBOXONE) 8-2 mg per SL tablet; Two tabs sl q day  -     [facility] Prescribed Meds and Special Instructions  -     Amphetamines, Methamphetamines  -     Butalbital  -     Phenobarbital  -     Secobarbital  -     Temazepam  -     Alprazolam  -     Clonazepam  -     Diazepam  -     Lorazepam  -     Oxazepam  -     Gabapentin  -     Pregabalin  -     Cocaine  -     Heroin  -     Buprenorphine  -     Levorphanol  -     Meperidine  -     Naltrexone  -     Fentanyl  -     Methadone  -     Oxycodone  -     Oxymorphone  -     Tapentadol  -     THC  -     Tramadol  -     Codeine, Hydrocodone, Hydropmorphone, Morphine  -     Bath Salts  -     Ethyl Glucuronide/Ethyl Sulfate  -     Kratom  -     Spice  -     Methylphenidate  -     Phentermine  -     Validity Oxidant  -     Validity Creatinine  -     Validity pH  -     Validity Specific    Encounter for monitoring Suboxone maintenance therapy  -     buprenorphine-naloxone (SUBOXONE) 8-2 mg per SL tablet; Two tabs sl q day    -     [facility] Prescribed Meds and Special Instructions  -     Amphetamines, Methamphetamines  -     Butalbital  -     Phenobarbital  -     Secobarbital  -     Temazepam  -     Alprazolam  -     Clonazepam  -     Diazepam  -     Lorazepam  -     Oxazepam  -     Gabapentin  -     Pregabalin  -     Cocaine  -     Heroin  -     Buprenorphine  -     Levorphanol  -     Meperidine  -     Naltrexone  -     Fentanyl  -     Methadone  -     Oxycodone  -     Oxymorphone  -     Tapentadol  -     THC  -     Tramadol  -     Codeine, Hydrocodone, Hydropmorphone, Morphine  -     Bath Salts  -     Ethyl Glucuronide/Ethyl Sulfate  -     Kratom  -     Spice  -     Methylphenidate  -     Phentermine  -     Validity Oxidant  -     Validity Creatinine  -     Validity pH  -     Validity Specific    Medication therapy continued  -     buprenorphine-naloxone (SUBOXONE) 8-2 mg per SL tablet; Two tabs sl q day  -     [facility] All Prescribed Meds and Special Instructions  -     Amphetamines, Methamphetamines  -     Butalbital  -     Phenobarbital  -     Secobarbital  -     Temazepam  -     Alprazolam  -     Clonazepam  -     Diazepam  -     Lorazepam  -     Oxazepam  -     Gabapentin  -     Pregabalin  -     Cocaine  -     Heroin  -     Buprenorphine  -     Levorphanol  -     Meperidine  -     Naltrexone  -     Fentanyl  -     Methadone  -     Oxycodone  -     Oxymorphone  -     Tapentadol  -     THC  -     Tramadol  -     Codeine, Hydrocodone, Hydropmorphone, Morphine  -     Bath Salts  -     Ethyl Glucuronide/Ethyl Sulfate  -     Kratom  -     Spice  -     Methylphenidate  -     Phentermine  -     Validity Oxidant  -     Validity Creatinine  -     Validity pH  -     Validity Specific    Posttraumatic stress disorder  -     buprenorphine-naloxone (SUBOXONE) 8-2 mg per SL tablet; Two tabs sl q day    -     [facility] Prescribed Meds and Special Instructions  -     Amphetamines, Methamphetamines  -     Butalbital  -     Phenobarbital  -     Secobarbital  -     Temazepam  -     Alprazolam  -     Clonazepam  -     Diazepam  -     Lorazepam  -     Oxazepam  -     Gabapentin  -     Pregabalin  -     Cocaine  -     Heroin  -     Buprenorphine  -     Levorphanol  -     Meperidine  -     Naltrexone  -     Fentanyl  -     Methadone  -     Oxycodone  -     Oxymorphone  -     Tapentadol  -     THC  -     Tramadol  -     Codeine, Hydrocodone, Hydropmorphone, Morphine  -     Bath Salts  -     Ethyl Glucuronide/Ethyl Sulfate  -     Kratom  -     Spice  -     Methylphenidate  - Phentermine  -     Validity Oxidant  -     Validity Creatinine  -     Validity pH  -     Validity Specific    Noncompliance with medication regimen  -     buprenorphine-naloxone (SUBOXONE) 8-2 mg per SL tablet; Two tabs sl q day  -     [facility] Prescribed Meds and Special Instructions  -     Amphetamines, Methamphetamines  -     Butalbital  -     Phenobarbital  -     Secobarbital  -     Temazepam  -     Alprazolam  -     Clonazepam  -     Diazepam  -     Lorazepam  -     Oxazepam  -     Gabapentin  -     Pregabalin  -     Cocaine  -     Heroin  -     Buprenorphine  -     Levorphanol  -     Meperidine  -     Naltrexone  -     Fentanyl  -     Methadone  -     Oxycodone  -     Oxymorphone  -     Tapentadol  -     THC  -     Tramadol  -     Codeine, Hydrocodone, Hydropmorphone, Morphine  -     Bath Salts  -     Ethyl Glucuronide/Ethyl Sulfate  -     Kratom  -     Spice  -     Methylphenidate  -     Phentermine  -     Validity Oxidant  -     Validity Creatinine  -     Validity pH  -     Validity Specific      No problem-specific Assessment & Plan notes found for this encounter  Scheduled Medication Review:  Pt's scheduled medication use was reviewed by myself/staff via the [facility] website  Pt's use has been found to be appropriate w/o any concerns for misuse by the patient  Pt's current conditions require continued scheduled medication use at this time  Future review for continued appropriate medication use and misuse will continue  A/P: Doing well and will continue 16mg tabs, dose 2/6 and continue with   counseling  Reminded to keep meds safe and out of reach of children  RTC 4 weeks  Subjective: WM presents for f/u suboxone  Doing well and no c/o's  Not using and no withdraw  Does attend counseling  UDT obtain today  Tolerating the meds and no side effects  Patient ID: Kimberly Jalloh is a 24 y o  male      HPI    The following portions of the patient's history were reviewed and updated as appropriate:   He has a past medical history of ADHD (attention deficit hyperactivity disorder), Bipolar disorder ([facility] [address] ), Depression, GERD (gastroesophageal reflux disease), Hyperlipidemia, Hypertension, Psychiatric disorder, [facility] ), Substance abuse ([facility] ), and Tourette syndrome  ,  does not have any pertinent problems on file  ,   has a past surgical history that includes Tympanostomy tube placement; TONSILECTOMY AND ADNOIDECTOMY; Tonsillectomy; and pr exc skin benig >4 cm trunk,arm,leg (Left, 10/26/2021)  ,  family history includes Cirrhosis in his father; Coronary artery disease in his father; Hepatitis in his father; No Known Problems in his mother; Substance Abuse in his brother and father  ,   reports that he has been smoking cigarettes  He has a 15 00 pack-year smoking history  He has never used smokeless tobacco  He reports previous alcohol use  He reports current drug use  Drugs: Marijuana, Heroin, and Methamphetamines  ,  is allergic to abilify [aripiprazole]     Current Outpatient Medications   Medication Sig Dispense Refill    buprenorphine-naloxone (SUBOXONE) 8-2 mg per SL tablet Two tabs sl q day   60 tablet 0    cloNIDine (CATAPRES) 0 1 mg tablet Take 0 1 mg by mouth once      divalproex sodium (DEPAKOTE ER) 250 mg 24 hr tablet Take 3 tablets (750 mg total) by mouth daily (Patient taking differently: Take 750 mg by mouth daily 500mg am and 1000 at pm) 180 tablet 0    divalproex sodium (DEPAKOTE) 500 mg EC tablet Take 500 mg by mouth every 12 (twelve) hours      hydrOXYzine pamoate (VISTARIL) 50 mg capsule Take 50 mg by mouth if needed      meloxicam (MOBIC) 15 mg tablet Take 1 tablet (15 mg total) by mouth daily 30 tablet 1    naloxone (Narcan) 4 mg/0 1 mL nasal spray 0 1 mL (4 mg total) into each nostril as needed (respiratory depression or possible OD) 1 each 1    QUEtiapine (SEROquel) 200 mg tablet Take 1 tablet (200 mg total) by mouth daily at bedtime (Patient taking differently: Take 400 mg by mouth daily at bedtime) 60 tablet 0    QUEtiapine (SEROquel) 50 mg tablet Take 50 mg by mouth daily at bedtime      pantoprazole (PROTONIX) 40 mg tablet Take 1 tablet (40 mg total) by mouth daily in the early morning (Patient not taking: Reported on 8/26/2022) 60 tablet 0     No current facility-administered medications for this visit  Review of Systems   Constitutional: Negative for activity change, chills, diaphoresis, fatigue and fever  Respiratory: Negative for cough, chest tightness, shortness of breath and wheezing  Cardiovascular: Negative for chest pain, palpitations and leg swelling  Gastrointestinal: Negative for abdominal pain, constipation, diarrhea, nausea and vomiting  Genitourinary: Negative for difficulty urinating, dysuria and frequency  Musculoskeletal: Negative for arthralgias, gait problem and myalgias  Neurological: Negative for dizziness, seizures, syncope, weakness, light-headedness and headaches  Psychiatric/Behavioral: Negative for confusion, dysphoric mood, self-injury, sleep disturbance and suicidal ideas  The patient is not nervous/anxious  PHQ-2/9 Depression Screening          Objective:  Vitals:    09/02/22 1130   BP: 120/72   Pulse: 93   Temp: 97 8 °F (36 6 °C)   SpO2: 99%   Weight: 95 4 kg (210 lb 4 oz)   Height: 5' 6" (1 676 m)     Body mass index is 33 94 kg/m²  Physical Exam  Vitals and nursing note reviewed  Constitutional:       General: He is not in acute distress  Appearance: Normal appearance  He is not ill-appearing  HENT:      Head: Normocephalic and atraumatic  Mouth/Throat:      Mouth: Mucous membranes are moist    Eyes:      Extraocular Movements: Extraocular movements intact  Conjunctiva/sclera: Conjunctivae normal       Pupils: Pupils are equal, round, and reactive to light  Cardiovascular:      Rate and Rhythm: Normal rate and regular rhythm  Heart sounds: Normal heart sounds     Pulmonary:      Effort: Pulmonary effort is normal  No respiratory distress  Breath sounds: Normal breath sounds  No wheezing or rales  Abdominal:      General: Bowel sounds are normal  There is no distension  Palpations: Abdomen is soft  Tenderness: There is no abdominal tenderness  Neurological:      General: No focal deficit present  Mental Status: He is alert and oriented to person, place, and time  Mental status is at baseline  Psychiatric:         Mood and Affect: Mood normal          Behavior: Behavior normal          Thought Content:  Thought content normal          Judgment: Judgment normal

## 2022-09-07 LAB
6MAM UR QL CFM: NEGATIVE NG/ML
7AMINOCLONAZEPAM UR QL CFM: NEGATIVE NG/ML
A-OH ALPRAZ UR QL CFM: NEGATIVE NG/ML
ACCEPTABLE CREAT UR QL: NORMAL MG/DL
ACCEPTIBLE SP GR UR QL: NORMAL
AMPHET UR QL CFM: ABNORMAL NG/ML
BUPRENORPHINE UR QL CFM: NORMAL NG/ML
BUTALBITAL UR QL CFM: NEGATIVE NG/ML
BZE UR QL CFM: NEGATIVE NG/ML
CODEINE UR QL CFM: NEGATIVE NG/ML
EDDP UR QL CFM: NEGATIVE NG/ML
ETHYL GLUCURONIDE UR QL CFM: NEGATIVE NG/ML
ETHYL SULFATE UR QL SCN: NEGATIVE NG/ML
EUTYLONE UR QL: NEGATIVE NG/ML
FENTANYL UR QL CFM: NEGATIVE NG/ML
GLIADIN IGG SER IA-ACNC: NEGATIVE NG/ML
HYDROCODONE UR QL CFM: NEGATIVE NG/ML
HYDROMORPHONE UR QL CFM: NEGATIVE NG/ML
LORAZEPAM UR QL CFM: NEGATIVE NG/ML
ME-PHENIDATE UR QL CFM: NEGATIVE NG/ML
MEPERIDINE UR QL CFM: NEGATIVE NG/ML
METHADONE UR QL CFM: NEGATIVE NG/ML
METHAMPHET UR QL CFM: ABNORMAL NG/ML
MORPHINE UR QL CFM: NEGATIVE NG/ML
NALTREXONE UR QL CFM: NEGATIVE NG/ML
NITRITE UR QL: NORMAL UG/ML
NORBUPRENORPHINE UR QL CFM: NORMAL NG/ML
NORDIAZEPAM UR QL CFM: NEGATIVE NG/ML
NORFENTANYL UR QL CFM: NEGATIVE NG/ML
NORHYDROCODONE UR QL CFM: NEGATIVE NG/ML
NORMEPERIDINE UR QL CFM: NEGATIVE NG/ML
NOROXYCODONE UR QL CFM: NEGATIVE NG/ML
OXAZEPAM UR QL CFM: NEGATIVE NG/ML
OXYCODONE UR QL CFM: NEGATIVE NG/ML
OXYMORPHONE UR QL CFM: NEGATIVE NG/ML
OXYMORPHONE UR QL CFM: NEGATIVE NG/ML
PARA-FLUOROFENTANYL QUANTIFICATION: NORMAL NG/ML
PHENOBARB UR QL CFM: NEGATIVE NG/ML
RESULT ALL_PRESCRIBED MEDS AND SPECIAL INSTRUCTIONS: NORMAL
SECOBARBITAL UR QL CFM: NEGATIVE NG/ML
SL AMB 4-ANPP QUANTIFICATION: NORMAL NG/ML
SL AMB 5F-ADB-M7 METABOLITE QUANTIFICATION: NEGATIVE NG/ML
SL AMB 7-OH-MITRAGYNINE (KRATOM ALKALOID) QUANTIFICATION: NEGATIVE NG/ML
SL AMB AB-FUBINACA-M3 METABOLITE QUANTIFICATION: NEGATIVE NG/ML
SL AMB ACETYL FENTANYL QUANTIFICATION: NORMAL NG/ML
SL AMB ACETYL NORFENTANYL QUANTIFICATION: NORMAL NG/ML
SL AMB ACRYL FENTANYL QUANTIFICATION: NORMAL NG/ML
SL AMB CARFENTANIL QUANTIFICATION: NORMAL NG/ML
SL AMB CTHC (MARIJUANA METABOLITE) QUANTIFICATION: ABNORMAL NG/ML
SL AMB DEXTRORPHAN (DEXTROMETHORPHAN METABOLITE) QUANT: NEGATIVE NG/ML
SL AMB GABAPENTIN QUANTIFICATION: NEGATIVE
SL AMB JWH018 METABOLITE QUANTIFICATION: NEGATIVE NG/ML
SL AMB JWH073 METABOLITE QUANTIFICATION: NEGATIVE NG/ML
SL AMB MDMB-FUBINACA-M1 METABOLITE QUANTIFICATION: NEGATIVE NG/ML
SL AMB METHYLONE QUANTIFICATION: NEGATIVE NG/ML
SL AMB N-DESMETHYL-TRAMADOL QUANTIFICATION: NEGATIVE NG/ML
SL AMB PHENTERMINE QUANTIFICATION: NEGATIVE NG/ML
SL AMB PREGABALIN QUANTIFICATION: NEGATIVE
SL AMB RCS4 METABOLITE QUANTIFICATION: NEGATIVE NG/ML
SL AMB RITALINIC ACID QUANTIFICATION: NEGATIVE NG/ML
SMOOTH MUSCLE AB TITR SER IF: NEGATIVE NG/ML
SPECIMEN DRAWN SERPL: NEGATIVE NG/ML
SPECIMEN PH ACCEPTABLE UR: NORMAL
TAPENTADOL UR QL CFM: NEGATIVE NG/ML
TEMAZEPAM UR QL CFM: NEGATIVE NG/ML
TEMAZEPAM UR QL CFM: NEGATIVE NG/ML
TRAMADOL UR QL CFM: NEGATIVE NG/ML
URATE/CREAT 24H UR: NEGATIVE NG/ML

## 2022-10-18 ENCOUNTER — HOSPITAL ENCOUNTER (EMERGENCY)
Facility: HOSPITAL | Age: 22
End: 2022-10-19
Attending: EMERGENCY MEDICINE
Payer: COMMERCIAL

## 2022-10-18 VITALS
TEMPERATURE: 98.2 F | HEART RATE: 92 BPM | SYSTOLIC BLOOD PRESSURE: 125 MMHG | BODY MASS INDEX: 33.75 KG/M2 | WEIGHT: 210 LBS | RESPIRATION RATE: 20 BRPM | HEIGHT: 66 IN | OXYGEN SATURATION: 98 % | DIASTOLIC BLOOD PRESSURE: 83 MMHG

## 2022-10-18 DIAGNOSIS — F32.A DEPRESSION: Primary | ICD-10-CM

## 2022-10-18 DIAGNOSIS — Z00.8 ENCOUNTER FOR PSYCHOLOGICAL EVALUATION: ICD-10-CM

## 2022-10-18 DIAGNOSIS — F19.10 POLYSUBSTANCE ABUSE (HCC): ICD-10-CM

## 2022-10-18 LAB
AMPHETAMINES SERPL QL SCN: POSITIVE
BARBITURATES UR QL: NEGATIVE
BENZODIAZ UR QL: NEGATIVE
COCAINE UR QL: NEGATIVE
ETHANOL EXG-MCNC: 0 MG/DL
METHADONE UR QL: NEGATIVE
OPIATES UR QL SCN: NEGATIVE
OXYCODONE+OXYMORPHONE UR QL SCN: NEGATIVE
PCP UR QL: NEGATIVE
THC UR QL: POSITIVE

## 2022-10-18 PROCEDURE — 99285 EMERGENCY DEPT VISIT HI MDM: CPT

## 2022-10-18 PROCEDURE — 0241U HB NFCT DS VIR RESP RNA 4 TRGT: CPT | Performed by: EMERGENCY MEDICINE

## 2022-10-18 PROCEDURE — 82075 ASSAY OF BREATH ETHANOL: CPT | Performed by: EMERGENCY MEDICINE

## 2022-10-18 PROCEDURE — 99284 EMERGENCY DEPT VISIT MOD MDM: CPT | Performed by: EMERGENCY MEDICINE

## 2022-10-18 PROCEDURE — 80307 DRUG TEST PRSMV CHEM ANLYZR: CPT | Performed by: EMERGENCY MEDICINE

## 2022-10-18 RX ORDER — BUPRENORPHINE AND NALOXONE 8; 2 MG/1; MG/1
8 FILM, SOLUBLE BUCCAL; SUBLINGUAL 2 TIMES DAILY
Status: DISCONTINUED | OUTPATIENT
Start: 2022-10-19 | End: 2022-10-19 | Stop reason: HOSPADM

## 2022-10-18 NOTE — LETTER
[address] [facility] [address]  Dept: [phone]      EMTALA TRANSFER CONSENT    NAME Gaye Cruz                                         2000                              MRN [id number]    I have been informed of my rights regarding examination, treatment, and transfer   by Dr Guy Solis MD    Benefits: Continuity of care    Risks: Potential for delay in receiving treatment      Consent for Transfer:  I acknowledge that my medical condition has been evaluated and explained to me by the emergency department physician or other qualified medical person and/or my attending physician, who has recommended that I be transferred to the service of  Accepting Physician: Dr Aliza Vazquez at [facility] Name, [address] : [phone]  The above potential benefits of such transfer, the potential risks associated with such transfer, and the probable risks of not being transferred have been explained to me, and I fully understand them  The doctor has explained that, in my case, the benefits of transfer outweigh the risks  I agree to be transferred  I authorize the performance of emergency medical procedures and treatments upon me in both transit and upon arrival at the receiving facility  Additionally, I authorize the release of any and all medical records to the receiving facility and request they be transported with me, if possible  I understand that the safest mode of transportation during a medical emergency is an ambulance and that the Hospital advocates the use of this mode of transport  Risks of traveling to the receiving facility by car, including absence of medical control, life sustaining equipment, such as oxygen, and medical personnel has been explained to me and I fully understand them  (SILVANA CORRECT BOX BELOW)  [  ]  I consent to the stated transfer and to be transported by ambulance/helicopter    [  ]  I consent to the stated transfer, but refuse transportation by ambulance and accept full responsibility for my transportation by car  I understand the risks of non-ambulance transfers and I exonerate the Hospital and its staff from any deterioration in my condition that results from this refusal     X___________________________________________    DATE  10/19/22  TIME________  Signature of patient or legally responsible individual signing on patient behalf           RELATIONSHIP TO PATIENT_________________________          Provider Certification    NAME Mukesh Lorenz                                         2000                              MRN [id number]    A medical screening exam was performed on the above named patient  Based on the examination:    Condition Necessitating Transfer The primary encounter diagnosis was Depression  Diagnoses of Polysubstance abuse ([facility] [address] ) and Encounter for psychological evaluation were also pertinent to this visit  Patient Condition: The patient has been stabilized such that within reasonable medical probability, no material deterioration of the patient condition or the condition of the unborn child(juan) is likely to result from the transfer    Reason for Transfer: Level of Care needed not available at this facility    Transfer Requirements: [address] [place] [address] [address]   · Space available and qualified personnel available for treatment as acknowledged by [facility] [phone]  · Agreed to accept transfer and to provide appropriate medical treatment as acknowledged by       Dr Shawnee Verma  · Appropriate medical records of the examination and treatment of the patient are provided at the time of transfer   [address], [address] _______  · Transfer will be performed by qualified personnel from [address] [place] [address]  and appropriate transfer equipment as required, including the use of necessary and appropriate life support measures      Provider Certification: I have examined the patient and explained the following risks and benefits of being transferred/refusing transfer to the patient/family:  The patient is stable for psychiatric transfer because they are medically stable, and is protected from harming him/herself or others during transport, General risk, such as traffic hazards, adverse weather conditions, rough terrain or turbulence, possible failure of equipment (including vehicle or aircraft), or consequences of actions of persons outside the control of the transport personnel      Based on these reasonable risks and benefits to the patient and/or the unborn child(juan), and based upon the information available at the time of the patient’s examination, I certify that the medical benefits reasonably to be expected from the provision of appropriate medical treatments at another medical facility outweigh the increasing risks, if any, to the individual’s medical condition, and in the case of labor to the unborn child, from effecting the transfer      X____________________________________________ DATE 10/19/22        TIME_______      ORIGINAL - SEND TO MEDICAL RECORDS   COPY - SEND WITH PATIENT DURING TRANSFER

## 2022-10-19 LAB
FLUAV RNA RESP QL NAA+PROBE: NEGATIVE
FLUBV RNA RESP QL NAA+PROBE: NEGATIVE
RSV RNA RESP QL NAA+PROBE: NEGATIVE
SARS-COV-2 RNA RESP QL NAA+PROBE: NEGATIVE

## 2022-10-19 RX ADMIN — BUPRENORPHINE AND NALOXONE 8 MG: 8; 2 FILM BUCCAL; SUBLINGUAL at 11:49

## 2022-10-19 NOTE — ED NOTES
Patient is accepted at [facility]   Patient is accepted by Dr Matthew Rodriguez  Transportation is arranged with **     Transportation is scheduled for **  Patient may go to the floor after 10:00
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
## 2022-10-19 NOTE — ED PROVIDER NOTES
History  Chief Complaint   Patient presents with   • Psychiatric Evaluation     My family shanita thinks I need help because I do drugs and havnt been myself     61-year-old male history of bipolar disorder, depression, polysubstance abuse presents to the emergency department for psychiatric evaluation  Patient reports that he has been using multiple different types of drugs including cocaine, meth, marijuana, and opioids for some time now  He believes that he is using more frequently  Last used meth 2 days ago, did smoke marijuana earlier today  Denies any alcohol use  He states he has been trying to get clean for the past 2 years, but secondary to depression and multiple stressors in his life has been unable to do so  He was maintained on Suboxone, but recently ran out of his prescription has not followed up with his PCP for refill  Does endorse some occasional suicidal thoughts without any definitive plan  He has attempted to harm himself in the past but not recently  Denies any HI  No auditory visual hallucinations  He believes he would benefit from inpatient psychiatric treatment  Only physical complaint at this time is some mild low back pain  Prior to Admission Medications   Prescriptions Last Dose Informant Patient Reported? Taking? QUEtiapine (SEROquel) 200 mg tablet   No No   Sig: Take 1 tablet (200 mg total) by mouth daily at bedtime   Patient taking differently: Take 400 mg by mouth daily at bedtime   QUEtiapine (SEROquel) 50 mg tablet   Yes No   Sig: Take 50 mg by mouth daily at bedtime   buprenorphine-naloxone (SUBOXONE) 8-2 mg per SL tablet   No No   Sig: Two tabs sl q day     cloNIDine (CATAPRES) 0 1 mg tablet   Yes No   Sig: Take 0 1 mg by mouth once   divalproex sodium (DEPAKOTE ER) 250 mg 24 hr tablet   No No   Sig: Take 3 tablets (750 mg total) by mouth daily   Patient taking differently: Take 750 mg by mouth daily 500mg am and 1000 at pm   divalproex sodium (DEPAKOTE) 500 mg EC tablet   Yes No   Sig: Take 500 mg by mouth every 12 (twelve) hours   hydrOXYzine pamoate (VISTARIL) 50 mg capsule   Yes No   Sig: Take 50 mg by mouth if needed   meloxicam (MOBIC) 15 mg tablet   No No   Sig: Take 1 tablet (15 mg total) by mouth daily   naloxone (Narcan) 4 mg/0 1 mL nasal spray   No No   Si 1 mL (4 mg total) into each nostril as needed (respiratory depression or possible OD)   pantoprazole (PROTONIX) 40 mg tablet   No No   Sig: Take 1 tablet (40 mg total) by mouth daily in the early morning   Patient not taking: Reported on 2022      Facility-Administered Medications: None       Past Medical History:   Diagnosis Date   • ADHD (attention deficit hyperactivity disorder)    • Bipolar disorder ([place] [address] )    • Depression    • GERD (gastroesophageal reflux disease)    • Hyperlipidemia    • Hypertension    • Psychiatric disorder    • Seizures ([place] [address] )    • Substance abuse ([place] [address] )    • Tourette syndrome        Past Surgical History:   Procedure Laterality Date   • NE EXC SKIN BENIG >4 CM TRUNK,ARM,LEG Left 10/26/2021    Procedure: EXCISION LIPOMA (BACK); Surgeon: Gris Nicole DO;  Location: [facility];  Service: General   • TONSILECTOMY AND ADNOIDECTOMY     • TONSILLECTOMY     • TYMPANOSTOMY TUBE PLACEMENT         Family History   Problem Relation Age of Onset   • No Known Problems Mother    • Substance Abuse Father    • Cirrhosis Father    • Coronary artery disease Father    • Hepatitis Father    • Substance Abuse Brother    • Diabetes Neg Hx      I have reviewed and agree with the history as documented      E-Cigarette/Vaping   • E-Cigarette Use Never User      E-Cigarette/Vaping Substances   • Nicotine No    • THC No    • CBD No    • Flavoring No    • Other No    • Unknown No      Social History     Tobacco Use   • Smoking status: Current Every Day Smoker     Packs/day: 1 50     Years: 10 00     Pack years: 15 00     Types: Cigarettes   • Smokeless tobacco: Never Used   Vaping Use   • Vaping Use: Never used   Substance Use Topics   • Alcohol use: Not Currently     Comment: vodka weekly   • Drug use: Yes     Types: Marijuana, Heroin, Methamphetamines       Review of Systems   Constitutional: Negative for chills and fever  HENT: Negative for ear pain and sore throat  Eyes: Negative for pain and visual disturbance  Respiratory: Negative for cough and shortness of breath  Cardiovascular: Negative for chest pain and palpitations  Gastrointestinal: Negative for abdominal pain and vomiting  Genitourinary: Negative for dysuria and hematuria  Musculoskeletal: Positive for back pain  Negative for arthralgias  Skin: Negative for color change and rash  Neurological: Negative for seizures and syncope  Psychiatric/Behavioral: Positive for suicidal ideas  Negative for self-injury  All other systems reviewed and are negative  Physical Exam  Physical Exam  Vitals and nursing note reviewed  Constitutional:       General: He is not in acute distress  HENT:      Head: Normocephalic and atraumatic  Right Ear: External ear normal       Left Ear: External ear normal       Nose: Nose normal       Mouth/Throat:      Mouth: Mucous membranes are moist    Eyes:      Extraocular Movements: Extraocular movements intact  Conjunctiva/sclera: Conjunctivae normal       Pupils: Pupils are equal, round, and reactive to light  Cardiovascular:      Rate and Rhythm: Normal rate and regular rhythm  Pulses: Normal pulses  Pulmonary:      Effort: Pulmonary effort is normal  No respiratory distress  Breath sounds: Normal breath sounds  No stridor  Abdominal:      General: Abdomen is flat  Bowel sounds are normal       Tenderness: There is no abdominal tenderness  There is no guarding or rebound  Musculoskeletal:         General: No deformity  Normal range of motion  Cervical back: Normal range of motion and neck supple  Skin:     General: Skin is warm and dry        Capillary Refill: Capillary refill takes less than 2 seconds  Neurological:      General: No focal deficit present  Mental Status: He is alert and oriented to person, place, and time  Psychiatric:      Comments: Depressed mood, no current SI or HI, no paranoia or delusions, calm and cooperative         Vital Signs  ED Triage Vitals [10/18/22 2214]   Temperature Pulse Respirations Blood Pressure SpO2   98 2 °F (36 8 °C) 92 20 125/83 98 %      Temp Source Heart Rate Source Patient Position - Orthostatic VS BP Location FiO2 (%)   Tympanic Monitor Sitting Right arm --      Pain Score       No Pain           Vitals:    10/18/22 2214   BP: 125/83   Pulse: 92   Patient Position - Orthostatic VS: Sitting         Visual Acuity      ED Medications  Medications   buprenorphine-naloxone (Suboxone) film 8 mg (has no administration in time range)       Diagnostic Studies  Results Reviewed     Procedure Component Value Units Date/Time    COVID/FLU/RSV [[id number]]  (Normal) Collected: 10/18/22 2334    Lab Status: Final result Specimen: Nares from Nose Updated: 10/19/22 0017     SARS-CoV-2 Negative     INFLUENZA A PCR Negative     INFLUENZA B PCR Negative     RSV PCR Negative    Narrative:      FOR PEDIATRIC PATIENTS - copy/paste COVID Guidelines URL to browser: [email or web address]    SARS-CoV-2 assay is a Nucleic Acid Amplification assay intended for the  qualitative detection of nucleic acid from SARS-CoV-2 in nasopharyngeal  swabs  Results are for the presumptive identification of SARS-CoV-2 RNA  Positive results are indicative of infection with SARS-CoV-2, the virus  causing COVID-19, but do not rule out bacterial infection or co-infection  with other viruses  Laboratories within the [place] and its  territories are required to report all positive results to the appropriate  public health authorities   Negative results do not preclude SARS-CoV-2  infection and should not be used as the sole basis for treatment or other  patient management decisions  Negative results must be combined with  clinical observations, patient history, and epidemiological information  This test has not been FDA cleared or approved  This test has been authorized by FDA under an Emergency Use Authorization  (EUA)  This test is only authorized for the duration of time the  declaration that circumstances exist justifying the authorization of the  emergency use of an in vitro diagnostic tests for detection of SARS-CoV-2  virus and/or diagnosis of COVID-19 infection under section 564(b)(1) of  the Act, 21 U  S C  548KPE-7(N)(5), unless the authorization is terminated  or revoked sooner  The test has been validated but independent review by FDA  and CLIA is pending  Test performed using [employer] GeneXpert: This RT-PCR assay targets N2,  a region unique to SARS-CoV-2  A conserved region in the E-gene was chosen  for pan-Sarbecovirus detection which includes SARS-CoV-2  According to CMS-2020-01-R, this platform meets the definition of high-throughput technology  Rapid drug screen, urine [[id number]]  (Abnormal) Collected: 10/18/22 2332    Lab Status: Final result Specimen: Urine, Clean Catch Updated: 10/18/22 2349     Amph/Meth UR Positive     Barbiturate Ur Negative     Benzodiazepine Urine Negative     Cocaine Urine Negative     Methadone Urine Negative     Opiate Urine Negative     PCP Ur Negative     THC Urine Positive     Oxycodone Urine Negative    Narrative:      Presumptive report  If requested, specimen will be sent to reference lab for confirmation  FOR MEDICAL PURPOSES ONLY  IF CONFIRMATION NEEDED PLEASE CONTACT THE LAB WITHIN 5 DAYS      Drug Screen Cutoff Levels:  AMPHETAMINE/METHAMPHETAMINES  1000 ng/mL  BARBITURATES     200 ng/mL  BENZODIAZEPINES     200 ng/mL  COCAINE      300 ng/mL  METHADONE      300 ng/mL  OPIATES      300 ng/mL  PHENCYCLIDINE     25 ng/mL  THC       50 ng/mL  OXYCODONE      100 ng/mL    POCT alcohol breath test [[id number]]  (Normal) Resulted: 10/18/22 2336    Lab Status: Final result Updated: 10/18/22 2336     EXTBreath Alcohol 0 00                 No orders to display              Procedures  Procedures         ED Course  ED Course as of 10/19/22 0615   Tue Oct 18, 2022   2247 The patient medically cleared for inpatient psych   Wed Oct 19, 2022   0031 201 signed                                             MDM  Number of Diagnoses or Management Options  Depression  Encounter for psychological evaluation  Polysubstance abuse [facility])  Diagnosis management comments: 27-year-old male presents to the emergency department for a psych eval   On exam, patient is resting comfortably in bed in no acute distress  Will check psych clearance labs and consult crisis  Patient looking to sign 201  Patient medically cleared  201 signed, pending placement      Disposition  Final diagnoses:   Polysubstance abuse ([facility] [address] )   Encounter for psychological evaluation   Depression     Time reflects when diagnosis was documented in both MDM as applicable and the Disposition within this note     Time User Action Codes Description Comment    10/18/2022 10:55 PM Check, Farzad Desouza Add [F32 1] Current moderate episode of major depressive disorder without prior episode ([facility] [address] )     10/18/2022 10:55 PM Check, Farzad Desouza Add [F19 10] Polysubstance abuse ([facility] [address] )     10/18/2022 10:56 PM Check, Farzad Desouza Add [Z00 8] Encounter for psychological evaluation     10/18/2022 10:56 PM Check, Farzad Desouza Modify [F19 10] Polysubstance abuse ([facility] [address] )     10/18/2022 10:56 PM Check, Izabel Pals [F32 1] Current moderate episode of major depressive disorder without prior episode ([facility] [address] )     10/18/2022 10:56 PM Check, Farzad Desouza Add [F32  A] Depression     10/18/2022 10:56 PM Check, Farzad Desouza Modify [F19 10] Polysubstance abuse ([facility] [address] )     10/18/2022 10:56 PM Check, Farzad Desouza Modify [F32  A] Depression       ED Disposition     ED Disposition   Transfer to Behavioral Health    Condition   --    Date/Time   Tue Oct 18, 2022 10:56 PM    Comment   Yamile Police should be transferred out to [facility] and has been medically cleared  Follow-up Information    None         Patient's Medications   Discharge Prescriptions    No medications on file       No discharge procedures on file      PDMP Review       Value Time User    PDMP Reviewed  Yes 9/2/2022 11:32 AM Shamika Leal DO          ED Provider  Electronically Signed by           Danii Milan MD  10/19/22 7614

## 2022-10-19 NOTE — ED NOTES
Rosemarie Suicide Risk Assessment deferred, as unable to assess while patient sleeping  Behavioral Health Assessment deferred as patient is sleeping and would benefit from additional rest   Vital signs deferred until patient awake, no signs or symptoms of respiratory distress at this time  Once patient is awake and able to participate, will complete assessments         Kylah Young RN  10/19/22 0487

## 2022-10-19 NOTE — EMTALA/ACUTE CARE TRANSFER
[facility] [address]  Dept: [phone]      EMTALA TRANSFER CONSENT    NAME Gladys Velasquez                                         2000                              MRN [id number]    I have been informed of my rights regarding examination, treatment, and transfer   by Dr Aris Wilkins MD    Benefits: Continuity of care    Risks: Potential for delay in receiving treatment      Consent for Transfer:  I acknowledge that my medical condition has been evaluated and explained to me by the emergency department physician or other qualified medical person and/or my attending physician, who has recommended that I be transferred to the service of  Accepting Physician: Dr Andres Galindo at [address] [facility] Name, [address] : [address]  The above potential benefits of such transfer, the potential risks associated with such transfer, and the probable risks of not being transferred have been explained to me, and I fully understand them  The doctor has explained that, in my case, the benefits of transfer outweigh the risks  I agree to be transferred  I authorize the performance of emergency medical procedures and treatments upon me in both transit and upon arrival at the receiving facility  Additionally, I authorize the release of any and all medical records to the receiving facility and request they be transported with me, if possible  I understand that the safest mode of transportation during a medical emergency is an ambulance and that the Hospital advocates the use of this mode of transport  Risks of traveling to the receiving facility by car, including absence of medical control, life sustaining equipment, such as oxygen, and medical personnel has been explained to me and I fully understand them  (SILVANA CORRECT BOX BELOW)  [  ]  I consent to the stated transfer and to be transported by ambulance/helicopter    [  ]  I consent to the stated transfer, but refuse transportation by ambulance and accept full responsibility for my transportation by car  I understand the risks of non-ambulance transfers and I exonerate the Hospital and its staff from any deterioration in my condition that results from this refusal     X___________________________________________    DATE  10/19/22  TIME________  Signature of patient or legally responsible individual signing on patient behalf           RELATIONSHIP TO PATIENT_________________________          Provider Certification    NAME Marko Nina                                         2000                              MRN [id number]    A medical screening exam was performed on the above named patient  Based on the examination:    Condition Necessitating Transfer The primary encounter diagnosis was Depression  Diagnoses of Polysubstance abuse ([facility] [address] ) and Encounter for psychological evaluation were also pertinent to this visit  Patient Condition: The patient has been stabilized such that within reasonable medical probability, no material deterioration of the patient condition or the condition of the unborn child(juan) is likely to result from the transfer    Reason for Transfer: Level of Care needed not available at this facility    Transfer Requirements: [address] [place] [address] [address]   · Space available and qualified personnel available for treatment as acknowledged by Ayden Bearden [phone]  · Agreed to accept transfer and to provide appropriate medical treatment as acknowledged by       Dr Mina Grimm  · Appropriate medical records of the examination and treatment of the patient are provided at the time of transfer   [address] [facility], [address] _______  · Transfer will be performed by qualified personnel from [address] [place] [address]  and appropriate transfer equipment as required, including the use of necessary and appropriate life support measures      Provider Certification: I have examined the patient and explained the following risks and benefits of being transferred/refusing transfer to the patient/family:  The patient is stable for psychiatric transfer because they are medically stable, and is protected from harming him/herself or others during transport, General risk, such as traffic hazards, adverse weather conditions, rough terrain or turbulence, possible failure of equipment (including vehicle or aircraft), or consequences of actions of persons outside the control of the transport personnel      Based on these reasonable risks and benefits to the patient and/or the unborn child(juan), and based upon the information available at the time of the patient’s examination, I certify that the medical benefits reasonably to be expected from the provision of appropriate medical treatments at another medical facility outweigh the increasing risks, if any, to the individual’s medical condition, and in the case of labor to the unborn child, from effecting the transfer      X____________________________________________ DATE 10/19/22        TIME_______      ORIGINAL - SEND TO MEDICAL RECORDS   COPY - SEND WITH PATIENT DURING TRANSFER

## 2022-10-19 NOTE — ED NOTES
Rosemarie Suicide Risk Assessment deferred, as unable to assess while patient sleeping  Behavioral Health Assessment deferred as patient is sleeping and would benefit from additional rest   Vital signs deferred until patient awake, no signs or symptoms of respiratory distress at this time  Once patient is awake and able to participate, will complete assessments       Remains on virtual 1:1     [facility]  10/19/22 5349

## 2022-10-19 NOTE — ED NOTES
[facility]:    [facility]

## 2022-10-19 NOTE — ED NOTES
Insurance Authorization for admission:   Phone call placed to [address] [place] [address]  Phone number: [phone]  Spoke to TASH BIANCHI  [employer]  14 days approved  Level of care: inpatient psych  Review on 11/1     Authorization # [id number]

## 2022-12-05 ENCOUNTER — HOSPITAL ENCOUNTER (EMERGENCY)
Facility: HOSPITAL | Age: 22
End: 2022-12-05
Attending: EMERGENCY MEDICINE

## 2022-12-05 VITALS
BODY MASS INDEX: 33.91 KG/M2 | SYSTOLIC BLOOD PRESSURE: 141 MMHG | RESPIRATION RATE: 18 BRPM | OXYGEN SATURATION: 98 % | DIASTOLIC BLOOD PRESSURE: 77 MMHG | WEIGHT: 210.1 LBS | TEMPERATURE: 96.7 F | HEART RATE: 84 BPM

## 2022-12-05 DIAGNOSIS — R45.851 SUICIDAL IDEATIONS: Primary | ICD-10-CM

## 2022-12-05 LAB
AMPHETAMINES SERPL QL SCN: POSITIVE
BARBITURATES UR QL: NEGATIVE
BENZODIAZ UR QL: NEGATIVE
COCAINE UR QL: NEGATIVE
ETHANOL EXG-MCNC: 0 MG/DL
FLUAV RNA RESP QL NAA+PROBE: NEGATIVE
FLUBV RNA RESP QL NAA+PROBE: NEGATIVE
METHADONE UR QL: NEGATIVE
OPIATES UR QL SCN: NEGATIVE
OXYCODONE+OXYMORPHONE UR QL SCN: NEGATIVE
PCP UR QL: NEGATIVE
RSV RNA RESP QL NAA+PROBE: NEGATIVE
SARS-COV-2 RNA RESP QL NAA+PROBE: NEGATIVE
THC UR QL: POSITIVE

## 2022-12-05 NOTE — ED NOTES
Patient calm and cooperative at this time  Patient changing into paper scrubs and belongings being checked by myself and security  Patient reports that he had plans to overdose of fentanyl prior to his girlfriend taking it away from him this morning   Reports that he has been inpatient psych before and has helped in the past       Julia Tello RN  12/05/22 1018

## 2022-12-05 NOTE — ED NOTES
Insurance Authorization for admission:   Phone call placed to [facility]  Phone number: [phone]  Spoke to [facility]   15 days approved  Level of care: Inpatient  Review on 12/19/22  Authorization # [id number]

## 2022-12-05 NOTE — ED NOTES
Patient is accepted at [facility]  Patient is accepted by Dr Aliza Tate  Transportation is arranged with [facility]  Transportation is scheduled for 1600  Patient may go to the floor upon arrival         Nurse report is to be called to [phone] prior to patient transfer

## 2022-12-05 NOTE — EMTALA/ACUTE CARE TRANSFER
[facility] EMERGENCY DEPARTMENT  [address] [place] [id number]  Dept: [phone]      EMTALA TRANSFER CONSENT    NAME Erica Post                                         2000                              MRN [id number]    I have been informed of my rights regarding examination, treatment, and transfer   by Dr Kush Rooney DO    Benefits: Other benefits (Include comment)_______________________ (behavioral health)    Risks: Potential for delay in receiving treatment      Consent for Transfer:  I acknowledge that my medical condition has been evaluated and explained to me by the emergency department physician or other qualified medical person and/or my attending physician, who has recommended that I be transferred to the service of  Accepting Physician: Dr Sonal Langford at [address] Name, [address] : [facility]  The above potential benefits of such transfer, the potential risks associated with such transfer, and the probable risks of not being transferred have been explained to me, and I fully understand them  The doctor has explained that, in my case, the benefits of transfer outweigh the risks  I agree to be transferred  I authorize the performance of emergency medical procedures and treatments upon me in both transit and upon arrival at the receiving facility  Additionally, I authorize the release of any and all medical records to the receiving facility and request they be transported with me, if possible  I understand that the safest mode of transportation during a medical emergency is an ambulance and that the Hospital advocates the use of this mode of transport  Risks of traveling to the receiving facility by car, including absence of medical control, life sustaining equipment, such as oxygen, and medical personnel has been explained to me and I fully understand them      (SILVANA CORRECT BOX BELOW)  [ x ]  I consent to the stated transfer and to be transported by ambulance/helicopter  [  ]  I consent to the stated transfer, but refuse transportation by ambulance and accept full responsibility for my transportation by car  I understand the risks of non-ambulance transfers and I exonerate the Hospital and its staff from any deterioration in my condition that results from this refusal     X___________________________________________    DATE  22  TIME________  Signature of patient or legally responsible individual signing on patient behalf           RELATIONSHIP TO PATIENT_________________________          Provider Certification    NAME Dwaine Maldonado                                         2000                              MRN [id number]    A medical screening exam was performed on the above named patient  Based on the examination:    Condition Necessitating Transfer The encounter diagnosis was Suicidal ideations  Patient Condition: The patient has been stabilized such that within reasonable medical probability, no material deterioration of the patient condition or the condition of the unborn child(juan) is likely to result from the transfer    Reason for Transfer: Level of Care needed not available at this facility    Transfer Requirements: [address]   · Space available and qualified personnel available for treatment as acknowledged by Jenny Garrido [phone]  · Agreed to accept transfer and to provide appropriate medical treatment as acknowledged by       Dr Miguelangel Villarreal  · Appropriate medical records of the examination and treatment of the patient are provided at the time of transfer   [address] [facility], [address] _______  · Transfer will be performed by qualified personnel from [address] [place] [address]  and appropriate transfer equipment as required, including the use of necessary and appropriate life support measures      Provider Certification: I have examined the patient and explained the following risks and benefits of being transferred/refusing transfer to the patient/family:  General risk, such as traffic hazards, adverse weather conditions, rough terrain or turbulence, possible failure of equipment (including vehicle or aircraft), or consequences of actions of persons outside the control of the transport personnel, The patient is stable for psychiatric transfer because they are medically stable, and is protected from harming him/herself or others during transport      Based on these reasonable risks and benefits to the patient and/or the unborn child(juan), and based upon the information available at the time of the patient’s examination, I certify that the medical benefits reasonably to be expected from the provision of appropriate medical treatments at another medical facility outweigh the increasing risks, if any, to the individual’s medical condition, and in the case of labor to the unborn child, from effecting the transfer      X____________________________________________ DATE 12/05/22        TIME_______      ORIGINAL - SEND TO MEDICAL RECORDS   COPY - SEND WITH PATIENT DURING TRANSFER
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
## 2022-12-22 ENCOUNTER — OFFICE VISIT (OUTPATIENT)
Dept: INTERNAL MEDICINE CLINIC | Facility: CLINIC | Age: 22
End: 2022-12-22

## 2022-12-22 VITALS
TEMPERATURE: 97.4 F | HEART RATE: 101 BPM | HEIGHT: 66 IN | OXYGEN SATURATION: 97 % | WEIGHT: 209.13 LBS | BODY MASS INDEX: 33.61 KG/M2 | DIASTOLIC BLOOD PRESSURE: 74 MMHG | SYSTOLIC BLOOD PRESSURE: 128 MMHG

## 2022-12-22 DIAGNOSIS — Z79.899 ENCOUNTER FOR MONITORING SUBOXONE MAINTENANCE THERAPY: ICD-10-CM

## 2022-12-22 DIAGNOSIS — L73.9 FOLLICULITIS: ICD-10-CM

## 2022-12-22 DIAGNOSIS — F31.32 BIPOLAR 1 DISORDER, DEPRESSED, MODERATE (HCC): Primary | ICD-10-CM

## 2022-12-22 DIAGNOSIS — F19.20 POLYSUBSTANCE DEPENDENCE INCLUDING OPIOID TYPE DRUG WITH COMPLICATION, CONTINUOUS USE (HCC): ICD-10-CM

## 2022-12-22 DIAGNOSIS — F43.10 POSTTRAUMATIC STRESS DISORDER: ICD-10-CM

## 2022-12-22 DIAGNOSIS — F41.9 ANXIETY: ICD-10-CM

## 2022-12-22 DIAGNOSIS — F32.1 CURRENT MODERATE EPISODE OF MAJOR DEPRESSIVE DISORDER WITHOUT PRIOR EPISODE (HCC): ICD-10-CM

## 2022-12-22 DIAGNOSIS — Z79.899 MEDICATION THERAPY CONTINUED: ICD-10-CM

## 2022-12-22 DIAGNOSIS — Z51.81 ENCOUNTER FOR MONITORING SUBOXONE MAINTENANCE THERAPY: ICD-10-CM

## 2022-12-22 PROBLEM — R22.1 NECK MASS: Status: RESOLVED | Noted: 2021-09-29 | Resolved: 2022-12-22

## 2022-12-22 RX ORDER — BUSPIRONE HYDROCHLORIDE 5 MG/1
5 TABLET ORAL DAILY
COMMUNITY
Start: 2022-12-15

## 2022-12-22 RX ORDER — QUETIAPINE FUMARATE 300 MG/1
TABLET, FILM COATED ORAL
COMMUNITY
Start: 2022-12-15

## 2022-12-22 RX ORDER — BUPRENORPHINE AND NALOXONE 12; 3 MG/1; MG/1
FILM, SOLUBLE BUCCAL; SUBLINGUAL
COMMUNITY
Start: 2022-12-15 | End: 2022-12-22 | Stop reason: SDUPTHER

## 2022-12-22 RX ORDER — CEPHALEXIN 500 MG/1
500 CAPSULE ORAL EVERY 8 HOURS SCHEDULED
Qty: 30 CAPSULE | Refills: 0 | Status: SHIPPED | OUTPATIENT
Start: 2022-12-22 | End: 2023-01-01

## 2022-12-22 RX ORDER — BUPRENORPHINE AND NALOXONE 12; 3 MG/1; MG/1
12 FILM, SOLUBLE BUCCAL; SUBLINGUAL DAILY
Qty: 14 EACH | Refills: 0 | Status: SHIPPED | OUTPATIENT
Start: 2022-12-22

## 2022-12-22 NOTE — PROGRESS NOTES
Assessment/Plan:  Problem List Items Addressed This Visit        Other    Posttraumatic stress disorder    Relevant Medications    busPIRone (BUSPAR) 5 mg tablet    QUEtiapine (SEROquel) 300 mg tablet    Polysubstance dependence including opioid type drug with complication, continuous use (HCC)    Relevant Medications    cephalexin (KEFLEX) 500 mg capsule    buprenorphine-naloxone (Suboxone) 12-3 MG    Other Relevant Orders    [facility] All Prescribed Meds and Special Instructions    Amphetamines, Methamphetamines    Butalbital    Phenobarbital    Secobarbital    Temazepam    Alprazolam    Clonazepam    Diazepam    Lorazepam    Oxazepam    Gabapentin    Pregabalin    Cocaine    Heroin    Buprenorphine    Levorphanol    Meperidine    Naltrexone    Fentanyl    Methadone    Oxycodone    Oxymorphone    Tapentadol    THC    Tramadol    Codeine, Hydrocodone, Hydropmorphone, Morphine    Bath Salts    Ethyl Glucuronide/Ethyl Sulfate    Kratom    Spice    Methylphenidate    Phentermine    Validity Oxidant    Validity Creatinine    Validity pH    Validity Specific    CBC and differential    Comprehensive metabolic panel    Hemoglobin A1C    LDL cholesterol, direct    Triglycerides    Valproic acid level, total    Medication therapy continued    Relevant Medications    cephalexin (KEFLEX) 500 mg capsule    buprenorphine-naloxone (Suboxone) 12-3 MG    Other Relevant Orders    [facility] All Prescribed Meds and Special Instructions    Amphetamines, Methamphetamines    Butalbital    Phenobarbital    Secobarbital    Temazepam    Alprazolam    Clonazepam    Diazepam    Lorazepam    Oxazepam    Gabapentin    Pregabalin    Cocaine    Heroin    Buprenorphine    Levorphanol    Meperidine    Naltrexone    Fentanyl    Methadone    Oxycodone    Oxymorphone    Tapentadol    THC    Tramadol    Codeine, Hydrocodone, Hydropmorphone, Morphine    Bath Salts    Ethyl Glucuronide/Ethyl Sulfate    Kratom    Spice    Methylphenidate    Phentermine Validity Oxidant    Validity Creatinine    Validity pH    Validity Specific    CBC and differential    Comprehensive metabolic panel    Hemoglobin A1C    LDL cholesterol, direct    Triglycerides    Valproic acid level, total    Encounter for monitoring Suboxone maintenance therapy    Relevant Medications    cephalexin (KEFLEX) 500 mg capsule    buprenorphine-naloxone (Suboxone) 12-3 MG    Other Relevant Orders    [facility] All Prescribed Meds and Special Instructions    Amphetamines, Methamphetamines    Butalbital    Phenobarbital    Secobarbital    Temazepam    Alprazolam    Clonazepam    Diazepam    Lorazepam    Oxazepam    Gabapentin    Pregabalin    Cocaine    Heroin    Buprenorphine    Levorphanol    Meperidine    Naltrexone    Fentanyl    Methadone    Oxycodone    Oxymorphone    Tapentadol    THC    Tramadol    Codeine, Hydrocodone, Hydropmorphone, Morphine    Bath Salts    Ethyl Glucuronide/Ethyl Sulfate    Kratom    Spice    Methylphenidate    Phentermine    Validity Oxidant    Validity Creatinine    Validity pH    Validity Specific    CBC and differential    Comprehensive metabolic panel    Hemoglobin A1C    LDL cholesterol, direct    Triglycerides    Valproic acid level, total    Depression    Relevant Medications    busPIRone (BUSPAR) 5 mg tablet    QUEtiapine (SEROquel) 300 mg tablet    Bipolar 1 disorder, depressed, moderate (HCC) - Primary    Relevant Medications    busPIRone (BUSPAR) 5 mg tablet    QUEtiapine (SEROquel) 300 mg tablet    Other Relevant Orders    Prolactin    Anxiety   Other Visit Diagnoses     Folliculitis        Relevant Medications    cephalexin (KEFLEX) 500 mg capsule           Diagnoses and all orders for this visit:    Bipolar 1 disorder, depressed, moderate (HCC)  -     Prolactin;  Future    Polysubstance dependence including opioid type drug with complication, continuous use ([facility] [address] )  -     [facility] All Prescribed Meds and Special Instructions  -     Amphetamines, Methamphetamines  -     Butalbital  -     Phenobarbital  -     Secobarbital  -     Temazepam  -     Alprazolam  -     Clonazepam  -     Diazepam  -     Lorazepam  -     Oxazepam  -     Gabapentin  -     Pregabalin  -     Cocaine  -     Heroin  -     Buprenorphine  -     Levorphanol  -     Meperidine  -     Naltrexone  -     Fentanyl  -     Methadone  -     Oxycodone  -     Oxymorphone  -     Tapentadol  -     THC  -     Tramadol  -     Codeine, Hydrocodone, Hydropmorphone, Morphine  -     Bath Salts  -     Ethyl Glucuronide/Ethyl Sulfate  -     Kratom  -     Spice  -     Methylphenidate  -     Phentermine  -     Validity Oxidant  -     Validity Creatinine  -     Validity pH  -     Validity Specific  -     CBC and differential; Future  -     Comprehensive metabolic panel; Future  -     Hemoglobin A1C; Future  -     LDL cholesterol, direct; Future  -     Triglycerides; Future  -     Valproic acid level, total; Future  -     cephalexin (KEFLEX) 500 mg capsule;  Take 1 capsule (500 mg total) by mouth every 8 (eight) hours for 10 days  -     buprenorphine-naloxone (Suboxone) 12-3 MG; Place 1 Film (12 mg total) under the tongue daily    Encounter for monitoring Suboxone maintenance therapy  -     [facility] All Prescribed Meds and Special Instructions  -     Amphetamines, Methamphetamines  -     Butalbital  -     Phenobarbital  -     Secobarbital  -     Temazepam  -     Alprazolam  -     Clonazepam  -     Diazepam  -     Lorazepam  -     Oxazepam  -     Gabapentin  -     Pregabalin  -     Cocaine  -     Heroin  -     Buprenorphine  -     Levorphanol  -     Meperidine  -     Naltrexone  -     Fentanyl  -     Methadone  -     Oxycodone  -     Oxymorphone  -     Tapentadol  -     THC  -     Tramadol  -     Codeine, Hydrocodone, Hydropmorphone, Morphine  -     Bath Salts  -     Ethyl Glucuronide/Ethyl Sulfate  -     Kratom  -     Spice  -     Methylphenidate  -     Phentermine  -     Validity Oxidant  -     Validity Creatinine  -     Validity pH  -     Validity Specific  -     CBC and differential; Future  -     Comprehensive metabolic panel; Future  -     Hemoglobin A1C; Future  -     LDL cholesterol, direct; Future  -     Triglycerides; Future  -     Valproic acid level, total; Future  -     cephalexin (KEFLEX) 500 mg capsule; Take 1 capsule (500 mg total) by mouth every 8 (eight) hours for 10 days  -     buprenorphine-naloxone (Suboxone) 12-3 MG; Place 1 Film (12 mg total) under the tongue daily    Medication therapy continued  -     [facility] All Prescribed Meds and Special Instructions  -     Amphetamines, Methamphetamines  -     Butalbital  -     Phenobarbital  -     Secobarbital  -     Temazepam  -     Alprazolam  -     Clonazepam  -     Diazepam  -     Lorazepam  -     Oxazepam  -     Gabapentin  -     Pregabalin  -     Cocaine  -     Heroin  -     Buprenorphine  -     Levorphanol  -     Meperidine  -     Naltrexone  -     Fentanyl  -     Methadone  -     Oxycodone  -     Oxymorphone  -     Tapentadol  -     THC  -     Tramadol  -     Codeine, Hydrocodone, Hydropmorphone, Morphine  -     Bath Salts  -     Ethyl Glucuronide/Ethyl Sulfate  -     Kratom  -     Spice  -     Methylphenidate  -     Phentermine  -     Validity Oxidant  -     Validity Creatinine  -     Validity pH  -     Validity Specific  -     CBC and differential; Future  -     Comprehensive metabolic panel; Future  -     Hemoglobin A1C; Future  -     LDL cholesterol, direct; Future  -     Triglycerides; Future  -     Valproic acid level, total; Future  -     cephalexin (KEFLEX) 500 mg capsule; Take 1 capsule (500 mg total) by mouth every 8 (eight) hours for 10 days  -     buprenorphine-naloxone (Suboxone) 12-3 MG; Place 1 Film (12 mg total) under the tongue daily    Current moderate episode of major depressive disorder without prior episode (HCC)    Posttraumatic stress disorder    Anxiety    Folliculitis  -     cephalexin (KEFLEX) 500 mg capsule;  Take 1 capsule (500 mg total) by mouth every 8 (eight) hours for 10 days    Other orders  -     busPIRone (BUSPAR) 5 mg tablet; Take 5 mg by mouth in the morning  -     Discontinue: buprenorphine-naloxone (Suboxone) 12-3 MG  -     QUEtiapine (SEROquel) 300 mg tablet      No problem-specific Assessment & Plan notes found for this encounter  Scheduled Medication Review:  Pt's scheduled medication use was reviewed by myself/staff via the [employer] website  Pt's use has been found to be appropriate w/o any concerns for misuse by the patient  Pt's current conditions require continued scheduled medication use at this time  Future review for continued appropriate medication use and misuse will continue  A/P: Doing ok and will continue 12mg films, dose 1/6 and continue with counseling  Reminded to keep meds safe and out of reach of children  Has narcan at home  Td is up to date  Will start keflex for the lesion  Will check labs  RTC 2 weeks for suboxone         Subjective:     Patient ID: Jess Garcia is a 25 y o  male  WM with strong psych and substance abuse  History with  past OD's and behavioral med admissions, presents for f/u recent behavioral med admission  Pt with relationship issues and went off his suboxone and relapsed on multiple substances  Some suicidal ideations and pt was admitted  Meds adjusted and counseling intensified  Restarted on suboxone and d/c'd to home  Seeing local counseling  Denies using since d/c  No other physical problems, but ROS is positive for a right calf lesion    Tolerating his meds  The following portions of the patient's history were reviewed and updated as appropriate:   He has a past medical history of ADHD (attention deficit hyperactivity disorder), Bipolar disorder ([facility] [address] ), Depression, GERD (gastroesophageal reflux disease), Hyperlipidemia, Hypertension, Psychiatric disorder, Seizures ([facility] [address] ), Substance abuse ([facility] [address] ), and Tourette syndrome  ,  does not have any pertinent problems on file  ,   has a past surgical history that includes Tympanostomy tube placement; TONSILECTOMY AND ADNOIDECTOMY; Tonsillectomy; and pr exc b9 lesion mrgn xcp sk tg t/a/l >4 0 cm (Left, 10/26/2021)  ,  family history includes Cirrhosis in his father; Coronary artery disease in his father; Hepatitis in his father; No Known Problems in his mother; Substance Abuse in his brother and father  ,   reports that he has been smoking cigarettes  He has a 15 00 pack-year smoking history  He has never used smokeless tobacco  He reports that he does not currently use alcohol  He reports current drug use  Drugs: Marijuana, Heroin, and Methamphetamines  ,  is allergic to abilify [aripiprazole]     Current Outpatient Medications   Medication Sig Dispense Refill   • buprenorphine-naloxone (Suboxone) 12-3 MG Place 1 Film (12 mg total) under the tongue daily 14 each 0   • busPIRone (BUSPAR) 5 mg tablet Take 5 mg by mouth in the morning     • cephalexin (KEFLEX) 500 mg capsule Take 1 capsule (500 mg total) by mouth every 8 (eight) hours for 10 days 30 capsule 0   • cloNIDine (CATAPRES) 0 1 mg tablet Take 0 1 mg by mouth once     • divalproex sodium (DEPAKOTE ER) 250 mg 24 hr tablet Take 3 tablets (750 mg total) by mouth daily (Patient taking differently: Take 750 mg by mouth daily 500mg am and 1000 at pm) 180 tablet 0   • hydrOXYzine pamoate (VISTARIL) 50 mg capsule Take 50 mg by mouth if needed     • meloxicam (MOBIC) 15 mg tablet Take 1 tablet (15 mg total) by mouth daily 30 tablet 1   • naloxone (Narcan) 4 mg/0 1 mL nasal spray 0 1 mL (4 mg total) into each nostril as needed (respiratory depression or possible OD) 1 each 1   • QUEtiapine (SEROquel) 300 mg tablet      • QUEtiapine (SEROquel) 200 mg tablet Take 1 tablet (200 mg total) by mouth daily at bedtime (Patient taking differently: Take 400 mg by mouth daily at bedtime) 60 tablet 0     No current facility-administered medications for this visit         Review of Systems Constitutional: Negative for activity change, chills, diaphoresis, fatigue and fever  HENT: Negative  Eyes: Negative for visual disturbance  Respiratory: Negative for cough, chest tightness, shortness of breath and wheezing  Cardiovascular: Negative for chest pain, palpitations and leg swelling  Gastrointestinal: Negative for abdominal pain, constipation, diarrhea, nausea and vomiting  Endocrine: Negative for cold intolerance and heat intolerance  Genitourinary: Negative for difficulty urinating, dysuria and frequency  Musculoskeletal: Negative for arthralgias, gait problem and myalgias  Skin: Positive for wound  Neurological: Negative for dizziness, seizures, syncope, weakness, light-headedness and headaches  Psychiatric/Behavioral: Positive for sleep disturbance  Negative for agitation, behavioral problems, confusion, dysphoric mood, hallucinations, self-injury and suicidal ideas  The patient is nervous/anxious  The patient is not hyperactive  PHQ-2/9 Depression Screening          Objective:  Vitals:    12/22/22 0937   BP: 128/74   Pulse: 101   Temp: (!) 97 4 °F (36 3 °C)   SpO2: 97%   Weight: 94 9 kg (209 lb 2 oz)   Height: 5' 6" (1 676 m)     Body mass index is 33 75 kg/m²  Physical Exam  Vitals and nursing note reviewed  Constitutional:       General: He is not in acute distress  Appearance: Normal appearance  He is not ill-appearing  HENT:      Head: Normocephalic and atraumatic  Mouth/Throat:      Mouth: Mucous membranes are moist    Eyes:      Extraocular Movements: Extraocular movements intact  Conjunctiva/sclera: Conjunctivae normal       Pupils: Pupils are equal, round, and reactive to light  Neck:      Vascular: No carotid bruit  Cardiovascular:      Rate and Rhythm: Normal rate and regular rhythm  Heart sounds: Normal heart sounds  Pulmonary:      Effort: Pulmonary effort is normal  No respiratory distress        Breath sounds: Normal breath sounds  No wheezing, rhonchi or rales  Abdominal:      General: Bowel sounds are normal  There is no distension  Palpations: Abdomen is soft  Tenderness: There is no abdominal tenderness  Musculoskeletal:      Cervical back: Neck supple  Right lower leg: No edema  Left lower leg: No edema  Skin:     Findings: Lesion (right calf with a quarter size erythematous and warm area with central pustule  No induration or fluctuation  ) present  Neurological:      General: No focal deficit present  Mental Status: He is alert and oriented to person, place, and time  Mental status is at baseline  Psychiatric:         Mood and Affect: Mood normal          Behavior: Behavior normal          Thought Content:  Thought content normal          Judgment: Judgment normal

## 2022-12-22 NOTE — PATIENT INSTRUCTIONS
Buprenorphine/Naloxone (Into the mouth)   Buprenorphine (bue-pre-NOR-feen), Naloxone (nal-OX-one)  Treats narcotic dependence  Brand Name(s): Suboxone, Zubsolv   There may be other brand names for this medicine  When This Medicine Should Not Be Used: This medicine is not right for everyone  Do not use it if you had an allergic reaction to buprenorphine or naloxone  How to Use This Medicine: Thin Sheet, Tablet  Take your medicine as directed  Your dose may need to be changed several times to find what works best for you  You must let the medicine dissolve  Never swallow the film or tablet  Your body may not absorb enough of the medicine if you swallow it  Your health caregiver should show you how to use the medicine  If you do not understand, ask for help  It is important to use the medicine correctly  Do not talk while the medicine is inside your mouth  Buccal film: Rinse your mouth with water to moisten it  Place the film against the inside of your cheek  If your doctor told you to use more than 1 film, place the second film inside your other cheek  Do not place more than 2 films inside of 1 cheek at a time  Do not move or touch the film  Do not eat or drink anything until the film is completely dissolved  Sublingual tablet: Place the tablet under your tongue  If your doctor told you to use more than 1 tablet, place all of the tablets in different places under your tongue at the same time  You can use 2 tablets at a time until you have taken all of the medicine, if that is easier for you  Let the tablets dissolve completely in your mouth  Do not eat or drink anything until the tablets are completely dissolved  Sublingual film: Drink some water to help moisten your mouth  Place the film under your tongue  If your doctor told you to use more than 1 film, place the second film on the opposite side from the first one  Do not move the film after you placed it under your tongue   If you are supposed to use more than 2 films, use them the same way, but do not start until the first 2 films are completely dissolved  Do not eat or drink anything until the film is completely dissolved  Do not break, crush, chew, or cut the film or tablet  This medicine should come with a Medication Guide  Ask your pharmacist for a copy if you do not have one  Missed dose: Take a dose as soon as you remember  If it is almost time for your next dose, wait until then and take a regular dose  Do not take extra medicine to make up for a missed dose  Store the medicine in a closed container at room temperature, away from heat, moisture, and direct light  Drop off any unused narcotic medicine at a drug take-back location right away  If you do not have a drug take-back location near you, flush any unused narcotic medicine down the toilet  Check your local drug store and clinics for take-back locations  You can also check the [employer] web site for locations  Here is the link to the [employer] safe disposal of medicines [employer] com ee  Drugs and Foods to Avoid:   Ask your doctor or pharmacist before using any other medicine, including over-the-counter medicines, vitamins, and herbal products  Do not use this medicine if you are using or have used an MAO inhibitor within the past 14 days  Some medicines can affect how buprenorphine/naloxone works   Tell your doctor if you are using the following:   Carbamazepine, cyclobenzaprine, erythromycin, ketoconazole, metaxalone, mirtazapine, phenobarbital, phenytoin, rifampin, tramadol, trazodone  Diuretic (water pill)  Medicine to treat depression or mental health problems (including SNRIs, SSRIs, TCAs)  Medicine to treat HIV/AIDS (including atazanavir, delavirdine, efavirenz, etravirine, nevirapine, ritonavir)  Phenothiazine medicine  Triptan medicine to treat migraine headaches  Do not drink alcohol while you are using this medicine  Tell your doctor if you use anything else that makes you sleepy  Some examples are allergy medicine, narcotic pain medicine, and alcohol  Tell your doctor if you are also using butorphanol, nalbuphine, pentazocine, or a muscle relaxer  Warnings While Using This Medicine:   Tell your doctor if you are pregnant or breastfeeding, or if you have kidney disease, liver disease (including hepatitis), lung or breathing problems (including sleep apnea), adrenal gland problems, an enlarged prostate, trouble urinating, gallbladder problems, thyroid problems, stomach problems, or a history of depression, brain tumor, head injury, or alcohol or drug abuse  This medicine may cause the following problems:  High risk of overdose, which can lead to death  Respiratory depression (serious breathing problem that can be life-threatening)  Adrenal gland problems  Sleep-related breathing problems (including sleep apnea, sleep-related hypoxemia)  Low blood pressure  Liver problems  Serotonin syndrome, when used with certain medicines  This medicine may make you dizzy or drowsy  Do not drive or do anything else that could be dangerous until you know how this medicine affects you  Sit or lie down if you feel dizzy  Stand up carefully  Tell any doctor or dentist who treats you that you are using this medicine  This medicine can be habit-forming  Do not use more than your prescribed dose  Call your doctor if you think your medicine is not working  This medicine may cause constipation, especially with long-term use  Ask your doctor if you should use a laxative to prevent and treat constipation  Do not stop using this medicine suddenly  Your doctor will need to slowly decrease your dose before you stop it completely  This medicine could cause infertility  Talk with your doctor before using this medicine if you plan to have children    Your doctor will do lab tests at regular visits to check on the effects of this medicine  Keep all appointments  Keep all medicine out of the reach of children  Never share your medicine with anyone  Possible Side Effects While Using This Medicine:   Call your doctor right away if you notice any of these side effects: Allergic reaction: Itching or hives, swelling in your face or hands, swelling or tingling in your mouth or throat, chest tightness, trouble breathing  Blue lips, fingernails, or skin, trouble breathing  Changes in skin color, dark freckles, cold feeling, tiredness, weight loss  Dark urine or pale stools, nausea, vomiting, loss of appetite, stomach pain, yellow skin or eyes  Extreme dizziness, drowsiness, or weakness, slow heartbeat, sweating, seizures, cold or clammy skin  Severe confusion, lightheadedness, dizziness, or fainting  If you notice these less serious side effects, talk with your doctor:   Constipation, diarrhea, nausea, vomiting, stomach upset  Headache  Stuffy or runny nose  If you notice other side effects that you think are caused by this medicine, tell your doctor  Call your doctor for medical advice about side effects  You may report side effects to FDA at 0-208-FDA-5585    © Copyright [employer] 2022 Information is for End User's use only and may not be sold, redistributed or otherwise used for commercial purposes  The above information is an  only  It is not intended as medical advice for individual conditions or treatments  Talk to your doctor, nurse or pharmacist before following any medical regimen to see if it is safe and effective for you

## 2022-12-28 LAB
6MAM UR QL CFM: NEGATIVE NG/ML
7AMINOCLONAZEPAM UR QL CFM: NEGATIVE NG/ML
A-OH ALPRAZ UR QL CFM: NEGATIVE NG/ML
ACCEPTABLE CREAT UR QL: NORMAL MG/DL
ACCEPTIBLE SP GR UR QL: NORMAL
AMPHET UR QL CFM: NEGATIVE NG/ML
BUPRENORPHINE UR QL CFM: NORMAL NG/ML
BUTALBITAL UR QL CFM: NEGATIVE NG/ML
BZE UR QL CFM: NEGATIVE NG/ML
CODEINE UR QL CFM: NEGATIVE NG/ML
EDDP UR QL CFM: NEGATIVE NG/ML
ETHYL GLUCURONIDE UR QL CFM: NEGATIVE NG/ML
ETHYL SULFATE UR QL SCN: NEGATIVE NG/ML
EUTYLONE UR QL: NEGATIVE NG/ML
FENTANYL UR QL CFM: NEGATIVE NG/ML
GLIADIN IGG SER IA-ACNC: NEGATIVE NG/ML
HYDROCODONE UR QL CFM: NEGATIVE NG/ML
HYDROMORPHONE UR QL CFM: NEGATIVE NG/ML
LORAZEPAM UR QL CFM: NEGATIVE NG/ML
ME-PHENIDATE UR QL CFM: NEGATIVE NG/ML
MEPERIDINE UR QL CFM: NEGATIVE NG/ML
METHADONE UR QL CFM: NEGATIVE NG/ML
METHAMPHET UR QL CFM: NEGATIVE NG/ML
MORPHINE UR QL CFM: NEGATIVE NG/ML
NALTREXONE UR QL CFM: NEGATIVE NG/ML
NITRITE UR QL: NORMAL UG/ML
NORBUPRENORPHINE UR QL CFM: NORMAL NG/ML
NORDIAZEPAM UR QL CFM: NEGATIVE NG/ML
NORFENTANYL UR QL CFM: NEGATIVE NG/ML
NORHYDROCODONE UR QL CFM: NEGATIVE NG/ML
NORMEPERIDINE UR QL CFM: NEGATIVE NG/ML
NOROXYCODONE UR QL CFM: NEGATIVE NG/ML
OXAZEPAM UR QL CFM: NEGATIVE NG/ML
OXYCODONE UR QL CFM: NEGATIVE NG/ML
OXYMORPHONE UR QL CFM: NEGATIVE NG/ML
OXYMORPHONE UR QL CFM: NEGATIVE NG/ML
PARA-FLUOROFENTANYL QUANTIFICATION: NORMAL NG/ML
PHENOBARB UR QL CFM: NEGATIVE NG/ML
RESULT ALL_PRESCRIBED MEDS AND SPECIAL INSTRUCTIONS: NORMAL
SECOBARBITAL UR QL CFM: NEGATIVE NG/ML
SL AMB 4-ANPP QUANTIFICATION: NORMAL NG/ML
SL AMB 5F-ADB-M7 METABOLITE QUANTIFICATION: NEGATIVE NG/ML
SL AMB 7-OH-MITRAGYNINE (KRATOM ALKALOID) QUANTIFICATION: NEGATIVE NG/ML
SL AMB AB-FUBINACA-M3 METABOLITE QUANTIFICATION: NEGATIVE NG/ML
SL AMB ACETYL FENTANYL QUANTIFICATION: NORMAL NG/ML
SL AMB ACETYL NORFENTANYL QUANTIFICATION: NORMAL NG/ML
SL AMB ACRYL FENTANYL QUANTIFICATION: NORMAL NG/ML
SL AMB CARFENTANIL QUANTIFICATION: NORMAL NG/ML
SL AMB CTHC (MARIJUANA METABOLITE) QUANTIFICATION: NEGATIVE NG/ML
SL AMB DEXTRORPHAN (DEXTROMETHORPHAN METABOLITE) QUANT: NEGATIVE NG/ML
SL AMB GABAPENTIN QUANTIFICATION: NEGATIVE
SL AMB JWH018 METABOLITE QUANTIFICATION: NEGATIVE NG/ML
SL AMB JWH073 METABOLITE QUANTIFICATION: NEGATIVE NG/ML
SL AMB MDMB-FUBINACA-M1 METABOLITE QUANTIFICATION: NEGATIVE NG/ML
SL AMB METHYLONE QUANTIFICATION: NEGATIVE NG/ML
SL AMB N-DESMETHYL-TRAMADOL QUANTIFICATION: NEGATIVE NG/ML
SL AMB PHENTERMINE QUANTIFICATION: NEGATIVE NG/ML
SL AMB PREGABALIN QUANTIFICATION: NEGATIVE
SL AMB RCS4 METABOLITE QUANTIFICATION: NEGATIVE NG/ML
SL AMB RITALINIC ACID QUANTIFICATION: NEGATIVE NG/ML
SMOOTH MUSCLE AB TITR SER IF: NEGATIVE NG/ML
SPECIMEN DRAWN SERPL: NEGATIVE NG/ML
SPECIMEN PH ACCEPTABLE UR: NORMAL
TAPENTADOL UR QL CFM: NEGATIVE NG/ML
TEMAZEPAM UR QL CFM: NEGATIVE NG/ML
TEMAZEPAM UR QL CFM: NEGATIVE NG/ML
TRAMADOL UR QL CFM: NEGATIVE NG/ML
URATE/CREAT 24H UR: NEGATIVE NG/ML

## 2023-01-02 ENCOUNTER — HOSPITAL ENCOUNTER (EMERGENCY)
Facility: HOSPITAL | Age: 23
End: 2023-01-03
Attending: EMERGENCY MEDICINE

## 2023-01-02 VITALS
HEART RATE: 72 BPM | OXYGEN SATURATION: 98 % | SYSTOLIC BLOOD PRESSURE: 121 MMHG | DIASTOLIC BLOOD PRESSURE: 71 MMHG | TEMPERATURE: 97.1 F | RESPIRATION RATE: 18 BRPM

## 2023-01-02 DIAGNOSIS — K21.9 GERD (GASTROESOPHAGEAL REFLUX DISEASE): ICD-10-CM

## 2023-01-02 DIAGNOSIS — Z79.899 ENCOUNTER FOR MONITORING SUBOXONE MAINTENANCE THERAPY: ICD-10-CM

## 2023-01-02 DIAGNOSIS — Z51.81 ENCOUNTER FOR MONITORING SUBOXONE MAINTENANCE THERAPY: ICD-10-CM

## 2023-01-02 DIAGNOSIS — F19.90 SUBSTANCE USE DISORDER: Primary | ICD-10-CM

## 2023-01-02 DIAGNOSIS — Z00.8 MEDICAL CLEARANCE FOR PSYCHIATRIC ADMISSION: ICD-10-CM

## 2023-01-02 DIAGNOSIS — G40.909 SEIZURE DISORDER (HCC): ICD-10-CM

## 2023-01-02 DIAGNOSIS — F32.9 MAJOR DEPRESSIVE DISORDER: ICD-10-CM

## 2023-01-02 DIAGNOSIS — E78.2 MIXED HYPERLIPIDEMIA: ICD-10-CM

## 2023-01-02 NOTE — LETTER
[address] [facility] [address]  Dept: [phone]      EMTALA TRANSFER CONSENT    NAME Bud Daimond                                         2000                              MRN [id number]    I have been informed of my rights regarding examination, treatment, and transfer   by Dr Hnasa Lara,     Benefits: Continuity of care    Risks: Potential for delay in receiving treatment      Consent for Transfer:  I acknowledge that my medical condition has been evaluated and explained to me by the emergency department physician or other qualified medical person and/or my attending physician, who has recommended that I be transferred to the service of  Accepting Physician: Yamilet Odonnell PA-C at [address] Name, [address] : Jarvis MORALES  The above potential benefits of such transfer, the potential risks associated with such transfer, and the probable risks of not being transferred have been explained to me, and I fully understand them  The doctor has explained that, in my case, the benefits of transfer outweigh the risks  I agree to be transferred  I authorize the performance of emergency medical procedures and treatments upon me in both transit and upon arrival at the receiving facility  Additionally, I authorize the release of any and all medical records to the receiving facility and request they be transported with me, if possible  I understand that the safest mode of transportation during a medical emergency is an ambulance and that the Hospital advocates the use of this mode of transport  Risks of traveling to the receiving facility by car, including absence of medical control, life sustaining equipment, such as oxygen, and medical personnel has been explained to me and I fully understand them  (SILVANA CORRECT BOX BELOW)  [ X ]  I consent to the stated transfer and to be transported by ambulance/helicopter    [  ] I consent to the stated transfer, but refuse transportation by ambulance and accept full responsibility for my transportation by car  I understand the risks of non-ambulance transfers and I exonerate the Hospital and its staff from any deterioration in my condition that results from this refusal     X___________________________________________    DATE  23  TIME________  Signature of patient or legally responsible individual signing on patient behalf           RELATIONSHIP TO PATIENT________self_________________                  Provider Certification    NAME Gaye Cruz                                         2000                              MRN [id number]    A medical screening exam was performed on the above named patient  Based on the examination:    Condition Necessitating Transfer The primary encounter diagnosis was Substance use disorder  Diagnoses of Major depressive disorder, Medical clearance for psychiatric admission, Mixed hyperlipidemia, Encounter for monitoring Suboxone maintenance therapy, GERD (gastroesophageal reflux disease), and Se[facility] disorder ([facility] ) were also pertinent to this visit      Patient Condition: The patient has been stabilized such that within reasonable medical probability, no material deterioration of the patient condition or the condition of the unborn child(juan) is likely to result from the transfer    Reason for Transfer: Level of Care needed not available at this facility    Transfer Requirements: [facility], [facility]   · Space available and qualified personnel available for treatment as acknowledged by Tanya Torres [phone]  · Agreed to accept transfer and to provide appropriate medical treatment as acknowledged by       Dejuan Cancino PA-C  · Appropriate medical records of the examination and treatment of the patient are provided at the time of transfer   [address] [facility], [address] __IK_____  · Transfer will be performed by qualified personnel from [facility]  and appropriate transfer equipment as required, including the use of necessary and appropriate life support measures  Provider Certification: I have examined the patient and explained the following risks and benefits of being transferred/refusing transfer to the patient/family:  The patient is stable for psychiatric transfer because they are medically stable, and is protected from harming him/herself or others during transport      Based on these reasonable risks and benefits to the patient and/or the unborn child(juan), and based upon the information available at the time of the patient’s examination, I certify that the medical benefits reasonably to be expected from the provision of appropriate medical treatments at another medical facility outweigh the increasing risks, if any, to the individual’s medical condition, and in the case of labor to the unborn child, from effecting the transfer      X____________________________________________ DATE 01/03/23        TIME_______      ORIGINAL - SEND TO MEDICAL RECORDS   COPY - SEND WITH PATIENT DURING TRANSFER

## 2023-01-02 NOTE — ED NOTES
Yoana Johansen is a 24 y/o male with Opiate abuse episodic, Bipolar 1 disorder depressed moderate, Tourette's, Depression, Polysubstance dependence including opioid type drug with complication, continuous use, PTSD, Anxiety that presented for psychiatric evaluation  Patient arrived at ED via private transportation  Patient cooperative with the assessment  Patient presents with good eye contact  Patient oriented x 4  Patient lives with mother, grandmother, siblings  Patient current unemployed  Patient on Suboxone  Patient reported multiple hospitalizations  Last one October 2022  Patient presents with suicidal ideations  Patient states he was discharged from [facility] in October  Patient stopped taking his medications and relapsed since last Wednesday  Patient unsure as to why he stopped his medications  Patient states “I am doing good while at the hospital  Then when I discharge no one there is helping me, I am lacking that structure, and all fails”  Patient states he has been experiencing intrusive suicidal thoughts with the intend on overdose on drugs  Patient denies homicidal thoughts, intentions or plan  Patient denies self-harming  Patient denies hallucinations  No paranoia, delusions observed  Patient states appetite problems  Patient states sleep problems  Patient reports drug use  Patient relapsed on Meth as of last Wednesday

## 2023-01-02 NOTE — ED PROVIDER NOTES
History  Chief Complaint   Patient presents with   • Psychiatric Evaluation     26-year-old male presents emergency room noting that he relapsed on heroin as well as meth 2 days ago  His last use was yesterday  The patient notes that he is depressed about his situation and has suicidal ideation without definitive plan  He states that he took his last Suboxone yesterday, and would like to get his meds back on track and feels he needs to be hospitalized  He wants to sign himself in  Patient denies any definitive plan but has thoughts of wishing that he was dead  Patient here for evaluation          Prior to Admission Medications   Prescriptions Last Dose Informant Patient Reported? Taking?    QUEtiapine (SEROquel) 200 mg tablet   No No   Sig: Take 1 tablet (200 mg total) by mouth daily at bedtime   Patient taking differently: Take 400 mg by mouth daily at bedtime   QUEtiapine (SEROquel) 300 mg tablet   Yes No   buprenorphine-naloxone (Suboxone) 12-3 MG 2023  No Yes   Sig: Place 1 Film (12 mg total) under the tongue daily   busPIRone (BUSPAR) 5 mg tablet   Yes No   Sig: Take 5 mg by mouth in the morning   cephalexin (KEFLEX) 500 mg capsule   No No   Sig: Take 1 capsule (500 mg total) by mouth every 8 (eight) hours for 10 days   cloNIDine (CATAPRES) 0 1 mg tablet   Yes No   Sig: Take 0 1 mg by mouth once   divalproex sodium (DEPAKOTE ER) 250 mg 24 hr tablet   No No   Sig: Take 3 tablets (750 mg total) by mouth daily   Patient taking differently: Take 750 mg by mouth daily 500mg am and 1000 at pm   hydrOXYzine pamoate (VISTARIL) 50 mg capsule   Yes No   Sig: Take 50 mg by mouth if needed   meloxicam (MOBIC) 15 mg tablet   No No   Sig: Take 1 tablet (15 mg total) by mouth daily   naloxone (Narcan) 4 mg/0 1 mL nasal spray   No No   Si 1 mL (4 mg total) into each nostril as needed (respiratory depression or possible OD)      Facility-Administered Medications: None       Past Medical History:   Diagnosis Date • ADHD (attention deficit hyperactivity disorder)    • Anxiety    • Bipolar disorder ([place] [address] )    • Depression    • GERD (gastroesophageal reflux disease)    • Hyperlipidemia    • Hypertension    • Psychiatric disorder    • Psychiatric illness    • Seizures ([place] [address] )    • Substance abuse ([place] [address] )    • Tourette syndrome        Past Surgical History:   Procedure Laterality Date   • AZ EXC B9 LESION MRGN XCP SK TG T/A/L >4 0 CM Left 10/26/2021    Procedure: EXCISION LIPOMA (BACK); Surgeon: Antoine Brown DO;  Location: [facility];  Service: General   • TONSILECTOMY AND ADNOIDECTOMY     • TONSILLECTOMY     • TYMPANOSTOMY TUBE PLACEMENT         Family History   Problem Relation Age of Onset   • No Known Problems Mother    • Substance Abuse Father    • Cirrhosis Father    • Coronary artery disease Father    • Hepatitis Father    • Substance Abuse Brother    • Diabetes Neg Hx      I have reviewed and agree with the history as documented  E-Cigarette/Vaping   • E-Cigarette Use Never User      E-Cigarette/Vaping Substances   • Nicotine No    • THC No    • CBD No    • Flavoring No    • Other No    • Unknown No      Social History     Tobacco Use   • Smoking status: Every Day     Packs/day: 1 50     Years: 10 00     Pack years: 15 00     Types: Cigarettes   • Smokeless tobacco: Never   Vaping Use   • Vaping Use: Never used   Substance Use Topics   • Alcohol use: Not Currently     Comment: vodka weekly   • Drug use: Yes     Types: Marijuana, Heroin, Methamphetamines     Comment: LAST USED METH TODAY       Review of Systems   Constitutional: Negative for chills and fever  HENT: Negative for ear pain and sore throat  Eyes: Negative for pain and visual disturbance  Respiratory: Negative for cough and shortness of breath  Cardiovascular: Negative for chest pain and palpitations  Gastrointestinal: Negative for abdominal pain and vomiting  Genitourinary: Negative for dysuria and hematuria     Musculoskeletal: Negative for arthralgias and back pain  Skin: Negative for color change and rash  Neurological: Negative for seizures and syncope  All other systems reviewed and are negative  Physical Exam  Physical Exam  Constitutional:       General: He is not in acute distress  Appearance: Normal appearance  He is normal weight  He is not ill-appearing  HENT:      Head: Normocephalic and atraumatic  Right Ear: External ear normal       Left Ear: External ear normal       Nose: Nose normal       Mouth/Throat:      Mouth: Mucous membranes are moist    Eyes:      Conjunctiva/sclera: Conjunctivae normal    Cardiovascular:      Rate and Rhythm: Regular rhythm  Tachycardia present  Pulses: Normal pulses  Heart sounds: Normal heart sounds  Comments: Heart rate 102  Pulmonary:      Effort: Pulmonary effort is normal       Breath sounds: Normal breath sounds  Abdominal:      General: Abdomen is flat  There is no distension  Palpations: Abdomen is soft  There is no mass  Musculoskeletal:         General: No swelling, tenderness or deformity  Normal range of motion  Cervical back: Normal range of motion  Skin:     General: Skin is warm and dry  Capillary Refill: Capillary refill takes 2 to 3 seconds  Coloration: Skin is not pale  Comments: Multiple facial excoriations consistent with meth use   Neurological:      General: No focal deficit present  Mental Status: He is alert and oriented to person, place, and time  Mental status is at baseline     Psychiatric:         Mood and Affect: Mood normal       Comments: Suicidal ideation without plan         Vital Signs  ED Triage Vitals [01/02/23 1453]   Temperature Pulse Respirations Blood Pressure SpO2   98 4 °F (36 9 °C) 82 18 117/71 94 %      Temp Source Heart Rate Source Patient Position - Orthostatic VS BP Location FiO2 (%)   Temporal Monitor Lying Left arm --      Pain Score       No Pain           Vitals:    01/02/23 1453 01/02/23 1958   BP: 117/71 121/71   Pulse: 82 72   Patient Position - Orthostatic VS: Lying Lying         Visual Acuity      ED Medications  Medications - No data to display    Diagnostic Studies  Results Reviewed     Procedure Component Value Units Date/Time    FLU/RSV/COVID - if FLU/RSV clinically relevant [[id number]]  (Normal) Collected: 01/03/23 1349    Lab Status: Final result Specimen: Nares from Nose Updated: 01/03/23 1435     SARS-CoV-2 Negative     INFLUENZA A PCR Negative     INFLUENZA B PCR Negative     RSV PCR Negative    Narrative:      FOR PEDIATRIC PATIENTS - copy/paste COVID Guidelines URL to browser: [email or web address]  [employer]    SARS-CoV-2 assay is a Nucleic Acid Amplification assay intended for the  qualitative detection of nucleic acid from SARS-CoV-2 in nasopharyngeal  swabs  Results are for the presumptive identification of SARS-CoV-2 RNA  Positive results are indicative of infection with SARS-CoV-2, the virus  causing COVID-19, but do not rule out bacterial infection or co-infection  with other viruses  Laboratories within the [place] and its  territories are required to report all positive results to the appropriate  public health authorities  Negative results do not preclude SARS-CoV-2  infection and should not be used as the sole basis for treatment or other  patient management decisions  Negative results must be combined with  clinical observations, patient history, and epidemiological information  This test has not been FDA cleared or approved  This test has been authorized by FDA under an Emergency Use Authorization  (EUA)  This test is only authorized for the duration of time the  declaration that circumstances exist justifying the authorization of the  emergency use of an in vitro diagnostic tests for detection of SARS-CoV-2  virus and/or diagnosis of COVID-19 infection under section 564(b)(1) of  the Act, 21 U  S C  752NSX-7(U)(9), unless the authorization is terminated  or revoked sooner  The test has been validated but independent review by FDA  and CLIA is pending  Test performed using [employer] Gene[employer]pert: This RT-PCR assay targets N2,  a region unique to SARS-CoV-2  A conserved region in the E-gene was chosen  for pan-Sarbecovirus detection which includes SARS-CoV-2  According to CMS-2020-01-R, this platform meets the definition of high-throughput technology  Rapid drug screen, urine [[id number]]  (Abnormal) Collected: 01/02/23 1910    Lab Status: Final result Specimen: Urine, Clean Catch Updated: 01/02/23 1930     Amph/Meth UR Negative     Barbiturate Ur Negative     Benzodiazepine Urine Negative     Cocaine Urine Negative     Methadone Urine Negative     Opiate Urine Negative     PCP Ur Negative     THC Urine Positive     Oxycodone Urine Negative    Narrative:      Presumptive report  If requested, specimen will be sent to reference lab for confirmation  FOR MEDICAL PURPOSES ONLY  IF CONFIRMATION NEEDED PLEASE CONTACT THE LAB WITHIN 5 DAYS  Drug Screen Cutoff Levels:  AMPHETAMINE/METHAMPHETAMINES  1000 ng/mL  BARBITURATES     200 ng/mL  BENZODIAZEPINES     200 ng/mL  COCAINE      300 ng/mL  METHADONE      300 ng/mL  OPIATES      300 ng/mL  PHENCYCLIDINE     25 ng/mL  THC       50 ng/mL  OXYCODONE      100 ng/mL    FLU/RSV/COVID - if FLU/RSV clinically relevant [[id number]]  (Normal) Collected: 01/02/23 1810    Lab Status: Final result Specimen: Nares from Nose Updated: 01/02/23 1853     SARS-CoV-2 Negative     INFLUENZA A PCR Negative     INFLUENZA B PCR Negative     RSV PCR Negative    Narrative:      FOR PEDIATRIC PATIENTS - copy/paste COVID Guidelines URL to browser: [email or web address]/  ashx    SARS-CoV-2 assay is a Nucleic Acid Amplification assay intended for the  qualitative detection of nucleic acid from SARS-CoV-2 in nasopharyngeal  swabs   Results are for the presumptive identification of SARS-CoV-2 RNA  Positive results are indicative of infection with SARS-CoV-2, the virus  causing COVID-19, but do not rule out bacterial infection or co-infection  with other viruses  Laboratories within the [place] and its  territories are required to report all positive results to the appropriate  public health authorities  Negative results do not preclude SARS-CoV-2  infection and should not be used as the sole basis for treatment or other  patient management decisions  Negative results must be combined with  clinical observations, patient history, and epidemiological information  This test has not been FDA cleared or approved  This test has been authorized by FDA under an Emergency Use Authorization  (EUA)  This test is only authorized for the duration of time the  declaration that circumstances exist justifying the authorization of the  emergency use of an in vitro diagnostic tests for detection of SARS-CoV-2  virus and/or diagnosis of COVID-19 infection under section 564(b)(1) of  the Act, 21 U  S C  357XUQ-1(G)(5), unless the authorization is terminated  or revoked sooner  The test has been validated but independent review by FDA  and CLIA is pending  Test performed using [employer] GeneXpert: This RT-PCR assay targets N2,  a region unique to SARS-CoV-2  A conserved region in the E-gene was chosen  for pan-Sarbecovirus detection which includes SARS-CoV-2  According to CMS-2020-01-R, this platform meets the definition of high-throughput technology  POCT alcohol breath test [[id number]]  (Normal) Resulted: 01/02/23 1807    Lab Status: Final result Updated: 01/02/23 1807     EXTBreath Alcohol 0 00                 No orders to display              Procedures  Procedures         ED Course  ED Course as of 01/06/23 0102   Mon Jan 02, 2023   1848 201 signed     2107 THC URINE(!): Positive                               SBIRT 22yo+    Flowsheet Row Most Recent Value SBIRT (23 yo +)    In order to provide better care to our patients, we are screening all of our patients for alcohol and drug use  Would it be okay to ask you these screening questions? Yes Filed at: 01/02/2023 1724   Initial Alcohol Screen: US AUDIT-C     1  How often do you have a drink containing alcohol? 0 Filed at: 01/02/2023 1724   2  How many drinks containing alcohol do you have on a typical day you are drinking? 0 Filed at: 01/02/2023 1724   3a  Male UNDER 65: How often do you have five or more drinks on one occasion? 0 Filed at: 01/02/2023 1724   3b  FEMALE Any Age, or MALE 65+: How often do you have 4 or more drinks on one occassion? 0 Filed at: 01/02/2023 1724   Audit-C Score 0 Filed at: 01/02/2023 1724   JASON: How many times in the past year have you    Used an illegal drug or used a prescription medication for non-medical reasons?  Never Filed at: 01/02/2023 1724                    MDM    Disposition  Final diagnoses:   Substance use disorder   Major depressive disorder     Time reflects when diagnosis was documented in both MDM as applicable and the Disposition within this note     Time User Action Codes Description Comment    1/2/2023  9:07 PM Jourdan Gary Add [F19 90] Substance use disorder     1/2/2023  9:08 PM Luis Thurman Add [F32 9] Major depressive disorder     1/3/2023 11:09 AM Romy Hoit Add [Z00 8] Medical clearance for psychiatric admission     1/3/2023 11:09 AM Romy Hoit Add [E78 2] Mixed hyperlipidemia     1/3/2023 11:10 AM Romy Hoit Add [Z51 81,  T34 313] Encounter for monitoring Suboxone maintenance therapy     1/3/2023 11:10 AM Romy Hoit Add [K21 9] GERD (gastroesophageal reflux disease)     1/3/2023 11:10 AM Romy Hoit Add [G40 909] Seizure disorder [facility])       ED Disposition     ED Disposition   Transfer to [address] [place] [address]   --    Date/Time   Mon Jan 2, 2023  9:08 PM    Comment   Kenn Cano has been medically cleared for behavioral health evaluation and treatment               MD Documentation    Moriah Brasher Most Recent Value   Patient Condition The patient has been stabilized such that within reasonable medical probability, no material deterioration of the patient condition or the condition of the unborn child(juan) is likely to result from the transfer   Reason for Transfer Level of Care needed not available at this facility   Benefits of Transfer Continuity of care   Risks of Transfer Potential for delay in receiving treatment   Accepting Physician Dejuan Cancino PA-C   [address] Name, [address]  [place]    (Name & Tel number) Tanya Torres [phone]   Transported by [facility] and Unit #) [email or web address]   Sending MD Dr Jennifer Felder   Provider Certification The patient is stable for psychiatric transfer because they are medically stable, and is protected from harming him/herself or others during transport      RN Documentation    [address], [address], [facility]    (Name & Tel number) Tanya Torres [phone]   Medications Reviewed with Next Provider of Service Yes   Transport Mode Ambulance   Transported by ([facility] and Unit #) [email or web address]   Level of Care Basic life support   Patient Belongings Disposition Sent with patient   Transfer Date 01/03/23   Transfer Time 2045      Follow-up Information    None         Discharge Medication List as of 1/3/2023  8:01 PM      CONTINUE these medications which have NOT CHANGED    Details   buprenorphine-naloxone (Suboxone) 12-3 MG Place 1 Film (12 mg total) under the tongue daily, Starting Thu 12/22/2022, Normal      busPIRone (BUSPAR) 5 mg tablet Take 5 mg by mouth in the morning, Starting Thu 12/15/2022, Historical Med      cloNIDine (CATAPRES) 0 1 mg tablet Take 0 1 mg by mouth once, Starting Wed 8/3/2022, Historical Med      divalproex sodium (DEPAKOTE ER) 250 mg 24 hr tablet Take 3 tablets (750 mg total) by mouth daily, Starting Tue 1/11/2022, Until Thu 12/22/2022, Print      hydrOXYzine pamoate (VISTARIL) 50 mg capsule Take 50 mg by mouth if needed, Starting Wed 8/3/2022, Historical Med      meloxicam (MOBIC) 15 mg tablet Take 1 tablet (15 mg total) by mouth daily, Starting Fri 8/26/2022, Normal      naloxone (Narcan) 4 mg/0 1 mL nasal spray 0 1 mL (4 mg total) into each nostril as needed (respiratory depression or possible OD), Starting Wed 12/29/2021, Print      QUEtiapine (SEROquel) 300 mg tablet Starting Thu 12/15/2022, Historical Med         STOP taking these medications       cephalexin (KEFLEX) 500 mg capsule Comments:   Reason for Stopping:               No discharge procedures on file      PDMP Review       Value Time User    PDMP Reviewed  Yes 1/3/2023 11:07 AM Em Arredondo PA-C          ED Provider  Electronically Signed by           Barber Marley DO  01/06/23 2233

## 2023-01-02 NOTE — ED NOTES
Pt is looking for inpatient help, pt states he has SI ideation with a plan to "shoot up with heroin and overdose"  Denies HI  Pt is pleasant and cooperative  Pt states he just recently got inpatient help and relapsed Wednesday       Otf Renee  01/02/23 4746

## 2023-01-02 NOTE — LETTER
Section I - General Information    Name of Patient: Rebecca Shah                 : 2000    Medicare #:____________________  Transport Date: 23 (PCS is valid for round trips on this date and for all repetitive trips in the 60-day range as noted below )  Origin: [address] [place] [address]                                                         Destination:________________________________________________  Is the pt's stay covered under Medicare Part A (PPS/DRG)     (_) YES  (_) NO  Closest appropriate facility?  (_) YES  (_) NO  If no, why is transport to more distant facility required?________________________  If hosp-hosp transfer, describe services needed at 2nd facility not available at 1st facility? _________________________________  If hospice pt, is this transport related to pt's terminal illness? (_) YES (_) NO Describe____________________________________    Section II - Medical Necessity Questionnaire  Ambulance transportation is medically necessary only if other means of transport are contraindicated or would be potentially harmful to the patient  To meet this requirement, the patient must either be "bed confined" or suffer from a condition such that transport by means other than ambulance is contraindicated by the patient's condition   The following questions must be answered by the medical professional signing below for this form to be valid:    1)  Describe the MEDICAL CONDITION (physical and/or mental) of this patient AT [address] [place] [address] that requires the patient to be transported in an ambulance and why transport by other means is contraindicated by the patient's condition:__________________________________________________________________________________________________    2) Is the patient "bed confined" as defined below?     (_) YES  (_) NO  To be "be confined" the patient must satisfy all three of the following conditions: (1) unable to get up from bed without Assistance; AND (2) unable to ambulate; AND (3) unable to sit in a chair or wheelchair  3) Can this patient safely be transported by car or wheelchair [facility] (i e , seated during transport without a medical attendant or monitoring)?   (_) YES  (_) NO    4) In addition to completing questions 1-3 above, please check any of the following conditions that apply*:  *Note: supporting documentation for any boxes checked must be maintained in the patient's medical records  (_)Contractures   (_)Non-Healed Fractures  (_)Patient is confused (_)Patient is comatose (_)Moderate/severe pain on movement (_)Danger to self/others  (_)IV meds/fluids required (_)Patient is combative(_)Need or possible need for restraints (_)DVT requires elevation of lower extremity  (_)Medical attendant required (_)Requires oxygen-unable to self administer (_)Special handling/isolation/infection control precautions required (_)Unable to tolerate seated position for time needed to transport (_)Hemodynamic monitoring required en route (_)Unable to sit in a chair or wheelchair due to decubitus ulcers or other wounds (_)Cardiac monitoring required en route (_)Morbid obesity requires additional personnel/equipment to safely handle patient (_)Orthopedic device (backboard, halo, pins, traction, brace, wedge, etc,) requiring special handling during transport (_)Other(specify)_______________________________________________    Section III - Signature of Physician or Healthcare Professional    I certify that the above information is accurate based on my evaluation of this patient, and that the medical necessity provisions of 42  40(e)(1) are met, requiring that this patient be transported by ambulance  I understand this information will be used by the Centers for Medicare and Medicaid Services (CMS) to support the determination of medical necessity for ambulance services   I represent that I am the beneficiary’s attending physician; or an employee of the beneficiary’s attending physician, or the hospital or facility where the beneficiary is being treated and from which the beneficiary is being transported; that I have personal knowledge of the beneficiary’s condition at the time of transport; and that I meet all Medicare regulations and applicable State licensure laws for the credential indicated  (_) If this box is checked, I also certify that the patient is physically or mentally incapable of signing the ambulance service's claim and that the institution with which I am affiliated has furnished care, services, or assistance to the patient  My signature below is made on behalf of the patient pursuant to 42 CFR §424 36(b)(4)  In accordance with 42 CFR §424 37, the specific reason(s) that the patient is physically or mentally incapable of signing the claim form is as follows: _________________________________________________________________________________________________________      Signature of Physician* or Healthcare Professional______________________________________________________________  Signature Date 01/03/23 (For scheduled repetitive transports, this form is not valid for transports performed more than 60 days after this date)    Printed Name & Credentials of Physician or Healthcare Professional (MD, DO, RN, etc )________________________________  *Form must be signed by patient's attending physician for scheduled, repetitive transports   For non-repetitive, unscheduled ambulance transports, if unable to obtain the signature of the attending physician, any of the following may sign (choose appropriate option below)  (_) Physician Assistant   (_)  Clinical Nurse Specialist    (_)  Licensed Practical Nurse    (_)    (_)  Nurse Practitioner     (_)  Registered Nurse                (_)                         (_) Discharge Planner

## 2023-01-02 NOTE — ED NOTES
Patient made aware of urine needed to be cleared medically for crisis eval      [facility]  01/02/23 2816

## 2023-01-03 ENCOUNTER — HOSPITAL ENCOUNTER (INPATIENT)
Facility: HOSPITAL | Age: 23
LOS: 8 days | Discharge: HOME | End: 2023-01-11
Attending: STUDENT IN AN ORGANIZED HEALTH CARE EDUCATION/TRAINING PROGRAM | Admitting: STUDENT IN AN ORGANIZED HEALTH CARE EDUCATION/TRAINING PROGRAM

## 2023-01-03 DIAGNOSIS — G40.909 SEIZURE DISORDER (HCC): ICD-10-CM

## 2023-01-03 DIAGNOSIS — Z51.81 ENCOUNTER FOR MONITORING SUBOXONE MAINTENANCE THERAPY: ICD-10-CM

## 2023-01-03 DIAGNOSIS — Z79.899 ENCOUNTER FOR MONITORING SUBOXONE MAINTENANCE THERAPY: ICD-10-CM

## 2023-01-03 DIAGNOSIS — F31.32 BIPOLAR 1 DISORDER, DEPRESSED, MODERATE (HCC): Primary | ICD-10-CM

## 2023-01-03 DIAGNOSIS — Z00.8 MEDICAL CLEARANCE FOR PSYCHIATRIC ADMISSION: ICD-10-CM

## 2023-01-03 DIAGNOSIS — F11.10 OPIATE ABUSE, EPISODIC (HCC): ICD-10-CM

## 2023-01-03 DIAGNOSIS — E78.2 MIXED HYPERLIPIDEMIA: ICD-10-CM

## 2023-01-03 DIAGNOSIS — Z72.0 TOBACCO USE: ICD-10-CM

## 2023-01-03 DIAGNOSIS — E55.9 VITAMIN D DEFICIENCY: ICD-10-CM

## 2023-01-03 DIAGNOSIS — K21.9 GERD (GASTROESOPHAGEAL REFLUX DISEASE): ICD-10-CM

## 2023-01-03 RX ORDER — DIPHENHYDRAMINE HYDROCHLORIDE 50 MG/ML
50 INJECTION INTRAMUSCULAR; INTRAVENOUS EVERY 6 HOURS PRN
Status: CANCELLED | OUTPATIENT
Start: 2023-01-03

## 2023-01-03 RX ORDER — BENZTROPINE MESYLATE 1 MG/ML
0.5 INJECTION INTRAMUSCULAR; INTRAVENOUS
Status: CANCELLED | OUTPATIENT
Start: 2023-01-03

## 2023-01-03 RX ORDER — MAGNESIUM HYDROXIDE/ALUMINUM HYDROXICE/SIMETHICONE 120; 1200; 1200 MG/30ML; MG/30ML; MG/30ML
30 SUSPENSION ORAL EVERY 4 HOURS PRN
Status: CANCELLED | OUTPATIENT
Start: 2023-01-03

## 2023-01-03 RX ORDER — LORAZEPAM 2 MG/ML
1 INJECTION INTRAMUSCULAR
Status: CANCELLED | OUTPATIENT
Start: 2023-01-03

## 2023-01-03 RX ORDER — HYDROXYZINE 50 MG/1
50 TABLET, FILM COATED ORAL
Status: DISCONTINUED | OUTPATIENT
Start: 2023-01-03 | End: 2023-01-11 | Stop reason: HOSPADM

## 2023-01-03 RX ORDER — HALOPERIDOL 5 MG/ML
2.5 INJECTION INTRAMUSCULAR
Status: CANCELLED | OUTPATIENT
Start: 2023-01-03

## 2023-01-03 RX ORDER — POLYETHYLENE GLYCOL 3350 17 G/17G
17 POWDER, FOR SOLUTION ORAL DAILY PRN
Status: DISCONTINUED | OUTPATIENT
Start: 2023-01-03 | End: 2023-01-11 | Stop reason: HOSPADM

## 2023-01-03 RX ORDER — HYDROXYZINE HYDROCHLORIDE 25 MG/1
25 TABLET, FILM COATED ORAL
Status: CANCELLED | OUTPATIENT
Start: 2023-01-03

## 2023-01-03 RX ORDER — AMOXICILLIN 250 MG
1 CAPSULE ORAL DAILY PRN
Status: CANCELLED | OUTPATIENT
Start: 2023-01-03

## 2023-01-03 RX ORDER — BISACODYL 10 MG
10 SUPPOSITORY, RECTAL RECTAL DAILY PRN
Status: CANCELLED | OUTPATIENT
Start: 2023-01-03

## 2023-01-03 RX ORDER — HALOPERIDOL 5 MG/1
5 TABLET ORAL
Status: DISCONTINUED | OUTPATIENT
Start: 2023-01-03 | End: 2023-01-11 | Stop reason: HOSPADM

## 2023-01-03 RX ORDER — LORAZEPAM 2 MG/ML
2 INJECTION INTRAMUSCULAR
Status: DISCONTINUED | OUTPATIENT
Start: 2023-01-03 | End: 2023-01-11 | Stop reason: HOSPADM

## 2023-01-03 RX ORDER — ACETAMINOPHEN 325 MG/1
650 TABLET ORAL EVERY 4 HOURS PRN
Status: CANCELLED | OUTPATIENT
Start: 2023-01-03

## 2023-01-03 RX ORDER — HALOPERIDOL 5 MG/ML
5 INJECTION INTRAMUSCULAR
Status: DISCONTINUED | OUTPATIENT
Start: 2023-01-03 | End: 2023-01-11 | Stop reason: HOSPADM

## 2023-01-03 RX ORDER — BENZTROPINE MESYLATE 1 MG/1
1 TABLET ORAL
Status: DISCONTINUED | OUTPATIENT
Start: 2023-01-03 | End: 2023-01-11 | Stop reason: HOSPADM

## 2023-01-03 RX ORDER — BENZTROPINE MESYLATE 0.5 MG/1
1 TABLET ORAL
Status: CANCELLED | OUTPATIENT
Start: 2023-01-03

## 2023-01-03 RX ORDER — NALOXONE HYDROCHLORIDE 1 MG/ML
2 INJECTION INTRAMUSCULAR; INTRAVENOUS; SUBCUTANEOUS ONCE AS NEEDED
Status: CANCELLED | OUTPATIENT
Start: 2023-01-03

## 2023-01-03 RX ORDER — BENZTROPINE MESYLATE 1 MG/ML
1 INJECTION INTRAMUSCULAR; INTRAVENOUS
Status: CANCELLED | OUTPATIENT
Start: 2023-01-03

## 2023-01-03 RX ORDER — BENZTROPINE MESYLATE 1 MG/ML
1 INJECTION INTRAMUSCULAR; INTRAVENOUS
Status: DISCONTINUED | OUTPATIENT
Start: 2023-01-03 | End: 2023-01-11 | Stop reason: HOSPADM

## 2023-01-03 RX ORDER — ACETAMINOPHEN 325 MG/1
975 TABLET ORAL EVERY 6 HOURS PRN
Status: CANCELLED | OUTPATIENT
Start: 2023-01-03

## 2023-01-03 RX ORDER — NALOXONE HYDROCHLORIDE 1 MG/ML
2 INJECTION INTRAMUSCULAR; INTRAVENOUS; SUBCUTANEOUS ONCE AS NEEDED
Status: DISCONTINUED | OUTPATIENT
Start: 2023-01-03 | End: 2023-01-11 | Stop reason: HOSPADM

## 2023-01-03 RX ORDER — LORAZEPAM 2 MG/ML
1 INJECTION INTRAMUSCULAR
Status: DISCONTINUED | OUTPATIENT
Start: 2023-01-03 | End: 2023-01-11 | Stop reason: HOSPADM

## 2023-01-03 RX ORDER — TRAZODONE HYDROCHLORIDE 50 MG/1
50 TABLET ORAL
Status: CANCELLED | OUTPATIENT
Start: 2023-01-03

## 2023-01-03 RX ORDER — BENZTROPINE MESYLATE 1 MG/ML
0.5 INJECTION INTRAMUSCULAR; INTRAVENOUS
Status: DISCONTINUED | OUTPATIENT
Start: 2023-01-03 | End: 2023-01-11 | Stop reason: HOSPADM

## 2023-01-03 RX ORDER — ACETAMINOPHEN 325 MG/1
650 TABLET ORAL EVERY 6 HOURS PRN
Status: CANCELLED | OUTPATIENT
Start: 2023-01-03

## 2023-01-03 RX ORDER — HALOPERIDOL 5 MG/ML
2.5 INJECTION INTRAMUSCULAR
Status: DISCONTINUED | OUTPATIENT
Start: 2023-01-03 | End: 2023-01-11 | Stop reason: HOSPADM

## 2023-01-03 RX ORDER — NICOTINE 21 MG/24HR
1 PATCH, TRANSDERMAL 24 HOURS TRANSDERMAL DAILY
Status: DISCONTINUED | OUTPATIENT
Start: 2023-01-04 | End: 2023-01-11 | Stop reason: HOSPADM

## 2023-01-03 RX ORDER — BUPRENORPHINE AND NALOXONE 2; .5 MG/1; MG/1
3 FILM, SOLUBLE BUCCAL; SUBLINGUAL DAILY
Status: DISCONTINUED | OUTPATIENT
Start: 2023-01-03 | End: 2023-01-03

## 2023-01-03 RX ORDER — TRAZODONE HYDROCHLORIDE 50 MG/1
50 TABLET ORAL
Status: DISCONTINUED | OUTPATIENT
Start: 2023-01-03 | End: 2023-01-11 | Stop reason: HOSPADM

## 2023-01-03 RX ORDER — HALOPERIDOL 2 MG/1
2 TABLET ORAL
Status: CANCELLED | OUTPATIENT
Start: 2023-01-03

## 2023-01-03 RX ORDER — HALOPERIDOL 5 MG/1
5 TABLET ORAL
Status: CANCELLED | OUTPATIENT
Start: 2023-01-03

## 2023-01-03 RX ORDER — BUPRENORPHINE AND NALOXONE 8; 2 MG/1; MG/1
8 FILM, SOLUBLE BUCCAL; SUBLINGUAL DAILY
Status: DISCONTINUED | OUTPATIENT
Start: 2023-01-04 | End: 2023-01-11 | Stop reason: HOSPADM

## 2023-01-03 RX ORDER — NICOTINE 21 MG/24HR
1 PATCH, TRANSDERMAL 24 HOURS TRANSDERMAL DAILY
Status: CANCELLED | OUTPATIENT
Start: 2023-01-03

## 2023-01-03 RX ORDER — HYDROXYZINE 50 MG/1
50 TABLET, FILM COATED ORAL
Status: CANCELLED | OUTPATIENT
Start: 2023-01-03

## 2023-01-03 RX ORDER — ACETAMINOPHEN 325 MG/1
650 TABLET ORAL EVERY 4 HOURS PRN
Status: DISCONTINUED | OUTPATIENT
Start: 2023-01-03 | End: 2023-01-11 | Stop reason: HOSPADM

## 2023-01-03 RX ORDER — POLYETHYLENE GLYCOL 3350 17 G/17G
17 POWDER, FOR SOLUTION ORAL DAILY PRN
Status: CANCELLED | OUTPATIENT
Start: 2023-01-03

## 2023-01-03 RX ORDER — LORAZEPAM 2 MG/ML
2 INJECTION INTRAMUSCULAR EVERY 6 HOURS PRN
Status: DISCONTINUED | OUTPATIENT
Start: 2023-01-03 | End: 2023-01-11 | Stop reason: HOSPADM

## 2023-01-03 RX ORDER — HYDROXYZINE HYDROCHLORIDE 25 MG/1
25 TABLET, FILM COATED ORAL
Status: DISCONTINUED | OUTPATIENT
Start: 2023-01-03 | End: 2023-01-11 | Stop reason: HOSPADM

## 2023-01-03 RX ORDER — DIPHENHYDRAMINE HYDROCHLORIDE 50 MG/ML
50 INJECTION INTRAMUSCULAR; INTRAVENOUS EVERY 6 HOURS PRN
Status: DISCONTINUED | OUTPATIENT
Start: 2023-01-03 | End: 2023-01-11 | Stop reason: HOSPADM

## 2023-01-03 RX ORDER — HALOPERIDOL 5 MG/ML
5 INJECTION INTRAMUSCULAR
Status: CANCELLED | OUTPATIENT
Start: 2023-01-03

## 2023-01-03 RX ORDER — LORAZEPAM 2 MG/ML
2 INJECTION INTRAMUSCULAR EVERY 6 HOURS PRN
Status: CANCELLED | OUTPATIENT
Start: 2023-01-03

## 2023-01-03 RX ORDER — HYDROXYZINE 50 MG/1
100 TABLET, FILM COATED ORAL
Status: CANCELLED | OUTPATIENT
Start: 2023-01-03

## 2023-01-03 RX ORDER — BISACODYL 10 MG
10 SUPPOSITORY, RECTAL RECTAL DAILY PRN
Status: DISCONTINUED | OUTPATIENT
Start: 2023-01-03 | End: 2023-01-11 | Stop reason: HOSPADM

## 2023-01-03 RX ORDER — AMOXICILLIN 250 MG
1 CAPSULE ORAL DAILY PRN
Status: DISCONTINUED | OUTPATIENT
Start: 2023-01-03 | End: 2023-01-11 | Stop reason: HOSPADM

## 2023-01-03 RX ORDER — BUPRENORPHINE AND NALOXONE 8; 2 MG/1; MG/1
8 FILM, SOLUBLE BUCCAL; SUBLINGUAL DAILY
Status: CANCELLED | OUTPATIENT
Start: 2023-01-03

## 2023-01-03 RX ORDER — ACETAMINOPHEN 325 MG/1
650 TABLET ORAL EVERY 6 HOURS PRN
Status: DISCONTINUED | OUTPATIENT
Start: 2023-01-03 | End: 2023-01-11 | Stop reason: HOSPADM

## 2023-01-03 RX ORDER — LORAZEPAM 2 MG/ML
2 INJECTION INTRAMUSCULAR
Status: CANCELLED | OUTPATIENT
Start: 2023-01-03

## 2023-01-03 RX ORDER — ACETAMINOPHEN 325 MG/1
975 TABLET ORAL EVERY 6 HOURS PRN
Status: DISCONTINUED | OUTPATIENT
Start: 2023-01-03 | End: 2023-01-11 | Stop reason: HOSPADM

## 2023-01-03 RX ORDER — HALOPERIDOL 2 MG/1
2 TABLET ORAL
Status: DISCONTINUED | OUTPATIENT
Start: 2023-01-03 | End: 2023-01-11 | Stop reason: HOSPADM

## 2023-01-03 RX ORDER — MAGNESIUM HYDROXIDE/ALUMINUM HYDROXICE/SIMETHICONE 120; 1200; 1200 MG/30ML; MG/30ML; MG/30ML
30 SUSPENSION ORAL EVERY 4 HOURS PRN
Status: DISCONTINUED | OUTPATIENT
Start: 2023-01-03 | End: 2023-01-11 | Stop reason: HOSPADM

## 2023-01-03 RX ORDER — HYDROXYZINE 50 MG/1
100 TABLET, FILM COATED ORAL
Status: DISCONTINUED | OUTPATIENT
Start: 2023-01-03 | End: 2023-01-11 | Stop reason: HOSPADM

## 2023-01-03 RX ADMIN — BUPRENORPHINE AND NALOXONE 12 MG: 8; 2 FILM BUCCAL; SUBLINGUAL at 15:19

## 2023-01-03 NOTE — ED NOTES
Pt uses [address] [place] [address] pharmacy to  [employer]   Prescribed by dr Mar Rao, RN  01/03/23 5434 [facility] Helenshady Briscoe RN  01/03/23 4285

## 2023-01-03 NOTE — ED NOTES
Patient is accepted at [facility]  Patient is accepted by Noel Luciano PA-C per **  3 [facility] is TBD         Nurse report is to be called to [phone] prior to patient transfer

## 2023-01-03 NOTE — ED NOTES
Rosemarie Suicide Risk Assessment deferred, as unable to assess while patient sleeping  Behavioral Health Assessment deferred as patient is sleeping and would benefit from additional rest   Vital signs deferred until patient awake, no signs or symptoms of respiratory distress at this time  Once patient is awake and able to participate, will complete assessments         Norma Gibson RN  01/02/23 4262

## 2023-01-03 NOTE — ED CARE HANDOFF
Emergency Department Sign Out Note        Sign out and transfer of care from Dr Samy Sierra  See Separate Emergency Department note  The patient, Lakshmi Zimmerman, was evaluated by the previous provider for 201/SI/Depression  Workup Completed:  Psych clearance    ED Course / Workup Pending (followup):                                      ED Course as of 01/02/23 2115 Mon Jan 02, 2023 2114 Received in sign out:     Pt is 201 for SI/depression w/o plan    Medically cleared: uds, covid, ETOH done    No issues during last shift         Procedures  MDM        Disposition  Final diagnoses:   Substance use disorder   Major depressive disorder     Time reflects when diagnosis was documented in both MDM as applicable and the Disposition within this note     Time User Action Codes Description Comment    1/2/2023  9:07 PM Kareem Clements Add [F19 90] Substance use disorder     1/2/2023  9:08 PM David Thurman Add [F32 9] Major depressive disorder       ED Disposition     ED Disposition   Transfer to [address] [place] [address]   --    Date/Time   Mon Jan 2, 2023  9:08 PM    Comment   Lakshmi Zimmerman has been medically cleared for behavioral health evaluation and treatment               MD Documentation    Flowsheet Row Most Recent Value   Sending MD Kain Clements      Follow-up Information    None       Patient's Medications   Discharge Prescriptions    No medications on file     No discharge procedures on file         ED Provider  Electronically Signed by     Phan Payan MD  01/02/23 2115

## 2023-01-03 NOTE — ED NOTES
Crisis discussed treatment options, recommendation for Inpatient Dual Hospitalizations  Patient in agreement with signing [phone] prepared and signatures obtained  Referral faxed to Intake for the review  Patient informed and provided with his rights and 72 hours notice  No restrictions of placement

## 2023-01-03 NOTE — ED NOTES
Insurance Authorization for admission:   Phone call placed to [facility]  Phone number: [phone]     [facility]  [facility]  Level of care: 201 inpatient mental health  Review on 1/17  Authorization # [id number]      EVS (Eligibility Verification System) called - [phone]    Automated system indicates: active and eligible with [address] [facility])

## 2023-01-03 NOTE — ED CARE HANDOFF
Emergency Department Sign Out Note        Sign out and transfer of care from Dr Zachery Brown  See Separate Emergency Department note  The patient, Ion Pinedo, was evaluated by the previous provider for hx of methamphetamine abuse with depression w/ SI  Paul Gao Workup Completed:  Psych clearance    ED Course / Workup Pending (followup): Bed search                                  ED Course as of 01/03/23 1618   Tue Jan 03, 2023   0700 Received signout from Dr Zachery Brown, 201 signed, no active issues over night  5751 Rounded with Madhav Chun, Being reviewed at [facility]    1344 Accepted at [facility], no  time  [address], RN, confirmed with national pharmacy that patient takes 12-3mg qD, last filled on 12/22/22, order placed  Procedures  MDM        Disposition  Final diagnoses:   Substance use disorder   Major depressive disorder     Time reflects when diagnosis was documented in both MDM as applicable and the Disposition within this note     Time User Action Codes Description Comment    1/2/2023  9:07 PM Sebastian Gut Add [F19 90] Substance use disorder     1/2/2023  9:08 PM Brutico, Gini Parents Add [F32 9] Major depressive disorder     1/3/2023 11:09 AM Marlaine Door Add [Z00 8] Medical clearance for psychiatric admission     1/3/2023 11:09 AM Marlaine Door Add [E78 2] Mixed hyperlipidemia     1/3/2023 11:10 AM Marlaine Door Add [Z51 81,  Z44 625] Encounter for monitoring Suboxone maintenance therapy     1/3/2023 11:10 AM Marlaine Door Add [K21 9] GERD (gastroesophageal reflux disease)     1/3/2023 11:10 AM Marlaine Door Add [G40 909] Seizure disorder [facility])       ED Disposition     ED Disposition   Transfer to [address] [place] [address]   --    Date/Time   Mon Jan 2, 2023  9:08 PM    Comment   Ion Pinedo has been medically cleared for behavioral health evaluation and treatment               MD Angella Guerrero Most Recent Value   Sending MD Carmen Bautista      Follow-up Information None       Patient's Medications   Discharge Prescriptions    No medications on file     No discharge procedures on file         ED Provider  Electronically Signed by     Polo Weaver III,   01/03/23 3536

## 2023-01-03 NOTE — ED NOTES
Bed search: Patient needs dual placement    Intake- faxed referral for the review/no beds available    Nereida-per admission/Rosa Maria, no beds available    Audrey Dyson- per admission/no answer    Sheree- per admission/[place], no beds available    Luma- per admission/Ramandeep, no beds available    Friends- per admission/no beds available    Chalino- per admission/no beds available    Cuong Chavez- per admission/no beds available

## 2023-01-03 NOTE — ED NOTES
Call logged with patients insurance [address] [place] [address], spoke with Frank Alejandro  Awaiting call back to complete authorization

## 2023-01-03 NOTE — ED NOTES
Pt was reviewed by [facility] for a discharge bed on 1/3/23, but to fully admit, pt needs subox script verified  Waiting documentation from nursing staff to set up transport and submit for admitting orders

## 2023-01-03 NOTE — ED NOTES
Rosemarie Suicide Risk Assessment deferred, as unable to assess while patient sleeping  Behavioral Health Assessment deferred as patient is sleeping and would benefit from additional rest   Vital signs deferred until patient awake, no signs or symptoms of respiratory distress at this time  Once patient is awake and able to participate, will complete assessments          Nikita Simon RN  01/03/23 0045

## 2023-01-03 NOTE — ED NOTES
Rosemarie Suicide Risk Assessment deferred, as unable to assess while patient sleeping  Behavioral Health Assessment deferred as patient is sleeping and would benefit from additional rest   Vital signs deferred until patient awake, no signs or symptoms of respiratory distress at this time  Once patient is awake and able to participate, will complete assessments         Kelsy Thrasher RN  01/02/23 2912

## 2023-01-04 ENCOUNTER — TELEPHONE (OUTPATIENT)
Dept: INTERNAL MEDICINE CLINIC | Facility: CLINIC | Age: 23
End: 2023-01-04

## 2023-01-04 LAB
25(OH)D3 SERPL-MCNC: 14.3 NG/ML (ref 30–100)
ALBUMIN SERPL BCP-MCNC: 3.1 G/DL (ref 3.5–5)
ALP SERPL-CCNC: 92 U/L (ref 46–116)
ALT SERPL W P-5'-P-CCNC: 41 U/L (ref 12–78)
ANION GAP SERPL CALCULATED.3IONS-SCNC: 6 MMOL/L (ref 4–13)
AST SERPL W P-5'-P-CCNC: 33 U/L (ref 5–45)
ATRIAL RATE: 63 BPM
BASOPHILS # BLD MANUAL: 0 THOUSAND/UL (ref 0–0.1)
BASOPHILS NFR MAR MANUAL: 0 % (ref 0–1)
BILIRUB SERPL-MCNC: 0.2 MG/DL (ref 0.2–1)
BUN SERPL-MCNC: 15 MG/DL (ref 5–25)
CALCIUM ALBUM COR SERPL-MCNC: 9.3 MG/DL (ref 8.3–10.1)
CALCIUM SERPL-MCNC: 8.6 MG/DL (ref 8.3–10.1)
CHLORIDE SERPL-SCNC: 104 MMOL/L (ref 96–108)
CHOLEST SERPL-MCNC: 105 MG/DL
CO2 SERPL-SCNC: 29 MMOL/L (ref 21–32)
CREAT SERPL-MCNC: 0.65 MG/DL (ref 0.6–1.3)
EOSINOPHIL # BLD MANUAL: 0.36 THOUSAND/UL (ref 0–0.4)
EOSINOPHIL NFR BLD MANUAL: 5 % (ref 0–6)
ERYTHROCYTE [DISTWIDTH] IN BLOOD BY AUTOMATED COUNT: 13.3 % (ref 11.6–15.1)
GFR SERPL CREATININE-BSD FRML MDRD: 138 ML/MIN/1.73SQ M
GLUCOSE SERPL-MCNC: 92 MG/DL (ref 65–140)
HCT VFR BLD AUTO: 39 % (ref 36.5–49.3)
HDLC SERPL-MCNC: 26 MG/DL
HGB BLD-MCNC: 12.5 G/DL (ref 12–17)
LDLC SERPL CALC-MCNC: 51 MG/DL (ref 0–100)
LYMPHOCYTES # BLD AUTO: 3.67 THOUSAND/UL (ref 0.6–4.47)
LYMPHOCYTES # BLD AUTO: 51 % (ref 14–44)
MCH RBC QN AUTO: 27.5 PG (ref 26.8–34.3)
MCHC RBC AUTO-ENTMCNC: 32.1 G/DL (ref 31.4–37.4)
MCV RBC AUTO: 86 FL (ref 82–98)
MONOCYTES # BLD AUTO: 1.01 THOUSAND/UL (ref 0–1.22)
MONOCYTES NFR BLD: 14 % (ref 4–12)
NEUTROPHILS # BLD MANUAL: 2.16 THOUSAND/UL (ref 1.85–7.62)
NEUTS SEG NFR BLD AUTO: 30 % (ref 43–75)
NONHDLC SERPL-MCNC: 79 MG/DL
P AXIS: 14 DEGREES
PLATELET # BLD AUTO: 305 THOUSANDS/UL (ref 149–390)
PLATELET BLD QL SMEAR: ADEQUATE
PMV BLD AUTO: 9.5 FL (ref 8.9–12.7)
POTASSIUM SERPL-SCNC: 4 MMOL/L (ref 3.5–5.3)
PR INTERVAL: 142 MS
PROT SERPL-MCNC: 7.1 G/DL (ref 6.4–8.4)
QRS AXIS: 68 DEGREES
QRSD INTERVAL: 98 MS
QT INTERVAL: 396 MS
QTC INTERVAL: 405 MS
RBC # BLD AUTO: 4.54 MILLION/UL (ref 3.88–5.62)
RBC MORPH BLD: NORMAL
RPR SER QL: NORMAL
SODIUM SERPL-SCNC: 139 MMOL/L (ref 135–147)
T WAVE AXIS: 7 DEGREES
TRIGL SERPL-MCNC: 138 MG/DL
TSH SERPL DL<=0.05 MIU/L-ACNC: 3.12 UIU/ML (ref 0.45–4.5)
VENTRICULAR RATE: 63 BPM
WBC # BLD AUTO: 7.19 THOUSAND/UL (ref 4.31–10.16)

## 2023-01-04 RX ORDER — BUSPIRONE HYDROCHLORIDE 10 MG/1
5 TABLET ORAL 3 TIMES DAILY
Status: DISCONTINUED | OUTPATIENT
Start: 2023-01-04 | End: 2023-01-11 | Stop reason: HOSPADM

## 2023-01-04 RX ORDER — CLONIDINE HYDROCHLORIDE 0.1 MG/1
0.1 TABLET ORAL DAILY
Status: DISCONTINUED | OUTPATIENT
Start: 2023-01-04 | End: 2023-01-11 | Stop reason: HOSPADM

## 2023-01-04 RX ORDER — QUETIAPINE FUMARATE 300 MG/1
300 TABLET, FILM COATED ORAL
Status: DISCONTINUED | OUTPATIENT
Start: 2023-01-04 | End: 2023-01-05

## 2023-01-04 RX ORDER — ESCITALOPRAM OXALATE 10 MG/1
10 TABLET ORAL DAILY
Status: DISCONTINUED | OUTPATIENT
Start: 2023-01-04 | End: 2023-01-11 | Stop reason: HOSPADM

## 2023-01-04 RX ADMIN — BUSPIRONE HYDROCHLORIDE 5 MG: 5 TABLET ORAL at 12:13

## 2023-01-04 RX ADMIN — BUPRENORPHINE AND NALOXONE 8 MG: 8; 2 FILM BUCCAL; SUBLINGUAL at 08:06

## 2023-01-04 RX ADMIN — ESCITALOPRAM OXALATE 10 MG: 10 TABLET ORAL at 12:13

## 2023-01-04 RX ADMIN — BUSPIRONE HYDROCHLORIDE 5 MG: 5 TABLET ORAL at 16:57

## 2023-01-04 RX ADMIN — NICOTINE POLACRILEX 4 MG: 4 GUM, CHEWING BUCCAL at 12:12

## 2023-01-04 RX ADMIN — BUSPIRONE HYDROCHLORIDE 5 MG: 5 TABLET ORAL at 21:42

## 2023-01-04 RX ADMIN — CLONIDINE HYDROCHLORIDE 0.1 MG: 0.1 TABLET ORAL at 12:13

## 2023-01-04 RX ADMIN — QUETIAPINE FUMARATE 300 MG: 300 TABLET ORAL at 21:42

## 2023-01-04 RX ADMIN — NICOTINE POLACRILEX 4 MG: 4 GUM, CHEWING BUCCAL at 16:57

## 2023-01-04 RX ADMIN — DIVALPROEX SODIUM 750 MG: 250 TABLET, FILM COATED, EXTENDED RELEASE ORAL at 12:13
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
## 2023-01-04 NOTE — NURSING NOTE
Bedside:  - 2 t-shirts (one white/one gray)  - 2 boxers (one light blue/one dark blue)  - 3 pairs black socks    Locker:  - 1 red back pack  - 1 pair vans hightops with laces  - 3 electrical cables (1 red, 1 black, 1 black with pedal)  - 1 phone  (NO PHONE)  - 1 hair tie  - 1 polo club body spray  - 1 vibrator  - 1 gray hat  - 2 shorts (both black with strings)  - 2 sweatpants (light gray without strings/dark gray with strings)  - 1 long sleeve shirt with hurt (grey and blue)

## 2023-01-04 NOTE — TREATMENT PLAN
[facility] REVIEW [facility]  [address] [place] 25 y o  2000 male MRN: [id number]    [address] [place] [address] Room / Bed: [facility] 253/[facility] Encounter: [id number]          Admit Date/Time:  1/3/2023  9:02 PM    Treatment Team: Attending Provider: Андрей Shelton MD; Patient Care Technician: Malcom Lorenzo; Care Manager: Darion Nuñez RN; Security: Percgregg Selleroutlets; Patient Care Technician: Tiesha Hensley; Charge Nurse: Alonzo Severs, RN; Charge Nurse: Jaiden Gunter RN; Patient Care Assistant: Lety Barron;  : Tabatha Kan    Diagnosis: Principal Problem:    Bipolar 1 disorder, depressed, moderate ([place] [address] )  Active Problems:    Opiate abuse, episodic ([facility])    Cannabis use disorder, severe, dependence ([place] [address] )    Depression    Polysubstance dependence including opioid type drug with complication, continuous use ([place] [address] )    Posttraumatic stress disorder    Anxiety      Patient Strengths/Assets: cooperative, motivation for treatment/growth, patient is on a voluntary commitment    Patient Barriers/Limitations: financial instability, limited support system, self-care deficit, substance abuse    Short Term Goals: decrease in depressive symptoms, decrease in anxiety symptoms, decrease in paranoid thoughts, decrease in suicidal thoughts, decrease in self abusive behaviors, improvement in insight, improvement in self care, sleep improvement, improvement in appetite, mood stabilization    Long Term Goals: improvement in depression, improvement in anxiety, resolution of depressive symptoms, stabilization of mood, free of suicidal thoughts, no self abusive behavior, improved insight, adequate self care, adequate sleep, adequate appetite    Progress Towards Goals: starting psychiatric medications as prescribed    Recommended Treatment: medication management, patient medication education, group therapy, milieu therapy, continued Behavioral Health psychiatric evaluation/assessment process    Treatment Frequency: daily medication monitoring, group and milieu therapy daily, monitoring through interdisciplinary rounds, monitoring through weekly patient care conferences    Expected Discharge Date:  5-7 days    Discharge Plan: referral for outpatient medication management with a psychiatrist, referral for outpatient psychotherapy, referral to outpatient drug and alcohol rehabilitation program    Treatment Plan Created/Updated By: Cassie Lazcano MD

## 2023-01-04 NOTE — CASE MANAGEMENT
CM called Chloe Keishasteff ([phone]) - Pt's Suboxone Prescriber to inform them of hospitalization, they reported pt had appt scheduled for Thursday 1/5/23 at 9am  They will cancel and CM will contact them when pt is being dc'd to get him another appt  CM also requested that they confirm his Suboxone dose  CM spoke to John Darby who confirmed pt is on 12-3 mg 1 time per day

## 2023-01-04 NOTE — PROGRESS NOTES
New admission - 201 from [facility] ED  Pt is known to Dedra Pollard, last admission 1/4/22-1/11/22  Pt reported recent IP at [facility] in October 2022  Pt reported he relapsed on Meth and Heroin 2 days ago  Pt UDS + for only THC  Pt reported SI without a specific plan  Pt on Suboxone Maintenance  Would like to get back on Psych Meds and possibly inpatient rehab  DC: TBD      01/04/23 0835   Team Meeting   Meeting Type Daily Rounds   Team Members Present   Team Members Present Physician;Nurse;; Other (Discipline and Name)   Physician Team Member Dr Maria Isabel Hassan / Janna Fagan / Lisa Nurse Team Member [facility] Management Team Member Jesus Manuel Kee / Holcomb Boot   Other (Discipline and Name) [place] [facility]   Patient/Family Present   Patient Present No   Patient's Family Present No

## 2023-01-04 NOTE — CMS CERTIFICATION NOTE
Recertification: Based upon physical, mental and social evaluations, I certify that inpatient psychiatric services continue to be medically necessary for this patient for a duration of 7 midnights for the treatment of  Bipolar 1 disorder, depressed, moderate ([facility] [address] )   Available alternative community resources still do not meet the patient's mental health care needs  I further attest that an established written individualized plan of care has been updated and is outlined in the patient's medical records

## 2023-01-04 NOTE — NURSING NOTE
25 yr old male adm to [facility] after relapse on IV  Meth and  Heroin, depressed over relapse, had multiple admissions in past for substance abuse and depression, not taking meds as prescribed, has scabs and marks on face from meth use, denies DEAN YOUNG at this time, is requesting long term rehab after discharge from hospital pleasant and cooperative

## 2023-01-04 NOTE — NURSING NOTE
Patient states allergic to Nicoderm patch, states usually takes 12 mg, not 8 mg that is ordered   Patient took 8 mg  He plans to talk to Dr pollack

## 2023-01-04 NOTE — H&P
Psychiatric Evaluation - [address] [place] [address] [place] 25 y o  male MRN: [id number]  Unit/Bed#: [facility] 253-01 Encounter: [id number]    Assessment/Plan   Principal Problem:    Bipolar 1 disorder, depressed, moderate ([facility] )  Active Problems:    Opiate abuse, episodic (HCC)    Cannabis use disorder, severe, dependence ([facility] )    Depression    Polysubstance dependence including opioid type drug with complication, continuous use ([facility])    Posttraumatic stress disorder    Anxiety    Plan:   Continue Depakote  mg Daily  seroquel 300 mg PO HS  Start lexapro 10 mg PO Daily  Buspar 5 mg TID  Clonidine 0 1 mg Daily  Admit to [address] on [address] status for safety and treatment  No 1:1 continuous observation needed at this time, as patient feels safe on the unit  Check admission labs  Get collaterals  Collaborate with family for baseline assessment and disposition planning  Case discussed with treatment team     Treatment options and alternatives were reviewed with the patient, who concurs with the above plan  Risks, benefits, and possible side effects of medications were explained to the patient, and he verbalizes understanding       -----------------------------------    Chief Complaint: "I was suicidal and overdosed on drugs  My girl friend Mildred Farr me"    History of Present Illness     Per Crisis worker on 1/2:"Tomas Mosquera is a 24 y/o male with Opiate abuse episodic, Bipolar 1 disorder depressed moderate, Tourette's, Depression, Polysubstance dependence including opioid type drug with complication, continuous use, PTSD, Anxiety that presented for psychiatric evaluation  Patient arrived at ED via private transportation  Patient cooperative with the assessment  Patient presents with good eye contact  Patient oriented x 4  Patient lives with mother, grandmother, siblings  Patient current unemployed  Patient on Suboxone  Patient reported multiple hospitalizations  Last one October 2022     Patient presents with suicidal ideations  Patient states he was discharged from [facility] in October  Patient stopped taking his medications and relapsed since last Wednesday  Patient unsure as to why he stopped his medications  Patient states “I am doing good while at the hospital  Then when I discharge no one there is helping me, I am lacking that structure, and all fails”  Patient states he has been experiencing intrusive suicidal thoughts with the intend on overdose on drugs  Patient denies homicidal thoughts, intentions or plan  Patient denies self-harming  Patient denies hallucinations  No paranoia, delusions observed  Patient states appetite problems  Patient states sleep problems  Patient reports drug use  Patient relapsed on Meth as of last Wednesday  "    This is 26 yo male with hx of Bipolar disorder and substance use admitted to inpatient unit on voluntary status for worsening of mood, suicidal ideations and over dosed on drugs to kill self in the context of psychosocial stressors and partial compliance with medications  Patient endorses depressed mood anhedonia, poor sleep, mood swings, irritability and passive death wishes  Denies any intent or plan currently  Psychiatric Review Of Systems:  Medication side effects: none  Sleep: Poor  Appetite: Poor   Hygiene: able to tend to instrumental and basic ADLs  Anxiety: Yes  Psychotic Symptoms: denies  Depression Symptoms: Depressed mood, worthlessness, hopelessness and passive death wishes  Manic Symptoms: Hx of manic episodes  PTSD Symptoms: denies  Suicidal Thoughts: denies  Homicidal Thoughts: denies    Medical Review Of Systems:   Complete ROS is negative, except as noted above      Historical Information     Psychiatric History:   Psychiatry diagnosis:Bipolar disorder/depression/anxiety  Inpatient Hx: Yes   Suicidal Hx:Yes Over dose on drugs, 3 times so far  Self harming behavior Hx:None  Violent behavior Hx:Denies  Outpatient Hx:[facility]  Medications/Trials: Depakote Seroquel Zyprexa    Substance Abuse History:  Long hx since 15years old, Pills, Cannabis, Heroin, Fentanyl Meth  Hx of rehabs  On suboxione  I spent time with Sourav Perez in counseling and education on risk of substance abuse  I assessed motivation and encouraged him for treatment as appropriate       Family Psychiatric History:   Father side- drugs use  Step Grand father- shot himself, many years ago    Social History:  Education: 10th Grade GED  Learning Disabilities:Special education and IEPs  Marital history:Single   Living arrangement: Lives with Mother  Occupational History: Unemployed  Functioning Relationships: Yes  Other Pertinent History:    Hx: Denies  Legal Hx: None    Traumatic History:   Denies    Past Medical History:   Past Medical History:   Diagnosis Date   • ADHD (attention deficit hyperactivity disorder)    • Anxiety    • Bipolar disorder ([facility] )    • Depression    • GERD (gastroesophageal reflux disease)    • Hyperlipidemia    • Hypertension    • Psychiatric disorder    • Psychiatric illness    • Seizures ([place] [address] )    • Substance abuse ([place] [address] )    • Tourette syndrome         -----------------------------------  Objective    Temp:  [97 4 °F (36 3 °C)-98 6 °F (37 °C)] 97 4 °F (36 3 °C)  HR:  [65-81] 72  Resp:  [16-18] 16  BP: (119-145)/(65-74) 119/72    Mental Status Evaluation:    Appearance:  disheveled and unkempt   Behavior:  guarded   Speech:  soft   Mood:  anxious and depressed   Affect:  constricted and mood-congruent   Thought Process:  goal directed and logical   Thought Content:  normal   Perceptual Disturbances: None   Risk Potential: Suicidal Ideations none  Homicidal Ideations none  Potential for Aggression No   Sensorium:  person, place and time/date   Memory:  recent and remote memory grossly intact   Consciousness:  alert and awake    Attention: attention span appeared shorter than expected for age   Insight:  limited   Judgment: limited   Gait/Station: normal gait/station   Motor Activity: no abnormal movements       Meds/Allergies   Allergies   Allergen Reactions   • Abilify [Aripiprazole] Other (See Comments)     Seizures and hyperglycemia     all current active meds have been reviewed    Behavioral Health Medications: all current active meds have been reviewed  Changes as above  Laboratory results:  I have personally reviewed all pertinent laboratory/tests results    Recent Results (from the past 48 hour(s))   POCT alcohol breath test    Collection Time: 01/02/23  6:07 PM   Result Value Ref Range    EXTBreath Alcohol 0 00    FLU/RSV/COVID - if FLU/RSV clinically relevant    Collection Time: 01/02/23  6:10 PM    Specimen: Nose; Nares   Result Value Ref Range    SARS-CoV-2 Negative Negative    INFLUENZA A PCR Negative Negative    INFLUENZA B PCR Negative Negative    RSV PCR Negative Negative   Rapid drug screen, urine    Collection Time: 01/02/23  7:10 PM   Result Value Ref Range    Amph/Meth UR Negative Negative    Barbiturate Ur Negative Negative    Benzodiazepine Urine Negative Negative    Cocaine Urine Negative Negative    Methadone Urine Negative Negative    Opiate Urine Negative Negative    PCP Ur Negative Negative    THC Urine Positive (A) Negative    Oxycodone Urine Negative Negative   FLU/RSV/COVID - if FLU/RSV clinically relevant    Collection Time: 01/03/23  1:49 PM    Specimen: Nose; Nares   Result Value Ref Range    SARS-CoV-2 Negative Negative    INFLUENZA A PCR Negative Negative    INFLUENZA B PCR Negative Negative    RSV PCR Negative Negative   ECG 12 lead    Collection Time: 01/04/23  7:30 AM   Result Value Ref Range    Ventricular Rate 63 BPM    Atrial Rate 63 BPM    ID Interval 142 ms    QRSD Interval 98 ms    QT Interval 396 ms    QTC Interval 405 ms    P Axis 14 degrees    QRS Axis 68 degrees    T Wave Axis 7 degrees   CBC and differential    Collection Time: 01/04/23  7:53 AM   Result Value Ref Range    WBC 7 19 4 31 - 10 16 Thousand/uL RBC 4 54 3 88 - 5 62 Million/uL    Hemoglobin 12 5 12 0 - 17 0 g/dL    Hematocrit 39 0 36 5 - 49 3 %    MCV 86 82 - 98 fL    MCH 27 5 26 8 - 34 3 pg    MCHC 32 1 31 4 - 37 4 g/dL    RDW 13 3 11 6 - 15 1 %    MPV 9 5 8 9 - 12 7 fL    Platelets 881 301 - 615 Thousands/uL   Vitamin D 25 hydroxy    Collection Time: 01/04/23  7:53 AM   Result Value Ref Range    Vit D, 25-Hydroxy 14 3 (L) 30 0 - 100 0 ng/mL   Manual Differential(PHLEBS Do Not Order)    Collection Time: 01/04/23  7:53 AM   Result Value Ref Range    Segmented % 30 (L) 43 - 75 %    Lymphocytes % 51 (H) 14 - 44 %    Monocytes % 14 (H) 4 - 12 %    Eosinophils, % 5 0 - 6 %    Basophils % 0 0 - 1 %    Absolute Neutrophils 2 16 1 85 - 7 62 Thousand/uL    Lymphocytes Absolute 3 67 0 60 - 4 47 Thousand/uL    Monocytes Absolute 1 01 0 00 - 1 22 Thousand/uL    Eosinophils Absolute 0 36 0 00 - 0 40 Thousand/uL    Basophils Absolute 0 00 0 00 - 0 10 Thousand/uL    Total Counted      RBC Morphology Normal     Platelet Estimate Adequate Adequate   Comprehensive metabolic panel    Collection Time: 01/04/23  7:54 AM   Result Value Ref Range    Sodium 139 135 - 147 mmol/L    Potassium 4 0 3 5 - 5 3 mmol/L    Chloride 104 96 - 108 mmol/L    CO2 29 21 - 32 mmol/L    ANION GAP 6 4 - 13 mmol/L    BUN 15 5 - 25 mg/dL    Creatinine 0 65 0 60 - 1 30 mg/dL    Glucose 92 65 - 140 mg/dL    Calcium 8 6 8 3 - 10 1 mg/dL    Corrected Calcium 9 3 8 3 - 10 1 mg/dL    AST 33 5 - 45 U/L    ALT 41 12 - 78 U/L    Alkaline Phosphatase 92 46 - 116 U/L    Total Protein 7 1 6 4 - 8 4 g/dL    Albumin 3 1 (L) 3 5 - 5 0 g/dL    Total Bilirubin 0 20 0 20 - 1 00 mg/dL    eGFR 138 ml/min/1 73sq m   Lipid panel    Collection Time: 01/04/23  7:54 AM   Result Value Ref Range    Cholesterol 105 See Comment mg/dL    Triglycerides 138 See Comment mg/dL    HDL, Direct 26 (L) >=40 mg/dL    LDL Calculated 51 0 - 100 mg/dL    Non-HDL-Chol (CHOL-HDL) 79 mg/dl   TSH, 3rd generation with Free T4 reflex Collection Time: 01/04/23  7:54 AM   Result Value Ref Range    TSH 3RD GENERATON 3 121 0 450 - 4 500 uIU/mL              -----------------------------------    Risks / Benefits of Treatment:     Risks, benefits, and possible side effects of medications explained to patient  The patient verbalizes understanding and agreement for treatment  Counseling / Coordination of Care:     Patient's presentation on admission and proposed treatment plan were discussed with the treatment team   Diagnosis, medication changes and treatment plan were reviewed with the patient  Recent stressors were discussed with the patient  Events leading to admission were reviewed with the patient  Importance of medication and treatment compliance was reviewed with the patient            Inpatient Psychiatric Certification:     Certification: Based upon physical, mental and social evaluations, I certify that inpatient psychiatric services are medically necessary for this patient for a duration of 7 midnights for the treatment of Bipolar 1 disorder, depressed, moderate ([facility] [address] [address] )

## 2023-01-04 NOTE — ASSESSMENT & PLAN NOTE
• Admission labs reviewed, CBC, CMP, RPR, HbA1c, lipid panel, TSH, UA acceptable  •  RPR, HbA1c labs pending  • Vitals stable   • UDS positive for THC  • COVID-19 negative  • EKG reveals NSR HR 63,   • Medically stable for continued inpatient psychiatric treatment based on available results  • Please contact [facility] with any questions or concerns

## 2023-01-04 NOTE — CONSULTS
[address] [address], [address] [facility] y o  male MRN: [id number]  Unit/Bed#: [facility] 253-01 Encounter: [id number]  Primary Care Provider: Carlita Barron DO   Date and time admitted to hospital: 1/3/2023  9:02 PM    Inpatient consult for Medical Clearance for [facility] patient  Consult performed by: Georges Carey PA-C  Consult ordered by: Barb Perry PA-C          * Medical clearance for psychiatric admission  Assessment & Plan  • Admission labs reviewed, CBC, CMP, RPR, HbA1c, lipid panel, TSH, UA acceptable  •  RPR, HbA1c labs pending  • Vitals stable   • UDS positive for THC  • COVID-19 negative  • EKG reveals NSR HR 63,   • Medically stable for continued inpatient psychiatric treatment based on available results  • Please contact [facility] with any questions or concerns      Tobacco use  Assessment & Plan  Provide nicotine patch    Opiate abuse, episodic ([address] )  Assessment & Plan  On suboxone therapy      Recommendations for Discharge:  · [facility] will continue to be available for any questions or concerns  · Follow up with PCP upon discharge  Counseling / Coordination of Care Time: 30 minutes  Greater than 50% of total time spent on patient counseling and coordination of care  Collaboration of Care: Were Recommendations Directly Discussed with Primary Treatment Team? - Yes     History of Present Illness:    Shari Kenyon is a [address] y o  male who is originally admitted to the psychiatric service due to intentional overdose  We are consulted for medical clearance for psychiatric hospitalization and medical management  Patient does not have any past medical history per patient or per chart review  He does have a history of Tourette's, PTSD, anxiety, polysubstance dependence on Suboxone therapy  His UDS positive for THC  His ECG shows normal sinus rhythm with HR 63, QTc 405  Vital signs are stable    He denies any chest pain, shortness of breath, lightheadedness, nausea, vomiting, abdominal pain  He is medically stable for psychiatric admission  Review of Systems:    Review of Systems   Constitutional: Negative for appetite change, chills, fatigue and fever  HENT: Negative for ear pain, sore throat and trouble swallowing  Eyes: Negative for visual disturbance  Respiratory: Negative for cough, chest tightness, shortness of breath and wheezing  Cardiovascular: Negative for chest pain, palpitations and leg swelling  Gastrointestinal: Negative for abdominal distention, abdominal pain, diarrhea, nausea and vomiting  Endocrine: Negative  Genitourinary: Negative for dysuria, flank pain and hematuria  Musculoskeletal: Negative for arthralgias, gait problem and myalgias  Skin: Negative for pallor  Allergic/Immunologic: Negative for immunocompromised state  Neurological: Negative for dizziness, syncope, light-headedness, numbness and headaches  Past Medical and Surgical History:     Past Medical History:   Diagnosis Date   • ADHD (attention deficit hyperactivity disorder)    • Anxiety    • Bipolar disorder ([place] [address] )    • Depression    • GERD (gastroesophageal reflux disease)    • Hyperlipidemia    • Hypertension    • Psychiatric disorder    • Psychiatric illness    • Seizures ([place] [address] )    • Substance abuse ([place] [address] )    • Tourette syndrome        Past Surgical History:   Procedure Laterality Date   • NC EXC B9 LESION MRGN XCP SK TG T/A/L >4 0 CM Left 10/26/2021    Procedure: EXCISION LIPOMA (BACK); Surgeon: Kandice Brunner, DO;  Location: [facility];  Service: General   • TONSILECTOMY AND ADNOIDECTOMY     • TONSILLECTOMY     • TYMPANOSTOMY TUBE PLACEMENT         Meds/Allergies:    all medications and allergies reviewed    Allergies:    Allergies   Allergen Reactions   • Abilify [Aripiprazole] Other (See Comments)     Seizures and hyperglycemia       Social History:     Marital Status: Single    Substance Use History:   Social History     Substance and Sexual Activity   Alcohol Use Not Currently    Comment: vodka weekly     Social History     Tobacco Use   Smoking Status Every Day   • Packs/day: 1 50   • Years: 10 00   • Pack years: 15 00   • Types: Cigarettes   Smokeless Tobacco Never     Social History     Substance and Sexual Activity   Drug Use Yes   • Types: Marijuana, Heroin, Methamphetamines    Comment: LAST USED METH TODAY       Family History:    non-contributory    Physical Exam:     Vitals:   Blood Pressure: 119/72 (01/04/23 1124)  Pulse: 72 (01/04/23 1124)  Temperature: (!) 97 4 °F (36 3 °C) (01/04/23 1124)  Temp Source: Tympanic (01/04/23 1124)  Respirations: 16 (01/04/23 1124)  Height: 5' 5" (165 1 cm) (01/03/23 2110)  Weight - Scale: 89 4 kg (197 lb 3 2 oz) (01/03/23 2110)  SpO2: 98 % (01/03/23 2110)    Physical Exam  Constitutional:       General: He is not in acute distress  HENT:      Head: Normocephalic and atraumatic  Eyes:      Extraocular Movements: Extraocular movements intact  Pupils: Pupils are equal, round, and reactive to light  Cardiovascular:      Rate and Rhythm: Normal rate and regular rhythm  Pulses: Normal pulses  Heart sounds: Normal heart sounds  No murmur heard  No friction rub  No gallop  Pulmonary:      Effort: Pulmonary effort is normal  No respiratory distress  Breath sounds: Normal breath sounds  Abdominal:      General: Abdomen is flat  There is no distension  Palpations: Abdomen is soft  Tenderness: There is no abdominal tenderness  Musculoskeletal:         General: Normal range of motion  Cervical back: Normal range of motion  Skin:     General: Skin is warm and dry  Neurological:      General: No focal deficit present  Mental Status: He is alert        Comments: CN II-XII intact  Ambulated without difficulty/ataxia           Additional Data:     Lab Results:     Results from last 7 days   Lab Units 01/04/23  0753   WBC Thousand/uL 7 19   HEMOGLOBIN g/dL 12 5   HEMATOCRIT % 39 0   PLATELETS Thousands/uL 305   LYMPHO PCT % 51*   MONO PCT % 14*   EOS PCT % 5     Results from last 7 days   Lab Units 01/04/23  0754   SODIUM mmol/L 139   POTASSIUM mmol/L 4 0   CHLORIDE mmol/L 104   CO2 mmol/L 29   BUN mg/dL 15   CREATININE mg/dL 0 65   ANION GAP mmol/L 6   CALCIUM mg/dL 8 6   ALBUMIN g/dL 3 1*   TOTAL BILIRUBIN mg/dL 0 20   ALK PHOS U/L 92   ALT U/L 41   AST U/L 33   GLUCOSE RANDOM mg/dL 92             Lab Results   Component Value Date/Time    HGBA1C 5 3 01/05/2022 05:41 AM    HGBA1C 5 4 11/23/2021 05:40 AM    HGBA1C 5 1 03/31/2021 12:50 PM               Imaging: I have personally reviewed pertinent reports  No orders to display       EKG, Pathology, and Other Studies Reviewed on Admission:   · EKG: see above    ** Please Note: This note has been constructed using a voice recognition system   **

## 2023-01-05 LAB
EST. AVERAGE GLUCOSE BLD GHB EST-MCNC: 97 MG/DL
HBA1C MFR BLD: 5 %

## 2023-01-05 RX ADMIN — BUSPIRONE HYDROCHLORIDE 5 MG: 5 TABLET ORAL at 16:53

## 2023-01-05 RX ADMIN — CLONIDINE HYDROCHLORIDE 0.1 MG: 0.1 TABLET ORAL at 08:55

## 2023-01-05 RX ADMIN — NICOTINE POLACRILEX 4 MG: 4 GUM, CHEWING BUCCAL at 21:33

## 2023-01-05 RX ADMIN — BUSPIRONE HYDROCHLORIDE 5 MG: 5 TABLET ORAL at 08:55

## 2023-01-05 RX ADMIN — QUETIAPINE 250 MG: 200 TABLET ORAL at 21:31

## 2023-01-05 RX ADMIN — BUSPIRONE HYDROCHLORIDE 5 MG: 5 TABLET ORAL at 21:31

## 2023-01-05 RX ADMIN — NICOTINE POLACRILEX 4 MG: 4 GUM, CHEWING BUCCAL at 08:56

## 2023-01-05 RX ADMIN — BUPRENORPHINE AND NALOXONE 8 MG: 8; 2 FILM BUCCAL; SUBLINGUAL at 08:55

## 2023-01-05 RX ADMIN — DIVALPROEX SODIUM 750 MG: 250 TABLET, FILM COATED, EXTENDED RELEASE ORAL at 08:55

## 2023-01-05 RX ADMIN — ESCITALOPRAM OXALATE 10 MG: 10 TABLET ORAL at 08:55

## 2023-01-05 NOTE — TREATMENT TEAM
01/04/23 1330   Activity/Group Checklist   Group Other (Comment)  (art therapy)   Attendance Attended   Attendance Duration (min) 31-45   Interactions Interacted appropriately   Affect/Mood Calm   Goals Achieved Able to engage in interactions; Able to listen to others; Other (Comment)  (authentic, spontaneous self expression connection and insight)

## 2023-01-05 NOTE — NURSING NOTE
Pt remains calm and cooperative throughout the day however minimal in conversation  Denies SI/HI, reports continued anxiety and depression however not bothersome at this time  Pt would not elaborate further  Pt denies any questions regarding prescribed medications  Pt does attend groups however does appear irritated during group and declines participation  Otherwise, pt denies any any questions or concerns

## 2023-01-05 NOTE — PROGRESS NOTES
Pt social at times, denies SI  On Suboxone maintenance  DC: TBD    01/05/23 0800   Team Meeting   Meeting Type Daily Rounds   Team Members Present   Team Members Present Physician;Nurse;; Other (Discipline and Name)   Physician Team Member Dr Chula Roth / Schuyler Cintron / Lorin Schwab Team Member Roseanna Kumari / Kelechi Lozada Management Team Member Debo Dejesus / Trey Guzman   Other (Discipline and Name) Rayna Covert - Art Therapy   Patient/Family Present   Patient Present No   Patient's Family Present No

## 2023-01-05 NOTE — NURSING NOTE
Locker   [employer] bag   Mix match socks   wendys shirt   Long michael spandex pants ( not allowed on  Unit)      Bedside   Hair brush   Black shirt with white   Black shirt   Blue shirt     White sweater   Red sweates   Black shorts   One pair of socks   3 pairs of boxers purple,blue,black spandex like material

## 2023-01-05 NOTE — NURSING NOTE
Pt remains pleasant and social  Denies SI/HI, reports having fluctuating anxiety throughout the day however not bothersome  Encouraged pt to notify staff if feeling like anxiety is increased  Pt denies any questions at this time

## 2023-01-05 NOTE — TREATMENT TEAM
01/04/23 1420   Activity/Group Checklist   Group Admission/Discharge   Attendance Attended   Attendance Duration (min) 0-15   Interactions Interacted appropriately  (pt independently filled out the admission self assessment and DERS forms   once completed he had no questions for this therapist  copy of admission self assessment form will be placed in DC folder)   Affect/Mood Other (Comment)  (agitated, sighing loudly, commenting to himself, fidgeting in seat)   Goals Achieved Able to engage in interactions

## 2023-01-05 NOTE — PROGRESS NOTES
Progress Note - [address] [place] [address] R [place] 25 y o  male MRN: [id number]  Unit/Bed#: [facility] 253-01 Encounter: [id number]    Assessment/Plan   Principal Problem:    Medical clearance for psychiatric admission  Active Problems:    Opiate abuse, episodic ([facility])    Bipolar 1 disorder, depressed, moderate ([facility])    Tobacco use    Cannabis use disorder, severe, dependence ([facility])    Depression    Polysubstance dependence including opioid type drug with complication, continuous use ([facility])    Posttraumatic stress disorder    Anxiety      Subjective: Patient was seen, chart was reviewed, and case was discussed with the team  Patient appears withdrawn, drowsy, disheveled, scant, soft and unkempt  Patient endorses depressed mood and fluctuating anxiety  Sleep and appetite are good  He is compliant with medications  Denies any side effects     Behavior over the last 24 hours:  unchanged  Sleep: normal  Appetite: normal  Medication side effects: No    Medical ROS: Pertinent items are noted in HPI all other systems are negative    Current Medications:  Current Facility-Administered Medications   Medication Dose Route Frequency   • acetaminophen (TYLENOL) tablet 650 mg  650 mg Oral Q6H PRN   • acetaminophen (TYLENOL) tablet 650 mg  650 mg Oral Q4H PRN   • acetaminophen (TYLENOL) tablet 975 mg  975 mg Oral Q6H PRN   • aluminum-magnesium hydroxide-simethicone (MYLANTA) oral suspension 30 mL  30 mL Oral Q4H PRN   • haloperidol lactate (HALDOL) injection 2 5 mg  2 5 mg Intramuscular Q6H PRN Max 4/day    And   • LORazepam (ATIVAN) injection 1 mg  1 mg Intramuscular Q6H PRN Max 4/day    And   • benztropine (COGENTIN) injection 0 5 mg  0 5 mg Intramuscular Q6H PRN Max 4/day   • haloperidol lactate (HALDOL) injection 5 mg  5 mg Intramuscular Q4H PRN Max 4/day    And   • LORazepam (ATIVAN) injection 2 mg  2 mg Intramuscular Q4H PRN Max 4/day    And   • benztropine (COGENTIN) injection 1 mg  1 mg Intramuscular Q4H PRN Max 4/day   • benztropine (COGENTIN) injection 1 mg  1 mg Intramuscular Q4H PRN Max 6/day   • benztropine (COGENTIN) tablet 1 mg  1 mg Oral Q4H PRN Max 6/day   • bisacodyl (DULCOLAX) rectal suppository 10 mg  10 mg Rectal Daily PRN   • buprenorphine-naloxone (Suboxone) film 8 mg  8 mg Sublingual Daily   • busPIRone (BUSPAR) tablet 5 mg  5 mg Oral TID   • cloNIDine (CATAPRES) tablet 0 1 mg  0 1 mg Oral Daily   • hydrOXYzine HCL (ATARAX) tablet 50 mg  50 mg Oral Q6H PRN Max 4/day    Or   • diphenhydrAMINE (BENADRYL) injection 50 mg  50 mg Intramuscular Q6H PRN   • divalproex sodium (DEPAKOTE ER) 24 hr tablet 750 mg  750 mg Oral Daily   • escitalopram (LEXAPRO) tablet 10 mg  10 mg Oral Daily   • glycerin-hypromellose- (ARTIFICIAL TEARS) ophthalmic solution 1 drop  1 drop Both Eyes Q3H PRN   • haloperidol (HALDOL) tablet 2 mg  2 mg Oral Q4H PRN Max 6/day   • haloperidol (HALDOL) tablet 5 mg  5 mg Oral Q6H PRN Max 4/day   • haloperidol (HALDOL) tablet 5 mg  5 mg Oral Q4H PRN Max 4/day   • hydrOXYzine HCL (ATARAX) tablet 100 mg  100 mg Oral Q6H PRN Max 4/day    Or   • LORazepam (ATIVAN) injection 2 mg  2 mg Intramuscular Q6H PRN   • hydrOXYzine HCL (ATARAX) tablet 25 mg  25 mg Oral Q6H PRN Max 4/day   • naloxone (NARCAN) intranasal 2 mg  2 mg Nasal Once PRN   • nicotine (NICODERM CQ) 14 mg/24hr TD 24 hr patch 1 patch  1 patch Transdermal Daily   • nicotine polacrilex (NICORETTE) gum 4 mg  4 mg Oral Q2H PRN   • polyethylene glycol (MIRALAX) packet 17 g  17 g Oral Daily PRN   • QUEtiapine (SEROquel) tablet 300 mg  300 mg Oral HS   • senna-docusate sodium (SENOKOT S) 8 6-50 mg per tablet 1 tablet  1 tablet Oral Daily PRN   • traZODone (DESYREL) tablet 50 mg  50 mg Oral HS PRN       Behavioral Health Medications:   all current active meds have been reviewed      Vitals:  Vitals:    01/05/23 0838   BP: 112/80   Pulse: (!) 118   Resp:    Temp:    SpO2:        Laboratory results:    I have personally reviewed all pertinent laboratory/tests results  Mental Status Evaluation:    Appearance:  disheveled   Behavior:  guarded   Speech:  soft   Mood:  anxious and depressed   Affect:  constricted and flat   Thought Process:  goal directed and logical   Thought Content:  normal   Perceptual Disturbances: None   Risk Potential: Suicidal Ideations none  Homicidal Ideations none  Potential for Aggression No   Sensorium:  person, place and time/date   Memory:  recent and remote memory grossly intact   Consciousness:  drowsy    Attention: attention span appeared shorter than expected for age   Insight:  limited   Judgment: limited   Gait/Station: normal gait/station   Motor Activity: no abnormal movements       Progress Toward Goals: Progressing    Recommended Treatment: Continue with pharmacotherapy, group therapy, milieu therapy and occupational therapy  1 Decrease Seroquel to 250 mg PO HS  Risks, benefits and possible side effects of Medications:   Risks, benefits, alternatives, and possible side effects of patient's psychiatric medications were discussed with patient

## 2023-01-05 NOTE — NURSING NOTE
Patient calm, mostly answers "yes and No" questions, states he was feeling "OK"  Denies SI and HI, was in day room mostly this shift watching television  Encourage patient to feel free to express concerns to staff  Close monitoring continues, all safety precautions maintained

## 2023-01-05 NOTE — PLAN OF CARE
Problem: Ineffective Coping  Goal: Identifies ineffective coping skills  1/5/2023 1752 by Hermelinda Negrete RN  Outcome: Progressing  1/5/2023 1749 by Hermelinda Negrete RN  Outcome: Progressing  Goal: Identifies healthy coping skills  1/5/2023 1752 by Hermelinad Negrete RN  Outcome: Progressing  1/5/2023 1749 by Hermelinda Negrete RN  Outcome: Progressing  Goal: Demonstrates healthy coping skills  1/5/2023 1752 by Hermelinda Negrete RN  Outcome: Progressing  1/5/2023 1749 by Hermelinda Negrete RN  Outcome: Progressing  Goal: Patient/Family participate in treatment and DC plans  Description: Interventions:  - Provide therapeutic environment  1/5/2023 1752 by Hermelinda Negrete RN  Outcome: Progressing  1/5/2023 1749 by Hermelinda Negrete RN  Outcome: Progressing  Goal: Patient/Family verbalizes awareness of resources  1/5/2023 1752 by Hermelinda Negrete RN  Outcome: Progressing  1/5/2023 1749 by Hermelinda Negrete RN  Outcome: Progressing     Problem: DEPRESSION  Goal: Will be euthymic at discharge  Description: INTERVENTIONS:  - Administer medication as ordered  - Provide emotional support via 1:1 interaction with staff  - Encourage involvement in milieu/groups/activities  - Monitor for social isolation  1/5/2023 1752 by Hermelinda Negrete RN  Outcome: Progressing  1/5/2023 1749 by Hermelinda Negrete RN  Outcome: Progressing     Problem: BEHAVIOR  Goal: Pt/Family maintain appropriate behavior and adhere to behavioral management agreement, if implemented  Description: INTERVENTIONS:  - Assess the family dynamic   - Encourage verbalization of thoughts and concerns in a socially appropriate manner  - Assess patient/family's coping skills and non-compliant behavior (including use of illegal substances)    - Utilize positive, consistent limit setting strategies supporting safety of patient, staff and others  - Initiate consult with Case Management, Spiritual Care or other ancillary services as appropriate  - If a patient's/visitor's behavior jeopardizes the safety of the patient, staff, or others, refer to organization procedure     - Notify Security of behavior or suspected illegal substances which indicate the need for search of the patient and/or belongings  - Encourage participation in the decision making process about a behavioral management agreement; implement if patient meets criteria  1/5/2023 1752 by Yaakov Paredes RN  Outcome: Progressing  1/5/2023 1749 by Yaakov Paredes RN  Outcome: Progressing     Problem: ANXIETY  Goal: Will report anxiety at manageable levels  Description: INTERVENTIONS:  - Administer medication as ordered  - Teach and encourage coping skills  - Provide emotional support  - Assess patient/family for anxiety and ability to cope  1/5/2023 1752 by Yaakov Paredes RN  Outcome: Progressing  1/5/2023 1749 by Yaakov Paredes RN  Outcome: Progressing  Goal: By discharge: Patient will verbalize 2 strategies to deal with anxiety  Description: Interventions:  - Identify any obvious source/trigger to anxiety  - Staff will assist patient in applying identified coping technique/skills  - Encourage attendance of scheduled groups and activities  1/5/2023 1752 by Yaakov Paredes RN  Outcome: Progressing  1/5/2023 1749 by Yaakov Paredes RN  Outcome: Progressing     Problem: SUBSTANCE USE/ABUSE  Goal: By discharge, will develop insight into their chemical dependency and sustain motivation to continue in recovery  Description: INTERVENTIONS:  - Attends all daily group sessions and scheduled AA groups  - Actively practices coping skills through participation in the therapeutic community and adherence to program rules  - Reviews and completes assignments from individual treatment plan  - Assist patient development of understanding of their personal cycle of addiction and relapse triggers  1/5/2023 1752 by Yaakov Paredes RN  Outcome: Progressing  1/5/2023 1749 by Jayme Webster RN  Outcome: Progressing  Goal: By discharge, patient will have ongoing treatment plan addressing chemical dependency  Description: INTERVENTIONS:  - Assist patient with resources and/or appointments for ongoing recovery based living  1/5/2023 1752 by Jayme Webster RN  Outcome: Progressing  1/5/2023 1749 by Jayme Webster RN  Outcome: Progressing     Problem: SLEEP DISTURBANCE  Goal: Will exhibit normal sleeping pattern  Description: Interventions:  -  Assess the patients sleep pattern, noting recent changes  - Administer medication as ordered  - Decrease environmental stimuli, including noise, as appropriate during the night  - Encourage the patient to actively participate in unit groups and or exercise during the day to enhance ability to achieve adequate sleep at night  - Assess the patient, in the morning, encouraging a description of sleep experience  1/5/2023 1752 by Jayme Webster RN  Outcome: Progressing  1/5/2023 1749 by Jayme Webster RN  Outcome: Progressing

## 2023-01-06 RX ORDER — OMEGA-3S/DHA/EPA/FISH OIL/D3 300MG-1000
400 CAPSULE ORAL DAILY
Status: DISCONTINUED | OUTPATIENT
Start: 2023-01-07 | End: 2023-01-11 | Stop reason: HOSPADM

## 2023-01-06 RX ADMIN — BUSPIRONE HYDROCHLORIDE 5 MG: 5 TABLET ORAL at 08:04

## 2023-01-06 RX ADMIN — NICOTINE POLACRILEX 4 MG: 4 GUM, CHEWING BUCCAL at 17:06

## 2023-01-06 RX ADMIN — QUETIAPINE 250 MG: 200 TABLET ORAL at 21:03

## 2023-01-06 RX ADMIN — BUPRENORPHINE AND NALOXONE 8 MG: 8; 2 FILM BUCCAL; SUBLINGUAL at 08:03

## 2023-01-06 RX ADMIN — BUSPIRONE HYDROCHLORIDE 5 MG: 5 TABLET ORAL at 21:03

## 2023-01-06 RX ADMIN — CLONIDINE HYDROCHLORIDE 0.1 MG: 0.1 TABLET ORAL at 08:04

## 2023-01-06 RX ADMIN — DIVALPROEX SODIUM 750 MG: 250 TABLET, FILM COATED, EXTENDED RELEASE ORAL at 08:04

## 2023-01-06 RX ADMIN — ESCITALOPRAM OXALATE 10 MG: 10 TABLET ORAL at 08:04

## 2023-01-06 RX ADMIN — BUSPIRONE HYDROCHLORIDE 5 MG: 5 TABLET ORAL at 15:49

## 2023-01-06 RX ADMIN — HYDROXYZINE HYDROCHLORIDE 50 MG: 50 TABLET, FILM COATED ORAL at 17:06

## 2023-01-06 NOTE — NURSING NOTE
Pt c/o constant anxiety with worrying about what he should do, pt requested PRN Haldol and received PRN Atarax 50 mg PO at 1706 for moderate anxiety  Pt appears to be sleeping on reassessment  When asked about his mood, pt shrugged, denied feeling depressed but agreed that he feels "blah " Pt reports improved drowsiness today  Pt was informed that his Seroquel was decreased to 250 mg and requests that it be increased back to 300 mg  Pt denies SI/HI  Pt has been resting in his room for most of this shift  Blunted affect, cooperative

## 2023-01-06 NOTE — TREATMENT TEAM
01/06/23 0987   Activity/Group Checklist   Group Community meeting   Attendance Attended   Attendance Duration (min) 16-30   Interactions Interacted appropriately   Affect/Mood Calm   Goals Achieved Able to engage in interactions; Able to listen to others; Other (Comment)  (identified goals for the day)

## 2023-01-06 NOTE — PLAN OF CARE
Problem: Ineffective Coping  Goal: Identifies ineffective coping skills  Outcome: Progressing  Goal: Identifies healthy coping skills  Outcome: Progressing  Goal: Demonstrates healthy coping skills  Outcome: Progressing  Goal: Participates in unit activities  Description: Interventions:  - Provide therapeutic environment   - Provide required programming   - Redirect inappropriate behaviors   Outcome: Progressing  Goal: Patient/Family participate in treatment and DC plans  Description: Interventions:  - Provide therapeutic environment  Outcome: Progressing  Goal: Patient/Family verbalizes awareness of resources  Outcome: Progressing     Problem: DEPRESSION  Goal: Will be euthymic at discharge  Description: INTERVENTIONS:  - Administer medication as ordered  - Provide emotional support via 1:1 interaction with staff  - Encourage involvement in milieu/groups/activities  - Monitor for social isolation  Outcome: Progressing     Problem: BEHAVIOR  Goal: Pt/Family maintain appropriate behavior and adhere to behavioral management agreement, if implemented  Description: INTERVENTIONS:  - Assess the family dynamic   - Encourage verbalization of thoughts and concerns in a socially appropriate manner  - Assess patient/family's coping skills and non-compliant behavior (including use of illegal substances)  - Utilize positive, consistent limit setting strategies supporting safety of patient, staff and others  - Initiate consult with Case Management, Spiritual Care or other ancillary services as appropriate  - If a patient's/visitor's behavior jeopardizes the safety of the patient, staff, or others, refer to organization procedure     - Notify Security of behavior or suspected illegal substances which indicate the need for search of the patient and/or belongings  - Encourage participation in the decision making process about a behavioral management agreement; implement if patient meets criteria  Outcome: Progressing     Problem: ANXIETY  Goal: Will report anxiety at manageable levels  Description: INTERVENTIONS:  - Administer medication as ordered  - Teach and encourage coping skills  - Provide emotional support  - Assess patient/family for anxiety and ability to cope  Outcome: Progressing  Goal: By discharge: Patient will verbalize 2 strategies to deal with anxiety  Description: Interventions:  - Identify any obvious source/trigger to anxiety  - Staff will assist patient in applying identified coping technique/skills  - Encourage attendance of scheduled groups and activities  Outcome: Progressing     Problem: SUBSTANCE USE/ABUSE  Goal: By discharge, will develop insight into their chemical dependency and sustain motivation to continue in recovery  Description: INTERVENTIONS:  - Attends all daily group sessions and scheduled AA groups  - Actively practices coping skills through participation in the therapeutic community and adherence to program rules  - Reviews and completes assignments from individual treatment plan  - Assist patient development of understanding of their personal cycle of addiction and relapse triggers  Outcome: Progressing  Goal: By discharge, patient will have ongoing treatment plan addressing chemical dependency  Description: INTERVENTIONS:  - Assist patient with resources and/or appointments for ongoing recovery based living  Outcome: Progressing     Problem: SLEEP DISTURBANCE  Goal: Will exhibit normal sleeping pattern  Description: Interventions:  -  Assess the patients sleep pattern, noting recent changes  - Administer medication as ordered  - Decrease environmental stimuli, including noise, as appropriate during the night  - Encourage the patient to actively participate in unit groups and or exercise during the day to enhance ability to achieve adequate sleep at night  - Assess the patient, in the morning, encouraging a description of sleep experience  Outcome: Progressing

## 2023-01-06 NOTE — TREATMENT TEAM
01/06/23 1000   Activity/Group Checklist   Group Exercise  (chair stretches)   Attendance Attended   Attendance Duration (min) 0-15   Interactions Interacted appropriately   Affect/Mood Calm   Goals Achieved Able to listen to others; Able to engage in interactions; Other (Comment)  (briefly stretched along with this therapist and peers)

## 2023-01-06 NOTE — PROGRESS NOTES
Progress Note - [address] [place] [address] R [place] 25 y o  male MRN: [id number]  Unit/Bed#: [facility] 253-01 Encounter: [id number]    Assessment/Plan   Principal Problem:    Medical clearance for psychiatric admission  Active Problems:    Opiate abuse, episodic ([facility])    Bipolar 1 disorder, depressed, moderate ([facility])    Tobacco use    Cannabis use disorder, severe, dependence ([facility])    Depression    Polysubstance dependence including opioid type drug with complication, continuous use ([facility])    Posttraumatic stress disorder    Anxiety      Subjective: Patient was seen, chart was reviewed, and case was discussed with the team  As per report patient slept whole day and night  Patient appears less drowsy today and feels that he is adjusting medications  He is planning to attend groups today  Mood is improving and anxiety is fluctuating  He is compliant with medications  Denies any side effects     Behavior over the last 24 hours:  unchanged  Sleep: normal  Appetite: normal  Medication side effects: No    Medical ROS: Pertinent items are noted in HPI all other systems are negative    Current Medications:  Current Facility-Administered Medications   Medication Dose Route Frequency   • acetaminophen (TYLENOL) tablet 650 mg  650 mg Oral Q6H PRN   • acetaminophen (TYLENOL) tablet 650 mg  650 mg Oral Q4H PRN   • acetaminophen (TYLENOL) tablet 975 mg  975 mg Oral Q6H PRN   • aluminum-magnesium hydroxide-simethicone (MYLANTA) oral suspension 30 mL  30 mL Oral Q4H PRN   • haloperidol lactate (HALDOL) injection 2 5 mg  2 5 mg Intramuscular Q6H PRN Max 4/day    And   • LORazepam (ATIVAN) injection 1 mg  1 mg Intramuscular Q6H PRN Max 4/day    And   • benztropine (COGENTIN) injection 0 5 mg  0 5 mg Intramuscular Q6H PRN Max 4/day   • haloperidol lactate (HALDOL) injection 5 mg  5 mg Intramuscular Q4H PRN Max 4/day    And   • LORazepam (ATIVAN) injection 2 mg  2 mg Intramuscular Q4H PRN Max 4/day    And   • benztropine (COGENTIN) injection 1 mg  1 mg Intramuscular Q4H PRN Max 4/day   • benztropine (COGENTIN) injection 1 mg  1 mg Intramuscular Q4H PRN Max 6/day   • benztropine (COGENTIN) tablet 1 mg  1 mg Oral Q4H PRN Max 6/day   • bisacodyl (DULCOLAX) rectal suppository 10 mg  10 mg Rectal Daily PRN   • buprenorphine-naloxone (Suboxone) film 8 mg  8 mg Sublingual Daily   • busPIRone (BUSPAR) tablet 5 mg  5 mg Oral TID   • cloNIDine (CATAPRES) tablet 0 1 mg  0 1 mg Oral Daily   • hydrOXYzine HCL (ATARAX) tablet 50 mg  50 mg Oral Q6H PRN Max 4/day    Or   • diphenhydrAMINE (BENADRYL) injection 50 mg  50 mg Intramuscular Q6H PRN   • divalproex sodium (DEPAKOTE ER) 24 hr tablet 750 mg  750 mg Oral Daily   • escitalopram (LEXAPRO) tablet 10 mg  10 mg Oral Daily   • glycerin-hypromellose- (ARTIFICIAL TEARS) ophthalmic solution 1 drop  1 drop Both Eyes Q3H PRN   • haloperidol (HALDOL) tablet 2 mg  2 mg Oral Q4H PRN Max 6/day   • haloperidol (HALDOL) tablet 5 mg  5 mg Oral Q6H PRN Max 4/day   • haloperidol (HALDOL) tablet 5 mg  5 mg Oral Q4H PRN Max 4/day   • hydrOXYzine HCL (ATARAX) tablet 100 mg  100 mg Oral Q6H PRN Max 4/day    Or   • LORazepam (ATIVAN) injection 2 mg  2 mg Intramuscular Q6H PRN   • hydrOXYzine HCL (ATARAX) tablet 25 mg  25 mg Oral Q6H PRN Max 4/day   • naloxone (NARCAN) intranasal 2 mg  2 mg Nasal Once PRN   • nicotine (NICODERM CQ) 14 mg/24hr TD 24 hr patch 1 patch  1 patch Transdermal Daily   • nicotine polacrilex (NICORETTE) gum 4 mg  4 mg Oral Q2H PRN   • polyethylene glycol (MIRALAX) packet 17 g  17 g Oral Daily PRN   • QUEtiapine (SEROquel) tablet 250 mg  250 mg Oral HS   • senna-docusate sodium (SENOKOT S) 8 6-50 mg per tablet 1 tablet  1 tablet Oral Daily PRN   • traZODone (DESYREL) tablet 50 mg  50 mg Oral HS PRN       Behavioral Health Medications:   all current active meds have been reviewed      Vitals:  Vitals:    01/06/23 0748   BP: 121/63   Pulse: 76   Resp: 17   Temp: (!) 97 2 °F (36 2 °C)   SpO2: 98%       Laboratory results:    I have personally reviewed all pertinent laboratory/tests results  Mental Status Evaluation:    Appearance:  disheveled and older than stated age   Behavior:  cooperative   Speech:  normal pitch and normal volume   Mood:  anxious and depressed   Affect:  constricted and mood-congruent   Thought Process:  goal directed and logical   Thought Content:  normal   Perceptual Disturbances: None   Risk Potential: Suicidal Ideations none  Homicidal Ideations none  Potential for Aggression No   Sensorium:  person, place and time/date   Memory:  recent and remote memory grossly intact   Consciousness:  alert and awake    Attention: attention span appeared shorter than expected for age   Insight:  limited   Judgment: limited   Gait/Station: normal gait/station   Motor Activity: no abnormal movements       Progress Toward Goals: Progressing    Recommended Treatment: Continue with pharmacotherapy, group therapy, milieu therapy and occupational therapy  1 Valproic acid level on Sunday  Risks, benefits and possible side effects of Medications:   Risks, benefits, alternatives, and possible side effects of patient's psychiatric medications were discussed with patient

## 2023-01-06 NOTE — CASE MANAGEMENT
MARE called [address] [facility] ([phone]) to confirm when pt's upcoming court date as he reported he thinks it is Monday 1/9/23  MARE spoke to [facility] and she reported that hearing is not until 1/19/2023  She provided her email to send admission letter to: [email or web address]     She also advised CM to call [facility]s office to inform them  CM called ([phone]) and spoke to Matthew Richards CM informed them of pt hospitalization and she confirmed his court date is 1/19/23       She reported that 'if he is still hospitalized closer to that date, please call us and let us know "

## 2023-01-06 NOTE — PROGRESS NOTES
Attended group alongside other people on the unit and explored their own experience with mental health recovery and personal growth        01/06/23 1500   Activity/Group Checklist   Group Other (Comment)  (3:00 PM Goal Accomplishments Group)   Attendance Attended   Attendance Duration (min) 31-45   Interactions Interacted appropriately   Affect/Mood Appropriate   Goals Achieved Other (Comment)  (Engaged with CPS and others in conversation around connection to online support groups following graduation from the unit )

## 2023-01-06 NOTE — NURSING NOTE
Pt is awake, visible in Day Room, watching TV currently  Pt reports sleeping well last night, feeling less tired this morning  Discussed recent decrease in Seroquel dose as possible reason for feeling less drowsy today  Pt requesting PRN for constipation, pt reports having a BM yesterday  Discussed fluid intake, being active/walking halls, fibrous foods  Will follow up with pt after groups this morning

## 2023-01-06 NOTE — TREATMENT TEAM
01/06/23 1330   Activity/Group Checklist   Group Other (Comment)  (art therapy)   Attendance Attended   Attendance Duration (min) 31-45  (took breaks when needed)   Interactions Interacted appropriately   Affect/Mood Appropriate   Goals Achieved Able to listen to others; Able to engage in interactions

## 2023-01-07 RX ADMIN — DIVALPROEX SODIUM 750 MG: 250 TABLET, FILM COATED, EXTENDED RELEASE ORAL at 08:21

## 2023-01-07 RX ADMIN — NICOTINE POLACRILEX 4 MG: 4 GUM, CHEWING BUCCAL at 16:11

## 2023-01-07 RX ADMIN — BUSPIRONE HYDROCHLORIDE 5 MG: 5 TABLET ORAL at 16:10

## 2023-01-07 RX ADMIN — ESCITALOPRAM OXALATE 10 MG: 10 TABLET ORAL at 08:22

## 2023-01-07 RX ADMIN — BUPRENORPHINE AND NALOXONE 8 MG: 8; 2 FILM BUCCAL; SUBLINGUAL at 08:22

## 2023-01-07 RX ADMIN — NICOTINE POLACRILEX 4 MG: 4 GUM, CHEWING BUCCAL at 21:03

## 2023-01-07 RX ADMIN — BUSPIRONE HYDROCHLORIDE 5 MG: 5 TABLET ORAL at 08:21

## 2023-01-07 RX ADMIN — HYDROXYZINE HYDROCHLORIDE 50 MG: 50 TABLET, FILM COATED ORAL at 03:46

## 2023-01-07 RX ADMIN — QUETIAPINE 250 MG: 200 TABLET ORAL at 21:02

## 2023-01-07 RX ADMIN — CLONIDINE HYDROCHLORIDE 0.1 MG: 0.1 TABLET ORAL at 08:21

## 2023-01-07 RX ADMIN — CHOLECALCIFEROL (VITAMIN D3) 10 MCG (400 UNIT) TABLET 400 UNITS: at 08:21

## 2023-01-07 RX ADMIN — BUSPIRONE HYDROCHLORIDE 5 MG: 5 TABLET ORAL at 21:02

## 2023-01-07 NOTE — TREATMENT TEAM
01/07/23 0800   Team Meeting   Meeting Type Daily Rounds   Team Members Present   Team Members Present Physician;Nurse   Physician Team Member Dr Amaya Squires Team Member Ala Human   Patient/Family Present   Patient Present No   Patient's Family Present No       Daily Rounds: Provider reviewed pt's chart

## 2023-01-07 NOTE — NURSING NOTE
Pt awoke anxious requesting anxiety medication  Atarax 50 mg administered at 0346  Pt observed in bed with eyes closed at this time

## 2023-01-07 NOTE — NURSING NOTE
Pt appears asleep after receiving Atarax 50 mg at 0346  Medication appears to have been effective  Q 7 min safety rounds maintained

## 2023-01-07 NOTE — PROGRESS NOTES
Progress Note - Behavioral Health   senaitaudra Cano 25 y o  male MRN: [id number]  Unit/Bed#: [facility] 253-01 Encounter: [id number]    Assessment:  Principal Problem:    Medical clearance for psychiatric admission  Active Problems:    Opiate abuse, episodic (HCC)    Bipolar 1 disorder, depressed, moderate ([facility])    Tobacco use    Cannabis use disorder, severe, dependence ([facility])    Depression    Polysubstance dependence including opioid type drug with complication, continuous use ([facility])    Posttraumatic stress disorder    Anxiety      Plan:  --Continue with psychiatric hospitalization  --Continue with individual, group, and milieu therapy  --Continue the following medications:  Current Facility-Administered Medications   Medication Dose Route Frequency   • acetaminophen (TYLENOL) tablet 650 mg  650 mg Oral Q6H PRN   • acetaminophen (TYLENOL) tablet 650 mg  650 mg Oral Q4H PRN   • acetaminophen (TYLENOL) tablet 975 mg  975 mg Oral Q6H PRN   • aluminum-magnesium hydroxide-simethicone (MYLANTA) oral suspension 30 mL  30 mL Oral Q4H PRN   • haloperidol lactate (HALDOL) injection 2 5 mg  2 5 mg Intramuscular Q6H PRN Max 4/day    And   • LORazepam (ATIVAN) injection 1 mg  1 mg Intramuscular Q6H PRN Max 4/day    And   • benztropine (COGENTIN) injection 0 5 mg  0 5 mg Intramuscular Q6H PRN Max 4/day   • haloperidol lactate (HALDOL) injection 5 mg  5 mg Intramuscular Q4H PRN Max 4/day    And   • LORazepam (ATIVAN) injection 2 mg  2 mg Intramuscular Q4H PRN Max 4/day    And   • benztropine (COGENTIN) injection 1 mg  1 mg Intramuscular Q4H PRN Max 4/day   • benztropine (COGENTIN) injection 1 mg  1 mg Intramuscular Q4H PRN Max 6/day   • benztropine (COGENTIN) tablet 1 mg  1 mg Oral Q4H PRN Max 6/day   • bisacodyl (DULCOLAX) rectal suppository 10 mg  10 mg Rectal Daily PRN   • buprenorphine-naloxone (Suboxone) film 8 mg  8 mg Sublingual Daily   • busPIRone (BUSPAR) tablet 5 mg  5 mg Oral TID   • cholecalciferol (VITAMIN D3) tablet 400 Units  400 Units Oral Daily   • cloNIDine (CATAPRES) tablet 0 1 mg  0 1 mg Oral Daily   • hydrOXYzine HCL (ATARAX) tablet 50 mg  50 mg Oral Q6H PRN Max 4/day    Or   • diphenhydrAMINE (BENADRYL) injection 50 mg  50 mg Intramuscular Q6H PRN   • divalproex sodium (DEPAKOTE ER) 24 hr tablet 750 mg  750 mg Oral Daily   • escitalopram (LEXAPRO) tablet 10 mg  10 mg Oral Daily   • glycerin-hypromellose- (ARTIFICIAL TEARS) ophthalmic solution 1 drop  1 drop Both Eyes Q3H PRN   • haloperidol (HALDOL) tablet 2 mg  2 mg Oral Q4H PRN Max 6/day   • haloperidol (HALDOL) tablet 5 mg  5 mg Oral Q6H PRN Max 4/day   • haloperidol (HALDOL) tablet 5 mg  5 mg Oral Q4H PRN Max 4/day   • hydrOXYzine HCL (ATARAX) tablet 100 mg  100 mg Oral Q6H PRN Max 4/day    Or   • LORazepam (ATIVAN) injection 2 mg  2 mg Intramuscular Q6H PRN   • hydrOXYzine HCL (ATARAX) tablet 25 mg  25 mg Oral Q6H PRN Max 4/day   • naloxone (NARCAN) intranasal 2 mg  2 mg Nasal Once PRN   • nicotine (NICODERM CQ) 14 mg/24hr TD 24 hr patch 1 patch  1 patch Transdermal Daily   • nicotine polacrilex (NICORETTE) gum 4 mg  4 mg Oral Q2H PRN   • polyethylene glycol (MIRALAX) packet 17 g  17 g Oral Daily PRN   • QUEtiapine (SEROquel) tablet 250 mg  250 mg Oral HS   • senna-docusate sodium (SENOKOT S) 8 6-50 mg per tablet 1 tablet  1 tablet Oral Daily PRN   • traZODone (DESYREL) tablet 50 mg  50 mg Oral HS PRN       Subjective: Patient was seen for continuation of care  Chart was reviewed and discussed with treatment team      Per nursing note: "Pt appears asleep after receiving Atarax 50 mg at 0346  Medication appears to have been effective  Q 7 min safety rounds maintained "    Today, patient was seen and examined at bedside  He received Atarax as needed with mild effect  He denies adverse effects to his psychiatric medications  He denies any other acute psychiatric concerns or complaint at this time      Vitals:  Vitals:    01/07/23 0755   BP: 128/64   Pulse: 73 Resp: 16   Temp: 97 5 °F (36 4 °C)   SpO2: 99%       Laboratory results:    I have personally reviewed all pertinent laboratory/tests results  No results found for this or any previous visit (from the past 48 hour(s))  Current Medications:  Current medications as per above  All medications have been reviewed  Risks, benefits, alternatives, and possible side effects of patient's psychiatric medications were discussed with patient  Mental Status Evaluation:  Appearance: moderately kempt  Motor: +psychomotor agitation  Behavior: cooperative, pleasant  Speech: normal rate, rhythm, and volume  Mood: "anxious"  Affect: mood-congruent, anxious appearing  Thought Process: organized and linear  Thought Content: denies auditory or visual hallucinations  Perception: denies delusions or other perceptual disturbances  Risk Potential: denies suicidal ideation, plan, or intent  Denies homicidal ideation  Sensorium: Oriented to person, place, time, and situation  Cognition: cognitive ability appears intact but was not quantitatively tested  Consciousness: alert and awake  Attention: currently intact  Insight: fair  Judgement: fair      Progress Toward Goals & Illness Status: Patient is not at goal  They are psychiatrically unstable and are not yet ready for discharge  The patient's condition currently requires active psychopharmacological medication management, interdisciplinary coordination with case management, and the utilization of adjunctive milieu and group therapy to augment psychopharmacological efficacy  The patient's risk of morbidity, and progression or decompensation of psychiatric disease, is high without this current treatment  This note has been constructed using a voice recognition system  There may be translation, syntax, or grammatical errors  If you have any questions, please contact the dictating provider

## 2023-01-07 NOTE — NURSING NOTE
Annie Gustafson is observed to be visible, social and pleasant thus far for the evening  He showered and washed his hair following dinner  Denies any current complaints, reports feeling safe on the unit, with no intentions to harm self or others  RN encouraged pt to continue participating in groups and asking for support if/when necessary

## 2023-01-07 NOTE — PLAN OF CARE
Problem: Ineffective Coping  Goal: Identifies healthy coping skills  1/7/2023 1711 by Naomi Campoverde RN  Outcome: Progressing  1/7/2023 1711 by Naomi Campoverde RN  Outcome: Progressing  Goal: Demonstrates healthy coping skills  1/7/2023 1711 by Naomi Campoverde RN  Outcome: Progressing  1/7/2023 1711 by Naomi Campoverde RN  Outcome: Progressing  Goal: Participates in unit activities  Description: Interventions:  - Provide therapeutic environment   - Provide required programming   - Redirect inappropriate behaviors   1/7/2023 1711 by Naomi Campoverde RN  Outcome: Progressing  1/7/2023 1711 by Naomi Campoverde RN  Outcome: Progressing  Goal: Patient/Family verbalizes awareness of resources  1/7/2023 1711 by Naomi Campoverde RN  Outcome: Progressing  1/7/2023 1711 by Naomi Campoverde RN  Outcome: Progressing     Problem: DEPRESSION  Goal: Will be euthymic at discharge  Description: INTERVENTIONS:  - Administer medication as ordered  - Provide emotional support via 1:1 interaction with staff  - Encourage involvement in milieu/groups/activities  - Monitor for social isolation  1/7/2023 1711 by Naomi Campoverde RN  Outcome: Progressing  1/7/2023 1711 by Naomi Campoverde RN  Outcome: Progressing     Problem: BEHAVIOR  Goal: Pt/Family maintain appropriate behavior and adhere to behavioral management agreement, if implemented  Description: INTERVENTIONS:  - Assess the family dynamic   - Encourage verbalization of thoughts and concerns in a socially appropriate manner  - Assess patient/family's coping skills and non-compliant behavior (including use of illegal substances)  - Utilize positive, consistent limit setting strategies supporting safety of patient, staff and others  - Initiate consult with Case Management, Spiritual Care or other ancillary services as appropriate  - If a patient's/visitor's behavior jeopardizes the safety of the patient, staff, or others, refer to organization procedure     - Notify Security of behavior or suspected illegal substances which indicate the need for search of the patient and/or belongings  - Encourage participation in the decision making process about a behavioral management agreement; implement if patient meets criteria  1/7/2023 1711 by Amrit Carl RN  Outcome: Progressing  1/7/2023 1711 by Amrit Carl RN  Outcome: Progressing     Problem: ANXIETY  Goal: Will report anxiety at manageable levels  Description: INTERVENTIONS:  - Administer medication as ordered  - Teach and encourage coping skills  - Provide emotional support  - Assess patient/family for anxiety and ability to cope  1/7/2023 1711 by Amrit Carl RN  Outcome: Progressing  1/7/2023 1711 by Amrit Carl RN  Outcome: Progressing  Goal: By discharge: Patient will verbalize 2 strategies to deal with anxiety  Description: Interventions:  - Identify any obvious source/trigger to anxiety  - Staff will assist patient in applying identified coping technique/skills  - Encourage attendance of scheduled groups and activities  1/7/2023 1711 by Amrit Carl RN  Outcome: Progressing  1/7/2023 1711 by Amrit Carl RN  Outcome: Progressing     Problem: SUBSTANCE USE/ABUSE  Goal: By discharge, will develop insight into their chemical dependency and sustain motivation to continue in recovery  Description: INTERVENTIONS:  - Attends all daily group sessions and scheduled AA groups  - Actively practices coping skills through participation in the therapeutic community and adherence to program rules  - Reviews and completes assignments from individual treatment plan  - Assist patient development of understanding of their personal cycle of addiction and relapse triggers  1/7/2023 1711 by Amrit Carl RN  Outcome: Progressing  1/7/2023 1711 by Amrit Carl RN  Outcome: Progressing  Goal: By discharge, patient will have ongoing treatment plan addressing chemical dependency  Description: INTERVENTIONS:  - Assist patient with resources and/or appointments for ongoing recovery based living  1/7/2023 1711 by Naomi Campoverde RN  Outcome: Progressing  1/7/2023 1711 by Naomi Campoverde RN  Outcome: Progressing     Problem: SLEEP DISTURBANCE  Goal: Will exhibit normal sleeping pattern  Description: Interventions:  -  Assess the patients sleep pattern, noting recent changes  - Administer medication as ordered  - Decrease environmental stimuli, including noise, as appropriate during the night  - Encourage the patient to actively participate in unit groups and or exercise during the day to enhance ability to achieve adequate sleep at night  - Assess the patient, in the morning, encouraging a description of sleep experience  1/7/2023 1711 by Naomi Campoverde RN  Outcome: Progressing  1/7/2023 1711 by Naomi Campoverde RN  Outcome: Progressing     Problem: DISCHARGE PLANNING  Goal: Discharge to home or other facility with appropriate resources  Description: INTERVENTIONS:  - Identify barriers to discharge w/patient and caregiver  - Arrange for needed discharge resources and transportation as appropriate  - Identify discharge learning needs (meds, wound care, etc )  - Arrange for interpretive services to assist at discharge as needed  - Refer to Case Management Department for coordinating discharge planning if the patient needs post-hospital services based on physician/advanced practitioner order or complex needs related to functional status, cognitive ability, or social support system  1/7/2023 1711 by Naomi Campoverde RN  Outcome: Progressing  1/7/2023 1711 by Naomi Campoverde RN  Outcome: Progressing

## 2023-01-07 NOTE — NURSING NOTE
Pt OOB for breakfast and meds this morning  Returned to rest in bed  Writer encouraged pt to attend groups today, pt unsure at this time  Pt is scant in conversation but overall is cooperative  Denies SI/HI and reports feeling improved mood since admission  Denies any questions or concerns at this time

## 2023-01-08 LAB — VALPROATE SERPL-MCNC: 28 UG/ML (ref 50–100)

## 2023-01-08 RX ADMIN — ESCITALOPRAM OXALATE 10 MG: 10 TABLET ORAL at 08:09

## 2023-01-08 RX ADMIN — CHOLECALCIFEROL (VITAMIN D3) 10 MCG (400 UNIT) TABLET 400 UNITS: at 08:09

## 2023-01-08 RX ADMIN — DIVALPROEX SODIUM 750 MG: 250 TABLET, FILM COATED, EXTENDED RELEASE ORAL at 08:09

## 2023-01-08 RX ADMIN — BUSPIRONE HYDROCHLORIDE 5 MG: 5 TABLET ORAL at 21:34

## 2023-01-08 RX ADMIN — NICOTINE POLACRILEX 4 MG: 4 GUM, CHEWING BUCCAL at 21:35

## 2023-01-08 RX ADMIN — BUSPIRONE HYDROCHLORIDE 5 MG: 5 TABLET ORAL at 15:37

## 2023-01-08 RX ADMIN — HYDROXYZINE HYDROCHLORIDE 50 MG: 50 TABLET, FILM COATED ORAL at 15:37

## 2023-01-08 RX ADMIN — NICOTINE POLACRILEX 4 MG: 4 GUM, CHEWING BUCCAL at 12:43

## 2023-01-08 RX ADMIN — ALUMINUM HYDROXIDE, MAGNESIUM HYDROXIDE, AND DIMETHICONE 30 ML: 200; 20; 200 SUSPENSION ORAL at 15:36

## 2023-01-08 RX ADMIN — CLONIDINE HYDROCHLORIDE 0.1 MG: 0.1 TABLET ORAL at 08:09

## 2023-01-08 RX ADMIN — BUSPIRONE HYDROCHLORIDE 5 MG: 5 TABLET ORAL at 08:09

## 2023-01-08 RX ADMIN — BUPRENORPHINE AND NALOXONE 8 MG: 8; 2 FILM BUCCAL; SUBLINGUAL at 08:11

## 2023-01-08 RX ADMIN — NICOTINE POLACRILEX 4 MG: 4 GUM, CHEWING BUCCAL at 17:54

## 2023-01-08 RX ADMIN — QUETIAPINE 250 MG: 200 TABLET ORAL at 21:34

## 2023-01-08 NOTE — PROGRESS NOTES
Progress Note - Behavioral Health   Yaneth Perez 25 y o  male MRN: [id number]  Unit/Bed#: [facility] 253-01 Encounter: [id number]    Assessment:  Principal Problem:    Medical clearance for psychiatric admission  Active Problems:    Opiate abuse, episodic (HCC)    Bipolar 1 disorder, depressed, moderate (HCC)    Tobacco use    Cannabis use disorder, severe, dependence (HCC)    Depression    Polysubstance dependence including opioid type drug with complication, continuous use ([facility])    Posttraumatic stress disorder    Anxiety      Plan:  --Continue with psychiatric hospitalization  --Continue with individual, group, and milieu therapy  --Continue the following medications:  Current Facility-Administered Medications   Medication Dose Route Frequency   • acetaminophen (TYLENOL) tablet 650 mg  650 mg Oral Q6H PRN   • acetaminophen (TYLENOL) tablet 650 mg  650 mg Oral Q4H PRN   • acetaminophen (TYLENOL) tablet 975 mg  975 mg Oral Q6H PRN   • aluminum-magnesium hydroxide-simethicone (MYLANTA) oral suspension 30 mL  30 mL Oral Q4H PRN   • haloperidol lactate (HALDOL) injection 2 5 mg  2 5 mg Intramuscular Q6H PRN Max 4/day    And   • LORazepam (ATIVAN) injection 1 mg  1 mg Intramuscular Q6H PRN Max 4/day    And   • benztropine (COGENTIN) injection 0 5 mg  0 5 mg Intramuscular Q6H PRN Max 4/day   • haloperidol lactate (HALDOL) injection 5 mg  5 mg Intramuscular Q4H PRN Max 4/day    And   • LORazepam (ATIVAN) injection 2 mg  2 mg Intramuscular Q4H PRN Max 4/day    And   • benztropine (COGENTIN) injection 1 mg  1 mg Intramuscular Q4H PRN Max 4/day   • benztropine (COGENTIN) injection 1 mg  1 mg Intramuscular Q4H PRN Max 6/day   • benztropine (COGENTIN) tablet 1 mg  1 mg Oral Q4H PRN Max 6/day   • bisacodyl (DULCOLAX) rectal suppository 10 mg  10 mg Rectal Daily PRN   • buprenorphine-naloxone (Suboxone) film 8 mg  8 mg Sublingual Daily   • busPIRone (BUSPAR) tablet 5 mg  5 mg Oral TID   • cholecalciferol (VITAMIN D3) tablet 400 Units  400 Units Oral Daily   • cloNIDine (CATAPRES) tablet 0 1 mg  0 1 mg Oral Daily   • hydrOXYzine HCL (ATARAX) tablet 50 mg  50 mg Oral Q6H PRN Max 4/day    Or   • diphenhydrAMINE (BENADRYL) injection 50 mg  50 mg Intramuscular Q6H PRN   • divalproex sodium (DEPAKOTE ER) 24 hr tablet 750 mg  750 mg Oral Daily   • escitalopram (LEXAPRO) tablet 10 mg  10 mg Oral Daily   • glycerin-hypromellose- (ARTIFICIAL TEARS) ophthalmic solution 1 drop  1 drop Both Eyes Q3H PRN   • haloperidol (HALDOL) tablet 2 mg  2 mg Oral Q4H PRN Max 6/day   • haloperidol (HALDOL) tablet 5 mg  5 mg Oral Q6H PRN Max 4/day   • haloperidol (HALDOL) tablet 5 mg  5 mg Oral Q4H PRN Max 4/day   • hydrOXYzine HCL (ATARAX) tablet 100 mg  100 mg Oral Q6H PRN Max 4/day    Or   • LORazepam (ATIVAN) injection 2 mg  2 mg Intramuscular Q6H PRN   • hydrOXYzine HCL (ATARAX) tablet 25 mg  25 mg Oral Q6H PRN Max 4/day   • naloxone (NARCAN) intranasal 2 mg  2 mg Nasal Once PRN   • nicotine (NICODERM CQ) 14 mg/24hr TD 24 hr patch 1 patch  1 patch Transdermal Daily   • nicotine polacrilex (NICORETTE) gum 4 mg  4 mg Oral Q2H PRN   • polyethylene glycol (MIRALAX) packet 17 g  17 g Oral Daily PRN   • QUEtiapine (SEROquel) tablet 250 mg  250 mg Oral HS   • senna-docusate sodium (SENOKOT S) 8 6-50 mg per tablet 1 tablet  1 tablet Oral Daily PRN   • traZODone (DESYREL) tablet 50 mg  50 mg Oral HS PRN       Subjective: Patient was seen for continuation of care  Chart was reviewed and discussed with treatment team      Per nursing note: "Terrea Linear is observed to be visible, social and pleasant thus far for the evening  He showered and washed his hair following dinner  Denies any current complaints, reports feeling safe on the unit, with no intentions to harm self or others  RN encouraged pt to continue participating in groups and asking for support if/when necessary  "    Today, patient was seen and examined at bedside    He is hyper focused on when breakfast will arrive and states that he is "starving "  He denies adverse effects to his psychiatric medications  We discussed that the Depakote level was being drawn and is now pending  Vitals:  Vitals:    01/08/23 0801   BP: 129/69   Pulse: 75   Resp: 18   Temp: (!) 96 9 °F (36 1 °C)   SpO2: 97%       Laboratory results:    I have personally reviewed all pertinent laboratory/tests results  No results found for this or any previous visit (from the past 48 hour(s))  Current Medications:  Current medications as per above  All medications have been reviewed  Risks, benefits, alternatives, and possible side effects of patient's psychiatric medications were discussed with patient  Mental Status Evaluation:  Appearance: moderately kempt  Motor: +psychomotor agitation  Behavior: cooperative, pleasant  Speech: normal rate, rhythm, and volume  Mood: "starving"  Affect: mood-congruent, anxious appearing  Thought Process: organized and linear  Thought Content: denies auditory or visual hallucinations  Perception: denies delusions or other perceptual disturbances  Risk Potential: denies suicidal ideation, plan, or intent  Denies homicidal ideation  Sensorium: Oriented to person, place, time, and situation  Cognition: cognitive ability appears intact but was not quantitatively tested  Consciousness: alert and awake  Attention: currently intact  Insight: fair  Judgement: fair      Progress Toward Goals & Illness Status: Patient is not at goal  They are psychiatrically unstable and are not yet ready for discharge  The patient's condition currently requires active psychopharmacological medication management, interdisciplinary coordination with case management, and the utilization of adjunctive milieu and group therapy to augment psychopharmacological efficacy  The patient's risk of morbidity, and progression or decompensation of psychiatric disease, is high without this current treatment         This note has been constructed using a voice recognition system  There may be translation, syntax, or grammatical errors  If you have any questions, please contact the dictating provider

## 2023-01-08 NOTE — TREATMENT TEAM
01/08/23 0800   Team Meeting   Meeting Type Daily Rounds   Team Members Present   Team Members Present Physician;Nurse   Physician Team Member Dr Corby Bhatt Team Member Kyle Avina   Patient/Family Present   Patient Present No   Patient's Family Present No     Daily Rounds: Pt stable on unit  Depakote level pending

## 2023-01-08 NOTE — NURSING NOTE
Pt received PRN Atarax 50 mg for moderate anxiety  Upon follow up pt resting in bed calmly listening to radio, PRN effective

## 2023-01-08 NOTE — NURSING NOTE
Pt restless on unit this morning  Frequently coming to nurses station to check the time when waiting for meds and breakfast   Focused on food and menu  Generally pleasant and cooperative during interaction  Observed napping in bed during morning groups  Denies SI or thoughts of self harm

## 2023-01-08 NOTE — PLAN OF CARE
Problem: Ineffective Coping  Goal: Identifies healthy coping skills  Outcome: Progressing  Goal: Demonstrates healthy coping skills  Outcome: Progressing  Goal: Participates in unit activities  Description: Interventions:  - Provide therapeutic environment   - Provide required programming   - Redirect inappropriate behaviors   Outcome: Progressing  Goal: Patient/Family verbalizes awareness of resources  Outcome: Progressing     Problem: DEPRESSION  Goal: Will be euthymic at discharge  Description: INTERVENTIONS:  - Administer medication as ordered  - Provide emotional support via 1:1 interaction with staff  - Encourage involvement in milieu/groups/activities  - Monitor for social isolation  Outcome: Progressing     Problem: BEHAVIOR  Goal: Pt/Family maintain appropriate behavior and adhere to behavioral management agreement, if implemented  Description: INTERVENTIONS:  - Assess the family dynamic   - Encourage verbalization of thoughts and concerns in a socially appropriate manner  - Assess patient/family's coping skills and non-compliant behavior (including use of illegal substances)  - Utilize positive, consistent limit setting strategies supporting safety of patient, staff and others  - Initiate consult with Case Management, Spiritual Care or other ancillary services as appropriate  - If a patient's/visitor's behavior jeopardizes the safety of the patient, staff, or others, refer to organization procedure     - Notify Security of behavior or suspected illegal substances which indicate the need for search of the patient and/or belongings  - Encourage participation in the decision making process about a behavioral management agreement; implement if patient meets criteria  Outcome: Progressing     Problem: ANXIETY  Goal: Will report anxiety at manageable levels  Description: INTERVENTIONS:  - Administer medication as ordered  - Teach and encourage coping skills  - Provide emotional support  - Assess patient/family for anxiety and ability to cope  Outcome: Progressing  Goal: By discharge: Patient will verbalize 2 strategies to deal with anxiety  Description: Interventions:  - Identify any obvious source/trigger to anxiety  - Staff will assist patient in applying identified coping technique/skills  - Encourage attendance of scheduled groups and activities  Outcome: Progressing     Problem: SUBSTANCE USE/ABUSE  Goal: By discharge, will develop insight into their chemical dependency and sustain motivation to continue in recovery  Description: INTERVENTIONS:  - Attends all daily group sessions and scheduled AA groups  - Actively practices coping skills through participation in the therapeutic community and adherence to program rules  - Reviews and completes assignments from individual treatment plan  - Assist patient development of understanding of their personal cycle of addiction and relapse triggers  Outcome: Progressing  Goal: By discharge, patient will have ongoing treatment plan addressing chemical dependency  Description: INTERVENTIONS:  - Assist patient with resources and/or appointments for ongoing recovery based living  Outcome: Progressing     Problem: SLEEP DISTURBANCE  Goal: Will exhibit normal sleeping pattern  Description: Interventions:  -  Assess the patients sleep pattern, noting recent changes  - Administer medication as ordered  - Decrease environmental stimuli, including noise, as appropriate during the night  - Encourage the patient to actively participate in unit groups and or exercise during the day to enhance ability to achieve adequate sleep at night  - Assess the patient, in the morning, encouraging a description of sleep experience  Outcome: Progressing     Problem: DISCHARGE PLANNING  Goal: Discharge to home or other facility with appropriate resources  Description: INTERVENTIONS:  - Identify barriers to discharge w/patient and caregiver  - Arrange for needed discharge resources and transportation as appropriate  - Identify discharge learning needs (meds, wound care, etc )  - Arrange for interpretive services to assist at discharge as needed  - Refer to Case Management Department for coordinating discharge planning if the patient needs post-hospital services based on physician/advanced practitioner order or complex needs related to functional status, cognitive ability, or social support system  Outcome: Progressing

## 2023-01-08 NOTE — NURSING NOTE
Patient pleasant, social and cooperative this evening  Pt reports struggling with "craving to use" meth and reports he is restless because of this  He denies SI and utilizes radio often, at times requiring staff to request he turn it down and pt has been receptive  Meaghan Montes remains food focused, asking frequently when meal times are and asking for extra food, at times requiring staff to intervene and discouraging the amount of food pt is requesting to consume due to risk for metabolic syndrome

## 2023-01-09 RX ORDER — DIVALPROEX SODIUM 500 MG/1
1000 TABLET, EXTENDED RELEASE ORAL DAILY
Status: DISCONTINUED | OUTPATIENT
Start: 2023-01-10 | End: 2023-01-11 | Stop reason: HOSPADM

## 2023-01-09 RX ORDER — QUETIAPINE FUMARATE 300 MG/1
300 TABLET, FILM COATED ORAL
Status: DISCONTINUED | OUTPATIENT
Start: 2023-01-09 | End: 2023-01-11 | Stop reason: HOSPADM

## 2023-01-09 RX ADMIN — BUSPIRONE HYDROCHLORIDE 5 MG: 5 TABLET ORAL at 17:04

## 2023-01-09 RX ADMIN — QUETIAPINE FUMARATE 300 MG: 300 TABLET ORAL at 21:53

## 2023-01-09 RX ADMIN — BUSPIRONE HYDROCHLORIDE 5 MG: 5 TABLET ORAL at 21:53

## 2023-01-09 RX ADMIN — NICOTINE POLACRILEX 4 MG: 4 GUM, CHEWING BUCCAL at 17:09

## 2023-01-09 RX ADMIN — CLONIDINE HYDROCHLORIDE 0.1 MG: 0.1 TABLET ORAL at 09:07

## 2023-01-09 RX ADMIN — BUPRENORPHINE AND NALOXONE 8 MG: 8; 2 FILM BUCCAL; SUBLINGUAL at 08:03

## 2023-01-09 RX ADMIN — BUSPIRONE HYDROCHLORIDE 5 MG: 5 TABLET ORAL at 08:01

## 2023-01-09 RX ADMIN — NICOTINE POLACRILEX 4 MG: 4 GUM, CHEWING BUCCAL at 09:08

## 2023-01-09 RX ADMIN — NICOTINE POLACRILEX 4 MG: 4 GUM, CHEWING BUCCAL at 19:55

## 2023-01-09 RX ADMIN — CHOLECALCIFEROL (VITAMIN D3) 10 MCG (400 UNIT) TABLET 400 UNITS: at 08:01

## 2023-01-09 RX ADMIN — ESCITALOPRAM OXALATE 10 MG: 10 TABLET ORAL at 08:01

## 2023-01-09 RX ADMIN — NICOTINE POLACRILEX 4 MG: 4 GUM, CHEWING BUCCAL at 21:55

## 2023-01-09 RX ADMIN — DIVALPROEX SODIUM 750 MG: 250 TABLET, FILM COATED, EXTENDED RELEASE ORAL at 08:01

## 2023-01-09 NOTE — CASE MANAGEMENT
MARE met with pt to check in about dc plans  Pt signed 72 hour notice this morning 1/9/23 @ 745 am and asked if CM was able to reschedule his hearing  CM informed him that CM called [facility] [employer] on Friday and they reported that his hearing is not until 1/19/23  Pt reported "I was just talking to my mom and she said that too "     CM informed pt that he is set for dc on Wednesday as he reported to provider he wanted medication changes  Pt verbalized understanding  Pt continues to report interest in rehab but was adamant about wanting to dc home first "to take care of court stuff and some other thing "     MARE informed him that rehabs typically like for individual to transfer from hospital to their rehab facility but CM can begin calling [facility] to see if they would accept pt and that pt would have to coordinate with them directly after dc on Wednesday for his admission to the rehab  Pt verbalized understanding and continued to say "you're not going to trick me and make me go right to rehab first Wednesday right?"     MARE informed him that it is ultimately his decision on how he proceeds with rehab and CM can assist him in contacting them to get the intake process started and then it would be his responsibility to follow up and coordinate transportation to the rehab that he is accepted to  CM assisted pt in filling out [address] application and will hold onto it until pt is discharged  Pt will need to drop form off to the [facility] office directly as they do not allow email or faxed form

## 2023-01-09 NOTE — CASE MANAGEMENT
MARE called Dr [facility] office ([phone]) and spoke to Dank Beverly to inform her that pt will dc on Wednesday 1/11/23 and asked if he can be scheduled for a follow up appointment  She reported they do not have an appt until Monday January 16, 2023 at 10am      She scheduled pt for that appt and Dank Beverly reported "he will need to call me on Wednesday and we will write him a bridge script for his Suboxone until his appt on Monday  If he can't make that appt he will need to call me "     MARE informed treatment team and will inform pt of above

## 2023-01-09 NOTE — NURSING NOTE
Pt signed a 72 hour notice @5645 1/9  Reports having court today and stated " and case management better have rescheduled it or I am going to flip out"  Pt demanding morning medication and irritable with redirection  Pt sat right in front of medication room and yelled out "Can I get my medication now!" twice  While getting morning medication, pt was doing jumping jacks and restless  Pt stated "Yo, can I get two Suboxone"  Pt redirected and irritable with being asked to wait at medication room until Suboxone dissolves  Pt walking the halls with peers  Denies SI/HI/AH/VH  Pt has poor insight  Denies any question or concern at this time

## 2023-01-09 NOTE — CASE MANAGEMENT
MARE received call from [address] [place] [address] ([phone]) and she wanted to see if pt was willing to speak to her Tuesday 1/10/23 around 10am to answer some questions and for how she can help pt after dc with resources  MARE will provide information to pt to see if he would like to talk with Estuardo Knox

## 2023-01-09 NOTE — NURSING NOTE
Patient slept through the night, no periods of restlessness observed  Woke around 0500, currently in dayroom
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
## 2023-01-09 NOTE — PROGRESS NOTES
Diagnosis of Bipolar 1 disorder, depressed, moderate reviewed  Short term goals for decrease in depressive symptoms, decrease in anxiety symptoms, decrease in paranoid thoughts, decrease in suicidal thoughts, decrease in self abusive behaviors, improvement in insight, improvement in self care, sleep improvement, improvement in appetite, mood stabilization discussed  All present parties in agreement and treatment plan signed      01/06/23 7946   Team Meeting   Meeting Type Tx Team Meeting   Team Members Present   Team Members Present Physician;Nurse;   Physician Team Member Dr Shahla Truong Team Member [facility] Management Team Member Tomasa   Patient/Family Present   Patient Present No  (pt declined to meet with treatment team)   Patient's Family Present No

## 2023-01-09 NOTE — TREATMENT TEAM
01/09/23 1000   Activity/Group Checklist   Group Exercise  (1 mile walk (17 laps))   Attendance Attended   Attendance Duration (min) 16-30   Interactions Other (Comment)  (required redirection for initiating inappropriate conversations with peers  responded well to redirection)   Affect/Mood Appropriate   Goals Achieved Able to engage in interactions; Other (Comment)  (stretched and walked laps with this therapist and peers )

## 2023-01-09 NOTE — NURSING NOTE
Pt needing frequent redirection on the unit  Pt was observed throwing stress ball, stating "If staff even try and take this from me, they better give me a shot of ativan and restrain me!" pt also making several jokes loudly regarding drug use  Pt difficult to redirect  Denies SI/HI, AVH  Declined to attend groups  Utilizes radio at time

## 2023-01-09 NOTE — TREATMENT TEAM
01/09/23 0969   Activity/Group Checklist   Group Community meeting   Attendance Attended   Attendance Duration (min) 16-30   Interactions Other (Comment)  (needed redirection for inappropriate comments, responded well to redirection)   Affect/Mood Appropriate   Goals Achieved Able to listen to others; Able to engage in interactions; Other (Comment)  (identified goals for the day)

## 2023-01-09 NOTE — PLAN OF CARE
Problem: Ineffective Coping  Goal: Identifies healthy coping skills  Outcome: Progressing  Goal: Demonstrates healthy coping skills  Outcome: Progressing  Goal: Patient/Family verbalizes awareness of resources  Outcome: Progressing     Problem: DEPRESSION  Goal: Will be euthymic at discharge  Description: INTERVENTIONS:  - Administer medication as ordered  - Provide emotional support via 1:1 interaction with staff  - Encourage involvement in milieu/groups/activities  - Monitor for social isolation  Outcome: Progressing     Problem: BEHAVIOR  Goal: Pt/Family maintain appropriate behavior and adhere to behavioral management agreement, if implemented  Description: INTERVENTIONS:  - Assess the family dynamic   - Encourage verbalization of thoughts and concerns in a socially appropriate manner  - Assess patient/family's coping skills and non-compliant behavior (including use of illegal substances)  - Utilize positive, consistent limit setting strategies supporting safety of patient, staff and others  - Initiate consult with Case Management, Spiritual Care or other ancillary services as appropriate  - If a patient's/visitor's behavior jeopardizes the safety of the patient, staff, or others, refer to organization procedure     - Notify Security of behavior or suspected illegal substances which indicate the need for search of the patient and/or belongings  - Encourage participation in the decision making process about a behavioral management agreement; implement if patient meets criteria  Outcome: Progressing     Problem: ANXIETY  Goal: Will report anxiety at manageable levels  Description: INTERVENTIONS:  - Administer medication as ordered  - Teach and encourage coping skills  - Provide emotional support  - Assess patient/family for anxiety and ability to cope  Outcome: Progressing  Goal: By discharge: Patient will verbalize 2 strategies to deal with anxiety  Description: Interventions:  - Identify any obvious source/trigger to anxiety  - Staff will assist patient in applying identified coping technique/skills  - Encourage attendance of scheduled groups and activities  Outcome: Progressing     Problem: SUBSTANCE USE/ABUSE  Goal: By discharge, will develop insight into their chemical dependency and sustain motivation to continue in recovery  Description: INTERVENTIONS:  - Attends all daily group sessions and scheduled AA groups  - Actively practices coping skills through participation in the therapeutic community and adherence to program rules  - Reviews and completes assignments from individual treatment plan  - Assist patient development of understanding of their personal cycle of addiction and relapse triggers  Outcome: Progressing  Goal: By discharge, patient will have ongoing treatment plan addressing chemical dependency  Description: INTERVENTIONS:  - Assist patient with resources and/or appointments for ongoing recovery based living  Outcome: Progressing     Problem: SLEEP DISTURBANCE  Goal: Will exhibit normal sleeping pattern  Description: Interventions:  -  Assess the patients sleep pattern, noting recent changes  - Administer medication as ordered  - Decrease environmental stimuli, including noise, as appropriate during the night  - Encourage the patient to actively participate in unit groups and or exercise during the day to enhance ability to achieve adequate sleep at night  - Assess the patient, in the morning, encouraging a description of sleep experience  Outcome: Progressing

## 2023-01-10 PROBLEM — Z00.8 MEDICAL CLEARANCE FOR PSYCHIATRIC ADMISSION: Status: RESOLVED | Noted: 2020-08-05 | Resolved: 2023-01-10

## 2023-01-10 RX ADMIN — BUSPIRONE HYDROCHLORIDE 5 MG: 5 TABLET ORAL at 21:33

## 2023-01-10 RX ADMIN — NICOTINE POLACRILEX 4 MG: 4 GUM, CHEWING BUCCAL at 11:17

## 2023-01-10 RX ADMIN — QUETIAPINE FUMARATE 300 MG: 300 TABLET ORAL at 21:34

## 2023-01-10 RX ADMIN — BUPRENORPHINE AND NALOXONE 8 MG: 8; 2 FILM BUCCAL; SUBLINGUAL at 09:09

## 2023-01-10 RX ADMIN — CLONIDINE HYDROCHLORIDE 0.1 MG: 0.1 TABLET ORAL at 09:07

## 2023-01-10 RX ADMIN — DIVALPROEX SODIUM 1000 MG: 500 TABLET, EXTENDED RELEASE ORAL at 09:07

## 2023-01-10 RX ADMIN — NICOTINE POLACRILEX 4 MG: 4 GUM, CHEWING BUCCAL at 21:33

## 2023-01-10 RX ADMIN — BUSPIRONE HYDROCHLORIDE 5 MG: 5 TABLET ORAL at 09:07

## 2023-01-10 RX ADMIN — NICOTINE POLACRILEX 4 MG: 4 GUM, CHEWING BUCCAL at 17:09

## 2023-01-10 RX ADMIN — NICOTINE POLACRILEX 4 MG: 4 GUM, CHEWING BUCCAL at 09:10

## 2023-01-10 RX ADMIN — BUSPIRONE HYDROCHLORIDE 5 MG: 5 TABLET ORAL at 17:00

## 2023-01-10 RX ADMIN — ESCITALOPRAM OXALATE 10 MG: 10 TABLET ORAL at 09:08

## 2023-01-10 RX ADMIN — CHOLECALCIFEROL (VITAMIN D3) 10 MCG (400 UNIT) TABLET 400 UNITS: at 09:07

## 2023-01-10 NOTE — CASE MANAGEMENT
CM sent transport request to [facility] for pt dc tomorrow to his home at: [address], [address]

## 2023-01-10 NOTE — PROGRESS NOTES
Pt denies SI, reported fluctuating anxiety  Slept all day  DC: Next week     01/06/23 6340   Team Meeting   Meeting Type Daily Rounds   Team Members Present   Team Members Present Physician;Nurse;; Other (Discipline and Name)   Physician Team Member Dr Balijt Jaeger / Natalya Hayes / Tri Ramsey Team Member [facility] Management Team Member Devika Carranza / Sharan Greenberg   Other (Discipline and Name) [place] [facility]   Patient/Family Present   Patient Present No   Patient's Family Present No

## 2023-01-10 NOTE — CASE MANAGEMENT
CM assisted pt in getting on the phone to speak to [facility] from [facility]  She wanted to speak to pt to offer support while inpatient and when he is discharged  Pt was asleep but woke up to speak to [facility]

## 2023-01-10 NOTE — PROGRESS NOTES
Progress Note - [address] 25 y o  male MRN: [id number]   Unit/Bed#: U 253-01 Encounter: [id number]    Behavior over the last 24 hours: unchanged  Michelle Messina is a 61-year-old male with a history of bipolar disorder and polysubstance abuse who presents for psychiatric follow-up  Staff reports that he has been inappropriate at times in the milieu, talking about using drugs and shooting various drugs into his veins  Otherwise, no behavioral issues  He is sedated upon approach and seems to be dozing off to sleep at times during our conversation, requiring redirection  States "the medicines are good  Do not change them "  Seroquel and Depakote were both increased last night  He states he plans on sleeping all day today so that I can get him 1 day closer to discharge  He is focused on rehab and his upcoming court date  No SI or HI  No AH or VH  Sleep: hypersomnia  Appetite: normal  Medication side effects: Yes - drowsiness   ROS: all other systems are negative    Mental Status Evaluation:    Appearance: casually dressed, appears consistent with stated age, unkempt, bearded  Motor: no psychomotor disturbances, no gait abnormalities, in bed  Behavior: Somewhat sedated, limited eye contact  Speech: Soft, scant  Mood: "good, ready to go home"  Affect: Constricted, mood-congruent  Thought Process: organized, linear, and goal-oriented; concrete associations  Thought Content: denies any delusional material, no preoccupation, minimizing  Perception: denies any auditory or visual hallucinations, denies other perceptual disturbances  Risk Potential: denies suicidal ideation, plan, or intent   Denies homicidal ideation  Sensorium: Oriented to person, place, time, and situation  Cognition: cognitive ability appears intact but was not quantitatively tested  Consciousness: alert and awake  Attention/Concentration: able to focus without difficulty, attention and concentration are shorter than expected for age and requires redirection at times  Insight: Limited  Judgement: Limited    Vital signs in last 24 hours:    Temp:  [97 9 °F (36 6 °C)-98 3 °F (36 8 °C)] 97 9 °F (36 6 °C)  HR:  [59-68] 59  Resp:  [16] 16  BP: (113-123)/(59-68) 113/59    Laboratory results: I have personally reviewed all pertinent laboratory/tests results    Results from the past 24 hours: No results found for this or any previous visit (from the past 24 hour(s))  Most Recent Labs:   Lab Results   Component Value Date    WBC 7 19 01/04/2023    RBC 4 54 01/04/2023    HGB 12 5 01/04/2023    HCT 39 0 01/04/2023     01/04/2023    RDW 13 3 01/04/2023    NEUTROABS 2 96 01/05/2022    TOTANEUTABS 2 16 01/04/2023    SODIUM 139 01/04/2023    K 4 0 01/04/2023     01/04/2023    CO2 29 01/04/2023    BUN 15 01/04/2023    CREATININE 0 65 01/04/2023    GLUC 92 01/04/2023    CALCIUM 8 6 01/04/2023    AST 33 01/04/2023    ALT 41 01/04/2023    ALKPHOS 92 01/04/2023    TP 7 1 01/04/2023    ALB 3 1 (L) 01/04/2023    TBILI 0 20 01/04/2023    CHOLESTEROL 105 01/04/2023    HDL 26 (L) 01/04/2023    TRIG 138 01/04/2023    LDLCALC 51 01/04/2023    NONHDLC 79 01/04/2023    VALPROICTOT 28 (L) 01/08/2023    BMP5JPLDMZSQ 3 121 01/04/2023    RPR Non-Reactive 01/04/2023    HGBA1C 5 0 01/04/2023    EAG 97 01/04/2023       Progress Toward Goals: progressing, working on coping skills, discharge planning    Assessment/Plan   Principal Problem:    Bipolar 1 disorder, depressed, moderate ([address] )  Active Problems:    Opiate abuse, episodic (HCC)    Medical clearance for psychiatric admission    Tobacco use    Cannabis use disorder, severe, dependence (HCC)    Depression    Polysubstance dependence including opioid type drug with complication, continuous use ([facility] [address] )    Posttraumatic stress disorder    Anxiety      Recommended Treatment:     Planned medication and treatment changes:     All current active medications have been reviewed  Encourage group therapy, milieu therapy and occupational therapy  Behavioral Health checks every 7 minutes    Continue current medications  Will require outpatient VPA level Thursday or Friday after discharge  Discussed with patient and he is agreeable for outpatient labs  Discharge planning for tomorrow prior to expiration of 72-hour notice  No grounds for involuntary commitment at this time  He is focused on rehab and his court date      Current Facility-Administered Medications   Medication Dose Route Frequency Provider Last Rate   • acetaminophen  650 mg Oral Q6H PRN Denton Socks, PA-C     • acetaminophen  650 mg Oral Q4H PRN Denton Socks, PA-C     • acetaminophen  975 mg Oral Q6H PRN Denton Socks, PA-C     • aluminum-magnesium hydroxide-simethicone  30 mL Oral Q4H PRN Denton Socks, PA-C     • haloperidol lactate  2 5 mg Intramuscular Q6H PRN Max 4/day Denton Socks, PA-C      And   • LORazepam  1 mg Intramuscular Q6H PRN Max 4/day Denton Socks, PA-C      And   • benztropine  0 5 mg Intramuscular Q6H PRN Max 4/day Denton Socks, PA-C     • haloperidol lactate  5 mg Intramuscular Q4H PRN Max 4/day Denton Socks, PA-C      And   • LORazepam  2 mg Intramuscular Q4H PRN Max 4/day Denton Socks, PA-C      And   • benztropine  1 mg Intramuscular Q4H PRN Max 4/day Denton Socks, PA-C     • benztropine  1 mg Intramuscular Q4H PRN Max 6/day Denton Socks, PA-C     • benztropine  1 mg Oral Q4H PRN Max 6/day Denton Socks, PA-C     • bisacodyl  10 mg Rectal Daily PRN Denton Socks, PA-C     • buprenorphine-naloxone  8 mg Sublingual Daily Denton Socks, PA-C     • busPIRone  5 mg Oral TID Stan Martell MD     • cholecalciferol  400 Units Oral Daily Stan Martell MD     • cloNIDine  0 1 mg Oral Daily Stan Martell MD     • hydrOXYzine HCL  50 mg Oral Q6H PRN Max 4/day Denton Socks, PA-C      Or   • diphenhydrAMINE  50 mg Intramuscular Q6H PRN Vanessa Stevenson Matty Hendrix PA-C     • divalproex sodium  1,000 mg Oral Daily Adair Mix, PA-C     • escitalopram  10 mg Oral Daily Tammie Pratt MD     • glycerin-hypromellose-  1 drop Both Eyes Q3H PRN Adair Mix, PA-C     • haloperidol  2 mg Oral Q4H PRN Max 6/day Adair Mix, PA-C     • haloperidol  5 mg Oral Q6H PRN Max 4/day Adair Mix, PA-C     • haloperidol  5 mg Oral Q4H PRN Max 4/day Adair Mix, PA-C     • hydrOXYzine HCL  100 mg Oral Q6H PRN Max 4/day Adair Mix, ANGELA      Or   • LORazepam  2 mg Intramuscular Q6H PRN Adair Mix, ANGELA     • hydrOXYzine HCL  25 mg Oral Q6H PRN Max 4/day Adair Mix, ANGELA     • naloxone  2 mg Nasal Once PRN Adair Mix, PAHARIS     • nicotine  1 patch Transdermal Daily Adair Mix, ANGELA     • nicotine polacrilex  4 mg Oral Q2H PRN Adair Mix, PA-C     • polyethylene glycol  17 g Oral Daily PRN Adair Mix, ANGELA     • QUEtiapine  300 mg Oral HS Adair Mix, ANGELA     • senna-docusate sodium  1 tablet Oral Daily PRN Adair Mix, ANGELA     • traZODone  50 mg Oral HS PRN Adair Mix, ANGELA       Risks / Benefits of Treatment:    Risks, benefits, and possible side effects of medications explained to patient and patient verbalizes understanding and agreement for treatment  Counseling / Coordination of Care:    Patient's progress discussed with staff in treatment team meeting  Medications, treatment progress and treatment plan reviewed with patient      Giovany Parra PA-C 01/10/23

## 2023-01-10 NOTE — TREATMENT TEAM
01/10/23 1420   Activity/Group Checklist   Group Admission/Discharge   Attendance Attended   Attendance Duration (min) 0-15   Interactions Interacted appropriately  (pt independently filled out the relapse prevention plan and DERS forms  once completed he had no questions or concerns, and he quickly left the room  upon my review of his forms, he simply initialed his name on the DERS form where it needed 1-5 rated )   Affect/Mood Calm  (tired)   Goals Achieved Able to engage in interactions; Other (Comment)  (filled out forms, but DERS form will need to be filled out again correctly at a later time )

## 2023-01-10 NOTE — BH TRANSITION RECORD
Contact Information: If you have any questions, concerns, pended studies, tests and/or procedures, or emergencies regarding your inpatient behavioral health visit  Please contact [facility] unit [phone] and ask to speak to a , nurse or physician  A contact is available 24 hours/ 7 days a week at this number  Summary of Procedures Performed During your Stay:  Below is a list of major procedures performed during your hospital stay and a summary of results:  - Cardiac Procedures/Studies: ekg - normal sinus rhythm  Pending Studies (From admission, onward)     Start     Ordered    01/10/23 0000  Valproic acid level, total         01/10/23 1204              Please follow up on the above pending studies with your PCP and/or referring provider

## 2023-01-10 NOTE — NURSING NOTE
Patient slept until approximately 0415  Patient came into hallway, demanding coffee, yelling at staff members  Patient was redirected, asked to return to room   Patient mumbled curse words at staff, currently pacing in room, stated "get away from me!"

## 2023-01-10 NOTE — CASE MANAGEMENT
CM sent email to [facility] ([email or web address]) to inform them of pt's interest in attending rehab and that he will dc home 1/11/23 per pt's request      CM requested response to see if CM can send them clinical today to review and then pt can contact them after dc to coordinate the rest of his admission to whatever facility he would be appropriate for

## 2023-01-10 NOTE — CASE MANAGEMENT
MARE received response from ([email or web address]) at [facility] and she reported:     "Thank you for that information and absolutely! Although, like you stated, we prefer bed to bed transfers if a client is coming from a hospital setting  We would accommodate the client as best as possible "      MARE sent pt clinical information for review to [facility]

## 2023-01-10 NOTE — PROGRESS NOTES
Pt required redirection many times regarding talking about drugs and other innappropriate topics  Pt frequently at the nurses station  DC: Wednesday - pt signed 72 hour notice on 1/9/23 @ 745 - pt reported he would like to dc home then pursue inpatient rehab   01/10/23 0832   Team Meeting   Meeting Type Daily Rounds   Team Members Present   Team Members Present Physician;Nurse;; Other (Discipline and Name)   Physician Team Member Dr Jessenia Pandey / Geovany Magallanes / [facility] Team Member Олег Castillo / [facility] Management Team Member Evelyn Hebert / Marito Guzman / Sharon Millan   Other (Discipline and Name) Ashwin Payne - Art Therapy   Patient/Family Present   Patient Present No   Patient's Family Present No

## 2023-01-10 NOTE — CASE MANAGEMENT
[facility] reported they would not be able to hold inpatient rehab bed indefinitely for pt and that another options for pt would be setting pt up with an appointment with [facility]A [facility] for after pt is discharged and then pt goes to that appt and then they can refer pt for inpatient rehab at that time

## 2023-01-10 NOTE — PROGRESS NOTES
Attended group alongside other people on the unit, participated in discussion around recovery-centered topic, and contributed their own lived experience toward connection between group members      01/09/23 1230   Activity/Group Checklist   Group Other (Comment)  (12:30 PM [employer], 3:00 PM Goal Accomplishments, 5:00 PM 12 Step Groups)   Attendance Attended   Attendance Duration (min) Greater than 60  (> 165 min)   Interactions Interacted appropriately   Affect/Mood Appropriate   Goals Achieved Other (Comment)  (Engaged with CPS and others around the development of individual budgets, challenges managing and overcoming stigma in the community, and anxieties and self-destructive pattens connected to substance use )

## 2023-01-10 NOTE — NURSING NOTE
Pt is hopeful for rehab placement  Reports needing to go home first to care of things and pack clothes and is okay with discharge tomorrow regardless  Social with peers and walking halls frequently  Denies SI and is future oriented

## 2023-01-10 NOTE — DISCHARGE INSTR - OTHER ORDERS
24/7 Lore Cisneros [place];  Call: [address] Free:  [facility]: [phone]    The [place] [facility] is for persons from [facility] and [facility]  Phone: [phone]  [address]:  [phone]  *Alcohol Anonymous: 255.935.2122  *[place] Drug & Alcohol Commission: (801) 704-9844  [address] [facility]  on [address] [facility] HELPLINE: 713.127.8351/Website: www lelo org  *Substance Abuse and [address] [facility] Administration([facility]) [employer], which is a confidential, free, 24-hour-a-day, 365-day-a-year, information service for individuals and family members facing mental health and/or substance use disorders  This service provides referrals to local treatment facilities, support groups, and community-based organizations  Callers can also order free publications and other information  Call 0-858.376.7744/Website: www [facility] gov  *[employer] 2-1-1: This is a toll free, confidential, 24-hour-a-day service which connects you to a community  in your area who can help you find services and resources that are available to you locally and provide critical services that can improve and save lives  Call: 211  /Website: https://amyDN2Ktoma net/      Outpatient Drug & Alcohol Treatment  The Commission currently operates outpatient treatment units:  [facility] in [place], [place] as a functional unit of the [facility] [place], [place] as a functional unit of the [address] treatment units are licensed by the [place] Department of Drug & Alcohol Programs to provide individual and group counseling for those with substance abuse and dependency problems   The clinical staff is experienced in a variety of therapeutic modalities and provides treatment that is individualized to meet the particular needs of each person  These units are drug-free treatment programs  The Commission accepts most major healthcare insurance coverage plans, [place] Medical Assistance and in those cases where the consumer has no third party healthcare coverage, a liberal sliding fee schedule is utilized  The length of service and type of outpatient service provided is based on the results of the Level of Care Assessment           There are currently three treatment protocols available: Outpatient  Intensive Outpatient  Contracted services for Partial Hospitalization  Therapy is provided in both Individual and Group counseling formats  The Outpatient department offers individual counseling for the family members of substance abusers to address co-dependent and enabling behaviors  Outpatient treatment services in [facility]IC[facility] AT [facility] are purchased through a fee-for-service subcontract with:  [place] [facility]  [address] [facility], [address]   Phone: [phone]     [address] [facility], [address] [place] [address] [facility], [address]  New Admissions [phone]  Local office [phone]      Outpatient treatment services in [facility] [place] are purchased through fee-for-service subcontracts with:  [address]  [address] [facility] [address]  Phone: [phone] Eli Odell, [address] [facility]  Phone: [address] [facility] [phone] / [place]  [phone]  Outpatient treatment services are also available to [facility] residents in our [facility]

## 2023-01-10 NOTE — DISCHARGE INSTR - APPOINTMENTS
You will be discharged to [address] [facility] [place], [address] [facility]   You confirmed that you do not have cell phone at this time and reported the best way to contact you is your mom Katerina's Cell ([phone])          Angelika Nielson or Kaylyn, our [address] [place] [address], will be calling you after your discharge, on the phone number that you provided  They will be available as an additional support, if needed  If you wish to speak with one of them, you may contact Stephani Ewing at [phone] or Gabi Vaz at [phone]

## 2023-01-10 NOTE — DISCHARGE SUMMARY
Discharge Summary - [address] [place] [address] R Clayton 25 y o  male MRN: [id number]  Unit/Bed#: Jeanette Mcintyre 253-01 Encounter: [id number]     Admission Date: 1/3/2023         Discharge Date: 1/11/23    Attending Psychiatrist: Kirill Lieberman*    Reason for Admission/HPI:     Per initial H&P from Dr Eloisa Suh on 1/4/23:    "Per Crisis worker on 1/2:"Tomas Willoughby is a 24 y/o male with Opiate abuse episodic, Bipolar 1 disorder depressed moderate, Tourette's, Depression, Polysubstance dependence including opioid type drug with complication, continuous use, PTSD, Anxiety that presented for psychiatric evaluation  Patient arrived at ED via private transportation  Patient cooperative with the assessment  Patient presents with good eye contact  Patient oriented x 4  Patient lives with mother, grandmother, siblings  Patient current unemployed  Patient on Suboxone  Patient reported multiple hospitalizations  Last one October 2022  Patient presents with suicidal ideations  Patient states he was discharged from [facility] in October  Patient stopped taking his medications and relapsed since last Wednesday  Patient unsure as to why he stopped his medications  Patient states “I am doing good while at the hospital  Then when I discharge no one there is helping me, I am lacking that structure, and all fails”  Patient states he has been experiencing intrusive suicidal thoughts with the intend on overdose on drugs     Patient denies homicidal thoughts, intentions or plan     Patient denies self-harming     Patient denies hallucinations     No paranoia, delusions observed  Patient states appetite problems  Patient states sleep problems     Patient reports drug use   Patient relapsed on Meth as of last Wednesday  "     This is 24 yo male with hx of Bipolar disorder and substance use admitted to inpatient unit on voluntary status for worsening of mood, suicidal ideations and over dosed on drugs to kill self in the context of psychosocial stressors and partial compliance with medications  Patient endorses depressed mood anhedonia, poor sleep, mood swings, irritability and passive death wishes  Denies any intent or plan currently "      Social History     Tobacco History     Smoking Status  Every Day Smoking Frequency  1 50 packs/day for 10 00 years (15 00 pk-yrs) Smoking Tobacco Type  Cigarettes    Smokeless Tobacco Use  Never          Alcohol History     Alcohol Use Status  Not Currently Comment  vodka weekly          Drug Use     Drug Use Status  Yes Types  Heroin, Marijuana, Methamphetamines Comment  LAST USED METH TODAY          Sexual Activity     Sexually Active  Yes Partners  Female          Activities of Daily Living    Not Asked               Additional Substance Use Detail     Questions Responses    Problems Due to Past Use of Alcohol? No    Problems Due to Past Use of Substances?  Yes    Substance Use Assessment Substance use within the past 12 months    Alcohol Use Frequency Experimented    Cannabis frequency 1-2 times/week    Comment: 1-2 times/week on 1/4/2022     Heroin Frequency Denies use in past 12 months    Cannabis method Smoke    Comment: Smoke on 11/22/2021     Cocaine frequency Never used    Comment: Never used on 11/22/2021     Crack Cocaine Frequency Denies use in past 12 months    Methamphetamine Frequency Past abuse    Methamphetamine Method Snort    Narcotic Frequency Denies use in past 12 months    Benzodiazepine Frequency Denies use in past 12 months    Amphetamine frequency Denies use in past 12 months    Barbituate Frequency Denies use use in past 12 months    Inhalant frequency Never used    Comment: Never used on 11/22/2021     Hallucinogen frequency Never used    Comment: Never used on 11/22/2021     Ecstasy frequency Never used    Comment: Never used on 11/22/2021     Other drug frequency Never used    Comment: Never used on 11/22/2021     Opiate frequency Denies use in past 12 months    Last reviewed by Hannah Valladares RN on 2023          Past Medical History:   Diagnosis Date   • ADHD (attention deficit hyperactivity disorder)    • Anxiety    • Bipolar disorder ([place] [address] )    • Depression    • GERD (gastroesophageal reflux disease)    • Hyperlipidemia    • Hypertension    • Psychiatric disorder    • Psychiatric illness    • Seizures ([place] [address] )    • Substance abuse ([place] [address] )    • Tourette syndrome      Past Surgical History:   Procedure Laterality Date   • CA EXC B9 LESION MRGN XCP SK TG T/A/L >4 0 CM Left 10/26/2021    Procedure: EXCISION LIPOMA (BACK); Surgeon: Mansi Cabrera DO;  Location: [facility];  Service: General   • TONSILECTOMY AND ADNOIDECTOMY     • TONSILLECTOMY     • TYMPANOSTOMY TUBE PLACEMENT         Medications: All current active medications have been reviewed  Medications prior to admission:    Prior to Admission Medications   Prescriptions Last Dose Informant Patient Reported? Taking?    QUEtiapine (SEROquel) 200 mg tablet   No No   Sig: Take 1 tablet (200 mg total) by mouth daily at bedtime   Patient taking differently: Take 400 mg by mouth daily at bedtime   QUEtiapine (SEROquel) 300 mg tablet   Yes No   buprenorphine-naloxone (Suboxone) 12-3 MG   No No   Sig: Place 1 Film (12 mg total) under the tongue daily   busPIRone (BUSPAR) 5 mg tablet   Yes No   Sig: Take 5 mg by mouth in the morning   cloNIDine (CATAPRES) 0 1 mg tablet   Yes No   Sig: Take 0 1 mg by mouth once   divalproex sodium (DEPAKOTE ER) 250 mg 24 hr tablet   No No   Sig: Take 3 tablets (750 mg total) by mouth daily   Patient taking differently: Take 750 mg by mouth daily 500mg am and 1000 at pm   hydrOXYzine pamoate (VISTARIL) 50 mg capsule   Yes No   Sig: Take 50 mg by mouth if needed   meloxicam (MOBIC) 15 mg tablet   No No   Sig: Take 1 tablet (15 mg total) by mouth daily   naloxone (Narcan) 4 mg/0 1 mL nasal spray   No No   Si 1 mL (4 mg total) into each nostril as needed (respiratory depression or possible OD) Facility-Administered Medications: None       Allergies: Allergies   Allergen Reactions   • Abilify [Aripiprazole] Other (See Comments)     Seizures and hyperglycemia       Objective     Vital signs in last 24 hours:    Temp:  [96 6 °F (35 9 °C)-97 8 °F (36 6 °C)] 97 8 °F (36 6 °C)  HR:  [60-62] 62  Resp:  [16-18] 18  BP: (120-123)/(63-66) 123/63    No intake or output data in the 24 hours ending 01/11/23 0345 Aron Ku:     Boom Fernandez was admitted to the inpatient psychiatric unit and started on [facility] checks every 7 minutes  During the hospitalization he was encouraged to attend individual therapy, group therapy, milieu therapy and occupational therapy  Psychiatric medications were adjusted over the hospital stay  To address depressive symptoms, mood swings, self-abusive behavior and potential opioid withdrawal symptoms, Boom Fernandez was treated with antidepressant Lexapro, mood stabilizer Depakote ER, antipsychotic medication Seroquel, anxiolytic medication Buspar and medications to control opioid withdrawal Clonidine and Suboxone  Medication doses were adjusted during the hospital course  Lexapro was added at the dose of 10mg daily  Depakote ER was adjusted to 1,000mg qhs  Boom Fernandez was given a lab slip for outpatient VPA level on Thursday or Friday after discharge as an appropriate VPA level could not be drawn prior to expiration of his 72 hour notice  Clinically, he did demonstrate a less labile mood following the dose adjustment  Seroquel was adjusted to 250mg qhs initially but later increased back to 300mg qhs at patient's request  Consuello Pond was continued at 5mg TID  Clonidine was continued at 0 1mg daily  Suboxone was continued at 8-2mg SL daily   Prior to beginning of treatment medications risks and benefits and possible side effects including risk of liver impairment related to treatment with Depakote, risk of parkinsonian symptoms, Tardive Dyskinesia and metabolic syndrome related to treatment with antipsychotic medications and risk of suicidality and serotonin syndrome related to treatment with antidepressants were reviewed with Santosh Avila  He verbalized understanding and agreement for treatment  Upon admission Santosh Avila was seen by medical service for medical clearance for inpatient treatment and medical follow up  Shira symptoms slowly improved over the hospital course  Initially after admission he was still feeling depressed, anxious, labile and irritable  With adjustment of medications and therapeutic milieu his symptoms slowly improved  Patient signed a 72 hour notice voluntarily withdrawing himself from treatment  At the end of treatment Santosh Avila was improved  His mood was more stable at the time of discharge  Santosh Avila denied suicidal ideation, intent or plan at the time of discharge and denied homicidal ideation, intent or plan at the time of discharge  There was no overt psychosis at the time of discharge  Santosh Avila was participating appropriately in milieu at the time of discharge  Behavior was appropriate on the unit at the time of discharge  Sleep and appetite were improved  Santosh Avila was tolerating medications and was not reporting any significant side effects at the time of discharge  Santosh Avila signed 72 hour notice and requested discharge  At the time of the 72 hour notice expiration he had no criteria for involuntary commitment and denied any suicidal or homicidal ideation  Patient was attending to all ADLs, taking medications appropriately, eating meals, and not demonstrating any clinical stigmata of acute psychosis  At the time of discharge, they were goal oriented, forward thinking and optimistic about the future  The  arranged for Santosh Avila to [facility] upon discharge  They also arranged for Santosh Avila to follow up with his PCP Dr Reji Ignacio for Suboxone maintenance on Monday, January 16, 2023   It was confirmed with their office that Jai Taylor must call Dr Esvin Velazquez office (number listed in discharge folder) to receive a temporary prescription of Suboxone to bridge him from day of discharge to his appointment next week  Jai Taylor verbalized understanding of this plan  Mental Status at Time of Discharge:     Appearance: casually dressed, appears consistent with stated age, normal grooming  Motor: no psychomotor disturbances, no gait abnormalities  Behavior: calm, cooperative, interacts with this writer appropriately  Speech: normal rate, rhythm, and volume  Mood: "good" less depressed, less irritable  Affect: appropriate, normal range and intensity, mood-congruent  Thought Process: organized, linear, and goal-oriented; intact associations  Thought Content: denies any delusional material, no preoccupation  Perception: denies any auditory or visual hallucinations, denies other perceptual disturbances  Risk Potential: denies suicidal ideation, plan, or intent   Denies homicidal ideation  Sensorium: Oriented to person, place, time, and situation  Cognition: cognitive ability appears intact but was not quantitatively tested  Consciousness: alert and awake  Attention/Concentration: able to focus without difficulty, attention and concentration are shorter than expected for age  Insight: improved but limited  Judgement: fair    Admission Diagnosis:    Principal Problem:    Bipolar 1 disorder, depressed, moderate ([address] )  Active Problems:    Opiate abuse, episodic ([address] )    Tobacco use    Cannabis use disorder, severe, dependence ([address] )    Depression    Polysubstance dependence including opioid type drug with complication, continuous use ([address] )    Posttraumatic stress disorder    Anxiety      Discharge Diagnosis:     Principal Problem:    Bipolar 1 disorder, depressed, moderate ([address] )  Active Problems:    Opiate abuse, episodic ([address] )    Tobacco use    Cannabis use disorder, severe, dependence ([address] )    Depression    Polysubstance dependence including opioid type drug with complication, continuous use ([facility] )    Posttraumatic stress disorder    Anxiety  Resolved Problems:    Medical clearance for psychiatric admission      Lab Results:   I have personally reviewed all pertinent laboratory/tests results  Most Recent Labs:   Lab Results   Component Value Date    WBC 7 19 01/04/2023    RBC 4 54 01/04/2023    HGB 12 5 01/04/2023    HCT 39 0 01/04/2023     01/04/2023    RDW 13 3 01/04/2023    TOTANEUTABS 2 16 01/04/2023    NEUTROABS 2 96 01/05/2022    SODIUM 139 01/04/2023    K 4 0 01/04/2023     01/04/2023    CO2 29 01/04/2023    BUN 15 01/04/2023    CREATININE 0 65 01/04/2023    GLUC 92 01/04/2023    GLUF 85 01/05/2022    CALCIUM 8 6 01/04/2023    AST 33 01/04/2023    ALT 41 01/04/2023    ALKPHOS 92 01/04/2023    TP 7 1 01/04/2023    ALB 3 1 (L) 01/04/2023    TBILI 0 20 01/04/2023    CHOLESTEROL 105 01/04/2023    HDL 26 (L) 01/04/2023    TRIG 138 01/04/2023    LDLCALC 51 01/04/2023    NONHDLC 79 01/04/2023    VALPROICTOT 28 (L) 01/08/2023    PRA3HFVJCPQY 3 121 01/04/2023    RPR Non-Reactive 01/04/2023    HGBA1C 5 0 01/04/2023    EAG 97 01/04/2023       Discharge Medications:    See after visit summary for all reconciled discharge medications provided to patient and family  Discharge instructions/Information to patient and family:     See after visit summary for information provided to patient and family  Provisions for Follow-Up Care:    See after visit summary for information related to follow-up care and any pertinent home health orders  Discharge Statement:    I spent 37 minutes discharging the patient  This time was spent on the day of discharge  I had direct contact with the patient on the day of discharge  Additional documentation is required if more than 30 minutes were spent on discharge:    I reviewed with Emelia Maker importance of compliance with medications and outpatient treatment after discharge    I discussed the medication regimen and possible side effects of the medications with Lit Bolanos prior to discharge  At the time of discharge he was tolerating psychiatric medications  I discussed outpatient follow up with Lit Bolanos  I reviewed with Lit Bolanos crisis plan and safety plan upon discharge  I discussed with Lit Bolanos recommendation to follow up with outpatient drug and alcohol counseling and AA meetings  Lit Bolanos agreed to abstain from drug and alcohol use after discharge  Lit Bolanos was competent to understand risks and benefits of withholding information and risks and benefits of his actions  Lit Bolanos signed 72 hour notice and requested discharge  At the time of the 72 hour notice expiration he had no criteria for involuntary commitment and denied any suicidal or homicidal ideation  PDMP reviewed and confirms ongoing Suboxone maintenance therapy through Dr Chapincito Graff office  Lit Bolanos is to call their office to receive a short Suboxone prescription to bridge him until his appointment with Dr Aminah Shelton on 1/16/23      Discharge on Two Antipsychotic Medications: Kate Conde PA-C 01/11/23

## 2023-01-11 VITALS
OXYGEN SATURATION: 96 % | HEIGHT: 65 IN | SYSTOLIC BLOOD PRESSURE: 123 MMHG | BODY MASS INDEX: 35.48 KG/M2 | HEART RATE: 62 BPM | TEMPERATURE: 97.8 F | RESPIRATION RATE: 18 BRPM | WEIGHT: 212.96 LBS | DIASTOLIC BLOOD PRESSURE: 63 MMHG

## 2023-01-11 DIAGNOSIS — Z79.899 ENCOUNTER FOR MONITORING SUBOXONE MAINTENANCE THERAPY: ICD-10-CM

## 2023-01-11 DIAGNOSIS — F19.20 POLYSUBSTANCE DEPENDENCE INCLUDING OPIOID TYPE DRUG WITH COMPLICATION, CONTINUOUS USE (HCC): ICD-10-CM

## 2023-01-11 DIAGNOSIS — Z51.81 ENCOUNTER FOR MONITORING SUBOXONE MAINTENANCE THERAPY: ICD-10-CM

## 2023-01-11 DIAGNOSIS — Z79.899 MEDICATION THERAPY CONTINUED: ICD-10-CM

## 2023-01-11 RX ORDER — BUPRENORPHINE AND NALOXONE 12; 3 MG/1; MG/1
12 FILM, SOLUBLE BUCCAL; SUBLINGUAL DAILY
Qty: 5 EACH | Refills: 0 | Status: SHIPPED | OUTPATIENT
Start: 2023-01-11

## 2023-01-11 RX ORDER — QUETIAPINE FUMARATE 300 MG/1
300 TABLET, FILM COATED ORAL
Qty: 30 TABLET | Refills: 1 | Status: SHIPPED | OUTPATIENT
Start: 2023-01-11 | End: 2023-03-12

## 2023-01-11 RX ORDER — OMEGA-3S/DHA/EPA/FISH OIL/D3 300MG-1000
400 CAPSULE ORAL DAILY
Qty: 30 TABLET | Refills: 1 | Status: SHIPPED | OUTPATIENT
Start: 2023-01-11 | End: 2023-03-12

## 2023-01-11 RX ORDER — CLONIDINE HYDROCHLORIDE 0.1 MG/1
0.1 TABLET ORAL DAILY
Qty: 30 TABLET | Refills: 1 | Status: SHIPPED | OUTPATIENT
Start: 2023-01-11 | End: 2023-03-12

## 2023-01-11 RX ORDER — ESCITALOPRAM OXALATE 10 MG/1
10 TABLET ORAL DAILY
Qty: 30 TABLET | Refills: 1 | Status: SHIPPED | OUTPATIENT
Start: 2023-01-11 | End: 2023-03-12

## 2023-01-11 RX ORDER — DIVALPROEX SODIUM 500 MG/1
1000 TABLET, EXTENDED RELEASE ORAL DAILY
Qty: 60 TABLET | Refills: 1 | Status: SHIPPED | OUTPATIENT
Start: 2023-01-11 | End: 2023-03-12

## 2023-01-11 RX ORDER — NALOXONE HYDROCHLORIDE 4 MG/.1ML
SPRAY NASAL
Qty: 1 EACH | Refills: 0 | Status: SHIPPED | OUTPATIENT
Start: 2023-01-11

## 2023-01-11 RX ORDER — BUSPIRONE HYDROCHLORIDE 5 MG/1
5 TABLET ORAL 3 TIMES DAILY
Qty: 90 TABLET | Refills: 1 | Status: SHIPPED | OUTPATIENT
Start: 2023-01-11 | End: 2023-03-12

## 2023-01-11 RX ADMIN — BUSPIRONE HYDROCHLORIDE 5 MG: 5 TABLET ORAL at 08:08

## 2023-01-11 RX ADMIN — CLONIDINE HYDROCHLORIDE 0.1 MG: 0.1 TABLET ORAL at 08:08

## 2023-01-11 RX ADMIN — CHOLECALCIFEROL (VITAMIN D3) 10 MCG (400 UNIT) TABLET 400 UNITS: at 08:08

## 2023-01-11 RX ADMIN — ESCITALOPRAM OXALATE 10 MG: 10 TABLET ORAL at 08:08

## 2023-01-11 RX ADMIN — NICOTINE POLACRILEX 4 MG: 4 GUM, CHEWING BUCCAL at 08:10

## 2023-01-11 RX ADMIN — DIVALPROEX SODIUM 1000 MG: 500 TABLET, EXTENDED RELEASE ORAL at 08:08

## 2023-01-11 RX ADMIN — BUPRENORPHINE AND NALOXONE 8 MG: 8; 2 FILM BUCCAL; SUBLINGUAL at 08:08

## 2023-01-11 NOTE — NURSING NOTE
Pt napping in intervals throughout the shift  Needing much redirection about talking about drug use and other inappropriate topics, mildly receptive  Still planning for discharge tomorrow; does not want to rescind 72 hr notice  Denies SI/HI/AVH

## 2023-01-11 NOTE — NURSING NOTE
Pt awake early and visible on unit  Socializing with peers  Looking forward to discharge and seeing his mother  Expresses plan to get things in order for upcoming court date and then go to to rehab  Denies SI/HI/AVH

## 2023-01-11 NOTE — PLAN OF CARE
Problem: Ineffective Coping  Goal: Identifies healthy coping skills  Outcome: Progressing  Goal: Demonstrates healthy coping skills  Outcome: Progressing  Goal: Participates in unit activities  Description: Interventions:  - Provide therapeutic environment   - Provide required programming   - Redirect inappropriate behaviors   Outcome: Progressing  Goal: Patient/Family verbalizes awareness of resources  Outcome: Progressing     Problem: DEPRESSION  Goal: Will be euthymic at discharge  Description: INTERVENTIONS:  - Administer medication as ordered  - Provide emotional support via 1:1 interaction with staff  - Encourage involvement in milieu/groups/activities  - Monitor for social isolation  Outcome: Progressing     Problem: BEHAVIOR  Goal: Pt/Family maintain appropriate behavior and adhere to behavioral management agreement, if implemented  Description: INTERVENTIONS:  - Assess the family dynamic   - Encourage verbalization of thoughts and concerns in a socially appropriate manner  - Assess patient/family's coping skills and non-compliant behavior (including use of illegal substances)  - Utilize positive, consistent limit setting strategies supporting safety of patient, staff and others  - Initiate consult with Case Management, Spiritual Care or other ancillary services as appropriate  - If a patient's/visitor's behavior jeopardizes the safety of the patient, staff, or others, refer to organization procedure     - Notify Security of behavior or suspected illegal substances which indicate the need for search of the patient and/or belongings  - Encourage participation in the decision making process about a behavioral management agreement; implement if patient meets criteria  Outcome: Progressing     Problem: ANXIETY  Goal: Will report anxiety at manageable levels  Description: INTERVENTIONS:  - Administer medication as ordered  - Teach and encourage coping skills  - Provide emotional support  - Assess patient/family for anxiety and ability to cope  Outcome: Progressing  Goal: By discharge: Patient will verbalize 2 strategies to deal with anxiety  Description: Interventions:  - Identify any obvious source/trigger to anxiety  - Staff will assist patient in applying identified coping technique/skills  - Encourage attendance of scheduled groups and activities  Outcome: Progressing     Problem: SUBSTANCE USE/ABUSE  Goal: By discharge, will develop insight into their chemical dependency and sustain motivation to continue in recovery  Description: INTERVENTIONS:  - Attends all daily group sessions and scheduled AA groups  - Actively practices coping skills through participation in the therapeutic community and adherence to program rules  - Reviews and completes assignments from individual treatment plan  - Assist patient development of understanding of their personal cycle of addiction and relapse triggers  Outcome: Progressing  Goal: By discharge, patient will have ongoing treatment plan addressing chemical dependency  Description: INTERVENTIONS:  - Assist patient with resources and/or appointments for ongoing recovery based living  Outcome: Progressing     Problem: SLEEP DISTURBANCE  Goal: Will exhibit normal sleeping pattern  Description: Interventions:  -  Assess the patients sleep pattern, noting recent changes  - Administer medication as ordered  - Decrease environmental stimuli, including noise, as appropriate during the night  - Encourage the patient to actively participate in unit groups and or exercise during the day to enhance ability to achieve adequate sleep at night  - Assess the patient, in the morning, encouraging a description of sleep experience  Outcome: Progressing

## 2023-01-11 NOTE — PROGRESS NOTES
Pt demanding extra food, asking for extra clothes donation  Pt needs redirection for talking about inappropriate topics  DC: Wednesday 01/09/23 2707   Team Meeting   Meeting Type Daily Rounds   Team Members Present   Team Members Present Physician;Nurse;; Other (Discipline and Name)   Physician Team Member Dr Baljit Jaeger / Natalya Hayes / Tri Ramsey Team Member MICHAEL [facility] Management Team Member Devika Carranza / Sharan Greenberg / Adelaide Martinez   Other (Discipline and Name) Dee Lips - Art Therapy   Patient/Family Present   Patient Present No   Patient's Family Present No

## 2023-01-11 NOTE — CASE MANAGEMENT
CM met with pt to check in about dc plans for today  Pt reported his mom will pick him up before 10am today  CM reviewed pt dc paperwork and advised him to call Dr Feliciano Qiu office right after discharge for them to provide him with a bridge script for his Suboxone as he does not have an appt until Monday 1/16/23  Pt verbalized understanding and reported he will call on his way home  MARE also informed pt that he will need to call [facility] when he is ready to seek inpatient rehab and they will complete rehab assessment and place him to appropriate rehab  Pt reported he knows where the [facility] is  MARE also informed pt that his  Application is in his dc folder for him to drop off at [facility]  Pt reported he will plan to do that today  MARE also provided pt with contact numbers for [phone]  for him to contact prior to his court date on 1/19/23 depending on if he goes to inpatient rehab or remains at home  CM encouraged pt to follow through with above to focus on his sobriety and improving his future   Pt verbalized understanding and reported "I will "

## 2023-01-11 NOTE — PROGRESS NOTES
Pt denies SI, loud, needed frequent redirection for talking about inappropriate topics  Slept overnight  Pt reported his mom will pick him up today  DC: Today - pt mom will pick him up around 9:45am     01/11/23 0755   Team Meeting   Meeting Type Daily Rounds   Team Members Present   Team Members Present Physician;Nurse;; Other (Discipline and Name)   Physician Team Member Dr Casa Pierce / Manda Mckee / Heather Horne Team Member MICHAEL [facility] Management Team Member Zander Smith / Francoise Toledo   Other (Discipline and Name) [place] - Art Therapy   Patient/Family Present   Patient Present No   Patient's Family Present No

## 2023-01-11 NOTE — PROGRESS NOTES
UDS/Identified Substance(s) used:   Pt UDS + for Marijuana but reported recent relapse on Meth and Heroin  (Pt has hx of the same)      Recommendations discussed:   Discussed inpatient rehab  Continue Suboxone Maintenance with Dr Wilton Gooden    Patient's response:   Pt is in agreement to Inpatient D&A Rehab but adamant that he wants to dc home first, take care of things for court (court date 1/19/23) and then go to rehab  CM explained that best practice is for pt to go to rehab right from [facility] and that rehabs prefer that but pt declined  Pt provided with detailed information to call [place] [facility] referral line when he is ready to pursue rehab and they can assist him in being placed to Rehab  Pt has upcoming appt for Suboxone with Dr Wilton Gooden on 1/16/23  Pt informed that he will need to call Dr Aquiles Ignacio office right after dc to obtain a bridge script of Suboxone to get him through until his appt on 1/16  Pt aware and verbalized understanding

## 2023-01-11 NOTE — PROGRESS NOTES
Attended group alongside other people on the unit, participated in discussion around recovery-centered topic, and contributed their own lived experience toward connection between group members        01/10/23 1500   Activity/Group Checklist   Group Other (Comment)  (3:00 PM Goal Accomplishments and 5:00 PM Reflective Music Groups)   Attendance Attended   Attendance Duration (min) Greater than 60  (> 105 min)   Interactions Interacted appropriately   Affect/Mood Appropriate   Goals Achieved Other (Comment)  (Engaged with CPS and others in conversation around support group accessibility, [facility] video featuring a Certified , and shared creative experience closing eyes and ‘writing/drawing’ music )

## 2023-06-28 ENCOUNTER — HOSPITAL ENCOUNTER (EMERGENCY)
Facility: HOSPITAL | Age: 23
End: 2023-06-29
Attending: EMERGENCY MEDICINE
Payer: COMMERCIAL

## 2023-06-28 DIAGNOSIS — F31.9 BIPOLAR DISORDER (HCC): ICD-10-CM

## 2023-06-28 DIAGNOSIS — F19.90 ILLICIT DRUG USE: ICD-10-CM

## 2023-06-28 DIAGNOSIS — Z00.8 ENCOUNTER FOR PSYCHOLOGICAL EVALUATION: Primary | ICD-10-CM

## 2023-06-28 DIAGNOSIS — Z91.14 NONCOMPLIANCE WITH MEDICATIONS: ICD-10-CM

## 2023-06-28 PROCEDURE — 87635 SARS-COV-2 COVID-19 AMP PRB: CPT | Performed by: EMERGENCY MEDICINE

## 2023-06-28 PROCEDURE — 99285 EMERGENCY DEPT VISIT HI MDM: CPT | Performed by: EMERGENCY MEDICINE

## 2023-06-28 PROCEDURE — 82075 ASSAY OF BREATH ETHANOL: CPT | Performed by: EMERGENCY MEDICINE

## 2023-06-28 PROCEDURE — 80307 DRUG TEST PRSMV CHEM ANLYZR: CPT | Performed by: EMERGENCY MEDICINE

## 2023-06-28 PROCEDURE — 96372 THER/PROPH/DIAG INJ SC/IM: CPT

## 2023-06-28 PROCEDURE — 99283 EMERGENCY DEPT VISIT LOW MDM: CPT

## 2023-06-28 RX ORDER — WATER 1000 ML/1000ML
INJECTION, SOLUTION INTRAVENOUS
Status: DISPENSED
Start: 2023-06-28 | End: 2023-06-29

## 2023-06-28 RX ORDER — CLONIDINE HYDROCHLORIDE 0.1 MG/1
0.1 TABLET ORAL DAILY
Status: DISCONTINUED | OUTPATIENT
Start: 2023-06-28 | End: 2023-06-29 | Stop reason: HOSPADM

## 2023-06-28 RX ORDER — QUETIAPINE FUMARATE 100 MG/1
300 TABLET, FILM COATED ORAL
Status: DISCONTINUED | OUTPATIENT
Start: 2023-06-28 | End: 2023-06-29 | Stop reason: HOSPADM

## 2023-06-28 RX ORDER — ESCITALOPRAM OXALATE 10 MG/1
10 TABLET ORAL DAILY
Status: DISCONTINUED | OUTPATIENT
Start: 2023-06-29 | End: 2023-06-29 | Stop reason: HOSPADM

## 2023-06-28 RX ORDER — BUSPIRONE HYDROCHLORIDE 5 MG/1
5 TABLET ORAL 3 TIMES DAILY
Status: DISCONTINUED | OUTPATIENT
Start: 2023-06-28 | End: 2023-06-29 | Stop reason: HOSPADM

## 2023-06-28 RX ORDER — OLANZAPINE 10 MG/1
10 INJECTION, POWDER, LYOPHILIZED, FOR SOLUTION INTRAMUSCULAR ONCE
Status: COMPLETED | OUTPATIENT
Start: 2023-06-28 | End: 2023-06-28

## 2023-06-28 RX ORDER — DIVALPROEX SODIUM 250 MG/1
1000 TABLET, EXTENDED RELEASE ORAL DAILY
Status: DISCONTINUED | OUTPATIENT
Start: 2023-06-29 | End: 2023-06-29 | Stop reason: HOSPADM

## 2023-06-28 RX ADMIN — OLANZAPINE 10 MG: 10 INJECTION, POWDER, FOR SOLUTION INTRAMUSCULAR at 13:35

## 2023-06-28 NOTE — ED PROVIDER NOTES
"History  Chief Complaint   Patient presents with   • Psychiatric Evaluation     Pt here today \"to turn himself in on a 302 warrant that has been petitioned against him\"  He reports he stopped taking his medications( suboxone, depakote, colondine and others) for unknown reason  He reports he has started using heroin and meth again  He states he feels very anxious and general SI that is intermittent with no plan  \" Leatha Rule     HPI  This is a 51-year-old male with a past medical history significant for bipolar disorder, polysubstance abuse including opiates, cannabis, tobacco, methamphetamine, HLD, anxiety, PTSD presenting to the emergency department for psychological evaluation  Patient reports history to nurse which is confirmed upon my interview with the patient  Patient reportedly wants to turn himself in on a 302 warrant which was petitioned against him by his mother  He reports that he stopped taking all of his medications sometime ago and has been again abusing illicit drugs  He also notes worsening anxiety due to these changes in his medications and drug use  He endorses passive suicidal ideations but denies a plan or any previous attempts to harm himself in the context of the symptoms  Denies homicidal ideations  Denies audio or visual hallucinations  Denies any other acute complaints at this time  Prior to Admission Medications   Prescriptions Last Dose Informant Patient Reported? Taking?    QUEtiapine (SEROquel) 300 mg tablet   No No   Sig: Take 1 tablet (300 mg total) by mouth daily at bedtime   buprenorphine-naloxone (Suboxone) 12-3 MG   No No   Sig: Place 1 Film (12 mg total) under the tongue daily   busPIRone (BUSPAR) 5 mg tablet   No No   Sig: Take 1 tablet (5 mg total) by mouth 3 (three) times a day   cholecalciferol (VITAMIN D3) 400 units tablet   No No   Sig: Take 1 tablet (400 Units total) by mouth daily   ciprofloxacin-dexamethasone (CIPRODEX) otic suspension   No No   Sig: " Administer 4 drops to the right ear 2 (two) times a day   cloNIDine (CATAPRES) 0 1 mg tablet   No No   Sig: Take 1 tablet (0 1 mg total) by mouth in the morning   divalproex sodium (DEPAKOTE ER) 500 mg 24 hr tablet   No No   Sig: Take 2 tablets (1,000 mg total) by mouth daily   escitalopram (LEXAPRO) 10 mg tablet   No No   Sig: Take 1 tablet (10 mg total) by mouth daily   naloxone (NARCAN) 4 mg/0 1 mL nasal spray   No No   Sig: Administer one spray intranasally into each nostril for suspected opioid overdose   naloxone (Narcan) 4 mg/0 1 mL nasal spray   No No   Si 1 mL (4 mg total) into each nostril as needed (respiratory depression or possible OD)      Facility-Administered Medications: None       Past Medical History:   Diagnosis Date   • ADHD (attention deficit hyperactivity disorder)    • Anxiety    • Bipolar disorder (HCC)    • Depression    • GERD (gastroesophageal reflux disease)    • Hyperlipidemia    • Hypertension    • Psychiatric disorder    • Psychiatric illness    • Seizures ([address] )    • Substance abuse (HCC)    • Tourette syndrome        Past Surgical History:   Procedure Laterality Date   • GA EXC B9 LESION MRGN XCP SK TG T/A/L >4 0 CM Left 10/26/2021    Procedure: EXCISION LIPOMA (BACK); Surgeon: Beverly Sexton DO;  Location: [facility];  Service: General   • TONSILECTOMY AND ADNOIDECTOMY     • TONSILLECTOMY     • TYMPANOSTOMY TUBE PLACEMENT         Family History   Problem Relation Age of Onset   • No Known Problems Mother    • Substance Abuse Father    • Cirrhosis Father    • Coronary artery disease Father    • Hepatitis Father    • Substance Abuse Brother    • Diabetes Neg Hx      I have reviewed and agree with the history as documented      E-Cigarette/Vaping   • E-Cigarette Use Never User      E-Cigarette/Vaping Substances   • Nicotine No    • THC No    • CBD No    • Flavoring No    • Other No    • Unknown No      Social History     Tobacco Use   • Smoking status: Every Day     Packs/day: 1 50 Years: 10 00     Total pack years: 15 00     Types: Cigarettes   • Smokeless tobacco: Never   Vaping Use   • Vaping Use: Never used   Substance Use Topics   • Alcohol use: Not Currently     Comment: vodka weekly   • Drug use: Yes     Types: Marijuana, Heroin, Methamphetamines     Comment: LAST USED METH TODAY       Review of Systems   Constitutional: Negative for chills and fever  HENT: Negative for ear pain and sore throat  Eyes: Negative for pain and visual disturbance  Respiratory: Negative for cough and shortness of breath  Cardiovascular: Negative for chest pain and palpitations  Gastrointestinal: Negative for abdominal pain and vomiting  Genitourinary: Negative for dysuria and hematuria  Musculoskeletal: Negative for arthralgias and back pain  Skin: Negative for color change and rash  Neurological: Negative for seizures and syncope  Psychiatric/Behavioral: Positive for dysphoric mood and suicidal ideas  The patient is nervous/anxious  All other systems reviewed and are negative  Physical Exam  Physical Exam  Vitals and nursing note reviewed  Exam conducted with a chaperone present  Constitutional:       General: He is not in acute distress  Appearance: Normal appearance  He is well-developed  He is not ill-appearing, toxic-appearing or diaphoretic  HENT:      Head: Normocephalic and atraumatic  Right Ear: External ear normal       Left Ear: External ear normal       Nose: Nose normal       Mouth/Throat:      Mouth: Mucous membranes are moist       Pharynx: Oropharynx is clear  Eyes:      General: No scleral icterus  Conjunctiva/sclera: Conjunctivae normal    Cardiovascular:      Rate and Rhythm: Normal rate and regular rhythm  Pulses: Normal pulses  Heart sounds: Normal heart sounds  No murmur heard  No friction rub  No gallop  Pulmonary:      Effort: Pulmonary effort is normal  No respiratory distress        Breath sounds: Normal breath sounds  No wheezing, rhonchi or rales  Abdominal:      Palpations: Abdomen is soft  Tenderness: There is no abdominal tenderness  There is no guarding or rebound  Musculoskeletal:         General: No swelling  Cervical back: Normal range of motion and neck supple  Right lower leg: No edema  Left lower leg: No edema  Skin:     General: Skin is warm and dry  Capillary Refill: Capillary refill takes less than 2 seconds  Neurological:      General: No focal deficit present  Mental Status: He is alert  Mental status is at baseline  Psychiatric:         Mood and Affect: Mood normal          Vital Signs  ED Triage Vitals [06/28/23 1229]   Temperature Pulse Respirations Blood Pressure SpO2   98 3 °F (36 8 °C) 89 22 128/71 96 %      Temp Source Heart Rate Source Patient Position - Orthostatic VS BP Location FiO2 (%)   Temporal Monitor -- Right arm --      Pain Score       --           Vitals:    06/28/23 1229   BP: 128/71   Pulse: 89         Visual Acuity      ED Medications  Medications   sterile water injection **ADS Override Pull** (has no administration in time range)   OLANZapine (ZyPREXA) IM injection 10 mg (10 mg Intramuscular Given 6/28/23 1335)       Diagnostic Studies  Results Reviewed     Procedure Component Value Units Date/Time    COVID only [[id number]]  (Normal) Collected: 06/28/23 1245    Lab Status: Final result Specimen: Nares from Nose Updated: 06/28/23 1329     SARS-CoV-2 Negative    Narrative:      FOR PEDIATRIC PATIENTS - copy/paste COVID Guidelines URL to browser: [email or web address]    SARS-CoV-2 assay is a Nucleic Acid Amplification assay intended for the  qualitative detection of nucleic acid from SARS-CoV-2 in nasopharyngeal  swabs  Results are for the presumptive identification of SARS-CoV-2 RNA      Positive results are indicative of infection with SARS-CoV-2, the virus  causing COVID-19, but do not rule out bacterial infection or co-infection  with other viruses  Laboratories within the [facility] and its  territories are required to report all positive results to the appropriate  public health authorities  Negative results do not preclude SARS-CoV-2  infection and should not be used as the sole basis for treatment or other  patient management decisions  Negative results must be combined with  clinical observations, patient history, and epidemiological information  This test has not been FDA cleared or approved  This test has been authorized by FDA under an Emergency Use Authorization  (EUA)  This test is only authorized for the duration of time the  declaration that circumstances exist justifying the authorization of the  emergency use of an in vitro diagnostic tests for detection of SARS-CoV-2  virus and/or diagnosis of COVID-19 infection under section 564(b)(1) of  the Act, 21 U  S C  819WTT-2(W)(1), unless the authorization is terminated  or revoked sooner  The test has been validated but independent review by FDA  and CLIA is pending  Test performed using [employer] GeneXpert: This RT-PCR assay targets N2,  a region unique to SARS-CoV-2  A conserved region in the E-gene was chosen  for pan-Sarbecovirus detection which includes SARS-CoV-2  According to CMS-2020-01-R, this platform meets the definition of high-throughput technology  Rapid drug screen, urine [[id number]]  (Abnormal) Collected: 06/28/23 1251    Lab Status: Final result Specimen: Urine, Clean Catch Updated: 06/28/23 1310     Amph/Meth UR Negative     Barbiturate Ur Negative     Benzodiazepine Urine Negative     Cocaine Urine Negative     Methadone Urine Negative     Opiate Urine Negative     PCP Ur Negative     THC Urine Positive     Oxycodone Urine Negative    Narrative:      Presumptive report  If requested, specimen will be sent to reference lab for confirmation  FOR MEDICAL PURPOSES ONLY     IF CONFIRMATION NEEDED PLEASE CONTACT THE "LAB WITHIN 5 DAYS  Drug Screen Cutoff Levels:  AMPHETAMINE/METHAMPHETAMINES  1000 ng/mL  BARBITURATES     200 ng/mL  BENZODIAZEPINES     200 ng/mL  COCAINE      300 ng/mL  METHADONE      300 ng/mL  OPIATES      300 ng/mL  PHENCYCLIDINE     25 ng/mL  THC       50 ng/mL  OXYCODONE      100 ng/mL    POCT alcohol breath test [[id number]]  (Normal) Resulted: 06/28/23 1239    Lab Status: Final result Updated: 06/28/23 1240     EXTBreath Alcohol 0 000                 No orders to display              Procedures  Procedures         ED Course                               SBIRT 22yo+    Flowsheet Row Most Recent Value   Initial Alcohol Screen: US AUDIT-C     1  How often do you have a drink containing alcohol? 0 Filed at: 06/28/2023 1322   2  How many drinks containing alcohol do you have on a typical day you are drinking? 0 Filed at: 06/28/2023 1322   3a  Male UNDER 65: How often do you have five or more drinks on one occasion? 0 Filed at: 06/28/2023 1322   3b  FEMALE Any Age, or MALE 65+: How often do you have 4 or more drinks on one occassion? 0 Filed at: 06/28/2023 1322   Audit-C Score 0 Filed at: 06/28/2023 1322   JASON: How many times in the past year have you    Used an illegal drug or used a prescription medication for non-medical reasons? Monthly Filed at: 06/28/2023 1322   DAST-10: In the past 12 months       1  Have you used drugs other than those required for medical reasons? 1 Filed at: 06/28/2023 1322   2  Do you use more than one drug at a time? 1 Filed at: 06/28/2023 1322   3  Have you had medical problems as a result of your drug use (e g , memory loss, hepatitis, convulsions, bleeding, etc )? 0 Filed at: 06/28/2023 1322   4  Have you had \"blackouts\" or \"flashbacks\" as a result of drug use? YesNo 0 Filed at: 06/28/2023 1322   5  Do you ever feel bad or guilty about your drug use? 1 Filed at: 06/28/2023 1322   6  Does your spouse (or parent) ever complain about your involvement with drugs?  1 Filed at: " 06/28/2023 1322   7  Have you neglected your family because of your use of drugs? 1 Filed at: 06/28/2023 1322   8  Have you engaged in illegal activities in order to obtain drugs? 0 Filed at: 06/28/2023 1322   9  Have you ever experienced withdrawal symptoms (felt sick) when you stopped taking drugs? 1 Filed at: 06/28/2023 1322   10  Are you always able to stop using drugs when you want to? 1 Filed at: 06/28/2023 1322   DAST-10 Score 7 Filed at: 06/28/2023 1322              Patient medically cleared for behavioral health evaluation  Medical Decision Making  57-year-old male presenting for psych evaluation citing that he is noncompliant with his outpatient maintenance medications, using illicit drugs, having passive SI  Patient is calm and cooperative placed on virtual observation and placed in gown  Patient is not medically cleared for crisis evaluation at this time  Patient is interested in inpatient psychiatric treatment  Would be willing to sign a 201 if 302 cannot be upheld  Bipolar disorder [facility]): chronic illness or injury  Encounter for psychological evaluation: acute illness or injury  Illicit drug use: acute illness or injury  Noncompliance with medications: acute illness or injury  Amount and/or Complexity of Data Reviewed  Labs: ordered  Risk  Prescription drug management            Disposition  Final diagnoses:   Encounter for psychological evaluation   Noncompliance with medications   Illicit drug use   Bipolar disorder ([facility] [address] )     Time reflects when diagnosis was documented in both MDM as applicable and the Disposition within this note     Time User Action Codes Description Comment    6/28/2023  5:22 PM Davida Means Add [Z00 8] Encounter for psychological evaluation     6/28/2023  5:22 PM Cherie Villa [Z91 148] Noncompliance with medications     6/28/2023  5:22 PM Davida Means Add [X60 15] Illicit drug use     6/21/1311  5:22 PM Davida Means Add [F31 9] Bipolar disorder [facility])       ED Disposition     None      Follow-up Information    None         Patient's Medications   Discharge Prescriptions    No medications on file       No discharge procedures on file      PDMP Review       Value Time User    PDMP Reviewed  Yes 1/11/2023  3:12 PM Valentine Arce DO          ED Provider  Electronically Signed by           Gadiel Tovar DO  06/28/23 0370

## 2023-06-28 NOTE — ED NOTES
Patient would like to voluntarily sign himself in  Attending is in agreement  This writer prepared a 61 51 81 and faxed it over to [facility] for patient and attending signature  Patient's rights were faxed over as well for [facility] RN to read to patient

## 2023-06-28 NOTE — ED NOTES
Spoke with Salvador Mar at [facility]  Patient has an active 302 and she will be faxing a copy of the 302 to [facility], crisis
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
## 2023-06-29 VITALS
OXYGEN SATURATION: 96 % | RESPIRATION RATE: 16 BRPM | TEMPERATURE: 98.2 F | SYSTOLIC BLOOD PRESSURE: 125 MMHG | DIASTOLIC BLOOD PRESSURE: 62 MMHG | HEART RATE: 88 BPM

## 2023-06-29 RX ADMIN — ESCITALOPRAM OXALATE 10 MG: 10 TABLET ORAL at 09:42

## 2023-06-29 RX ADMIN — CLONIDINE HYDROCHLORIDE 0.1 MG: 0.1 TABLET ORAL at 09:41

## 2023-06-29 RX ADMIN — BUSPIRONE HYDROCHLORIDE 5 MG: 5 TABLET ORAL at 09:41

## 2023-06-29 RX ADMIN — DIVALPROEX SODIUM 1000 MG: 250 TABLET, FILM COATED, EXTENDED RELEASE ORAL at 09:41

## 2023-06-29 NOTE — ED NOTES
Initiated bed search  [facility]- no beds as per Katelynn Maldonado  [facility]- no beds as per Abi Mcgill- unable to accept patient as per [place] [id number] [facility] faxed over to review- spoke to DONNA  Fax #: [phone]    Will continue bed search and report status

## 2023-06-29 NOTE — ED NOTES
[facility] here for transport  Report given to same and care transferred       Ronaldo Johansen, RN  06/29/23 1014

## 2023-06-29 NOTE — ED CARE HANDOFF
Emergency Department Sign Out Note        Sign out and transfer of care from Dr Gerardo Maher  See Separate Emergency Department note  The patient, Verner Flair, was evaluated by the previous provider for bipolar disorder, psychiatric evaluation  Workup Completed:  Labs, medically cleared    ED Course / Workup Pending (followup):  Pending IP behavioral health placement  ED Course as of 06/29/23 0644   Wed Jun 28, 2023   2358 SO - 26 yo M  302 petitioned as outpatient by brother due to homicidal threats  Vague SI  1:1 in place  Patient signed [place]  Would require 302 if patient rescinds  u Jun 29, 2023   0120 Patient accepted at [facility]  Pickup 9 AM    0303 Patient evaluated  He is sleeping calmly in the room  He denies any complaints at this time  Patient updated regarding plan of care and transfer to [facility] behavioral health  EMTALA form signed and placed in the patient's chart  Procedures  Medical Decision Making    25 y o  male presenting for pschiatric evaluation  Calm, cooperative overnight  Virtual 1:1 in place  Medically cleared, 201 signed  Patient is medically stable for inpatient psychiatric care  Patient agreeable to 201  201 form signed and placed in patient chart  EMTALA signed and placed in chart  Patient care transferred to Dr Carol Rivera pending transport  Amount and/or Complexity of Data Reviewed  Labs: ordered  Risk  Prescription drug management  Decision regarding hospitalization                Disposition  Final diagnoses:   Encounter for psychological evaluation   Noncompliance with medications   Illicit drug use   Bipolar disorder ([address] )     Time reflects when diagnosis was documented in both MDM as applicable and the Disposition within this note     Time User Action Codes Description Comment    6/28/2023  5:22 PM Cherri Prakash Add [Z00 8] Encounter for psychological evaluation     6/28/2023  5:22 PM Ladonna Valdez [Z91 148] Noncompliance with medications     6/28/2023  5:22 PM Ishan Duque Add [R72 03] Illicit drug use     4/42/8977  5:22 PM Ishan Duque Add [F31 9] Bipolar disorder [facility])       ED Disposition     ED Disposition   Transfer to [facility]    Condition   --    Date/Time   Thu Jun 29, 2023  1:21 AM    Comment   Ceferino Fox should be transferred out to [facility] and has been medically cleared  MD Angella Alcala Most Recent Value   Patient Condition The patient has been stabilized such that within reasonable medical probability, no material deterioration of the patient condition or the condition of the unborn child(juan) is likely to result from the transfer   Reason for Transfer Level of Care needed not available at this facility   Benefits of Transfer Specialized equipment and/or services available at the receiving facility (Include comment)________________________   Accepting Physician Renetta Lake   Sending MD Dusty Paz RN Documentation    72 Renetta Sauceda      Follow-up Information    None       Patient's Medications   Discharge Prescriptions    No medications on file     No discharge procedures on file         ED Provider  Electronically Signed by     Sofi Isabel DO  06/29/23 9206

## 2023-06-29 NOTE — EMTALA/ACUTE CARE TRANSFER
[facility] EMERGENCY DEPARTMENT  [address] [facility] [address]  Dept: [phone]      EMTALA TRANSFER CONSENT    NAME Jeffy Jesus                                         2000                              MRN [id number]    I have been informed of my rights regarding examination, treatment, and transfer   by Dr Shari Bowers DO    Benefits: Specialized equipment and/or services available at the receiving facility (Include comment)________________________    Risks:        Consent for Transfer:  I acknowledge that my medical condition has been evaluated and explained to me by the emergency department physician or other qualified medical person and/or my attending physician, who has recommended that I be transferred to the service of  Accepting Physician: Dr Sandor Flores at [address] Name, [address] : [facility]  The above potential benefits of such transfer, the potential risks associated with such transfer, and the probable risks of not being transferred have been explained to me, and I fully understand them  The doctor has explained that, in my case, the benefits of transfer outweigh the risks  I agree to be transferred  I authorize the performance of emergency medical procedures and treatments upon me in both transit and upon arrival at the receiving facility  Additionally, I authorize the release of any and all medical records to the receiving facility and request they be transported with me, if possible  I understand that the safest mode of transportation during a medical emergency is an ambulance and that the Hospital advocates the use of this mode of transport  Risks of traveling to the receiving facility by car, including absence of medical control, life sustaining equipment, such as oxygen, and medical personnel has been explained to me and I fully understand them      (SILVANA CORRECT BOX BELOW)  [  ]  I consent to the stated transfer and to be transported by ambulance/helicopter  [  ]  I consent to the stated transfer, but refuse transportation by ambulance and accept full responsibility for my transportation by car  I understand the risks of non-ambulance transfers and I exonerate the Hospital and its staff from any deterioration in my condition that results from this refusal     X___________________________________________    DATE  23  TIME________  Signature of patient or legally responsible individual signing on patient behalf           RELATIONSHIP TO PATIENT_________________________          Provider Certification    NAME Verner Flair DOB 2000                              MRN [id number]    A medical screening exam was performed on the above named patient  Based on the examination:    Condition Necessitating Transfer The primary encounter diagnosis was Encounter for psychological evaluation  Diagnoses of Noncompliance with medications, Illicit drug use, and Bipolar disorder ([facility] [address] ) were also pertinent to this visit      Patient Condition: The patient has been stabilized such that within reasonable medical probability, no material deterioration of the patient condition or the condition of the unborn child(juan) is likely to result from the transfer    Reason for Transfer: Level of Care needed not available at this facility    Transfer Requirements: [address]   · Space available and qualified personnel available for treatment as acknowledged by    · Agreed to accept transfer and to provide appropriate medical treatment as acknowledged by       Dr Earvin Cowden  · Appropriate medical records of the examination and treatment of the patient are provided at the time of transfer   [address], [address] _______  · Transfer will be performed by qualified personnel from    and appropriate transfer equipment as required, including the use of necessary and appropriate life support measures  Provider Certification: I have examined the patient and explained the following risks and benefits of being transferred/refusing transfer to the patient/family:         Based on these reasonable risks and benefits to the patient and/or the unborn child(juan), and based upon the information available at the time of the patient’s examination, I certify that the medical benefits reasonably to be expected from the provision of appropriate medical treatments at another medical facility outweigh the increasing risks, if any, to the individual’s medical condition, and in the case of labor to the unborn child, from effecting the transfer      X____________________________________________ DATE 06/29/23        TIME_______      ORIGINAL - SEND TO MEDICAL RECORDS   COPY - SEND WITH PATIENT DURING TRANSFER

## 2023-06-29 NOTE — ED NOTES
Insurance Authorization for admission:   Phone call placed to [facility]     Phone number:[phone]   Spoke to Obie  5 days approved  Level of care: 201  Review on 7/3  Authorization # [id number]

## 2023-06-29 NOTE — ED NOTES
This writer prepared the [facility], [facility] and [employer]  All documents listed along with patient's  were sent to the county

## 2023-06-29 NOTE — ED NOTES
Patient is accepted at [facility]  Patient is accepted by Dr Nixon Almodovar  [facility]  Transportation is scheduled for 9 AM    Patient may go to the floor after 9 AM           Nurse report is to be called to [phone] prior to patient transfer

## 2023-06-29 NOTE — ED NOTES
Received notification from [address] that pt transport today changed to 1030       Kwaku Mccauley RN  06/29/23 4070

## 2023-07-12 ENCOUNTER — HOSPITAL ENCOUNTER (EMERGENCY)
Facility: HOSPITAL | Age: 23
Discharge: HOME | End: 2023-07-13
Attending: EMERGENCY MEDICINE
Payer: COMMERCIAL

## 2023-07-12 VITALS
BODY MASS INDEX: 35.32 KG/M2 | RESPIRATION RATE: 18 BRPM | HEART RATE: 86 BPM | HEIGHT: 65 IN | SYSTOLIC BLOOD PRESSURE: 134 MMHG | OXYGEN SATURATION: 97 % | DIASTOLIC BLOOD PRESSURE: 79 MMHG | WEIGHT: 212 LBS | TEMPERATURE: 97.7 F

## 2023-07-12 DIAGNOSIS — L25.9 CONTACT DERMATITIS: Primary | ICD-10-CM

## 2023-07-12 RX ORDER — PREDNISONE 20 MG/1
60 TABLET ORAL ONCE
Status: COMPLETED | OUTPATIENT
Start: 2023-07-13 | End: 2023-07-13

## 2023-07-12 RX ORDER — HYDROXYZINE HYDROCHLORIDE 25 MG/1
50 TABLET, FILM COATED ORAL ONCE
Status: COMPLETED | OUTPATIENT
Start: 2023-07-13 | End: 2023-07-13

## 2023-07-13 ENCOUNTER — TELEPHONE (OUTPATIENT)
Dept: INTERNAL MEDICINE CLINIC | Facility: CLINIC | Age: 23
End: 2023-07-13

## 2023-07-13 RX ORDER — PREDNISONE 10 MG/1
TABLET ORAL
Qty: 40 TABLET | Refills: 0 | Status: SHIPPED | OUTPATIENT
Start: 2023-07-13

## 2023-07-13 RX ORDER — HYDROXYZINE HYDROCHLORIDE 25 MG/1
25 TABLET, FILM COATED ORAL EVERY 6 HOURS PRN
Qty: 40 TABLET | Refills: 0 | Status: SHIPPED | OUTPATIENT
Start: 2023-07-13

## 2023-07-13 RX ADMIN — PREDNISONE 60 MG: 20 TABLET ORAL at 00:21

## 2023-07-13 RX ADMIN — HYDROXYZINE HYDROCHLORIDE 50 MG: 25 TABLET ORAL at 00:21

## 2023-07-13 NOTE — ED PROVIDER NOTES
History  Chief Complaint   Patient presents with   • Rash     Patient reports having a rash on his whole body starting today. Patient reports a burning sensation but no itching. Patient is a 70-year-old male. He has a history of substance abuse. He does IV drugs. Uses IV meth and opiates. He also uses marijuana. [facility] He does take Suboxone. Patient last used IV drugs 2 days ago. He presents to the emergency room with a 1 rash that started today. He has it mostly to the arms. Somewhat to the face. Less to the torso. He has some to the legs. No fever or chills. No contacts with similar rash. Patient denies exposure to poison ivy. Patient reports that the rash is more burning than itching. Prior to Admission Medications   Prescriptions Last Dose Informant Patient Reported? Taking?    QUEtiapine (SEROquel) 300 mg tablet   No No   Sig: Take 1 tablet (300 mg total) by mouth daily at bedtime   buprenorphine-naloxone (Suboxone) 12-3 MG   No No   Sig: Place 1 Film (12 mg total) under the tongue daily   busPIRone (BUSPAR) 5 mg tablet   No No   Sig: Take 1 tablet (5 mg total) by mouth 3 (three) times a day   cholecalciferol (VITAMIN D3) 400 units tablet   No No   Sig: Take 1 tablet (400 Units total) by mouth daily   ciprofloxacin-dexamethasone (CIPRODEX) otic suspension   No No   Sig: Administer 4 drops to the right ear 2 (two) times a day   cloNIDine (CATAPRES) 0.1 mg tablet   No No   Sig: Take 1 tablet (0.1 mg total) by mouth in the morning   divalproex sodium (DEPAKOTE ER) 500 mg 24 hr tablet   No No   Sig: Take 2 tablets (1,000 mg total) by mouth daily   escitalopram (LEXAPRO) 10 mg tablet   No No   Sig: Take 1 tablet (10 mg total) by mouth daily   naloxone (NARCAN) 4 mg/0.1 mL nasal spray   No No   Sig: Administer one spray intranasally into each nostril for suspected opioid overdose   naloxone (Narcan) 4 mg/0.1 mL nasal spray   No No   Si.1 mL (4 mg total) into each nostril as needed (respiratory depression or possible OD)      Facility-Administered Medications: None       Past Medical History:   Diagnosis Date   • ADHD (attention deficit hyperactivity disorder)    • Anxiety    • Bipolar disorder (HCC)    • Depression    • GERD (gastroesophageal reflux disease)    • Hyperlipidemia    • Hypertension    • Psychiatric disorder    • Psychiatric illness    • Seizures ([address])    • Substance abuse ([address])    • Tourette syndrome        Past Surgical History:   Procedure Laterality Date   • WI EXC B9 LESION MRGN XCP SK TG T/A/L >4.0 CM Left 10/26/2021    Procedure: EXCISION LIPOMA (BACK); Surgeon: Bala Persaud DO;  Location: [facility];  Service: General   • TONSILECTOMY AND ADNOIDECTOMY     • TONSILLECTOMY     • TYMPANOSTOMY TUBE PLACEMENT         Family History   Problem Relation Age of Onset   • No Known Problems Mother    • Substance Abuse Father    • Cirrhosis Father    • Coronary artery disease Father    • Hepatitis Father    • Substance Abuse Brother    • Diabetes Neg Hx      I have reviewed and agree with the history as documented. E-Cigarette/Vaping   • E-Cigarette Use Never User      E-Cigarette/Vaping Substances   • Nicotine No    • THC No    • CBD No    • Flavoring No    • Other No    • Unknown No      Social History     Tobacco Use   • Smoking status: Every Day     Packs/day: 1.50     Years: 10.00     Total pack years: 15.00     Types: Cigarettes   • Smokeless tobacco: Never   Vaping Use   • Vaping Use: Never used   Substance Use Topics   • Alcohol use: Not Currently     Comment: vodka weekly   • Drug use: Yes     Types: Marijuana, Heroin, Methamphetamines     Comment: Last used meth 3 days ago, marijuana today (7/12/23)       Review of Systems   Constitutional: Negative for chills and fever. Skin: Positive for rash. Physical Exam  Physical Exam  Vitals reviewed. Constitutional:       General: He is not in acute distress.      Appearance: He is not toxic-appearing. HENT:      Head: Normocephalic and atraumatic. Nose: Nose normal.      Mouth/Throat:      Mouth: Mucous membranes are moist.   Eyes:      General:         Right eye: No discharge. Left eye: No discharge. Conjunctiva/sclera: Conjunctivae normal.   Cardiovascular:      Rate and Rhythm: Normal rate and regular rhythm. Pulses: Normal pulses. Heart sounds: Normal heart sounds. No murmur heard. No friction rub. No gallop. Pulmonary:      Effort: Pulmonary effort is normal. No respiratory distress. Breath sounds: Normal breath sounds. No stridor. No wheezing, rhonchi or rales. Abdominal:      General: Bowel sounds are normal. There is no distension. Palpations: Abdomen is soft. Tenderness: There is no abdominal tenderness. There is no right CVA tenderness, left CVA tenderness, guarding or rebound. Musculoskeletal:         General: No swelling, tenderness, deformity or signs of injury. Normal range of motion. Cervical back: Normal range of motion and neck supple. No rigidity. Right lower leg: No edema. Left lower leg: No edema. Comments: No calf pain or unilateral leg swelling   Skin:     General: Skin is warm and dry. Coloration: Skin is not jaundiced or pale. Findings: Erythema and rash present. No bruising. Comments: Rashes most prominent in the arms. Some are in linear distribution. There are red papules. There are red macules with confluence. He has areas on his torso and face consistent with skin picking. Neurological:      General: No focal deficit present. Mental Status: He is alert and oriented to person, place, and time. Cranial Nerves: No facial asymmetry. Sensory: No sensory deficit. Motor: Motor function is intact. Psychiatric:         Mood and Affect: Mood is anxious.          Behavior: Behavior normal.         Vital Signs  ED Triage Vitals [07/12/23 2313]   Temperature Pulse Respirations Blood Pressure SpO2   97.7 °F (36.5 °C) 86 18 134/79 97 %      Temp Source Heart Rate Source Patient Position - Orthostatic VS BP Location FiO2 (%)   Temporal Monitor Sitting Right arm --      Pain Score       9           Vitals:    07/12/23 2313   BP: 134/79   Pulse: 86   Patient Position - Orthostatic VS: Sitting         Visual Acuity      ED Medications  Medications   hydrOXYzine HCL (ATARAX) tablet 50 mg (has no administration in time range)   predniSONE tablet 60 mg (has no administration in time range)       Diagnostic Studies  Results Reviewed     None                 No orders to display              Procedures  Procedures         ED Course                               SBIRT 22yo+    Flowsheet Row Most Recent Value   Initial Alcohol Screen: US AUDIT-C     1. How often do you have a drink containing alcohol? 0 Filed at: 07/12/2023 2313   Audit-C Score 0 Filed at: 07/12/2023 2313   JASON: How many times in the past year have you. .. Used an illegal drug or used a prescription medication for non-medical reasons? Never Filed at: 07/12/2023 2313                    Medical Decision Making  Looks most likely like poison ivy. Other contact dermatitis would also be in the differential.  A drug rash would be less likely. This is not cellulitis. These or not abscesses. This is not toxic shock syndrome. Risk  Prescription drug management. Decision regarding hospitalization. Disposition  Final diagnoses:   Contact dermatitis     Time reflects when diagnosis was documented in both MDM as applicable and the Disposition within this note     Time User Action Codes Description Comment    7/13/2023 12:06 AM Anastacia Sampson Add [L25.9] Contact dermatitis       ED Disposition     ED Disposition   Discharge    Condition   Stable    Date/Time   Thu Jul 13, 2023 12:06 AM    Hope Newton discharge to home/self care.                Follow-up Information     Follow up With Specialties Details Why Contact Jose Juan Finnegan DO Internal Medicine In 2 days  [address] [facility] [address]  [phone]            Patient's Medications   Discharge Prescriptions    HYDROXYZINE HCL (ATARAX) 25 MG TABLET    Take 1 tablet (25 mg total) by mouth every 6 (six) hours as needed for itching or anxiety       Start Date: 7/13/2023 End Date: --       Order Dose: 25 mg       Quantity: 40 tablet    Refills: 0    PREDNISONE 10 MG TABLET    60mg po qd x 3 days, then 40mg po qd x 3 days, then 20mg po qd x 3 days, then 10mg po qd x 3 days, then stop       Start Date: 7/13/2023 End Date: --       Order Dose: --       Quantity: 40 tablet    Refills: 0       No discharge procedures on file.     PDMP Review       Value Time User    PDMP Reviewed  Yes 1/11/2023  3:12 PM Sia Finnegan DO          ED Provider  Electronically Signed by           Elham Whitaker MD  07/13/23 0009       Elham Whitaker MD  07/13/23 0009

## 2023-07-14 ENCOUNTER — HOSPITAL ENCOUNTER (EMERGENCY)
Facility: HOSPITAL | Age: 23
End: 2023-07-15
Attending: STUDENT IN AN ORGANIZED HEALTH CARE EDUCATION/TRAINING PROGRAM
Payer: COMMERCIAL

## 2023-07-14 ENCOUNTER — APPOINTMENT (EMERGENCY)
Dept: CT IMAGING | Facility: HOSPITAL | Age: 23
End: 2023-07-14
Payer: COMMERCIAL

## 2023-07-14 DIAGNOSIS — R45.851 SUICIDAL IDEATION: ICD-10-CM

## 2023-07-14 DIAGNOSIS — F41.9 ANXIETY: Primary | ICD-10-CM

## 2023-07-14 DIAGNOSIS — F15.929 METHAMPHETAMINE INTOXICATION (HCC): ICD-10-CM

## 2023-07-14 DIAGNOSIS — R45.851 SUICIDAL IDEATIONS: ICD-10-CM

## 2023-07-14 DIAGNOSIS — Z00.8 ENCOUNTER FOR PSYCHOLOGICAL EVALUATION: ICD-10-CM

## 2023-07-14 LAB — ETHANOL EXG-MCNC: 0 MG/DL

## 2023-07-14 PROCEDURE — 96372 THER/PROPH/DIAG INJ SC/IM: CPT

## 2023-07-14 PROCEDURE — 70450 CT HEAD/BRAIN W/O DYE: CPT

## 2023-07-14 PROCEDURE — G1004 CDSM NDSC: HCPCS

## 2023-07-14 PROCEDURE — 82075 ASSAY OF BREATH ETHANOL: CPT | Performed by: STUDENT IN AN ORGANIZED HEALTH CARE EDUCATION/TRAINING PROGRAM

## 2023-07-14 PROCEDURE — 99285 EMERGENCY DEPT VISIT HI MDM: CPT

## 2023-07-14 RX ORDER — OLANZAPINE 10 MG/1
5 INJECTION, POWDER, LYOPHILIZED, FOR SOLUTION INTRAMUSCULAR ONCE
Status: COMPLETED | OUTPATIENT
Start: 2023-07-14 | End: 2023-07-14

## 2023-07-14 RX ORDER — TRIAMCINOLONE ACETONIDE 1 MG/G
CREAM TOPICAL ONCE
Status: COMPLETED | OUTPATIENT
Start: 2023-07-14 | End: 2023-07-14

## 2023-07-14 RX ADMIN — TRIAMCINOLONE ACETONIDE: 1 CREAM TOPICAL at 09:34

## 2023-07-14 RX ADMIN — OLANZAPINE 5 MG: 10 INJECTION, POWDER, FOR SOLUTION INTRAMUSCULAR at 13:57

## 2023-07-14 NOTE — ED PROVIDER NOTES
History  Chief Complaint   Patient presents with   • Psychiatric Evaluation     HPI 80-year-old male presents to the emergency department via EMS after endorsing that he wants to sign himself in for psychiatric care. Patient states he has been having thoughts of suicidality for the last 2 days with a plan to hang himself with a pair of pants. Patient states he got in a verbal altercation with his mother last night and subsequently was sleeping outside near a local river last night. He also endorses that he has been using Suboxone and methamphetamine. Also endorses that he had several head strikes while out in the woods last night and possible syncopal episode. Unfortunately patient is poor historian which I believed to be secondary to the methamphetamine intoxication. Patient denies being on any blood thinners. He denies any other concerns other than the suicidality and the head strike. States he has been hospitalized multiple times for suicidality and was most recently discharged from inpatient psychiatric rehab 3 days ago for similar symptoms. Prior to Admission Medications   Prescriptions Last Dose Informant Patient Reported? Taking?    QUEtiapine (SEROquel) 300 mg tablet   No No   Sig: Take 1 tablet (300 mg total) by mouth daily at bedtime   buprenorphine-naloxone (Suboxone) 12-3 MG 7/14/2023  No Yes   Sig: Place 1 Film (12 mg total) under the tongue daily   busPIRone (BUSPAR) 5 mg tablet   No No   Sig: Take 1 tablet (5 mg total) by mouth 3 (three) times a day   cholecalciferol (VITAMIN D3) 400 units tablet   No No   Sig: Take 1 tablet (400 Units total) by mouth daily   ciprofloxacin-dexamethasone (CIPRODEX) otic suspension Unknown  No No   Sig: Administer 4 drops to the right ear 2 (two) times a day   cloNIDine (CATAPRES) 0.1 mg tablet   No No   Sig: Take 1 tablet (0.1 mg total) by mouth in the morning   divalproex sodium (DEPAKOTE ER) 500 mg 24 hr tablet   No No   Sig: Take 2 tablets (1,000 mg total) by mouth daily   escitalopram (LEXAPRO) 10 mg tablet   No No   Sig: Take 1 tablet (10 mg total) by mouth daily   hydrOXYzine HCL (ATARAX) 25 mg tablet Past Month  No Yes   Sig: Take 1 tablet (25 mg total) by mouth every 6 (six) hours as needed for itching or anxiety   naloxone (NARCAN) 4 mg/0.1 mL nasal spray Past Week  No Yes   Sig: Administer one spray intranasally into each nostril for suspected opioid overdose   naloxone (Narcan) 4 mg/0.1 mL nasal spray Past Week  No Yes   Si.1 mL (4 mg total) into each nostril as needed (respiratory depression or possible OD)   predniSONE 10 mg tablet Not Taking  No No   Simg po qd x 3 days, then 40mg po qd x 3 days, then 20mg po qd x 3 days, then 10mg po qd x 3 days, then stop   Patient not taking: Reported on 2023      Facility-Administered Medications: None       Past Medical History:   Diagnosis Date   • ADHD (attention deficit hyperactivity disorder)    • Anxiety    • Bipolar disorder (HCC)    • Depression    • GERD (gastroesophageal reflux disease)    • Hyperlipidemia    • Hypertension    • Psychiatric disorder    • Psychiatric illness    • Seizures ([address])    • Substance abuse (HCC)    • Tourette syndrome        Past Surgical History:   Procedure Laterality Date   • MT EXC B9 LESION MRGN XCP SK TG T/A/L >4.0 CM Left 10/26/2021    Procedure: EXCISION LIPOMA (BACK); Surgeon: Brandon Jorge DO;  Location: [facility];  Service: General   • TONSILECTOMY AND ADNOIDECTOMY     • TONSILLECTOMY     • TYMPANOSTOMY TUBE PLACEMENT         Family History   Problem Relation Age of Onset   • No Known Problems Mother    • Substance Abuse Father    • Cirrhosis Father    • Coronary artery disease Father    • Hepatitis Father    • Substance Abuse Brother    • Diabetes Neg Hx      I have reviewed and agree with the history as documented.     E-Cigarette/Vaping   • E-Cigarette Use Never User      E-Cigarette/Vaping Substances   • Nicotine No    • THC No    • CBD No    • Flavoring No    • Other No    • Unknown No      Social History     Tobacco Use   • Smoking status: Every Day     Packs/day: 1.50     Years: 10.00     Total pack years: 15.00     Types: Cigarettes   • Smokeless tobacco: Never   Vaping Use   • Vaping Use: Never used   Substance Use Topics   • Alcohol use: Not Currently     Comment: vodka weekly   • Drug use: Yes     Types: Marijuana, Heroin, Methamphetamines     Comment: Last used meth 3 days ago, marijuana today (7/12/23)       Review of Systems   Constitutional: Negative for activity change, appetite change, chills, fatigue and fever. HENT: Negative for congestion, dental problem, drooling, ear discharge, ear pain, facial swelling, postnasal drip, rhinorrhea and sinus pain. Eyes: Negative for photophobia, pain, discharge and itching. Respiratory: Negative for apnea, cough, chest tightness and shortness of breath. Cardiovascular: Negative for chest pain and leg swelling. Gastrointestinal: Negative for abdominal distention, abdominal pain, anal bleeding, constipation, diarrhea and nausea. Endocrine: Negative for cold intolerance, heat intolerance and polydipsia. Genitourinary: Negative for difficulty urinating. Musculoskeletal: Negative for arthralgias, gait problem, joint swelling and myalgias. Skin: Negative for color change and pallor. Allergic/Immunologic: Negative for immunocompromised state. Neurological: Positive for headaches. Negative for dizziness, seizures, facial asymmetry, weakness, light-headedness and numbness. Psychiatric/Behavioral: Positive for agitation. Negative for behavioral problems, confusion, decreased concentration and dysphoric mood. The patient is nervous/anxious. All other systems reviewed and are negative. Physical Exam  Physical Exam  Constitutional:       Appearance: He is well-developed. He is ill-appearing. He is not diaphoretic. HENT:      Head: Normocephalic.    Eyes:      Pupils: Pupils are equal, round, and reactive to light. Cardiovascular:      Rate and Rhythm: Normal rate and regular rhythm. Pulmonary:      Effort: Pulmonary effort is normal.      Breath sounds: Normal breath sounds. Abdominal:      General: Bowel sounds are normal.      Palpations: Abdomen is soft. Musculoskeletal:         General: Normal range of motion. Cervical back: Normal range of motion and neck supple. Comments: Contact dermatitis right upper extremity   Skin:     General: Skin is warm. Neurological:      Mental Status: He is alert. Psychiatric:         Attention and Perception: Attention and perception normal. He does not perceive auditory or visual hallucinations. Mood and Affect: Mood is anxious and elated. Speech: Speech normal.         Behavior: Behavior is agitated and hyperactive. Thought Content: Thought content includes suicidal ideation. Thought content does not include homicidal ideation. Thought content includes suicidal plan. Thought content does not include homicidal plan. Judgment: Judgment is impulsive and inappropriate.          Vital Signs  ED Triage Vitals [07/14/23 0812]   Temperature Pulse Respirations Blood Pressure SpO2   (!) 97.2 °F (36.2 °C) (!) 110 18 154/67 96 %      Temp Source Heart Rate Source Patient Position - Orthostatic VS BP Location FiO2 (%)   Tympanic Monitor Lying Left arm --      Pain Score       7           Vitals:    07/14/23 0812   BP: 154/67   Pulse: (!) 110   Patient Position - Orthostatic VS: Lying         Visual Acuity      ED Medications  Medications   triamcinolone (KENALOG) 0.1 % cream ( Topical Given 7/14/23 0934)       Diagnostic Studies  Results Reviewed     Procedure Component Value Units Date/Time    POCT alcohol breath test [[id number]]  (Normal) Resulted: 07/14/23 0910    Lab Status: Final result Updated: 07/14/23 0910     EXTBreath Alcohol 0    Rapid drug screen, urine [[id number]]     Lab Status: No result Specimen: Urine CT head wo contrast   Final Result by Uche Mandujano MD (07/14 0342)      No acute intracranial abnormality. Workstation performed: [id number]                    Procedures  Procedures         ED Course  ED Course as of 07/14/23 1028   Fri Jul 14, 2023   8249 CAT scan findings reviewed no acute intra cranial abnormality. 4011 Patient is complaining of itching on the right upper extremity consistent with contact dermatitis. Will treat with topical steroid. 2058 This patient was interviewed and examined by me and found to be medically stable for transfer and admission to a psychiatric treatment facility if needed. There are no medical conditions which are untreated, unstable, or requiring acute intervention. Medical Decision Making  This is a 58-year-old male who presents to the emergency department via EMS for concerns of suicidal ideology with plan. Patient states that he would like to 201 himself. Also endorses that he had multiple head strikes, secondary to what he believes to be methamphetamine intoxication which he endorses recent use approximately 18 hours ago. We will proceed with psychiatric work-up and consultation to crisis worker for 201 evaluation/placement. Given patient is not the best historian which I believe secondary to the methamphetamine intoxication we will proceed with CT head given the concern for multiple head strikes overnight. Encounter for psychological evaluation: acute illness or injury  Methamphetamine intoxication ([address]): acute illness or injury  Suicidal ideations: acute illness or injury that poses a threat to life or bodily functions  Amount and/or Complexity of Data Reviewed  Independent Historian: EMS  External Data Reviewed: labs, radiology and ECG. Labs: ordered. Decision-making details documented in ED Course. Radiology: ordered. Decision-making details documented in ED Course.   ECG/medicine tests:  Decision-making details documented in ED Course. Risk  Prescription drug management. Disposition  Final diagnoses:   Suicidal ideations   Methamphetamine intoxication ([address])   Encounter for psychological evaluation     Time reflects when diagnosis was documented in both MDM as applicable and the Disposition within this note     Time User Action Codes Description Comment    7/14/2023  8:23 AM Marshall Watts Add [V33.095] Suicidal ideations     7/14/2023  8:23 AM Marshall Watts Add [A55.505] Methamphetamine intoxication ([address])     7/14/2023  8:23 AM Marshall Watts Add [Z00.8] Encounter for psychological evaluation       ED Disposition     ED Disposition   Transfer to [address] [place] [address]   --    Date/Time   Fri Jul 14, 2023 10:28 AM    Comment   Isherinn Nelly should be transferred out          Follow-up Information    None         Patient's Medications   Discharge Prescriptions    No medications on file       No discharge procedures on file.     PDMP Review       Value Time User    PDMP Reviewed  Yes 1/11/2023  3:12 PM Jose Carlos Calixto DO          ED Provider  Electronically Signed by           Marshall Watts MD  07/14/23 3568

## 2023-07-14 NOTE — ED NOTES
Patient tried to give urine sample, unable to go.  Will try again      Darnell Georges  07/14/23 0976

## 2023-07-14 NOTE — ED NOTES
See previous [facility] Suicide Risk Assessment. Re-screening not required unless change in behavior or suicidal ideation. Behavioral Health Assessment deferred as patient is sleeping and would benefit from additional rest.  Vital signs deferred until patient awake, no signs or symptoms of respiratory distress at this time. Once patient is awake and able to participate, will complete assessments. 1:1 observation at bedside.       Hesham Kumar  07/14/23 3658

## 2023-07-14 NOTE — ED NOTES
Spoke to patient's mother, Valencia Villa, per the patient. Mother grateful for the phone call on the status of her son, as she had been worried for his health and safety. Mother tearful at this time.      Rosita Maya RN  07/14/23 7504

## 2023-07-14 NOTE — ED NOTES
See previous [facility] Suicide Risk Assessment. Re-screening not required unless change in behavior or suicidal ideation. Behavioral Health Assessment deferred as patient is sleeping and would benefit from additional rest.  Vital signs deferred until patient awake, no signs or symptoms of respiratory distress at this time. Once patient is awake and able to participate, will complete assessments.     1:1 remains at bedside       Carina Otoole RN  07/14/23 8217

## 2023-07-14 NOTE — ED NOTES
Crisis attempted to speak with pt, he was not awake. He moved his left leg back and forth; but was still not await. Crisis will re-attempt at a later time.

## 2023-07-14 NOTE — ED NOTES
Crisis met with pt to complete Crisis Intake and Safety Risk Assessment. Introduce self, role, and evaluation process. Pt is 80-year-old male presents in ED via EMS requesting a psychiatric evaluation. Patient states he has been having thoughts of suicidality for the last 2 days with a plan to hang himself with a pair of pants. Patient states he got in a verbal altercation with his mother last night and found himself sleeping outside near a local river last night. Pt admits to using Suboxone and methamphetamine. Pt denies homicidal ideations, auditory, visual hallucinations or thoughts to self harm. Denies alcohol use. Pt states he has been hospitalized multiple times for suicidality and was most recently discharged from inpatient psychiatric rehab 3 days ago for similar symptoms. Crisis and pt discussed treatment options and the need for inpatient admissions. Pt signed 201 after rights and 72 hour noticed were discussed and reviewed. 201 and face sheet faxed to intake.

## 2023-07-14 NOTE — ED NOTES
Patient arrived disheveled and unkept. Patient given basin, wash towels and soap to clean up.      Femi Rodrigues RN  07/14/23 7302

## 2023-07-14 NOTE — ED NOTES
[place]. Recipient #[id number].   [id number] [facility] managed by [facility].

## 2023-07-14 NOTE — ED NOTES
CW made an attempt to speak with patient although he continues to moan and is extremely wrestless, moving from side to side and scratching himself. CW to attempt eval once patient is more alert.      Hiren Bee LMSW  07/14/23  0106

## 2023-07-15 VITALS
TEMPERATURE: 97.6 F | OXYGEN SATURATION: 100 % | BODY MASS INDEX: 35.36 KG/M2 | SYSTOLIC BLOOD PRESSURE: 138 MMHG | HEIGHT: 66 IN | WEIGHT: 220 LBS | RESPIRATION RATE: 18 BRPM | DIASTOLIC BLOOD PRESSURE: 82 MMHG | HEART RATE: 66 BPM

## 2023-07-15 LAB
AMPHETAMINES SERPL QL SCN: POSITIVE
BARBITURATES UR QL: NEGATIVE
BENZODIAZ UR QL: NEGATIVE
COCAINE UR QL: NEGATIVE
FLUAV RNA RESP QL NAA+PROBE: NEGATIVE
FLUBV RNA RESP QL NAA+PROBE: NEGATIVE
METHADONE UR QL: NEGATIVE
OPIATES UR QL SCN: NEGATIVE
OXYCODONE+OXYMORPHONE UR QL SCN: NEGATIVE
PCP UR QL: NEGATIVE
RSV RNA RESP QL NAA+PROBE: NEGATIVE
SARS-COV-2 RNA RESP QL NAA+PROBE: NEGATIVE
THC UR QL: POSITIVE

## 2023-07-15 PROCEDURE — 80307 DRUG TEST PRSMV CHEM ANLYZR: CPT | Performed by: STUDENT IN AN ORGANIZED HEALTH CARE EDUCATION/TRAINING PROGRAM

## 2023-07-15 PROCEDURE — 0241U HB NFCT DS VIR RESP RNA 4 TRGT: CPT | Performed by: EMERGENCY MEDICINE

## 2023-07-15 RX ORDER — HYDROXYZINE 50 MG/1
25 TABLET, FILM COATED ORAL EVERY 6 HOURS PRN
Status: CANCELLED | OUTPATIENT
Start: 2023-07-15

## 2023-07-15 RX ORDER — QUETIAPINE FUMARATE 100 MG/1
300 TABLET, FILM COATED ORAL
Status: CANCELLED | OUTPATIENT
Start: 2023-07-15

## 2023-07-15 RX ORDER — ESCITALOPRAM OXALATE 10 MG/1
10 TABLET ORAL DAILY
Status: CANCELLED | OUTPATIENT
Start: 2023-07-15

## 2023-07-15 RX ORDER — NALOXONE HYDROCHLORIDE 0.4 MG/ML
0.1 INJECTION, SOLUTION INTRAMUSCULAR; INTRAVENOUS; SUBCUTANEOUS
Status: CANCELLED | OUTPATIENT
Start: 2023-07-15

## 2023-07-15 RX ORDER — CLONIDINE HYDROCHLORIDE 0.1 MG/1
0.1 TABLET ORAL DAILY
Status: CANCELLED | OUTPATIENT
Start: 2023-07-15

## 2023-07-15 RX ORDER — OMEGA-3S/DHA/EPA/FISH OIL/D3 300MG-1000
400 CAPSULE ORAL DAILY
Status: CANCELLED | OUTPATIENT
Start: 2023-07-15

## 2023-07-15 RX ORDER — BUSPIRONE HYDROCHLORIDE 5 MG/1
5 TABLET ORAL 3 TIMES DAILY
Status: CANCELLED | OUTPATIENT
Start: 2023-07-15

## 2023-07-15 RX ORDER — DIVALPROEX SODIUM 250 MG/1
1000 TABLET, EXTENDED RELEASE ORAL DAILY
Status: CANCELLED | OUTPATIENT
Start: 2023-07-15

## 2023-07-15 NOTE — ED NOTES
Patient leaving department with [facility] via stretcher. Belongings retrieved from locker and sent with EMS. Original 201 and chart sent with patient.      Nilton Villa RN  07/15/23 3064

## 2023-07-15 NOTE — EMTALA/ACUTE CARE TRANSFER
LARRY ROBERT    Patient Age: 57 year old     Refill to be: ePrescribed to [facility]    Pt has enough medication to wait for refill    Medication requested to be refilled:              Next and Last Visit with Provider and Department  Last visit with this provider: 4/18/2019  Last visit with this department: Visit date not found    Next visit with this provider: 6/20/2019  Next visit with this department: Visit date not found      Current Medications    AMLODIPINE (NORVASC) 10 MG TABLET    Take 1 tablet by mouth daily.    ATORVASTATIN (LIPITOR) 10 MG TABLET    Take 1 tablet by mouth at bedtime.    FAMOTIDINE (PEPCID) 20 MG TABLET    1 tablet 2 times daily.    HYDROCHLOROTHIAZIDE (MICROZIDE) 12.5 MG CAPSULE    Take 1 capsule by mouth daily.    IBUPROFEN (MOTRIN) 200 MG TABLET    Take by mouth every 8 hours as needed.     LISINOPRIL (ZESTRIL) 10 MG TABLET    Take 1 tablet by mouth daily.    METFORMIN (GLUCOPHAGE) 500 MG TABLET    Take 1 tablet by mouth daily.    METOPROLOL SUCCINATE (TOPROL-XL) 25 MG 24 HR TABLET    Take 0.5 tablets by mouth daily.    POTASSIUM CHLORIDE (KLOR-CON M) 20 MEQ SAMEERA ER TABLET    Take 1 tablet by mouth daily.    VITAMIN D, ERGOCALCIFEROL, 66273 UNITS CAPSULE    Take 1 capsule by mouth 1 day a week.       ALLERGIES:  Aspirin    PHARMACY to use:           Pharmacy preference(s) on file:   [id number] - [place], [place] - [address]  [address] [facility] [address]  Phone: [phone] Fax: [phone]    [facility] [address] [place] [address] [address]  [address] [facility] [address]  Phone: [phone] Fax: [phone]    [id number] - [place], [place] - [address]  [address] [facility] [address]  Phone: [phone] Fax: [phone]      CALL BACK INFO: Ok to leave response (including medical information) with family member or on answering machine    PCP: Shante Walters MD        [address] [address]  Dept: [phone]      EMTALA TRANSFER CONSENT    NAME Naseem Reynoso                                         2000                              MRN [id number]    I have been informed of my rights regarding examination, treatment, and transfer   by Dr. Sujatha Hancock DO    Benefits: Other benefits (Include comment)_______________________ (Inpatient psychiatric care)    Risks: Increased discomfort during transfer      Consent for Transfer:  I acknowledge that my medical condition has been evaluated and explained to me by the emergency department physician or other qualified medical person and/or my attending physician, who has recommended that I be transferred to the service of    at [address] Name, [address] [facility] : [address] The above potential benefits of such transfer, the potential risks associated with such transfer, and the probable risks of not being transferred have been explained to me, and I fully understand them. The doctor has explained that, in my case, the benefits of transfer outweigh the risks. I agree to be transferred. I authorize the performance of emergency medical procedures and treatments upon me in both transit and upon arrival at the receiving facility. Additionally, I authorize the release of any and all medical records to the receiving facility and request they be transported with me, if possible. I understand that the safest mode of transportation during a medical emergency is an ambulance and that the Hospital advocates the use of this mode of transport. Risks of traveling to the receiving facility by car, including absence of medical control, life sustaining equipment, such as oxygen, and medical personnel has been explained to me and I fully understand them.     (SILVANA CORRECT BOX BELOW)  [  ]  I consent to the stated transfer and to be transported by   INS: Payor: STEFAN/EDGAR / Plan: [id number] / Product Type: PPO MISC     PATIENT ADDRESS:  [address] [place] [address] [place] [address]     ambulance/helicopter. [  ]  I consent to the stated transfer, but refuse transportation by ambulance and accept full responsibility for my transportation by car. I understand the risks of non-ambulance transfers and I exonerate the Hospital and its staff from any deterioration in my condition that results from this refusal.    X___________________________________________    DATE  07/15/23  TIME________  Signature of patient or legally responsible individual signing on patient behalf           RELATIONSHIP TO PATIENT_________________________          Provider Certification    NAME Jaime Kwong                                         2000                              MRN [id number]    A medical screening exam was performed on the above named patient. Based on the examination:    Condition Necessitating Transfer The primary encounter diagnosis was Anxiety. Diagnoses of Suicidal ideations, Methamphetamine intoxication ([address]), Encounter for psychological evaluation, and Suicidal ideation were also pertinent to this visit. Patient Condition: The patient has been stabilized such that within reasonable medical probability, no material deterioration of the patient condition or the condition of the unborn child(juan) is likely to result from the transfer    Reason for Transfer: Level of Care needed not available at this facility    Transfer Requirements: [address]   · Space available and qualified personnel available for treatment as acknowledged by    · Agreed to accept transfer and to provide appropriate medical treatment as acknowledged by          · Appropriate medical records of the examination and treatment of the patient are provided at the time of transfer   [address] _______  · Transfer will be performed by qualified personnel from    and appropriate transfer equipment as required, including the use of necessary and appropriate life support measures.     Provider Certification: I have examined the patient and explained the following risks and benefits of being transferred/refusing transfer to the patient/family:         Based on these reasonable risks and benefits to the patient and/or the unborn child(juan), and based upon the information available at the time of the patient’s examination, I certify that the medical benefits reasonably to be expected from the provision of appropriate medical treatments at another medical facility outweigh the increasing risks, if any, to the individual’s medical condition, and in the case of labor to the unborn child, from effecting the transfer.     X____________________________________________ DATE 07/15/23        TIME_______      ORIGINAL - SEND TO MEDICAL RECORDS   COPY - SEND WITH PATIENT DURING TRANSFER

## 2023-07-15 NOTE — ED NOTES
Patient is accepted at [facility]. Patient is accepted by Dr. April Hurley. Transportation is arranged with [employer].     Transportation is scheduled for 11 AM.   Patient may go to the floor at 9 am.

## 2023-07-15 NOTE — ED NOTES
See previous [facility] Suicide Risk Assessment. Re-screening not required unless change in behavior or suicidal ideation. Behavioral Health Assessment deferred as patient is sleeping and would benefit from additional rest.  Vital signs deferred until patient awake, no signs or symptoms of respiratory distress at this time. Once patient is awake and able to participate, will complete assessments.       Nimo Alcantar RN  07/15/23 1716

## 2023-07-15 NOTE — ED NOTES
See previous [facility] Suicide Risk Assessment. Re-screening not required unless change in behavior or suicidal ideation. Behavioral Health Assessment deferred as patient is sleeping and would benefit from additional rest.  Vital signs deferred until patient awake, no signs or symptoms of respiratory distress at this time. Once patient is awake and able to participate, will complete assessments.       Joselin Saravia RN  07/15/23 8529

## 2023-07-15 NOTE — ED NOTES
PT is to be picked up at 8 AM by [facility] to be transported to [facility].     [facility] [phone]

## 2023-07-15 NOTE — ED NOTES
Spoke to the representative at [facility], when the UDS is completed and the Covid screen is completed, Crisis may contact [facility] with the results and set up admission time for the patient. When that is completed, Crisis may set up transportation with [facility] [employer].

## 2023-07-15 NOTE — ED NOTES
Insurance Authorization for admission:   Phone call placed to [facility]. Phone number: [phone]. Spoke to [employer]. 5 days approved. Level of care: 201. Review on July 19, 2023. Authorization # [id number].

## 2023-07-15 NOTE — ED NOTES
Crisis started bed search at this time. The following facilities have been contacted:     [facility]- no beds    [address] [facility]- No beds    [address]- No beds
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
## 2023-09-22 ENCOUNTER — APPOINTMENT (EMERGENCY)
Dept: RADIOLOGY | Facility: HOSPITAL | Age: 23
End: 2023-09-22
Payer: COMMERCIAL

## 2023-09-22 ENCOUNTER — HOSPITAL ENCOUNTER (EMERGENCY)
Facility: HOSPITAL | Age: 23
Discharge: HOME | End: 2023-09-22
Attending: EMERGENCY MEDICINE
Payer: COMMERCIAL

## 2023-09-22 ENCOUNTER — HOSPITAL ENCOUNTER (EMERGENCY)
Facility: HOSPITAL | Age: 23
End: 2023-09-23
Attending: EMERGENCY MEDICINE
Payer: COMMERCIAL

## 2023-09-22 ENCOUNTER — HOSPITAL ENCOUNTER (EMERGENCY)
Facility: HOSPITAL | Age: 23
Discharge: HOME | End: 2023-09-22
Payer: COMMERCIAL

## 2023-09-22 ENCOUNTER — APPOINTMENT (EMERGENCY)
Dept: CT IMAGING | Facility: HOSPITAL | Age: 23
End: 2023-09-22
Payer: COMMERCIAL

## 2023-09-22 VITALS
TEMPERATURE: 98 F | RESPIRATION RATE: 16 BRPM | SYSTOLIC BLOOD PRESSURE: 116 MMHG | OXYGEN SATURATION: 96 % | DIASTOLIC BLOOD PRESSURE: 62 MMHG | HEART RATE: 82 BPM

## 2023-09-22 VITALS
SYSTOLIC BLOOD PRESSURE: 113 MMHG | HEART RATE: 79 BPM | RESPIRATION RATE: 18 BRPM | OXYGEN SATURATION: 100 % | TEMPERATURE: 97.6 F | DIASTOLIC BLOOD PRESSURE: 66 MMHG

## 2023-09-22 DIAGNOSIS — F19.10 POLYSUBSTANCE ABUSE (HCC): ICD-10-CM

## 2023-09-22 DIAGNOSIS — N17.9 AKI (ACUTE KIDNEY INJURY) (HCC): ICD-10-CM

## 2023-09-22 DIAGNOSIS — G93.40 ACUTE ENCEPHALOPATHY: Primary | ICD-10-CM

## 2023-09-22 DIAGNOSIS — R45.851 SUICIDAL INTENT: Primary | ICD-10-CM

## 2023-09-22 LAB
2HR DELTA HS TROPONIN: -7 NG/L
2HR DELTA HS TROPONIN: 34 NG/L
4HR DELTA HS TROPONIN: 29 NG/L
ALBUMIN SERPL BCP-MCNC: 4.5 G/DL (ref 3.5–5)
ALBUMIN SERPL BCP-MCNC: 4.9 G/DL (ref 3.5–5)
ALP SERPL-CCNC: 104 U/L (ref 34–104)
ALP SERPL-CCNC: 96 U/L (ref 34–104)
ALT SERPL W P-5'-P-CCNC: 29 U/L (ref 7–52)
ALT SERPL W P-5'-P-CCNC: 30 U/L (ref 7–52)
AMPHETAMINES SERPL QL SCN: POSITIVE
ANION GAP SERPL CALCULATED.3IONS-SCNC: 12 MMOL/L
ANION GAP SERPL CALCULATED.3IONS-SCNC: 8 MMOL/L
APAP SERPL-MCNC: <10 UG/ML (ref 10–20)
AST SERPL W P-5'-P-CCNC: 29 U/L (ref 13–39)
AST SERPL W P-5'-P-CCNC: 32 U/L (ref 13–39)
ATRIAL RATE: 78 BPM
ATRIAL RATE: 88 BPM
BARBITURATES UR QL: NEGATIVE
BASE EX.OXY STD BLDV CALC-SCNC: 47.8 % (ref 60–80)
BASE EX.OXY STD BLDV CALC-SCNC: 62.4 % (ref 60–80)
BASE EXCESS BLDV CALC-SCNC: -5.9 MMOL/L
BASE EXCESS BLDV CALC-SCNC: -9 MMOL/L
BASOPHILS # BLD AUTO: 0.02 THOUSANDS/ÂΜL (ref 0–0.1)
BASOPHILS # BLD AUTO: 0.07 THOUSANDS/ÂΜL (ref 0–0.1)
BASOPHILS NFR BLD AUTO: 0 % (ref 0–1)
BASOPHILS NFR BLD AUTO: 0 % (ref 0–1)
BENZODIAZ UR QL: POSITIVE
BILIRUB SERPL-MCNC: 0.37 MG/DL (ref 0.2–1)
BILIRUB SERPL-MCNC: 0.46 MG/DL (ref 0.2–1)
BUN SERPL-MCNC: 11 MG/DL (ref 5–25)
BUN SERPL-MCNC: 12 MG/DL (ref 5–25)
CALCIUM SERPL-MCNC: 8.8 MG/DL (ref 8.4–10.2)
CALCIUM SERPL-MCNC: 9.5 MG/DL (ref 8.4–10.2)
CARDIAC TROPONIN I PNL SERPL HS: 20 NG/L
CARDIAC TROPONIN I PNL SERPL HS: 25 NG/L
CARDIAC TROPONIN I PNL SERPL HS: 32 NG/L
CARDIAC TROPONIN I PNL SERPL HS: 49 NG/L
CARDIAC TROPONIN I PNL SERPL HS: 54 NG/L
CHLORIDE SERPL-SCNC: 94 MMOL/L (ref 96–108)
CHLORIDE SERPL-SCNC: 97 MMOL/L (ref 96–108)
CK SERPL-CCNC: 359 U/L (ref 39–308)
CO2 SERPL-SCNC: 28 MMOL/L (ref 21–32)
CO2 SERPL-SCNC: 29 MMOL/L (ref 21–32)
COCAINE UR QL: NEGATIVE
CREAT SERPL-MCNC: 0.88 MG/DL (ref 0.6–1.3)
CREAT SERPL-MCNC: 1.66 MG/DL (ref 0.6–1.3)
EOSINOPHIL # BLD AUTO: 0.06 THOUSAND/ÂΜL (ref 0–0.61)
EOSINOPHIL # BLD AUTO: 0.15 THOUSAND/ÂΜL (ref 0–0.61)
EOSINOPHIL NFR BLD AUTO: 0 % (ref 0–6)
EOSINOPHIL NFR BLD AUTO: 1 % (ref 0–6)
ERYTHROCYTE [DISTWIDTH] IN BLOOD BY AUTOMATED COUNT: 12.5 % (ref 11.6–15.1)
ERYTHROCYTE [DISTWIDTH] IN BLOOD BY AUTOMATED COUNT: 12.5 % (ref 11.6–15.1)
ETHANOL SERPL-MCNC: <10 MG/DL
ETHANOL SERPL-MCNC: <10 MG/DL
GFR SERPL CREATININE-BSD FRML MDRD: 121 ML/MIN/1.73SQ M
GFR SERPL CREATININE-BSD FRML MDRD: 57 ML/MIN/1.73SQ M
GLUCOSE SERPL-MCNC: 101 MG/DL (ref 65–140)
GLUCOSE SERPL-MCNC: 318 MG/DL (ref 65–140)
HCO3 BLDV-SCNC: 22 MMOL/L (ref 24–30)
HCO3 BLDV-SCNC: 22.7 MMOL/L (ref 24–30)
HCT VFR BLD AUTO: 41 % (ref 36.5–49.3)
HCT VFR BLD AUTO: 45.2 % (ref 36.5–49.3)
HGB BLD-MCNC: 14 G/DL (ref 12–17)
HGB BLD-MCNC: 14.8 G/DL (ref 12–17)
IMM GRANULOCYTES # BLD AUTO: 0.04 THOUSAND/UL (ref 0–0.2)
IMM GRANULOCYTES # BLD AUTO: 0.15 THOUSAND/UL (ref 0–0.2)
IMM GRANULOCYTES NFR BLD AUTO: 0 % (ref 0–2)
IMM GRANULOCYTES NFR BLD AUTO: 1 % (ref 0–2)
LYMPHOCYTES # BLD AUTO: 3.61 THOUSANDS/ÂΜL (ref 0.6–4.47)
LYMPHOCYTES # BLD AUTO: 5.08 THOUSANDS/ÂΜL (ref 0.6–4.47)
LYMPHOCYTES NFR BLD AUTO: 26 % (ref 14–44)
LYMPHOCYTES NFR BLD AUTO: 29 % (ref 14–44)
MCH RBC QN AUTO: 28.8 PG (ref 26.8–34.3)
MCH RBC QN AUTO: 29 PG (ref 26.8–34.3)
MCHC RBC AUTO-ENTMCNC: 32.7 G/DL (ref 31.4–37.4)
MCHC RBC AUTO-ENTMCNC: 34.1 G/DL (ref 31.4–37.4)
MCV RBC AUTO: 85 FL (ref 82–98)
MCV RBC AUTO: 88 FL (ref 82–98)
MONOCYTES # BLD AUTO: 1.66 THOUSAND/ÂΜL (ref 0.17–1.22)
MONOCYTES # BLD AUTO: 2.28 THOUSAND/ÂΜL (ref 0.17–1.22)
MONOCYTES NFR BLD AUTO: 12 % (ref 4–12)
MONOCYTES NFR BLD AUTO: 13 % (ref 4–12)
NEUTROPHILS # BLD AUTO: 11.76 THOUSANDS/ÂΜL (ref 1.85–7.62)
NEUTROPHILS # BLD AUTO: 7.14 THOUSANDS/ÂΜL (ref 1.85–7.62)
NEUTS SEG NFR BLD AUTO: 57 % (ref 43–75)
NEUTS SEG NFR BLD AUTO: 61 % (ref 43–75)
NRBC BLD AUTO-RTO: 0 /100 WBCS
NRBC BLD AUTO-RTO: 0 /100 WBCS
O2 CT BLDV-SCNC: 10.3 ML/DL
O2 CT BLDV-SCNC: 12.3 ML/DL
OPIATES UR QL SCN: NEGATIVE
OXYCODONE+OXYMORPHONE UR QL SCN: NEGATIVE
P AXIS: 44 DEGREES
P AXIS: 47 DEGREES
PCO2 BLDV: 53.1 MM HG (ref 42–50)
PCO2 BLDV: 77.7 MM HG (ref 42–50)
PCP UR QL: NEGATIVE
PH BLDV: 7.08 [PH] (ref 7.3–7.4)
PH BLDV: 7.24 [PH] (ref 7.3–7.4)
PLATELET # BLD AUTO: 314 THOUSANDS/UL (ref 149–390)
PLATELET # BLD AUTO: 410 THOUSANDS/UL (ref 149–390)
PMV BLD AUTO: 8.7 FL (ref 8.9–12.7)
PMV BLD AUTO: 9 FL (ref 8.9–12.7)
PO2 BLDV: 35.4 MM HG (ref 35–45)
PO2 BLDV: 40.5 MM HG (ref 35–45)
POTASSIUM SERPL-SCNC: 3 MMOL/L (ref 3.5–5.3)
POTASSIUM SERPL-SCNC: 4.4 MMOL/L (ref 3.5–5.3)
PR INTERVAL: 138 MS
PR INTERVAL: 140 MS
PROT SERPL-MCNC: 7.4 G/DL (ref 6.4–8.4)
PROT SERPL-MCNC: 8 G/DL (ref 6.4–8.4)
QRS AXIS: 80 DEGREES
QRS AXIS: 82 DEGREES
QRSD INTERVAL: 86 MS
QRSD INTERVAL: 98 MS
QT INTERVAL: 360 MS
QT INTERVAL: 402 MS
QTC INTERVAL: 435 MS
QTC INTERVAL: 458 MS
RBC # BLD AUTO: 4.83 MILLION/UL (ref 3.88–5.62)
RBC # BLD AUTO: 5.14 MILLION/UL (ref 3.88–5.62)
SALICYLATES SERPL-MCNC: <5 MG/DL (ref 3–20)
SODIUM SERPL-SCNC: 134 MMOL/L (ref 135–147)
SODIUM SERPL-SCNC: 134 MMOL/L (ref 135–147)
T WAVE AXIS: -12 DEGREES
T WAVE AXIS: -12 DEGREES
THC UR QL: POSITIVE
TSH SERPL DL<=0.05 MIU/L-ACNC: 0.64 UIU/ML (ref 0.45–4.5)
TSH SERPL DL<=0.05 MIU/L-ACNC: 1.78 UIU/ML (ref 0.45–4.5)
VALPROATE SERPL-MCNC: <10 UG/ML (ref 50–100)
VENTRICULAR RATE: 78 BPM
VENTRICULAR RATE: 88 BPM
WBC # BLD AUTO: 12.62 THOUSAND/UL (ref 4.31–10.16)
WBC # BLD AUTO: 19.4 THOUSAND/UL (ref 4.31–10.16)

## 2023-09-22 PROCEDURE — G1004 CDSM NDSC: HCPCS

## 2023-09-22 PROCEDURE — 70450 CT HEAD/BRAIN W/O DYE: CPT

## 2023-09-22 PROCEDURE — 85025 COMPLETE CBC W/AUTO DIFF WBC: CPT | Performed by: EMERGENCY MEDICINE

## 2023-09-22 PROCEDURE — 36415 COLL VENOUS BLD VENIPUNCTURE: CPT | Performed by: EMERGENCY MEDICINE

## 2023-09-22 PROCEDURE — 80164 ASSAY DIPROPYLACETIC ACD TOT: CPT | Performed by: EMERGENCY MEDICINE

## 2023-09-22 PROCEDURE — 80307 DRUG TEST PRSMV CHEM ANLYZR: CPT | Performed by: EMERGENCY MEDICINE

## 2023-09-22 PROCEDURE — 96372 THER/PROPH/DIAG INJ SC/IM: CPT

## 2023-09-22 PROCEDURE — 96360 HYDRATION IV INFUSION INIT: CPT

## 2023-09-22 PROCEDURE — 99285 EMERGENCY DEPT VISIT HI MDM: CPT

## 2023-09-22 PROCEDURE — 93005 ELECTROCARDIOGRAM TRACING: CPT

## 2023-09-22 PROCEDURE — 84443 ASSAY THYROID STIM HORMONE: CPT | Performed by: EMERGENCY MEDICINE

## 2023-09-22 PROCEDURE — 80179 DRUG ASSAY SALICYLATE: CPT | Performed by: EMERGENCY MEDICINE

## 2023-09-22 PROCEDURE — 71045 X-RAY EXAM CHEST 1 VIEW: CPT

## 2023-09-22 PROCEDURE — 84484 ASSAY OF TROPONIN QUANT: CPT | Performed by: EMERGENCY MEDICINE

## 2023-09-22 PROCEDURE — 82550 ASSAY OF CK (CPK): CPT | Performed by: EMERGENCY MEDICINE

## 2023-09-22 PROCEDURE — 80053 COMPREHEN METABOLIC PANEL: CPT | Performed by: EMERGENCY MEDICINE

## 2023-09-22 PROCEDURE — 99291 CRITICAL CARE FIRST HOUR: CPT | Performed by: EMERGENCY MEDICINE

## 2023-09-22 PROCEDURE — 80143 DRUG ASSAY ACETAMINOPHEN: CPT | Performed by: EMERGENCY MEDICINE

## 2023-09-22 PROCEDURE — 99285 EMERGENCY DEPT VISIT HI MDM: CPT | Performed by: EMERGENCY MEDICINE

## 2023-09-22 PROCEDURE — 82948 REAGENT STRIP/BLOOD GLUCOSE: CPT

## 2023-09-22 PROCEDURE — 82077 ASSAY SPEC XCP UR&BREATH IA: CPT | Performed by: EMERGENCY MEDICINE

## 2023-09-22 PROCEDURE — 99284 EMERGENCY DEPT VISIT MOD MDM: CPT

## 2023-09-22 PROCEDURE — 82805 BLOOD GASES W/O2 SATURATION: CPT | Performed by: EMERGENCY MEDICINE

## 2023-09-22 RX ORDER — MAGNESIUM HYDROXIDE/ALUMINUM HYDROXICE/SIMETHICONE 120; 1200; 1200 MG/30ML; MG/30ML; MG/30ML
30 SUSPENSION ORAL EVERY 4 HOURS PRN
Status: CANCELLED | OUTPATIENT
Start: 2023-09-22

## 2023-09-22 RX ORDER — LORAZEPAM 2 MG/ML
2 INJECTION INTRAMUSCULAR EVERY 6 HOURS PRN
Status: CANCELLED | OUTPATIENT
Start: 2023-09-22

## 2023-09-22 RX ORDER — LORAZEPAM 2 MG/ML
2 INJECTION INTRAMUSCULAR
Status: CANCELLED | OUTPATIENT
Start: 2023-09-22

## 2023-09-22 RX ORDER — BENZTROPINE MESYLATE 1 MG/ML
1 INJECTION INTRAMUSCULAR; INTRAVENOUS
Status: CANCELLED | OUTPATIENT
Start: 2023-09-22

## 2023-09-22 RX ORDER — DIPHENHYDRAMINE HYDROCHLORIDE 50 MG/ML
50 INJECTION INTRAMUSCULAR; INTRAVENOUS EVERY 6 HOURS PRN
Status: CANCELLED | OUTPATIENT
Start: 2023-09-22

## 2023-09-22 RX ORDER — ACETAMINOPHEN 325 MG/1
975 TABLET ORAL EVERY 6 HOURS PRN
Status: CANCELLED | OUTPATIENT
Start: 2023-09-22

## 2023-09-22 RX ORDER — HYDROXYZINE 50 MG/1
25 TABLET, FILM COATED ORAL
Status: CANCELLED | OUTPATIENT
Start: 2023-09-22

## 2023-09-22 RX ORDER — ACETAMINOPHEN 325 MG/1
650 TABLET ORAL EVERY 4 HOURS PRN
Status: CANCELLED | OUTPATIENT
Start: 2023-09-22

## 2023-09-22 RX ORDER — BENZTROPINE MESYLATE 0.5 MG/1
1 TABLET ORAL
Status: CANCELLED | OUTPATIENT
Start: 2023-09-22

## 2023-09-22 RX ORDER — HYDROXYZINE 50 MG/1
100 TABLET, FILM COATED ORAL
Status: CANCELLED | OUTPATIENT
Start: 2023-09-22

## 2023-09-22 RX ORDER — POLYETHYLENE GLYCOL 3350 17 G/17G
17 POWDER, FOR SOLUTION ORAL DAILY PRN
Status: CANCELLED | OUTPATIENT
Start: 2023-09-22

## 2023-09-22 RX ORDER — AMOXICILLIN 250 MG
1 CAPSULE ORAL DAILY PRN
Status: CANCELLED | OUTPATIENT
Start: 2023-09-22

## 2023-09-22 RX ORDER — BENZTROPINE MESYLATE 1 MG/ML
0.5 INJECTION INTRAMUSCULAR; INTRAVENOUS
Status: CANCELLED | OUTPATIENT
Start: 2023-09-22

## 2023-09-22 RX ORDER — HALOPERIDOL 0.5 MG/1
1 TABLET ORAL EVERY 6 HOURS PRN
Status: CANCELLED | OUTPATIENT
Start: 2023-09-22

## 2023-09-22 RX ORDER — LANOLIN ALCOHOL/MO/W.PET/CERES
3 CREAM (GRAM) TOPICAL
Status: CANCELLED | OUTPATIENT
Start: 2023-09-22

## 2023-09-22 RX ORDER — BISACODYL 10 MG
10 SUPPOSITORY, RECTAL RECTAL DAILY PRN
Status: CANCELLED | OUTPATIENT
Start: 2023-09-22

## 2023-09-22 RX ORDER — LORAZEPAM 2 MG/ML
1 INJECTION INTRAMUSCULAR
Status: CANCELLED | OUTPATIENT
Start: 2023-09-22

## 2023-09-22 RX ORDER — NALOXONE HYDROCHLORIDE 1 MG/ML
2 INJECTION INTRAMUSCULAR; INTRAVENOUS; SUBCUTANEOUS ONCE
Status: COMPLETED | OUTPATIENT
Start: 2023-09-22 | End: 2023-09-22

## 2023-09-22 RX ORDER — TRAZODONE HYDROCHLORIDE 50 MG/1
50 TABLET ORAL
Status: CANCELLED | OUTPATIENT
Start: 2023-09-22

## 2023-09-22 RX ORDER — MIDAZOLAM HYDROCHLORIDE 2 MG/2ML
2 INJECTION, SOLUTION INTRAMUSCULAR; INTRAVENOUS ONCE
Status: COMPLETED | OUTPATIENT
Start: 2023-09-22 | End: 2023-09-22

## 2023-09-22 RX ORDER — ACETAMINOPHEN 325 MG/1
650 TABLET ORAL EVERY 6 HOURS PRN
Status: CANCELLED | OUTPATIENT
Start: 2023-09-22

## 2023-09-22 RX ORDER — POTASSIUM CHLORIDE 20 MEQ/1
40 TABLET, EXTENDED RELEASE ORAL ONCE
Status: COMPLETED | OUTPATIENT
Start: 2023-09-22 | End: 2023-09-22

## 2023-09-22 RX ORDER — HALOPERIDOL 5 MG/1
5 TABLET ORAL
Status: CANCELLED | OUTPATIENT
Start: 2023-09-22

## 2023-09-22 RX ORDER — MIDAZOLAM HYDROCHLORIDE 2 MG/2ML
INJECTION, SOLUTION INTRAMUSCULAR; INTRAVENOUS
Status: COMPLETED
Start: 2023-09-22 | End: 2023-09-22

## 2023-09-22 RX ORDER — HALOPERIDOL 5 MG/ML
2.5 INJECTION INTRAMUSCULAR
Status: CANCELLED | OUTPATIENT
Start: 2023-09-22

## 2023-09-22 RX ORDER — HALOPERIDOL 5 MG/ML
5 INJECTION INTRAMUSCULAR ONCE
Status: COMPLETED | OUTPATIENT
Start: 2023-09-22 | End: 2023-09-22

## 2023-09-22 RX ORDER — HALOPERIDOL 5 MG/ML
5 INJECTION INTRAMUSCULAR
Status: CANCELLED | OUTPATIENT
Start: 2023-09-22

## 2023-09-22 RX ORDER — HALOPERIDOL 5 MG/ML
INJECTION INTRAMUSCULAR
Status: COMPLETED
Start: 2023-09-22 | End: 2023-09-22

## 2023-09-22 RX ORDER — HYDROXYZINE 50 MG/1
50 TABLET, FILM COATED ORAL
Status: CANCELLED | OUTPATIENT
Start: 2023-09-22

## 2023-09-22 RX ADMIN — POTASSIUM CHLORIDE 40 MEQ: 1500 TABLET, EXTENDED RELEASE ORAL at 21:42

## 2023-09-22 RX ADMIN — SODIUM CHLORIDE 1000 ML: 0.9 INJECTION, SOLUTION INTRAVENOUS at 06:03

## 2023-09-22 RX ADMIN — HALOPERIDOL LACTATE 5 MG: 5 INJECTION, SOLUTION INTRAMUSCULAR at 05:20

## 2023-09-22 RX ADMIN — MIDAZOLAM HYDROCHLORIDE 2 MG: 2 INJECTION, SOLUTION INTRAMUSCULAR; INTRAVENOUS at 05:30

## 2023-09-22 RX ADMIN — MIDAZOLAM 2 MG: 1 INJECTION INTRAMUSCULAR; INTRAVENOUS at 05:30

## 2023-09-22 RX ADMIN — HALOPERIDOL 5 MG: 5 INJECTION INTRAMUSCULAR at 05:20

## 2023-09-22 NOTE — LETTER
[address] [address]  Dept: [phone]      EMTALA TRANSFER CONSENT    NAME Sanjana Vidal                                         2000                              MRN [id number]    I have been informed of my rights regarding examination, treatment, and transfer   by Dr. Chris Sharp MD    Benefits: Continuity of care    Risks: Potential for delay in receiving treatment      Consent for Transfer:  I acknowledge that my medical condition has been evaluated and explained to me by the emergency department physician or other qualified medical person and/or my attending physician, who has recommended that I be transferred to the service of  Accepting Physician: Chris Savage at [address] [facility] [address] Name, [facility] : [place], [place]. The above potential benefits of such transfer, the potential risks associated with such transfer, and the probable risks of not being transferred have been explained to me, and I fully understand them. The doctor has explained that, in my case, the benefits of transfer outweigh the risks. I agree to be transferred. I authorize the performance of emergency medical procedures and treatments upon me in both transit and upon arrival at the receiving facility. Additionally, I authorize the release of any and all medical records to the receiving facility and request they be transported with me, if possible. I understand that the safest mode of transportation during a medical emergency is an ambulance and that the Hospital advocates the use of this mode of transport. Risks of traveling to the receiving facility by car, including absence of medical control, life sustaining equipment, such as oxygen, and medical personnel has been explained to me and I fully understand them. (SILVANA CORRECT BOX BELOW)  [ X ]  I consent to the stated transfer and to be transported by ambulance/helicopter.   [  ] I consent to the stated transfer, but refuse transportation by ambulance and accept full responsibility for my transportation by car. I understand the risks of non-ambulance transfers and I exonerate the Hospital and its staff from any deterioration in my condition that results from this refusal.    X___________________________________________    DATE  23  TIME________  Signature of patient or legally responsible individual signing on patient behalf           RELATIONSHIP TO PATIENT___self______________________                    Provider Certification    NAME Farhan Garcia                                         2000                              MRN [id number]    A medical screening exam was performed on the above named patient. Based on the examination:    Condition Necessitating Transfer The encounter diagnosis was Suicidal intent. Patient Condition: The patient has been stabilized such that within reasonable medical probability, no material deterioration of the patient condition or the condition of the unborn child(juan) is likely to result from the transfer    Reason for Transfer: Level of Care needed not available at this facility    Transfer Requirements: [address], [place], [address] available and qualified personnel available for treatment as acknowledged by Roz Eli [phone]  Agreed to accept transfer and to provide appropriate medical treatment as acknowledged by       Hardik Nelson  Appropriate medical records of the examination and treatment of the patient are provided at the time of transfer   [address] [facility] [address] __IK_____  Transfer will be performed by qualified personnel from [facility]  and appropriate transfer equipment as required, including the use of necessary and appropriate life support measures.     Provider Certification: I have examined the patient and explained the following risks and benefits of being transferred/refusing transfer to the patient/family:         Based on these reasonable risks and benefits to the patient and/or the unborn child(juan), and based upon the information available at the time of the patient’s examination, I certify that the medical benefits reasonably to be expected from the provision of appropriate medical treatments at another medical facility outweigh the increasing risks, if any, to the individual’s medical condition, and in the case of labor to the unborn child, from effecting the transfer.     X____________________________________________ DATE 09/22/23        TIME_______      ORIGINAL - SEND TO MEDICAL RECORDS   COPY - SEND WITH PATIENT DURING TRANSFER

## 2023-09-22 NOTE — ED PROVIDER NOTES
History  Chief Complaint   Patient presents with   • Suicidal     Patient reports to the ed to 201 himself. Patient reports overdosing last night intentionally. Patient was supposed to go to rehab however would like to go to inpatient mental health first       Patient reports he was at the tail end of a 3-day meth savage when he decided to take enough fentanyl to kill himself. Stated he has not been using opioids recently with the exception of this episode last night. He was found by his girlfriend who brought him to the hospital.  He did not disclose that he was intentionally trying to harm himself. He was discharged home. He reports he wants to come in as a 201 for thoughts of killing himself. Prior to Admission Medications   Prescriptions Last Dose Informant Patient Reported? Taking?    QUEtiapine (SEROquel) 300 mg tablet   No No   Sig: Take 1 tablet (300 mg total) by mouth daily at bedtime   buprenorphine-naloxone (Suboxone) 12-3 MG   No No   Sig: Place 1 Film (12 mg total) under the tongue daily   Patient not taking: Reported on 9/23/2023   busPIRone (BUSPAR) 5 mg tablet   No No   Sig: Take 1 tablet (5 mg total) by mouth 3 (three) times a day   cholecalciferol (VITAMIN D3) 400 units tablet   No No   Sig: Take 1 tablet (400 Units total) by mouth daily   ciprofloxacin-dexamethasone (CIPRODEX) otic suspension   No No   Sig: Administer 4 drops to the right ear 2 (two) times a day   Patient not taking: Reported on 9/23/2023   cloNIDine (CATAPRES) 0.1 mg tablet   No No   Sig: Take 1 tablet (0.1 mg total) by mouth in the morning   divalproex sodium (DEPAKOTE ER) 500 mg 24 hr tablet   No No   Sig: Take 2 tablets (1,000 mg total) by mouth daily   escitalopram (LEXAPRO) 10 mg tablet   No No   Sig: Take 1 tablet (10 mg total) by mouth daily   hydrOXYzine HCL (ATARAX) 25 mg tablet   No No   Sig: Take 1 tablet (25 mg total) by mouth every 6 (six) hours as needed for itching or anxiety   Patient not taking: Reported on 2023   naloxone [facility]) 4 mg/0.1 mL nasal spray   No No   Sig: Administer one spray intranasally into each nostril for suspected opioid overdose   Patient not taking: Reported on 2023   naloxone (Narcan) 4 mg/0.1 mL nasal spray   No No   Si.1 mL (4 mg total) into each nostril as needed (respiratory depression or possible OD)   Patient not taking: Reported on 2023   predniSONE 10 mg tablet   No No   Simg po qd x 3 days, then 40mg po qd x 3 days, then 20mg po qd x 3 days, then 10mg po qd x 3 days, then stop   Patient not taking: Reported on 2023      Facility-Administered Medications: None       Past Medical History:   Diagnosis Date   • ADHD (attention deficit hyperactivity disorder)    • Anxiety    • Bipolar disorder ([address])    • Depression    • GERD (gastroesophageal reflux disease)    • Hyperlipidemia    • Hypertension    • Psychiatric disorder    • Psychiatric illness    • Seizures ([address])    • Substance abuse ([address])    • Tourette syndrome        Past Surgical History:   Procedure Laterality Date   • NM EXC B9 LESION MRGN XCP SK TG T/A/L >4.0 CM Left 10/26/2021    Procedure: EXCISION LIPOMA (BACK); Surgeon: Aide Bailey DO;  Location: [facility];  Service: General   • TONSILECTOMY AND ADNOIDECTOMY     • TONSILLECTOMY     • TYMPANOSTOMY TUBE PLACEMENT         Family History   Problem Relation Age of Onset   • No Known Problems Mother    • Substance Abuse Father    • Cirrhosis Father    • Coronary artery disease Father    • Hepatitis Father    • Substance Abuse Brother    • Diabetes Neg Hx      I have reviewed and agree with the history as documented.     E-Cigarette/Vaping   • E-Cigarette Use Never User      E-Cigarette/Vaping Substances   • Nicotine No    • THC No    • CBD No    • Flavoring No    • Other No    • Unknown No      Social History     Tobacco Use   • Smoking status: Every Day     Packs/day: 1.50     Years: 10.00     Total pack years: 15.00     Types: Cigarettes • Smokeless tobacco: Never   Vaping Use   • Vaping Use: Never used   Substance Use Topics   • Alcohol use: Not Currently     Comment: socially, last drink several months ago   • Drug use: Yes     Types: Marijuana, Heroin, Methamphetamines, Fentanyl       Review of Systems   Constitutional: Negative for chills and fever. Eyes: Negative for visual disturbance. Respiratory: Negative for shortness of breath. Cardiovascular: Negative for chest pain. Gastrointestinal: Negative for abdominal distention and abdominal pain. Endocrine: Negative for polyuria. Genitourinary: Negative for decreased urine volume and dysuria. Neurological: Negative for dizziness, syncope and weakness. Physical Exam  Physical Exam  Constitutional:       General: He is not in acute distress. Appearance: He is well-developed. He is not ill-appearing, toxic-appearing or diaphoretic. HENT:      Head: Normocephalic and atraumatic. Eyes:      Conjunctiva/sclera: Conjunctivae normal.      Pupils: Pupils are equal, round, and reactive to light. Cardiovascular:      Rate and Rhythm: Normal rate and regular rhythm. Heart sounds: Normal heart sounds. Pulmonary:      Effort: Pulmonary effort is normal. No respiratory distress. Breath sounds: Normal breath sounds. Abdominal:      General: Bowel sounds are normal.      Palpations: Abdomen is soft. Musculoskeletal:         General: Normal range of motion. Cervical back: Normal range of motion and neck supple. Skin:     General: Skin is warm and dry. Neurological:      General: No focal deficit present. Mental Status: He is alert and oriented to person, place, and time. Cranial Nerves: No cranial nerve deficit. Sensory: No sensory deficit. Motor: No weakness. Gait: Gait normal.   Psychiatric:         Behavior: Behavior normal.         Thought Content:  Thought content normal.         Judgment: Judgment normal.         Vital Signs  ED Triage Vitals   Temperature Pulse Respirations Blood Pressure SpO2   09/22/23 1504 09/22/23 1504 09/22/23 1504 09/22/23 1504 09/22/23 1504   98 °F (36.7 °C) 99 16 138/80 92 %      Temp Source Heart Rate Source Patient Position - Orthostatic VS BP Location FiO2 (%)   09/22/23 1504 09/22/23 1504 09/22/23 1504 09/22/23 1636 --   Oral Monitor Sitting Right arm       Pain Score       09/22/23 1504       No Pain           Vitals:    09/22/23 1504 09/22/23 1636   BP: 138/80 116/62   Pulse: 99 82   Patient Position - Orthostatic VS: Sitting Lying         Visual Acuity      ED Medications  Medications   potassium chloride (K-DUR,KLOR-CON) CR tablet 40 mEq (40 mEq Oral Given 9/22/23 2142)       Diagnostic Studies  Results Reviewed     Procedure Component Value Units Date/Time    Rapid drug screen, urine [[id number]]  (Abnormal) Collected: 09/22/23 2149    Lab Status: Final result Specimen: Urine Updated: 09/22/23 2157     Amph/Meth UR Positive     Barbiturate Ur Negative     Benzodiazepine Urine Positive     Cocaine Urine Negative     Methadone Urine --     Opiate Urine Negative     PCP Ur Negative     THC Urine Positive     Oxycodone Urine Negative    Narrative:      Presumptive report. If requested, specimen will be sent to reference lab for confirmation. FOR MEDICAL PURPOSES ONLY. IF CONFIRMATION NEEDED PLEASE CONTACT THE LAB WITHIN 5 DAYS.     Drug Screen Cutoff Levels:  AMPHETAMINE/METHAMPHETAMINES  1000 ng/mL  BARBITURATES     200 ng/mL  BENZODIAZEPINES     200 ng/mL  COCAINE      300 ng/mL  METHADONE      300 ng/mL  OPIATES      300 ng/mL  PHENCYCLIDINE     25 ng/mL  THC       50 ng/mL  OXYCODONE      100 ng/mL    HS Troponin I 2hr [[id number]]  (Normal) Collected: 09/22/23 1930    Lab Status: Final result Specimen: Blood from Arm, Right Updated: 09/22/23 1958     hs TnI 2hr 25 ng/L      Delta 2hr hsTnI -7 ng/L     TSH [[id number]]  (Normal) Collected: 09/22/23 1601    Lab Status: Final result Specimen: Blood from Arm, Right Updated: 09/22/23 1640     TSH 3RD GENERATON 0.639 uIU/mL     HS Troponin 0hr (reflex protocol) [[id number]]  (Normal) Collected: 09/22/23 1601    Lab Status: Final result Specimen: Blood from Arm, Right Updated: 09/22/23 1630     hs TnI 0hr 32 ng/L     Ethanol [[id number]]  (Normal) Collected: 09/22/23 1601    Lab Status: Final result Specimen: Blood from Arm, Right Updated: 09/22/23 1630     Ethanol Lvl <10 mg/dL     Comprehensive metabolic panel [[id number]]  (Abnormal) Collected: 09/22/23 1601    Lab Status: Final result Specimen: Blood from Arm, Right Updated: 09/22/23 1623     Sodium 134 mmol/L      Potassium 3.0 mmol/L      Chloride 97 mmol/L      CO2 29 mmol/L      ANION GAP 8 mmol/L      BUN 11 mg/dL      Creatinine 0.88 mg/dL      Glucose 101 mg/dL      Calcium 8.8 mg/dL      AST 32 U/L      ALT 29 U/L      Alkaline Phosphatase 96 U/L      Total Protein 7.4 g/dL      Albumin 4.5 g/dL      Total Bilirubin 0.46 mg/dL      eGFR 121 ml/min/1.73sq m     Narrative:      [place] guidelines for Chronic Kidney Disease (CKD):   •  Stage 1 with normal or high GFR (GFR > 90 mL/min/1.73 square meters)  •  Stage 2 Mild CKD (GFR = 60-89 mL/min/1.73 square meters)  •  Stage 3A Moderate CKD (GFR = 45-59 mL/min/1.73 square meters)  •  Stage 3B Moderate CKD (GFR = 30-44 mL/min/1.73 square meters)  •  Stage 4 Severe CKD (GFR = 15-29 mL/min/1.73 square meters)  •  Stage 5 End Stage CKD (GFR <15 mL/min/1.73 square meters)  Note: GFR calculation is accurate only with a steady state creatinine    CBC and differential [[id number]]  (Abnormal) Collected: 09/22/23 1601    Lab Status: Final result Specimen: Blood from Arm, Right Updated: 09/22/23 1608     WBC 12.62 Thousand/uL      RBC 4.83 Million/uL      Hemoglobin 14.0 g/dL      Hematocrit 41.0 %      MCV 85 fL      MCH 29.0 pg      MCHC 34.1 g/dL      RDW 12.5 %      MPV 8.7 fL      Platelets 815 Thousands/uL      nRBC 0 /100 WBCs      Neutrophils Relative 57 %      Immat GRANS % 0 %      Lymphocytes Relative 29 %      Monocytes Relative 13 %      Eosinophils Relative 1 %      Basophils Relative 0 %      Neutrophils Absolute 7.14 Thousands/µL      Immature Grans Absolute 0.04 Thousand/uL      Lymphocytes Absolute 3.61 Thousands/µL      Monocytes Absolute 1.66 Thousand/µL      Eosinophils Absolute 0.15 Thousand/µL      Basophils Absolute 0.02 Thousands/µL                  No orders to display              Procedures  ECG 12 Lead Documentation Only    Date/Time: 9/23/2023 4:57 PM    Performed by: Mary Yin MD  Authorized by: Mary Yin MD    ECG reviewed by me, the ED Provider: yes    Patient location:  ED  Previous ECG:     Comparison to cardiac monitor: Yes    Interpretation:     Interpretation: normal    Rate:     ECG rate assessment: normal    Rhythm:     Rhythm: sinus rhythm    Ectopy:     Ectopy: none    QRS:     QRS axis:  Normal  Conduction:     Conduction: normal    ST segments:     ST segments:  Non-specific  T waves:     T waves: non-specific               ED Course                                             Medical Decision Making  Is a 66-year-old male with intentional fentanyl overdose. Has been almost 12 hours since the initial event. Patient seems stable here. Had already been cleared medically but then went home because he had not disclosed his suicidal intent. Patient is now signed a 201 and is awaiting placement. No signs of active overdose and appears clinically well. Cleared for psychiatric evaluation and management in the inpatient setting. Suicidal intent: acute illness or injury  Amount and/or Complexity of Data Reviewed  External Data Reviewed: notes. Labs: ordered. ECG/medicine tests: ordered and independent interpretation performed.      Details: Normal sinus rhythm, no ST elevations  Discussion of management or test interpretation with external provider(s): Case discussed with [place] of crisis Risk  Prescription drug management. Decision regarding hospitalization. Disposition  Final diagnoses:   Suicidal intent     Time reflects when diagnosis was documented in both MDM as applicable and the Disposition within this note     Time User Action Codes Description Comment    9/22/2023  5:01 PM Maya Grayson Add [U10.187] Suicidal intent       ED Disposition     ED Disposition   Transfer to [address] [place] [address]   --    Date/Time   Fri Sep 22, 2023  5:01 PM    Jorgito Gerard should be transferred out to  and has been medically cleared.             MD Documentation    [facility] Most Recent Value   Patient Condition The patient has been stabilized such that within reasonable medical probability, no material deterioration of the patient condition or the condition of the unborn child(juan) is likely to result from the transfer   Reason for Transfer Level of Care needed not available at this facility   Benefits of Transfer Continuity of care   Risks of Transfer Potential for delay in receiving treatment   Accepting Physician [address], [address] [place] [address] Road    (Name & Tel number) Rayetta Samples [phone]   Transported by (Company and Unit #) [employer]   Sending  [facility] Name, [address]    (Name & Tel number) Rayetta Samples [phone]   Medications Reviewed with Next Provider of Service Yes   Transport Mode Ambulance   Transported by (Company and Unit #) [facility] Basic life support   Patient Belongings Disposition Sent with patient      Follow-up Information    None         Discharge Medication List as of 9/23/2023  1:44 AM      CONTINUE these medications which have NOT CHANGED    Details   buprenorphine-naloxone (Suboxone) 12-3 MG Place 1 Film (12 mg total) under the tongue daily, Starting Wed 1/11/2023, Normal      busPIRone (BUSPAR) 5 mg tablet Take 1 tablet (5 mg total) by mouth 3 (three) times a day, Starting Wed 1/11/2023, Until Sun 3/12/2023, Normal      cholecalciferol (VITAMIN D3) 400 units tablet Take 1 tablet (400 Units total) by mouth daily, Starting Wed 1/11/2023, Until Sun 3/12/2023, Normal      ciprofloxacin-dexamethasone (CIPRODEX) otic suspension Administer 4 drops to the right ear 2 (two) times a day, Starting Thu 4/6/2023, Normal      cloNIDine (CATAPRES) 0.1 mg tablet Take 1 tablet (0.1 mg total) by mouth in the morning, Starting Wed 1/11/2023, Until Sun 3/12/2023, Normal      divalproex sodium (DEPAKOTE ER) 500 mg 24 hr tablet Take 2 tablets (1,000 mg total) by mouth daily, Starting Wed 1/11/2023, Until Sun 3/12/2023, Normal      escitalopram (LEXAPRO) 10 mg tablet Take 1 tablet (10 mg total) by mouth daily, Starting Wed 1/11/2023, Until Sun 3/12/2023, Normal      hydrOXYzine HCL (ATARAX) 25 mg tablet Take 1 tablet (25 mg total) by mouth every 6 (six) hours as needed for itching or anxiety, Starting u 7/13/2023, Normal      !! naloxone (Narcan) 4 mg/0.1 mL nasal spray 0.1 mL (4 mg total) into each nostril as needed (respiratory depression or possible OD), Starting Wed 12/29/2021, Print      !! naloxone (NARCAN) 4 mg/0.1 mL nasal spray Administer one spray intranasally into each nostril for suspected opioid overdose, Normal      predniSONE 10 mg tablet 60mg po qd x 3 days, then 40mg po qd x 3 days, then 20mg po qd x 3 days, then 10mg po qd x 3 days, then stop, Normal      QUEtiapine (SEROquel) 300 mg tablet Take 1 tablet (300 mg total) by mouth daily at bedtime, Starting Wed 1/11/2023, Until Sun 3/12/2023, Normal       !! - Potential duplicate medications found. Please discuss with provider. No discharge procedures on file.     PDMP Review       Value Time User    PDMP Reviewed  Yes 1/11/2023  3:12 PM Alice Pemberton DO          ED Provider  Electronically Signed by           Jeremias Ruth MD  09/23/23 4924

## 2023-09-22 NOTE — ED CARE HANDOFF
[address] Warm Handoff Outcome Note    Patient name Zaynab Aguirre  Location ZG13/BS81 MRN [id number]  Age: 25 y.o. Plan Type:   Warm Handoff                                                                                    Plan Date: 9/22/2023  Service:  ED Warm Handoff      Substance Use History:  Opiates    Warm Handoff Update:  WHO advised of pt but pt discharged before WHO could contact them    Warm Handoff Outcome: Patient Refused

## 2023-09-22 NOTE — RESTRAINT FACE TO FACE
Restraint Face to Face   Nano Black 25 y.o. male MRN: [id number]  Unit/Bed#: TR05 Encounter: [id number]      Physical Evaluation: Agitated  Purpose for Restraints/ Seclusion High risk for self harm and High risk for harm to others  Patient's reaction to the intervention: Agitated, then calm  Patient's medical condition: acute encephalopathy  Patient's Behavioral condition: Agitation  Restraints to be Continued

## 2023-09-22 NOTE — ED PROVIDER NOTES
History  Chief Complaint   Patient presents with   • Overdose - Accidental     Pt brought in by EMS for accidental OD, patient was given narcan prehospital      Rachell Simmons is a 24yo year old male with PMH of polysubstance abuse, bipolar disorder, seizure disorder presenting to the [facility] ED for altered mental status. Patient reportedly found by 2 friends laying on a roof. He was reported to be minimally responsive. The 2 friends pulled him into a bathroom to try to put him in a shower. One of the friends reportedly attempted CPR while the patient was laying on his side. EMS found patient somnolent and unresponsive. Patient was given intranasal Narcan en route to the emergency department with gradual improvement of mental status and increased respirations. Per review of chart patient has history of polysubstance abuse including methamphetamine and opioid abuse. On arrival to the emergency department the patient became agitated. He would answer one-word questions though was hyperactive, uncooperative and flailing around in bed. Patient was not able to provide additional history. History provided by:  Medical records and EMS personnel  History limited by:  Mental status change   used: No        None       No past medical history on file. No past surgical history on file. No family history on file. I have reviewed and agree with the history as documented. No existing history information found. No existing history information found. Review of Systems   Unable to perform ROS: Mental status change       Physical Exam  Physical Exam  Vitals and nursing note reviewed. Constitutional:       Appearance: He is well-developed. He is obese. He is ill-appearing and diaphoretic. HENT:      Head: Normocephalic and atraumatic. Nose: No congestion or rhinorrhea. Eyes:      Pupils: Pupils are equal, round, and reactive to light.       Comments: No nystagmus noted Cardiovascular:      Rate and Rhythm: Regular rhythm. Tachycardia present. Pulmonary:      Effort: Pulmonary effort is normal. No respiratory distress. Breath sounds: Normal breath sounds. No wheezing or rales. Abdominal:      General: There is no distension. Palpations: Abdomen is soft. Tenderness: There is no abdominal tenderness. There is no right CVA tenderness, left CVA tenderness, guarding or rebound. Musculoskeletal:      Cervical back: Normal range of motion. No rigidity. Right lower leg: No edema. Left lower leg: No edema. Skin:     General: Skin is warm. Capillary Refill: Capillary refill takes less than 2 seconds. Neurological:      GCS: GCS eye subscore is 4. GCS verbal subscore is 4. GCS motor subscore is 6. Sensory: Sensation is intact. Motor: Motor function is intact. Comments: Moving arm/legs in bed. Turning head side to side. Responds to questions with one word answers. No clonus b/l LE   Psychiatric:         Behavior: Behavior is agitated and hyperactive.          Vital Signs  ED Triage Vitals   Temperature Pulse Respirations Blood Pressure SpO2   09/22/23 0542 09/22/23 0515 09/22/23 0515 09/22/23 0515 09/22/23 0515   97.6 °F (36.4 °C) (!) 130 12 102/78 92 %      Temp Source Heart Rate Source Patient Position - Orthostatic VS BP Location FiO2 (%)   09/22/23 0542 09/22/23 0515 09/22/23 0515 09/22/23 0515 --   Tympanic Monitor Lying Left arm       Pain Score       --                  Vitals:    09/22/23 0800 09/22/23 0900 09/22/23 1100 09/22/23 1200   BP: 141/76 146/76 117/64 113/66   Pulse: 93 88 84 79   Patient Position - Orthostatic VS: Lying Lying Lying Lying         Visual Acuity  Visual Acuity    Flowsheet Row Most Recent Value   L Pupil Size (mm) 2   R Pupil Size (mm) 2          ED Medications  Medications   sodium chloride 0.9 % bolus 1,000 mL (0 mL Intravenous Stopped 9/22/23 0640)   naloxone (NARCAN) intranasal 2 mg (0 mg Nasal Given to EMS 9/22/23 0557)   midazolam (VERSED) injection 2 mg (2 mg Intravenous Given 9/22/23 0530)   haloperidol lactate (HALDOL) injection 5 mg (5 mg Intramuscular Given 9/22/23 0520)       Diagnostic Studies  Results Reviewed     Procedure Component Value Units Date/Time    HS Troponin I 4hr [[id number]]  (Abnormal) Collected: 09/22/23 1139    Lab Status: Final result Specimen: Blood from Arm, Left Updated: 09/22/23 1207     hs TnI 4hr 49 ng/L      Delta 4hr hsTnI 29 ng/L     HS Troponin I 2hr [[id number]]  (Abnormal) Collected: 09/22/23 1006    Lab Status: Final result Specimen: Blood from Arm, Left Updated: 09/22/23 1041     hs TnI 2hr 54 ng/L      Delta 2hr hsTnI 34 ng/L     Blood gas, venous [[id number]]  (Abnormal) Collected: 09/22/23 0633    Lab Status: Final result Specimen: Blood from Arm, Left Updated: 09/22/23 0638     pH, Rodrigo 7.235     pCO2, Rodrigo 53.1 mm Hg      pO2, Rodrigo 40.5 mm Hg      HCO3, Rodrigo 22.0 mmol/L      Base Excess, Rodrigo -5.9 mmol/L      O2 Content, Rodrigo 12.3 ml/dL      O2 HGB, VENOUS 62.4 %     TSH, 3rd generation with Free T4 reflex [[id number]]  (Normal) Collected: 09/22/23 0548    Lab Status: Final result Specimen: Blood from Line, Venous Updated: 09/22/23 0638     TSH 3RD GENERATON 1.785 uIU/mL     HS Troponin 0hr (reflex protocol) [[id number]]  (Normal) Collected: 09/22/23 0548    Lab Status: Final result Specimen: Blood from Line, Venous Updated: 09/22/23 0630     hs TnI 0hr 20 ng/L     Comprehensive metabolic panel [[id number]]  (Abnormal) Collected: 09/22/23 0548    Lab Status: Final result Specimen: Blood from Line, Venous Updated: 09/22/23 0629     Sodium 134 mmol/L      Potassium 4.4 mmol/L      Chloride 94 mmol/L      CO2 28 mmol/L      ANION GAP 12 mmol/L      BUN 12 mg/dL      Creatinine 1.66 mg/dL      Glucose 318 mg/dL      Calcium 9.5 mg/dL      AST 29 U/L      ALT 30 U/L      Alkaline Phosphatase 104 U/L      Total Protein 8.0 g/dL      Albumin 4.9 g/dL      Total Bilirubin 0.37 mg/dL eGFR 57 ml/min/1.73sq m     Narrative:      [place] guidelines for Chronic Kidney Disease (CKD):   •  Stage 1 with normal or high GFR (GFR > 90 mL/min/1.73 square meters)  •  Stage 2 Mild CKD (GFR = 60-89 mL/min/1.73 square meters)  •  Stage 3A Moderate CKD (GFR = 45-59 mL/min/1.73 square meters)  •  Stage 3B Moderate CKD (GFR = 30-44 mL/min/1.73 square meters)  •  Stage 4 Severe CKD (GFR = 15-29 mL/min/1.73 square meters)  •  Stage 5 End Stage CKD (GFR <15 mL/min/1.73 square meters)  Note: GFR calculation is accurate only with a steady state creatinine    CK [[id number]]  (Abnormal) Collected: 09/22/23 0548    Lab Status: Final result Specimen: Blood from Line, Venous Updated: 09/22/23 0629     Total  U/L     Salicylate level [[id number]]  (Normal) Collected: 09/22/23 0548    Lab Status: Final result Specimen: Blood from Line, Venous Updated: 31/54/93 5794     Salicylate Lvl <5 mg/dL     Acetaminophen level-If concentration is detectable, please discuss with medical  on call.  [[id number]]  (Abnormal) Collected: 09/22/23 0548    Lab Status: Final result Specimen: Blood from Line, Venous Updated: 09/22/23 0629     Acetaminophen Level <10 ug/mL     Ethanol [[id number]]  (Normal) Collected: 09/22/23 0548    Lab Status: Final result Specimen: Blood from Arm, Left Updated: 09/22/23 0629     Ethanol Lvl <10 mg/dL     Valproic acid level, total [[id number]]  (Abnormal) Collected: 09/22/23 0548    Lab Status: Final result Specimen: Blood from Arm, Left Updated: 09/22/23 0629     Valproic Acid, Total <10 ug/mL     CBC and differential [[id number]]  (Abnormal) Collected: 09/22/23 0548    Lab Status: Final result Specimen: Blood from Line, Venous Updated: 09/22/23 0601     WBC 19.40 Thousand/uL      RBC 5.14 Million/uL      Hemoglobin 14.8 g/dL      Hematocrit 45.2 %      MCV 88 fL      MCH 28.8 pg      MCHC 32.7 g/dL      RDW 12.5 %      MPV 9.0 fL      Platelets 917 Thousands/uL nRBC 0 /100 WBCs      Neutrophils Relative 61 %      Immat GRANS % 1 %      Lymphocytes Relative 26 %      Monocytes Relative 12 %      Eosinophils Relative 0 %      Basophils Relative 0 %      Neutrophils Absolute 11.76 Thousands/µL      Immature Grans Absolute 0.15 Thousand/uL      Lymphocytes Absolute 5.08 Thousands/µL      Monocytes Absolute 2.28 Thousand/µL      Eosinophils Absolute 0.06 Thousand/µL      Basophils Absolute 0.07 Thousands/µL     Blood gas, venous [[id number]]  (Abnormal) Collected: 09/22/23 0548    Lab Status: Final result Specimen: Blood from Line, Venous Updated: 09/22/23 0559     pH, Rodrigo 7.083     pCO2, Rodrigo 77.7 mm Hg      pO2, Rodrigo 35.4 mm Hg      HCO3, Rodrigo 22.7 mmol/L      Base Excess, Rodrigo -9.0 mmol/L      O2 Content, Rodrigo 10.3 ml/dL      O2 HGB, VENOUS 47.8 %                  CT head without contrast   Final Result by Gwendolyn Garcia MD (09/22 0041)      No evidence of acute intracranial process.                   Workstation performed: [id number]                    Procedures  CriticalCare Time    Date/Time: 9/22/2023 5:47 AM    Performed by: Elvira Morales DO  Authorized by: Elvira Morales DO    Critical care provider statement:     Critical care time (minutes):  37    Critical care start time:  9/22/2023 5:10 AM    Critical care end time:  9/22/2023 5:47 AM    Critical care time was exclusive of:  Separately billable procedures and treating other patients and teaching time    Critical care was necessary to treat or prevent imminent or life-threatening deterioration of the following conditions:  CNS failure or compromise and toxidrome    Critical care was time spent personally by me on the following activities:  Blood draw for specimens, obtaining history from patient or surrogate, development of treatment plan with patient or surrogate, evaluation of patient's response to treatment, examination of patient, review of old charts, re-evaluation of patient's condition, ordering and review of radiographic studies, ordering and review of laboratory studies and ordering and performing treatments and interventions    I assumed direction of critical care for this patient from another provider in my specialty: no               ED Course  ED Course as of 09/22/23 2351   Fri Sep 22, 2023   0500 Procedure Note: EKG  Date/Time: 09/22/23 5:33 AM   Interpreted by: Antonio Clay DO  Indications / Diagnosis: Altered Mental Status  ECG reviewed by me, the ED Provider: yes   The EKG demonstrates:  Rhythm: sinus tachycardia, pfja=761 BPM  Intervals: Normal SD and QT intervals  Axis: Normal axis  QRS/Blocks: Normal QRS  ST Changes: No acute ST/T waves changes. No GERRY. TWI III, aVF.   0535 Patient arrives via EMS. Patient became agitated upon transfer to the stretcher. Patient was flailing arms around and spit on ED staff. Patient required both physical and chemical restraint to avoid self injury and to facilitate evaluation. 4837 VBG noted. Suspect secondary to bradypnea prior to arrival. Patient breathing spontaneously at this time. Will check serial VBG to trend. 7204 Patient care signed out to Dr. Jhon Winters pending observation and repeat evaluation. Medical Decision Making    25 y.o. male presenting for altered mental status. Patient reportedly unresponsive and apneic upon EMS arrival.  Patient was administered naloxone in route to the emergency department and had improvement in respiratory status prompting suspicion for opioid overdose. Patient became agitated after Narcan. On arrival patient agitated in the stretcher and spitting at ED staff. Concern for patient and staff safety. Patient was unable to be verbally redirected. He required both chemical and physical restraint upon arrival. I suspect presentation related to polysubstance toxidrome.   Will order labs, EKG and imaging to evaluate for electrolyte abnormality, toxic ingestion, arrhythmia/ACS, rhabdomyolysis, acute renal failure or thyroid disease. Given unknown history and encephalopathy will order CT of the head to exclude intracranial mass or hemorrhage. Initial VBG demonstrated respiratory acidosis however patient breathing spontaneously in the emergency department. Suspect related to episode of bradypnea due to likely fentanyl/heroin ingestion. Serial VBG with improvement in hypercapnia and acidosis. Vital stable during ED course. Patient sleepy though easily aroused after administration of Haldol and Versed. Patient became agitated when attempting to interact. Patient care signed out with disposition pending observation and repeat assessment when more alert. Amount and/or Complexity of Data Reviewed  Labs: ordered. Radiology: ordered. Risk  Prescription drug management. Disposition  Final diagnoses:   Acute encephalopathy   Polysubstance abuse ([address])   TERRENCE (acute kidney injury) ([address])     Time reflects when diagnosis was documented in both MDM as applicable and the Disposition within this note     Time User Action Codes Description Comment    9/22/2023  8:08 AM Cortes Beets Add [G93.40] Acute encephalopathy     9/22/2023  8:08 AM Cortes Beets Add [F19.10] Polysubstance abuse ([address])     9/22/2023  8:09 AM Cortes Beets Add [N17.9] TERRENCE (acute kidney injury) [facility])       ED Disposition     ED Disposition   Discharge    Condition   Stable    Date/Time   Fri Sep 22, 2023  1:03 PM    [facility] discharge to home/self care. Follow-up Information     Follow up With Specialties Details Why Contact Info Additional [facility] Call in 1 day for further evaluation and treatment, preventative and ongoing care.  [address] [facility] [address]  [address] [place] [address], [id number] [phone]          There are no discharge medications for this patient. No discharge procedures on file.     PDMP Review     None          ED Provider  Electronically Signed by           Jeancarlos Ayala DO  09/22/23 2347

## 2023-09-22 NOTE — ED NOTES
Crisis attempt to talk to the pt. Pt appears to be sleeping. Crisis will attempt to talk to the pt at the later time.

## 2023-09-23 ENCOUNTER — HOSPITAL ENCOUNTER (INPATIENT)
Facility: HOSPITAL | Age: 23
LOS: 5 days | Discharge: OTHER INSTITUTION | DRG: 753 | End: 2023-09-28
Attending: STUDENT IN AN ORGANIZED HEALTH CARE EDUCATION/TRAINING PROGRAM | Admitting: PSYCHIATRY & NEUROLOGY
Payer: COMMERCIAL

## 2023-09-23 DIAGNOSIS — F41.9 ANXIETY: Primary | ICD-10-CM

## 2023-09-23 DIAGNOSIS — R45.851 SUICIDAL INTENT: ICD-10-CM

## 2023-09-23 DIAGNOSIS — E55.9 VITAMIN D DEFICIENCY: ICD-10-CM

## 2023-09-23 DIAGNOSIS — F31.32 BIPOLAR 1 DISORDER, DEPRESSED, MODERATE (HCC): ICD-10-CM

## 2023-09-23 PROBLEM — E78.1 HYPERTRIGLYCERIDEMIA: Status: ACTIVE | Noted: 2023-09-23

## 2023-09-23 PROBLEM — K08.89 TOOTH PAIN: Status: ACTIVE | Noted: 2023-09-23

## 2023-09-23 PROBLEM — E87.6 HYPOKALEMIA: Status: ACTIVE | Noted: 2023-09-23

## 2023-09-23 LAB
25(OH)D3 SERPL-MCNC: 13.5 NG/ML (ref 30–100)
ALBUMIN SERPL BCP-MCNC: 3.7 G/DL (ref 3.5–5)
ALP SERPL-CCNC: 85 U/L (ref 34–104)
ALT SERPL W P-5'-P-CCNC: 25 U/L (ref 7–52)
ANION GAP SERPL CALCULATED.3IONS-SCNC: 5 MMOL/L
AST SERPL W P-5'-P-CCNC: 23 U/L (ref 13–39)
BASOPHILS # BLD AUTO: 0.03 THOUSANDS/ÂΜL (ref 0–0.1)
BASOPHILS NFR BLD AUTO: 1 % (ref 0–1)
BILIRUB SERPL-MCNC: 0.22 MG/DL (ref 0.2–1)
BUN SERPL-MCNC: 9 MG/DL (ref 5–25)
CALCIUM SERPL-MCNC: 8.7 MG/DL (ref 8.4–10.2)
CHLORIDE SERPL-SCNC: 105 MMOL/L (ref 96–108)
CHOLEST SERPL-MCNC: 88 MG/DL
CO2 SERPL-SCNC: 26 MMOL/L (ref 21–32)
CREAT SERPL-MCNC: 0.66 MG/DL (ref 0.6–1.3)
EOSINOPHIL # BLD AUTO: 0.17 THOUSAND/ÂΜL (ref 0–0.61)
EOSINOPHIL NFR BLD AUTO: 3 % (ref 0–6)
ERYTHROCYTE [DISTWIDTH] IN BLOOD BY AUTOMATED COUNT: 12.7 % (ref 11.6–15.1)
FOLATE SERPL-MCNC: 14.3 NG/ML
GFR SERPL CREATININE-BSD FRML MDRD: 137 ML/MIN/1.73SQ M
GLUCOSE P FAST SERPL-MCNC: 132 MG/DL (ref 65–99)
GLUCOSE SERPL-MCNC: 132 MG/DL (ref 65–140)
HCT VFR BLD AUTO: 39.3 % (ref 36.5–49.3)
HDLC SERPL-MCNC: 21 MG/DL
HGB BLD-MCNC: 13.1 G/DL (ref 12–17)
IMM GRANULOCYTES # BLD AUTO: 0.02 THOUSAND/UL (ref 0–0.2)
IMM GRANULOCYTES NFR BLD AUTO: 0 % (ref 0–2)
LDLC SERPL CALC-MCNC: 45 MG/DL (ref 0–100)
LYMPHOCYTES # BLD AUTO: 2.74 THOUSANDS/ÂΜL (ref 0.6–4.47)
LYMPHOCYTES NFR BLD AUTO: 41 % (ref 14–44)
MCH RBC QN AUTO: 28.5 PG (ref 26.8–34.3)
MCHC RBC AUTO-ENTMCNC: 33.3 G/DL (ref 31.4–37.4)
MCV RBC AUTO: 86 FL (ref 82–98)
MONOCYTES # BLD AUTO: 1.04 THOUSAND/ÂΜL (ref 0.17–1.22)
MONOCYTES NFR BLD AUTO: 16 % (ref 4–12)
NEUTROPHILS # BLD AUTO: 2.59 THOUSANDS/ÂΜL (ref 1.85–7.62)
NEUTS SEG NFR BLD AUTO: 39 % (ref 43–75)
NONHDLC SERPL-MCNC: 67 MG/DL
NRBC BLD AUTO-RTO: 0 /100 WBCS
PLATELET # BLD AUTO: 311 THOUSANDS/UL (ref 149–390)
PMV BLD AUTO: 9.4 FL (ref 8.9–12.7)
POTASSIUM SERPL-SCNC: 3.5 MMOL/L (ref 3.5–5.3)
PROT SERPL-MCNC: 6.7 G/DL (ref 6.4–8.4)
RBC # BLD AUTO: 4.59 MILLION/UL (ref 3.88–5.62)
SODIUM SERPL-SCNC: 136 MMOL/L (ref 135–147)
TRIGL SERPL-MCNC: 111 MG/DL
TSH SERPL DL<=0.05 MIU/L-ACNC: 0.46 UIU/ML (ref 0.45–4.5)
VIT B12 SERPL-MCNC: 388 PG/ML (ref 180–914)
WBC # BLD AUTO: 6.59 THOUSAND/UL (ref 4.31–10.16)

## 2023-09-23 PROCEDURE — 80053 COMPREHEN METABOLIC PANEL: CPT | Performed by: PSYCHIATRY & NEUROLOGY

## 2023-09-23 PROCEDURE — 84443 ASSAY THYROID STIM HORMONE: CPT | Performed by: PSYCHIATRY & NEUROLOGY

## 2023-09-23 PROCEDURE — 99253 IP/OBS CNSLTJ NEW/EST LOW 45: CPT | Performed by: PHYSICIAN ASSISTANT

## 2023-09-23 PROCEDURE — 99223 1ST HOSP IP/OBS HIGH 75: CPT | Performed by: PSYCHIATRY & NEUROLOGY

## 2023-09-23 PROCEDURE — GZ59ZZZ INDIVIDUAL PSYCHOTHERAPY, PSYCHOPHYSIOLOGICAL: ICD-10-PCS | Performed by: PSYCHIATRY & NEUROLOGY

## 2023-09-23 PROCEDURE — 85025 COMPLETE CBC W/AUTO DIFF WBC: CPT | Performed by: PSYCHIATRY & NEUROLOGY

## 2023-09-23 PROCEDURE — GZHZZZZ GROUP PSYCHOTHERAPY: ICD-10-PCS | Performed by: PSYCHIATRY & NEUROLOGY

## 2023-09-23 PROCEDURE — 82306 VITAMIN D 25 HYDROXY: CPT | Performed by: PSYCHIATRY & NEUROLOGY

## 2023-09-23 PROCEDURE — 82607 VITAMIN B-12: CPT | Performed by: PSYCHIATRY & NEUROLOGY

## 2023-09-23 PROCEDURE — 80061 LIPID PANEL: CPT | Performed by: PSYCHIATRY & NEUROLOGY

## 2023-09-23 PROCEDURE — 82746 ASSAY OF FOLIC ACID SERUM: CPT | Performed by: PSYCHIATRY & NEUROLOGY

## 2023-09-23 RX ORDER — BENZTROPINE MESYLATE 1 MG/ML
0.5 INJECTION INTRAMUSCULAR; INTRAVENOUS
Status: DISCONTINUED | OUTPATIENT
Start: 2023-09-23 | End: 2023-09-28 | Stop reason: HOSPADM

## 2023-09-23 RX ORDER — LANOLIN ALCOHOL/MO/W.PET/CERES
3 CREAM (GRAM) TOPICAL
Status: DISCONTINUED | OUTPATIENT
Start: 2023-09-23 | End: 2023-09-23

## 2023-09-23 RX ORDER — BISACODYL 10 MG
10 SUPPOSITORY, RECTAL RECTAL DAILY PRN
Status: DISCONTINUED | OUTPATIENT
Start: 2023-09-23 | End: 2023-09-28 | Stop reason: HOSPADM

## 2023-09-23 RX ORDER — AMOXICILLIN 250 MG
1 CAPSULE ORAL DAILY PRN
Status: DISCONTINUED | OUTPATIENT
Start: 2023-09-23 | End: 2023-09-28 | Stop reason: HOSPADM

## 2023-09-23 RX ORDER — HYDROXYZINE 50 MG/1
100 TABLET, FILM COATED ORAL
Status: DISCONTINUED | OUTPATIENT
Start: 2023-09-23 | End: 2023-09-26

## 2023-09-23 RX ORDER — POLYETHYLENE GLYCOL 3350 17 G/17G
17 POWDER, FOR SOLUTION ORAL DAILY PRN
Status: DISCONTINUED | OUTPATIENT
Start: 2023-09-23 | End: 2023-09-28 | Stop reason: HOSPADM

## 2023-09-23 RX ORDER — DIPHENHYDRAMINE HYDROCHLORIDE 50 MG/ML
50 INJECTION INTRAMUSCULAR; INTRAVENOUS EVERY 6 HOURS PRN
Status: DISCONTINUED | OUTPATIENT
Start: 2023-09-23 | End: 2023-09-28 | Stop reason: HOSPADM

## 2023-09-23 RX ORDER — BUSPIRONE HYDROCHLORIDE 5 MG/1
5 TABLET ORAL 3 TIMES DAILY
Status: DISCONTINUED | OUTPATIENT
Start: 2023-09-23 | End: 2023-09-25

## 2023-09-23 RX ORDER — HALOPERIDOL 5 MG/1
5 TABLET ORAL
Status: DISCONTINUED | OUTPATIENT
Start: 2023-09-23 | End: 2023-09-28 | Stop reason: HOSPADM

## 2023-09-23 RX ORDER — HALOPERIDOL 1 MG/1
1 TABLET ORAL EVERY 6 HOURS PRN
Status: DISCONTINUED | OUTPATIENT
Start: 2023-09-23 | End: 2023-09-28 | Stop reason: HOSPADM

## 2023-09-23 RX ORDER — BENZTROPINE MESYLATE 1 MG/1
1 TABLET ORAL
Status: DISCONTINUED | OUTPATIENT
Start: 2023-09-23 | End: 2023-09-28 | Stop reason: HOSPADM

## 2023-09-23 RX ORDER — MELATONIN
6000 DAILY
Status: DISCONTINUED | OUTPATIENT
Start: 2023-09-23 | End: 2023-09-28 | Stop reason: HOSPADM

## 2023-09-23 RX ORDER — HALOPERIDOL 5 MG/ML
5 INJECTION INTRAMUSCULAR
Status: DISCONTINUED | OUTPATIENT
Start: 2023-09-23 | End: 2023-09-28 | Stop reason: HOSPADM

## 2023-09-23 RX ORDER — ACETAMINOPHEN 325 MG/1
650 TABLET ORAL EVERY 6 HOURS PRN
Status: DISCONTINUED | OUTPATIENT
Start: 2023-09-23 | End: 2023-09-28 | Stop reason: HOSPADM

## 2023-09-23 RX ORDER — HYDROXYZINE 50 MG/1
50 TABLET, FILM COATED ORAL
Status: DISCONTINUED | OUTPATIENT
Start: 2023-09-23 | End: 2023-09-28 | Stop reason: HOSPADM

## 2023-09-23 RX ORDER — BENZTROPINE MESYLATE 1 MG/ML
1 INJECTION INTRAMUSCULAR; INTRAVENOUS
Status: DISCONTINUED | OUTPATIENT
Start: 2023-09-23 | End: 2023-09-28 | Stop reason: HOSPADM

## 2023-09-23 RX ORDER — ACETAMINOPHEN 325 MG/1
975 TABLET ORAL EVERY 6 HOURS PRN
Status: DISCONTINUED | OUTPATIENT
Start: 2023-09-23 | End: 2023-09-28 | Stop reason: HOSPADM

## 2023-09-23 RX ORDER — HYDROXYZINE HYDROCHLORIDE 25 MG/1
25 TABLET, FILM COATED ORAL
Status: DISCONTINUED | OUTPATIENT
Start: 2023-09-23 | End: 2023-09-28 | Stop reason: HOSPADM

## 2023-09-23 RX ORDER — LORAZEPAM 2 MG/ML
2 INJECTION INTRAMUSCULAR EVERY 6 HOURS PRN
Status: DISCONTINUED | OUTPATIENT
Start: 2023-09-23 | End: 2023-09-28 | Stop reason: HOSPADM

## 2023-09-23 RX ORDER — ACETAMINOPHEN 325 MG/1
650 TABLET ORAL EVERY 4 HOURS PRN
Status: DISCONTINUED | OUTPATIENT
Start: 2023-09-23 | End: 2023-09-28 | Stop reason: HOSPADM

## 2023-09-23 RX ORDER — ESCITALOPRAM OXALATE 5 MG/1
5 TABLET ORAL DAILY
Status: DISCONTINUED | OUTPATIENT
Start: 2023-09-23 | End: 2023-09-25

## 2023-09-23 RX ORDER — LORAZEPAM 2 MG/ML
1 INJECTION INTRAMUSCULAR
Status: DISCONTINUED | OUTPATIENT
Start: 2023-09-23 | End: 2023-09-28 | Stop reason: HOSPADM

## 2023-09-23 RX ORDER — HALOPERIDOL 5 MG/1
2.5 TABLET ORAL
Status: DISCONTINUED | OUTPATIENT
Start: 2023-09-23 | End: 2023-09-28 | Stop reason: HOSPADM

## 2023-09-23 RX ORDER — HALOPERIDOL 5 MG/ML
2.5 INJECTION INTRAMUSCULAR
Status: DISCONTINUED | OUTPATIENT
Start: 2023-09-23 | End: 2023-09-28 | Stop reason: HOSPADM

## 2023-09-23 RX ORDER — TRAZODONE HYDROCHLORIDE 50 MG/1
50 TABLET ORAL
Status: DISCONTINUED | OUTPATIENT
Start: 2023-09-23 | End: 2023-09-28 | Stop reason: HOSPADM

## 2023-09-23 RX ORDER — MAGNESIUM HYDROXIDE/ALUMINUM HYDROXICE/SIMETHICONE 120; 1200; 1200 MG/30ML; MG/30ML; MG/30ML
30 SUSPENSION ORAL EVERY 4 HOURS PRN
Status: DISCONTINUED | OUTPATIENT
Start: 2023-09-23 | End: 2023-09-28 | Stop reason: HOSPADM

## 2023-09-23 RX ORDER — LORAZEPAM 2 MG/ML
2 INJECTION INTRAMUSCULAR
Status: DISCONTINUED | OUTPATIENT
Start: 2023-09-23 | End: 2023-09-28 | Stop reason: HOSPADM

## 2023-09-23 RX ADMIN — BENZOCAINE 1 APPLICATION: 220 GEL, DENTIFRICE DENTAL at 09:23

## 2023-09-23 RX ADMIN — TRAZODONE HYDROCHLORIDE 50 MG: 50 TABLET ORAL at 21:25

## 2023-09-23 RX ADMIN — BUSPIRONE HYDROCHLORIDE 5 MG: 5 TABLET ORAL at 21:25

## 2023-09-23 RX ADMIN — DIVALPROEX SODIUM 750 MG: 250 TABLET, FILM COATED, EXTENDED RELEASE ORAL at 21:25

## 2023-09-23 RX ADMIN — BUSPIRONE HYDROCHLORIDE 5 MG: 5 TABLET ORAL at 12:10

## 2023-09-23 RX ADMIN — BUSPIRONE HYDROCHLORIDE 5 MG: 5 TABLET ORAL at 16:59

## 2023-09-23 RX ADMIN — ESCITALOPRAM 5 MG: 5 TABLET, FILM COATED ORAL at 12:10

## 2023-09-23 RX ADMIN — BENZOCAINE 1 APPLICATION: 220 GEL, DENTIFRICE DENTAL at 17:38

## 2023-09-23 RX ADMIN — Medication 6000 UNITS: at 16:59

## 2023-09-23 NOTE — ED NOTES
[facility] claimed the ride for Dilip Villafana in unit/room [address] [facility] 02 bed [address] [facility] 02, and will arrive on 09/23/2023 at 2:00am

## 2023-09-23 NOTE — ED NOTES
Crisis prepared 201 and obtained signatures. Pt informed about his rights, 72 hours notice and process. Referral faxed to Intake for review.

## 2023-09-23 NOTE — ASSESSMENT & PLAN NOTE
· Previously on statin and followed with [facility] PCP, ran out 2 months ago  · Lipid panel collected today: trigs 111, defer resumption of statin to PCP  · Also requires surveillance of fatty liver disease  · Follow up with PCP on discharge

## 2023-09-23 NOTE — H&P
Psychiatric Evaluation - [address] [facility] 25 y.o. male MRN: [id number]  Unit/Bed#: [facility] 204-02 Encounter: [id number]    Assessment   Principal Problem:    Bipolar 1 disorder, depressed, moderate ([address])  Active Problems:    Posttraumatic stress disorder    Anxiety    Polysubstance dependence including opioid type drug with complication, continuous use ([address])    Cannabis use disorder, severe, dependence ([address])    Medical clearance for psychiatric admission    Tobacco use    Hypertriglyceridemia    Tooth pain      Plan    • Admission labs evaluated, see detailed labs below. • Collaborate with collaterals for baseline assessment and disposition. • Begin Depakote 750 mg nightly for management of bipolar disorder  • Begin Lexapro 5 mg daily for mood and anxiety  • Begin BuSpar 5 mg 3 times daily for anxiety  • Promote patient participation in therapeutic milieu. • Medical management by primary team.    Risks, benefits and possible side effects of Medications:   Risks, benefits, and possible side effects of medications explained to patient and patient verbalizes understanding. Chief Complaint: "I wanted to die"    History of Present Illness     Mary Delgado is a 25 y.o. male, presenting to [address] [place] [address], possessing pertinent psychiatric history of bipolar 1 disorder, PTSD, anxiety, and polysubstance abuse, subsequent to worsening drug abuse, depression, suicidal ideation, and overdose on fentanyl in the setting of medication noncompliance. Per initial ED evaluation completed by More Valdez DO from 9/22/2023:  Mary Delgado is a 26yo year old male with PMH of polysubstance abuse, bipolar disorder, seizure disorder presenting to the [facility] ED for altered mental status. Patient reportedly found by 2 friends laying on a roof. He was reported to be minimally responsive. The 2 friends pulled him into a bathroom to try to put him in a shower.  One of the friends reportedly attempted CPR while the patient was laying on his side. EMS found patient somnolent and unresponsive. Patient was given intranasal Narcan en route to the emergency department with gradual improvement of mental status and increased respirations. Per review of chart patient has history of polysubstance abuse including methamphetamine and opioid abuse. On arrival to the emergency department the patient became agitated. He would answer one-word questions though was hyperactive, uncooperative and flailing around in bed. Patient was not able to provide additional history. Per subsequent ED evaluation completed by Sam Carey MD from 9/22/2023:  Patient reports he was at the tail end of a 3-day meth savage when he decided to take enough fentanyl to kill himself. Stated he has not been using opioids recently with the exception of this episode last night. He was found by his girlfriend who brought him to the hospital.  He did not disclose that he was intentionally trying to harm himself. He was discharged home. He reports he wants to come in as a 201 for thoughts of killing himself. Per crisis evaluation completed by Nasrin Vegas on 9/22/2023:  Amaya Kraus is a 26 y/o male, diagnosed with Opiate abuse, episodic,  Bipolar 1 disorder, depressed, moderate, ADHD, Polysubstance dependence including opioid type drug with complication, continuous use, PTSD. Pt presented to ED via private transportation due to:  Patient reports to the ed to 201 himself. Patient reports overdosing last night intentionally. Patient was supposed to go to rehab however would like to go to inpatient mental health first. Pt oriented x4. Pt calm and cooperative. This pt 2nd visit at ED today. Pt reports feeling depressed and suicidal. Pt states "yesterday I planned on killing myself by overdose, but today I did not plan it. Pt reports suicidal thoughts and intend. Pt states he cannot control his thoughts.  Pt states "my mind needs help". Pt denies HI, self harming, hallucinations. No problem with appetite of sleep. Pt asking to sign self in to get help.     The patient was admitted to the behavioral health unit for safety monitoring, medication management, group and milieu therapy, and discharge planning. On evaluation today Edgar Welsh was drowsy but responded to questions appropriately with redirection. He reports prior to admission symptoms of depression, poor sleep, low appetite, low energy, poor concentration, suicidal ideation x2 weeks without plan (reports recent overdose was accidental), and increased anxiety. Per chart review patient admitted to [address] [place] [address] from 1/3/2023 to 1/11/2023 and discharged on the following medication regiment:  · Lexapro 10 mg daily  · Clonidine 0.1 mg daily  · Depakote 1000 mg nightly  · Seroquel 300 mg nightly  · BuSpar 5 mg 3 times daily  · Suboxone 8-2 mg sublingual daily  However, Edgar Welsh also reports being hospitalized at [facility] "a few months ago" and says he was discharged on "unknown" medications which he discontinued after 1.5 months due to lack of follow up care. He reports that he resorted to fentanyl use due to being unable to get his Suboxone refilled. He would like to restart medications today and says he wants to discharge to rehab to work on his drug addition.      Stressors:  • Drug use  • Unstable relationships  • Financial instability    Patient Strengths/Assets: adapts well, cooperative, family ties, motivated, negotiates basic needs    Patient Barriers/Limitations: financial instability, lack of stable employment, limited support system, no/few hobbies or interests, poor past treatment response, substance abuse    Psychiatric Review Of Systems:  Sleep changes: decreased  Appetite changes: decreased  Weight changes: no  Energy/anergy: decreased  Interest/pleasure/anhedonia: decreased  Somatic symptoms: no  Anxiety/panic: worrying  Monique: past manic symptoms  Guilty/hopeless: yes  Self injurious behavior/risky behavior: yes, drug abuse  Suicidal ideation: yes, no plan  Homicidal ideation: no  Auditory hallucinations: no  Visual hallucinations: no  Other hallucinations: no  Delusional thinking: no  Eating disorder history: no  Obsessive/compulsive symptoms: no      Historical Information     Psychiatric History:   Psychiatry diagnosis: Bipolar disorder/depression/anxiety  Inpatient Hx: Yes, multiple  Suicidal Hx: Yes Over dose on drugs multiple times  Self harming behavior Hx: None  Violent behavior Hx: Denies  Outpatient Hx: Noncompliant with outpatient care  Medications/Trials: Depakote, Seroquel, Zyprexa, Lexapro, clonidine, BuSpar, Suboxone    Substance Abuse History:  Social History     Tobacco History     Smoking Status  Every Day Smoking Frequency  1.50 packs/day for 10.00 years (15.00 ttl pk-yrs) Smoking Tobacco Type  Cigarettes    Smokeless Tobacco Use  Never          Alcohol History     Alcohol Use Status  Not Currently Comment  socially, last drink several months ago          Drug Use     Drug Use Status  Yes Types  Fentanyl, Heroin, Marijuana, Methamphetamines          Sexual Activity     Sexually Active  Yes Partners  Female          Activities of Daily Living    Not Asked               Additional Substance Use Detail     Questions Responses    Problems Due to Past Use of Alcohol? No    Problems Due to Past Use of Substances?  Yes    Substance Use Assessment Substance use within the past 12 months    Alcohol Use Frequency Experimented    Cannabis frequency Daily    Comment: 1-2 times/week on 1/4/2022 1-2 times/week -> Daily on 9/23/2023     Heroin Frequency Daily    Cannabis method Smoke    Comment: Smoke on 11/22/2021     Cocaine frequency Never used    Comment: Never used on 11/22/2021     Crack Cocaine Frequency Denies use in past 12 months    Methamphetamine Frequency Past abuse    Methamphetamine Method Snort    Narcotic Frequency Denies use in past 12 months    Benzodiazepine Frequency Denies use in past 12 months    Amphetamine frequency Denies use in past 12 months    Barbituate Frequency Denies use use in past 12 months    Inhalant frequency Never used    Comment: Never used on 11/22/2021     Hallucinogen frequency Never used    Comment: Never used on 11/22/2021     Ecstasy frequency Never used    Comment: Never used on 11/22/2021     Other drug frequency Never used    Comment: Never used on 11/22/2021     Opiate frequency Daily    Other Substance Abuse Comments (free text) fentanyl    Last reviewed by Abilio Hebert RN on 9/23/2023        I have assessed this patient for substance use within the past 12 months    Substance Abuse History:  Long hx since 15years old Cannabis, Heroin, Fentanyl Meth. Hx of rehabs. Was recently on suboxone but was unable to get refills and resorted to fentanyl use.   Detox/rehab: Rehab 2x ([facility])     Family Psychiatric History:   Father side- drugs use  Step Grand father- shot himself, many years ago     Social History:  Education: 10th Grade GED  Learning Disabilities: Special education and IEPs  Marital history: Single   Living arrangement: Lives with Mother in [place], [place]  Occupational History: Unemployed  Functioning Relationships: Yes  Other Pertinent History:    Hx: Denies  Legal Hx: None     Traumatic History:   Denies    Past Medical History:  History of Seizures: yes  History of Head injury with loss of consciousness: history of head injury, several head injuries    Past Medical History:   Diagnosis Date   • ADHD (attention deficit hyperactivity disorder)    • Anxiety    • Bipolar disorder ([address])    • Depression    • GERD (gastroesophageal reflux disease)    • Hyperlipidemia    • Hypertension    • Psychiatric disorder    • Psychiatric illness    • Seizures ([address])    • Substance abuse ([address])    • Tourette syndrome        Past Surgical History:   Procedure Laterality Date   • NJ EXC B9 LESION MRGN XCP SK TG T/A/L >4.0 CM Left 10/26/2021    Procedure: EXCISION LIPOMA (BACK); Surgeon: Daniel Medrano DO;  Location: [facility];  Service: General   • TONSILECTOMY AND ADNOIDECTOMY     • TONSILLECTOMY     • TYMPANOSTOMY TUBE PLACEMENT         Medical Review Of Systems:  A comprehensive review of systems was negative except for: Constitutional: positive for fatigue  Behavioral/Psych: positive for Symptoms;  Pyschiatric: bipolar and depression    Meds/Allergies   all current active meds have been reviewed and current meds:   Current Facility-Administered Medications   Medication Dose Route Frequency   • acetaminophen (TYLENOL) tablet 650 mg  650 mg Oral Q6H PRN   • acetaminophen (TYLENOL) tablet 650 mg  650 mg Oral Q4H PRN   • acetaminophen (TYLENOL) tablet 975 mg  975 mg Oral Q6H PRN   • aluminum-magnesium hydroxide-simethicone (MAALOX) oral suspension 30 mL  30 mL Oral Q4H PRN   • haloperidol lactate (HALDOL) injection 2.5 mg  2.5 mg Intramuscular Q4H PRN Max 4/day    And   • LORazepam (ATIVAN) injection 1 mg  1 mg Intramuscular Q4H PRN Max 4/day    And   • benztropine (COGENTIN) injection 0.5 mg  0.5 mg Intramuscular Q4H PRN Max 4/day   • haloperidol lactate (HALDOL) injection 5 mg  5 mg Intramuscular Q4H PRN Max 4/day    And   • LORazepam (ATIVAN) injection 2 mg  2 mg Intramuscular Q4H PRN Max 4/day    And   • benztropine (COGENTIN) injection 1 mg  1 mg Intramuscular Q4H PRN Max 4/day   • benztropine (COGENTIN) tablet 1 mg  1 mg Oral Q4H PRN Max 6/day   • bisacodyl (DULCOLAX) rectal suppository 10 mg  10 mg Rectal Daily PRN   • busPIRone (BUSPAR) tablet 5 mg  5 mg Oral TID   • hydrOXYzine HCL (ATARAX) tablet 50 mg  50 mg Oral Q6H PRN Max 4/day    Or   • diphenhydrAMINE (BENADRYL) injection 50 mg  50 mg Intramuscular Q6H PRN   • divalproex sodium (DEPAKOTE ER) 24 hr tablet 750 mg  750 mg Oral HS   • escitalopram (LEXAPRO) tablet 5 mg  5 mg Oral Daily   • haloperidol (HALDOL) tablet 1 mg  1 mg Oral Q6H PRN   • haloperidol (HALDOL) tablet 2.5 mg  2.5 mg Oral Q4H PRN Max 4/day   • haloperidol (HALDOL) tablet 5 mg  5 mg Oral Q4H PRN Max 4/day   • hydrOXYzine HCL (ATARAX) tablet 100 mg  100 mg Oral Q6H PRN Max 4/day    Or   • LORazepam (ATIVAN) injection 2 mg  2 mg Intramuscular Q6H PRN   • hydrOXYzine HCL (ATARAX) tablet 25 mg  25 mg Oral Q6H PRN Max 4/day   • nicotine polacrilex (NICORETTE) gum 4 mg  4 mg Oral Q2H PRN   • polyethylene glycol (MIRALAX) packet 17 g  17 g Oral Daily PRN   • senna-docusate sodium (SENOKOT S) 8.6-50 mg per tablet 1 tablet  1 tablet Oral Daily PRN   • traZODone (DESYREL) tablet 50 mg  50 mg Oral HS PRN     Allergies   Allergen Reactions   • Abilify [Aripiprazole] Other (See Comments)     Seizures and hyperglycemia       Objective   Vital signs in last 24 hours:  Temp:  [97.4 °F (36.3 °C)-98 °F (36.7 °C)] 98 °F (36.7 °C)  HR:  [60-99] 67  Resp:  [16] 16  BP: ()/(62-82) 103/70    No intake or output data in the 24 hours ending 09/23/23 1054    Mental Status Evaluation:  Appearance:  marginal hygiene, looks older than stated age, overweight   Behavior:  responds to redirection   Speech:  slow, soft   Mood:  depressed, anxious   Affect:  constricted   Language: naming objects and repeating phrases   Thought Process:  linear   Associations: intact associations   Thought Content:  no overt delusions   Perceptual Disturbances: no auditory hallucinations, no visual hallucinations, does not appear responding to internal stimuli   Risk Potential: Suicidal ideation - None at present  Homicidal ideation - None at present  Potential for aggression - No   Sensorium:  oriented to person, place and time/date   Memory:  recent and remote memory grossly intact   Consciousness:  alert and awake   Attention/Concentration: decreased concentration and decreased attention span   Intellect: below average   Fund of Knowledge: awareness of current events: yes  past history: yes  vocabulary: normal   Insight:  limited Judgment: limited   Muscle Strength Muscle Tone: normal  normal   Gait/Station: normal gait/station, normal balance   Motor Activity: no abnormal movements     Laboratory Results: I have personally reviewed all pertinent laboratory/tests results    Most Recent Labs:   Lab Results   Component Value Date    WBC 6.59 09/23/2023    RBC 4.59 09/23/2023    HGB 13.1 09/23/2023    HCT 39.3 09/23/2023     09/23/2023    RDW 12.7 09/23/2023    NEUTROABS 2.59 09/23/2023    TOTANEUTABS 2.16 01/04/2023    SODIUM 136 09/23/2023    K 3.5 09/23/2023     09/23/2023    CO2 26 09/23/2023    BUN 9 09/23/2023    CREATININE 0.66 09/23/2023    GLUC 132 09/23/2023    CALCIUM 8.7 09/23/2023    AST 23 09/23/2023    ALT 25 09/23/2023    ALKPHOS 85 09/23/2023    TP 6.7 09/23/2023    ALB 3.7 09/23/2023    TBILI 0.22 09/23/2023    CHOLESTEROL 88 09/23/2023    HDL 21 (L) 09/23/2023    TRIG 111 09/23/2023    LDLCALC 45 09/23/2023    NONHDLC 67 09/23/2023    VALPROICTOT 28 (L) 01/08/2023    KOI0ZYIYBUQX 0.460 09/23/2023    HGBA1C 5.0 01/04/2023    EAG 97 01/04/2023       Imaging Studies: XR chest portable    Result Date: 9/22/2023  Narrative: CHEST INDICATION:   ams. COMPARISON: 7/20/2022 EXAM PERFORMED/VIEWS:  XR CHEST PORTABLE Single view FINDINGS: Cardiomediastinal silhouette appears unremarkable. The lungs are clear. No pneumothorax or pleural effusion. Osseous structures appear within normal limits for patient age. Impression: No acute cardiopulmonary disease.  Findings are stable Workstation performed: [id number]       Code Status: Level 1 - Full Code  Advance Directive and Living Will: <no information>    Suicide/Homicide Risk Assessment:  Risk of Harm to Self:   The following ratings are based on assessment at the time of the interview  Nursing Suicide Risk Assessment Last 24 hours: C-SSRS Risk (Since Last Contact)  Calculated C-SSRS Risk Score (Since Last Contact): Low Risk  Demographic risk factors include: , male, age: young adult (15-24)  Historical Risk Factors include: chronic psychiatric problems, history of suicide attempts, drug use, substance use  Current Specific Risk Factors include: has suicidal ideation without a plan, diagnosis of mood disorder, poor self care, substance use  Protective Factors: no current suicidal plan or intent, ability to communicate with staff on the unit, able to contract for safety on the unit  Weapons/Firearms: none. The following steps have been taken to ensure weapons are properly secured: not applicable  Based on today's assessment, Ascencion Goodson presents the following risk of harm to self: minimal    Risk of Harm to Others: The following ratings are based on assessment at the time of the interview  Nursing Homicide Risk Assessment: Violence Risk to Others: Denies within past 6 months  Demographic Risk Factors include: male, 15-23 years of age, lower intelligence . Historical Risk Factors include: drug abuse. Current Specific Risk Factors include: diagnosis of mood disorder, poor insight, multiple stressors, social difficulties  Protective Factors: no current homicidal ideation  Based on today's assessment, Ascencion Goodson presents the following risk of harm to others: minimal    The following interventions are recommended: behavioral checks every 7 minutes, continued hospitalization on locked unit     Risks / Benefits of Treatment:     Risks, benefits, and possible side effects of medications explained to patient. The patient verbalizes understanding and agreement for treatment. Counseling / Coordination of Care:     Patient's presentation on admission and proposed treatment plan discussed with treatment team.  Diagnosis, medication changes and treatment plan reviewed with patient. Recent stressors discussed with patient. .  Events leading to admission reviewed with patient. Importance of medication and treatment compliance reviewed with patient.           Inpatient Psychiatric Certification:     Certification: Based upon physical, mental and social evaluations, I certify that inpatient psychiatric services are medically necessary for this patient for a duration of 30 midnights for the treatment of Bipolar 1 disorder, depressed, moderate ([address])    Available alternative community resources do not meet the patient's mental health care needs. I further attest that an established written individualized plan of care has been implemented and is outlined in the patient's medical records. This note has been constructed using a voice recognition system. There may be translation, syntax, or grammatical errors. If you have any questions, please contact the dictating provider.     Elis Chavarria D.O.

## 2023-09-23 NOTE — NURSING NOTE
Pt asking if Suboxone was ordered. This writer messaged the provider [employer]. Informed pt has been off medication for approximately month to month and a half. Pt educated provider will meet with pt tomorrow to discuss medication and treatment options. Pt expressed understanding.

## 2023-09-23 NOTE — NURSING NOTE
Patient allowed staff to obtain admission labs, but declined EKG, stating, "I just had that done in the ER.  I don't want or need another one."

## 2023-09-23 NOTE — NURSING NOTE
Todd Gates scored "Moderate Risk" on Lifetime C-SSRS. Currently, patient is "Low Risk," denies active SI, endorses feeling of safety on the unit. Dr. Justino Brown of [address] [place] [address] notified via [facility] Text, no new orders obtained at this time. Observational rounds to continue for promotion of patient safety on the unit.

## 2023-09-23 NOTE — NURSING NOTE
RN will meet with patient at least twice per day to assess for questions or concerns, educate on the treatment plan, and encourage healthy coping skills and group attendance.

## 2023-09-23 NOTE — NURSING NOTE
Pt remains withdrawn to room, sleeping at times. Several attempts to speak with pt however pt declined. Pt does deny SI/HI however does not verbally respond for any other portion of assessment. Pt laying in bed with smile when this writer attempted to speak with patient. Encouraged pt to notify staff with any concerns or questions.

## 2023-09-23 NOTE — ED NOTES
Patient is accepted at \[facility]\"". Patient is accepted by Dr. Justino Brown per [address]. Transportation is arranged with [employer]. Transportation is scheduled for pending. Patient may go to the floor at anytime if in the morning then after 10am.          Nurse report is to be called to [phone] prior to patient transfer.

## 2023-09-23 NOTE — NURSING NOTE
Patient is a 12, presented to [address] [place] [address] seeking mental health treatment and rehab placement. PMH bipolar 1 disorder, depression, ADHD and polysubstance dependence. Per the chart review patient has a long history of mental health treatment and substance abuse. Patient reports unintentionally overdosing on fentanyl 9/21/2023. Per the ED notes patient stated he was suicidal and attempted to kill himself, but patient denies that to this writer. Stated, "I was just hanging out with friends all night and woke up in the hospital." Patient does admit to stopping psychiatric medications a few months ago and increased depression. UDS + Meth, Benzodiazepines and THC; patient additionally reporting daily fentanyl use. Denies alcohol use. Patient has prior suicide attempts by overdose and hanging. Last attempt by fentanyl overdose approximately 3 months ago. Denies HI, denies hallucinations. Reports poor sleep. Good appetite. At this time patient denies suicidal thoughts but does admit to a passive death wish to "not wake up." Pt oriented to the unit, denies any questions or concerns.  Encouraged to approach staff as needed

## 2023-09-23 NOTE — TREATMENT PLAN
TREATMENT PLAN REVIEW - [address] [facility] Rosalee R Pembroke 25 y.o. 2000 male MRN: [id number]    Jama Ku Room / Bed: [address] 339-74 Encounter: [id number]          Admit Date/Time:  9/23/2023  2:20 AM    Treatment Team: Attending Provider: Gabriella Agarwal MD; Registered Nurse: Emanuel Mar, RN; Patient Care Assistant: Audra Franco; Nursing Student: Amanda Mckinney; Registered Nurse: April Benitez RN; Patient Care Assistant: Toshia Quiros; Charge Nurse: Ade Anaya RN; Security: Jr Sal;  Advanced Practitioner: Michelle Couch PA-C    Diagnosis: Principal Problem:    Bipolar 1 disorder, depressed, moderate ([address])  Active Problems:    Posttraumatic stress disorder    Anxiety    Polysubstance dependence including opioid type drug with complication, continuous use ([address])    Cannabis use disorder, severe, dependence ([address])    Medical clearance for psychiatric admission    Tobacco use    Hypertriglyceridemia    Tooth pain      Patient Strengths/Assets: adapts well, cooperative, family ties, motivated, negotiates basic needs    Patient Barriers/Limitations: financial instability, lack of stable employment, limited support system, no/few hobbies or interests, poor past treatment response, substance abuse    Short Term Goals: decrease in depressive symptoms, decrease in suicidal thoughts, decrease in self abusive behaviors, ability to stay safe on the unit, ability to stay free of restraints, improvement in ability to express basic needs    Long Term Goals: improvement in depression, improvement in anxiety, free of suicidal thoughts, improved insight, able to express basic needs, acceptance of need for psychiatric medications, acceptance of need for psychiatric treatment, acceptance of need for psychiatric follow up after discharge    Progress Towards Goals: starting psychiatric medications as prescribed    Recommended Treatment: medication management, patient medication education, group therapy, milieu therapy, continued Behavioral Health psychiatric evaluation/assessment process    Treatment Frequency: daily medication monitoring, group and milieu therapy daily, monitoring through interdisciplinary rounds, monitoring through weekly patient care conferences    Expected Discharge Date:  Up to 30 midnights    Discharge Plan: referral for outpatient medication management with a psychiatrist, referral to outpatient drug and alcohol rehabilitation program, referrals as indicated    Treatment Plan Created/Updated By: Dina Perez DO

## 2023-09-23 NOTE — PLAN OF CARE
Patient is a new admission.     Problem: Ineffective Coping  Goal: Cooperates with admission process  Description: Interventions:   - Complete admission process  Outcome: Completed

## 2023-09-23 NOTE — CONSULTS
[facility]  Consult  Name: Ian Hernandez 25 y.o. male I MRN: [id number]  Unit/Bed#: U 204-02 I Date of Admission: 9/23/2023   Date of Service: 9/23/2023 I Hospital Day: 0    Inpatient consult for Medical Clearance for [facility] patient  Consult performed by: Brigette Dias PA-C  Consult ordered by: Delvis Avila MD          Assessment/Plan   Tooth pain  Assessment & Plan  · Feels like "tooth is coming in", exam reveals impacted wisdom teeth  · Also with mucosal breakdown in setting of braces, can provide topical anaesthetic for tooth pain and salt water rinses for mucosa  · Advised follow up with [facility]    Hypertriglyceridemia  Assessment & Plan  · Previously on statin and followed with [facility] PCP, ran out 2 months ago  · Lipid panel collected today: trigs 111, defer resumption of statin to PCP  · Also requires surveillance of fatty liver disease  · Follow up with PCP on discharge    Polysubstance dependence including opioid type drug with complication, continuous use ([address])  Assessment & Plan  · Pt reports occasional meth and fentanyl use, last use the day of presentation to the ED   · Denies regular use  ·  on cessation    Tobacco use  Assessment & Plan  · Counseled on cessation  · Nicotine patch provided    Medical clearance for psychiatric admission  Assessment & Plan  • At this time, patient appears medically stable to continue inpatient psychiatric management. • Current labs, nursing notes and imaging reviewed. o Pending Labs: vitamin D, B12, folate  • Recent EKG: NSR, HR 78, QTc 458  • Please contact SLIM for any further questions    * Bipolar 1 disorder, depressed, moderate (HCC)  Assessment & Plan  · Presents following intentional overdose  · Management per Psych        Recommendations for Discharge:  · SLIM will continue to be available for any questions or concerns. · Follow up with PCP upon discharge. Counseling / Coordination of Care Time: 30 minutes.   Greater than 50% of total time spent on patient counseling and coordination of care. Collaboration of Care: Were Recommendations Directly Discussed with Primary Treatment Team? - Yes     History of Present Illness:    Shashi Mei is a 25 y.o. male with PMHx of bipolar disorder, opiate abuse, hypertriglyceridemia and fatty liver who is originally admitted to the psychiatric service due to suicidality. We are consulted for medical clearance for psychiatric hospitalization and medical management. Patient reports his main medical comorbidity is hypertriglyceridemia for which she has been maintained on a statin that he ran out of a couple months ago. He has not seen his PCP in quite some time. He believes he previously was on blood pressure medications as well. He cannot remember specifics. Otherwise he only complains of some mouth pain and feels he has a "tooth coming in the back". He says his lips are sore believes he fell out of a chair after his overdose. No pain elsewhere. Denies headache or dizziness. Denies neck pain. He reports smoking a pack and 1/2 to 2 packs/day and reports occasional meth and fentanyl use. He feels he uses meth or fentanyl about 1-2 times per week. He states his last fentanyl use was the day of presentation to the ED. Also endorses some diarrhea which he reports is mainly associated with meth use. Reports this to be resolving and denies abdominal pain or cramping. Denies nausea or vomiting. All other ROS negative. Review of Systems:    Review of Systems   Constitutional: Negative for activity change, appetite change, chills, diaphoresis, fatigue and fever. HENT: Positive for dental problem. Eyes: Negative. Respiratory: Negative. Cardiovascular: Negative. Gastrointestinal: Positive for diarrhea. Reports diarrhea associated with meth use, resolving   Endocrine: Negative. Genitourinary: Negative. Musculoskeletal: Negative. Skin: Negative. Allergic/Immunologic: Negative. Neurological: Negative. Hematological: Negative. Psychiatric/Behavioral: Negative. Past Medical and Surgical History:     Past Medical History:   Diagnosis Date   • ADHD (attention deficit hyperactivity disorder)    • Anxiety    • Bipolar disorder ([address])    • Depression    • GERD (gastroesophageal reflux disease)    • Hyperlipidemia    • Hypertension    • Psychiatric disorder    • Psychiatric illness    • Seizures ([address])    • Substance abuse ([address])    • Tourette syndrome        Past Surgical History:   Procedure Laterality Date   • NM EXC B9 LESION MRGN XCP SK TG T/A/L >4.0 CM Left 10/26/2021    Procedure: EXCISION LIPOMA (BACK); Surgeon: Moreno Mayers DO;  Location: [facility];  Service: General   • TONSILECTOMY AND ADNOIDECTOMY     • TONSILLECTOMY     • TYMPANOSTOMY TUBE PLACEMENT         Meds/Allergies:    all medications and allergies reviewed    Allergies:    Allergies   Allergen Reactions   • Abilify [Aripiprazole] Other (See Comments)     Seizures and hyperglycemia       Social History:     Marital Status: Single    Substance Use History:   Social History     Substance and Sexual Activity   Alcohol Use Not Currently    Comment: socially, last drink several months ago     Social History     Tobacco Use   Smoking Status Every Day   • Packs/day: 1.50   • Years: 10.00   • Total pack years: 15.00   • Types: Cigarettes   Smokeless Tobacco Never     Social History     Substance and Sexual Activity   Drug Use Yes   • Types: Marijuana, Heroin, Methamphetamines, Fentanyl       Family History:    non-contributory    Physical Exam:     Vitals:   Blood Pressure: 103/70 (09/23/23 0758)  Pulse: 67 (09/23/23 0758)  Temperature: 98 °F (36.7 °C) (09/23/23 0758)  Temp Source: Tympanic (09/23/23 0758)  Respirations: 16 (09/23/23 0758)  Height: 5' 6" (167.6 cm) (09/23/23 0241)  Weight - Scale: 102 kg (225 lb) (09/23/23 0241)  SpO2: 99 % (09/23/23 0241)    Physical Exam  Vitals and nursing note reviewed. Constitutional:       General: He is not in acute distress. Appearance: Normal appearance. He is normal weight. He is not ill-appearing or toxic-appearing. HENT:      Head: Normocephalic and atraumatic. Right Ear: External ear normal.      Left Ear: External ear normal.      Nose: Nose normal.      Mouth/Throat:      Mouth: Mucous membranes are moist.      Comments: Oral mucosa within upper and lower lip with some friability and sloughing due to braces causing irritation and poor hygiene, also noted to have R impacted wisdom tooth. No evidence of bony or dental injury  Eyes:      Conjunctiva/sclera: Conjunctivae normal.   Cardiovascular:      Rate and Rhythm: Normal rate and regular rhythm. Pulses: Normal pulses. Heart sounds: Normal heart sounds. No murmur heard. Pulmonary:      Effort: Pulmonary effort is normal. No respiratory distress. Breath sounds: Normal breath sounds. No stridor. No wheezing, rhonchi or rales. Abdominal:      General: Abdomen is flat. Bowel sounds are normal. There is no distension. Palpations: Abdomen is soft. There is no mass. Tenderness: There is no abdominal tenderness. There is no guarding or rebound. Hernia: No hernia is present. Musculoskeletal:      Cervical back: Normal range of motion. Right lower leg: No edema. Left lower leg: No edema. Skin:     General: Skin is warm and dry. Neurological:      Mental Status: He is alert. Mental status is at baseline. Comments: No gross cranial nerve deficits   Psychiatric:         Mood and Affect: Mood normal.         Thought Content:  Thought content normal.         Additional Data:     Lab Results:     Results from last 7 days   Lab Units 09/23/23  0615   WBC Thousand/uL 6.59   HEMOGLOBIN g/dL 13.1   HEMATOCRIT % 39.3   PLATELETS Thousands/uL 311   NEUTROS PCT % 39*   LYMPHS PCT % 41   MONOS PCT % 16*   EOS PCT % 3     Results from last 7 days   Lab Units 09/23/23  0615   SODIUM mmol/L 136   POTASSIUM mmol/L 3.5   CHLORIDE mmol/L 105   CO2 mmol/L 26   BUN mg/dL 9   CREATININE mg/dL 0.66   ANION GAP mmol/L 5   CALCIUM mg/dL 8.7   ALBUMIN g/dL 3.7   TOTAL BILIRUBIN mg/dL 0.22   ALK PHOS U/L 85   ALT U/L 25   AST U/L 23   GLUCOSE RANDOM mg/dL 132             Lab Results   Component Value Date/Time    HGBA1C 5.0 01/04/2023 07:53 AM    HGBA1C 5.3 01/05/2022 05:41 AM    HGBA1C 5.4 11/23/2021 05:40 AM               Imaging: I have personally reviewed pertinent reports. No orders to display       EKG, Pathology, and Other Studies Reviewed on Admission:   · EKG: see above    ** Please Note: This note has been constructed using a voice recognition system.  **

## 2023-09-23 NOTE — ASSESSMENT & PLAN NOTE
· Feels like "tooth is coming in", exam reveals impacted wisdom teeth  · Also with mucosal breakdown in setting of braces, can provide topical anaesthetic for tooth pain and salt water rinses for mucosa  · Advised follow up with [facility]

## 2023-09-23 NOTE — ED NOTES
Insurance Authorization for admission:   Phone call placed to [facility]. Phone number: [phone]. Spoke to [address] [place] [address].     5 days approved. Level of care: 201. Review on 9/27/2023. Authorization # [id number]. Eligibility Verification System checked - ([phone]).   Online system / automated system indicates: ACTIVE

## 2023-09-23 NOTE — TREATMENT TEAM
09/23/23 1000   Team Meeting   Meeting Type Daily Rounds   Team Members Present   Team Members Present Physician;Nurse   Physician Team Member [facility]   Nursing Team Member Romaine   Patient/Family Present   Patient Present No   Patient's Family Present No       AM rounds- new admit, 201 reports overdosed on fentanyl. Pt initially states was accidental however later said it was intentional. Elevated anxiety and depression. Non-compliant with medications. Pt arrived to unit, calm and cooperative. Slept well.      Readmit score- 28

## 2023-09-23 NOTE — ASSESSMENT & PLAN NOTE
• At this time, patient appears medically stable to continue inpatient psychiatric management. • Current labs, nursing notes and imaging reviewed.   o Pending Labs: vitamin D, B12, folate  • Recent EKG: NSR, HR 78, QTc 458  • Please contact SLIM for any further questions None

## 2023-09-23 NOTE — ED NOTES
Francheska Keita is a 26 y/o male, diagnosed with   Opiate abuse, episodic,  Bipolar 1 disorder, depressed, moderate, ADHD, Polysubstance dependence including opioid type drug with complication, continuous use, PTSD. Pt presented to ED via private transportation due to:  Patient reports to the ed to 201 himself. Patient reports overdosing last night intentionally. Patient was supposed to go to rehab however would like to go to inpatient mental health first.  Pt oriented x4. Pt calm and cooperative. This pt 2nd visit at ED today. Pt reports feeling depressed and suicidal. Pt states "yesterday I planned on killing myself by overdose, but today I did not plan it. Pt reports suicidal thoughts and intend. Pt states he cannot control his thoughts. Pt states "my mind needs help". Pt denies HI, self harming, hallucinations. No problem with appetite of sleep. Pt asking to sign self in to get help.

## 2023-09-23 NOTE — ASSESSMENT & PLAN NOTE
· Pt reports occasional meth and fentanyl use, last use the day of presentation to the ED   · Denies regular use  ·  on cessation

## 2023-09-24 LAB
BACTERIA UR QL AUTO: ABNORMAL /HPF
BILIRUB UR QL STRIP: NEGATIVE
CLARITY UR: CLEAR
COLOR UR: ABNORMAL
GLUCOSE UR STRIP-MCNC: NEGATIVE MG/DL
HGB UR QL STRIP.AUTO: NEGATIVE
KETONES UR STRIP-MCNC: NEGATIVE MG/DL
LEUKOCYTE ESTERASE UR QL STRIP: NEGATIVE
MUCOUS THREADS UR QL AUTO: ABNORMAL
NITRITE UR QL STRIP: NEGATIVE
NON-SQ EPI CELLS URNS QL MICRO: ABNORMAL /HPF
PH UR STRIP.AUTO: 6 [PH]
PROT UR STRIP-MCNC: NEGATIVE MG/DL
RBC #/AREA URNS AUTO: ABNORMAL /HPF
SP GR UR STRIP.AUTO: 1.01 (ref 1–1.03)
UROBILINOGEN UR STRIP-ACNC: <2 MG/DL
WBC #/AREA URNS AUTO: ABNORMAL /HPF

## 2023-09-24 PROCEDURE — 81001 URINALYSIS AUTO W/SCOPE: CPT | Performed by: PSYCHIATRY & NEUROLOGY

## 2023-09-24 PROCEDURE — 99233 SBSQ HOSP IP/OBS HIGH 50: CPT | Performed by: PSYCHIATRY & NEUROLOGY

## 2023-09-24 RX ADMIN — HYDROXYZINE HYDROCHLORIDE 100 MG: 50 TABLET, FILM COATED ORAL at 17:59

## 2023-09-24 RX ADMIN — BUSPIRONE HYDROCHLORIDE 5 MG: 5 TABLET ORAL at 21:29

## 2023-09-24 RX ADMIN — BUSPIRONE HYDROCHLORIDE 5 MG: 5 TABLET ORAL at 15:04

## 2023-09-24 RX ADMIN — BUSPIRONE HYDROCHLORIDE 5 MG: 5 TABLET ORAL at 08:46

## 2023-09-24 RX ADMIN — TRAZODONE HYDROCHLORIDE 50 MG: 50 TABLET ORAL at 21:31

## 2023-09-24 RX ADMIN — NICOTINE POLACRILEX 4 MG: 4 GUM, CHEWING BUCCAL at 17:26

## 2023-09-24 RX ADMIN — BENZOCAINE 1 APPLICATION: 220 GEL, DENTIFRICE DENTAL at 08:51

## 2023-09-24 RX ADMIN — HALOPERIDOL 5 MG: 5 TABLET ORAL at 17:51

## 2023-09-24 RX ADMIN — NICOTINE POLACRILEX 4 MG: 4 GUM, CHEWING BUCCAL at 11:29

## 2023-09-24 RX ADMIN — HYDROXYZINE HYDROCHLORIDE 100 MG: 50 TABLET, FILM COATED ORAL at 10:59

## 2023-09-24 RX ADMIN — Medication 6000 UNITS: at 08:48

## 2023-09-24 RX ADMIN — DIVALPROEX SODIUM 750 MG: 250 TABLET, FILM COATED, EXTENDED RELEASE ORAL at 21:29

## 2023-09-24 RX ADMIN — ESCITALOPRAM 5 MG: 5 TABLET, FILM COATED ORAL at 08:46

## 2023-09-24 NOTE — PROGRESS NOTES
Progress Note - Desire R [place] 25 y.o. male MRN: [id number]  Unit/Bed#: [facility] 204-02 Encounter: [id number]    Assessment/Plan   Principal Problem:    Bipolar 1 disorder, depressed, moderate ([address])  Active Problems:    Posttraumatic stress disorder    Anxiety    Polysubstance dependence including opioid type drug with complication, continuous use (HCC)    Cannabis use disorder, severe, dependence ([address])    Medical clearance for psychiatric admission    Tobacco use    Hypertriglyceridemia    Tooth pain      • Continue medications as noted below. • Depakote level and CMP ordered for 9/26/2023, to be taken prior to evening dose of Depakote  • Continue to promote patient participation in therapeutic milieu. • Continue medical management by primary team.  • Discharge disposition:  Patient reports increased anxiety, high energy, medications recently started, continues to require inpatient psychiatric hospitalization    Subjective: The patient was evaluated this morning for continuity of care and no acute distress noted throughout the evaluation. Over the past 24 hours staff noted the patient was mostly isolative to room, sleeping most of the time, denying symptoms. The patient was meal and medication compliant and received as needed trazodone for sleep last night. Today on exam the patient appeared anxious but was cooperative and reports "increased anxiety" without specific trigger. The patient reported good sleep, good appetite, and increased energy. He denied auditory or visual hallucinations, denied suicidal or homicidal ideation, denied paranoid delusions. The patient denied adverse effects of his medication and was in agreement with plan to continue current medications.     Current Medications:  Current Facility-Administered Medications   Medication Dose Route Frequency Provider Last Rate   • acetaminophen  650 mg Oral Q6H PRN Venita Nissen, MD     • acetaminophen  650 mg Oral Q4H PRN Kevin Mayorga MD     • acetaminophen  975 mg Oral Q6H PRN Kevin Mayorga MD     • aluminum-magnesium hydroxide-simethicone  30 mL Oral Q4H PRN Kevin Mayorga MD     • haloperidol lactate  2.5 mg Intramuscular Q4H PRN Max 4/day Kevin Mayorga MD      And   • LORazepam  1 mg Intramuscular Q4H PRN Max 4/day Kevin Mayorga MD      And   • benztropine  0.5 mg Intramuscular Q4H PRN Max 4/day Kevin Mayorga MD     • haloperidol lactate  5 mg Intramuscular Q4H PRN Max 4/day Kevin Mayorga MD      And   • LORazepam  2 mg Intramuscular Q4H PRN Max 4/day Kevin Mayorga MD      And   • benztropine  1 mg Intramuscular Q4H PRN Max 4/day Kevin Mayorga MD     • benztropine  1 mg Oral Q4H PRN Max 6/day Kevin Mayorga MD     • bisacodyl  10 mg Rectal Daily PRN Kevin Mayorga MD     • busPIRone  5 mg Oral TID Marta Alvarez DO     • cholecalciferol  6,000 Units Oral Daily Holmes Bosworth, PA-C     • hydrOXYzine HCL  50 mg Oral Q6H PRN Max 4/day Kevin Mayorga MD      Or   • diphenhydrAMINE  50 mg Intramuscular Q6H PRN Kevin Mayorga MD     • divalproex sodium  750 mg Oral HS Rishi Bucca, DO     • escitalopram  5 mg Oral Daily Rishi Bucca, DO     • haloperidol  1 mg Oral Q6H PRN Kevin Mayorga MD     • haloperidol  2.5 mg Oral Q4H PRN Max 4/day Kevin Mayorga MD     • haloperidol  5 mg Oral Q4H PRN Max 4/day Kevin Mayorga MD     • hydrOXYzine HCL  100 mg Oral Q6H PRN Max 4/day Kevin Mayorga MD      Or   • LORazepam  2 mg Intramuscular Q6H PRN Kevin Mayorga MD     • hydrOXYzine HCL  25 mg Oral Q6H PRN Max 4/day Kevin Mayorga MD     • nicotine polacrilex  4 mg Oral Q2H PRN Kevin Mayorga MD     • polyethylene glycol  17 g Oral Daily PRN Kevin Mayorga MD     • senna-docusate sodium  1 tablet Oral Daily PRN Kevin Mayorga MD     • traZODone  50 mg Oral HS NICK Mayorga MD         Behavioral Health Medications: all current active meds have been reviewed and continue current psychiatric medications. Vital signs in last 24 hours:  Temp:  [98.3 °F (36.8 °C)-100.9 °F (38.3 °C)] 98.3 °F (36.8 °C)  HR:  [76-85] 85  Resp:  [16-18] 18  BP: (130-151)/(68-96) 134/84    Laboratory results:    I have personally reviewed all pertinent laboratory/tests results.   Most Recent Labs:   Lab Results   Component Value Date    WBC 6.59 09/23/2023    RBC 4.59 09/23/2023    HGB 13.1 09/23/2023    HCT 39.3 09/23/2023     09/23/2023    RDW 12.7 09/23/2023    TOTANEUTABS 2.16 01/04/2023    NEUTROABS 2.59 09/23/2023    SODIUM 136 09/23/2023    K 3.5 09/23/2023     09/23/2023    CO2 26 09/23/2023    BUN 9 09/23/2023    CREATININE 0.66 09/23/2023    GLUC 132 09/23/2023    GLUF 132 (H) 09/23/2023    CALCIUM 8.7 09/23/2023    AST 23 09/23/2023    ALT 25 09/23/2023    ALKPHOS 85 09/23/2023    TP 6.7 09/23/2023    ALB 3.7 09/23/2023    TBILI 0.22 09/23/2023    CHOLESTEROL 88 09/23/2023    HDL 21 (L) 09/23/2023    TRIG 111 09/23/2023    LDLCALC 45 09/23/2023    NONHDLC 67 09/23/2023    VALPROICTOT 28 (L) 01/08/2023    PTF5AHZKOSGG 0.460 09/23/2023    RPR Non-Reactive 01/04/2023    HGBA1C 5.0 01/04/2023    EAG 97 01/04/2023       Psychiatric Review of Systems:  Behavior over the last 24 hours:  unchanged  Sleep: normal  Appetite: normal  Medication side effects: No   ROS: no complaints, all other systems are negative    Mental Status Evaluation:    Appearance:  age appropriate, casually dressed   Behavior:  cooperative   Speech:  normal rate, normal volume, normal pitch   Mood:  anxious   Affect:  constricted   Thought Process:  linear   Associations: intact associations   Thought Content:  no overt delusions   Perceptual Disturbances: no auditory hallucinations, no visual hallucinations, does not appear responding to internal stimuli   Risk Potential: Suicidal ideation - None  Homicidal ideation - None  Potential for aggression - No   Sensorium:  oriented to person, place and time/date   Memory:  recent and remote memory grossly intact   Consciousness:  alert and awake   Attention/Concentration: attention span and concentration appear shorter than expected for age   Insight:  limited   Judgment: limited   Gait/Station: normal gait/station, normal balance   Motor Activity: no abnormal movements         Progress Toward Goals: Unchanged    Recommended Treatment:   See above for assessment and plan. Risks, benefits and possible side effects of Medications:   Risks, benefits, and possible side effects of medications explained to patient and patient verbalizes understanding. This note has been constructed using a voice recognition system. There may be translation, syntax, or grammatical errors. If you have any questions, please contact the dictating provider.     Shantell Munguia D.O.

## 2023-09-24 NOTE — PLAN OF CARE
Problem: Ineffective Coping  Goal: Participates in unit activities  Description: Interventions:  - Provide therapeutic environment   - Provide required programming   - Redirect inappropriate behaviors   Outcome: Not Progressing     Problem: Depression  Goal: Refrain from harming self  Description: Interventions:  - Monitor patient closely, per order   - Supervise medication ingestion, monitor effects and side effects   Outcome: Progressing  Goal: Refrain from isolation  Description: Interventions:  - Develop a trusting relationship   - Encourage socialization   Outcome: Not Progressing  Goal: Attend and participate in unit activities, including therapeutic, recreational, and educational groups  Description: Interventions:  - Provide therapeutic and educational activities daily, encourage attendance and participation, and document same in the medical record   Outcome: Not Progressing  Goal: Complete daily ADLs, including personal hygiene independently, as able  Description: Interventions:  - Observe, teach, and assist patient with ADLS  -  Monitor and promote a balance of rest/activity, with adequate nutrition and elimination   Outcome: Not Progressing

## 2023-09-24 NOTE — NURSING NOTE
Pt remains pleasant in conversation. Denies SI/HI, reports having fluctuating anxiety. Pt remains minimal in conversation however cooperative. Declined attendance to group, has been sleeping at times. Denies any questions regarding scheduled medications.

## 2023-09-24 NOTE — TREATMENT TEAM
09/24/23 1000   Team Meeting   Meeting Type Daily Rounds   Team Members Present   Team Members Present Physician;Nurse   Physician Team Member [address] Team Member Romaine   Patient/Family Present   Patient Present No   Patient's Family Present No     AM rounds- withdrawn to room, denies SI/HI, reports elevated anxiety. Declined groups. Minimal in conversation. Slept well.

## 2023-09-24 NOTE — NURSING NOTE
Patient slept for most of the shift, awake to eat dinner and take medication. Scant conversation, but stated he is "doing fine". Denies SI / HI / A/VH.

## 2023-09-24 NOTE — PLAN OF CARE
Problem: Ineffective Coping  Goal: Identifies ineffective coping skills  Outcome: Progressing  Goal: Identifies healthy coping skills  Outcome: Progressing  Goal: Demonstrates healthy coping skills  Outcome: Progressing  Goal: Patient/Family participate in treatment and DC plans  Description: Interventions:  - Provide therapeutic environment  Outcome: Progressing  Goal: Patient/Family verbalizes awareness of resources  Outcome: Progressing     Problem: Depression  Goal: Treatment Goal: Demonstrate behavioral control of depressive symptoms, verbalize feelings of improved mood/affect, and adopt new coping skills prior to discharge  Outcome: Progressing  Goal: Verbalize thoughts and feelings  Description: Interventions:  - Assess and re-assess patient's level of risk   - Engage patient in 1:1 interactions, daily, for a minimum of 15 minutes   - Encourage patient to express feelings, fears, frustrations, hopes   Outcome: Progressing  Goal: Refrain from harming self  Description: Interventions:  - Monitor patient closely, per order   - Supervise medication ingestion, monitor effects and side effects   Outcome: Progressing  Goal: Refrain from self-neglect  Outcome: Progressing  Goal: Attend and participate in unit activities, including therapeutic, recreational, and educational groups  Description: Interventions:  - Provide therapeutic and educational activities daily, encourage attendance and participation, and document same in the medical record   Outcome: Progressing  Goal: Complete daily ADLs, including personal hygiene independently, as able  Description: Interventions:  - Observe, teach, and assist patient with ADLS  -  Monitor and promote a balance of rest/activity, with adequate nutrition and elimination   Outcome: Progressing     Problem: Ineffective Coping  Goal: Understands least restrictive measures  Description: Interventions:  - Utilize least restrictive behavior  Outcome: Completed  Goal: Free from restraint events  Description: - Utilize least restrictive measures   - Provide behavioral interventions   - Redirect inappropriate behaviors   Outcome: Completed     Problem: Depression  Goal: Refrain from isolation  Description: Interventions:  - Develop a trusting relationship   - Encourage socialization   Outcome: Completed

## 2023-09-24 NOTE — NURSING NOTE
Pt denies SI/HI, reports increased agitation and anxiety. Pt states not wanting to speak to staff about reasons for agitation. Observed restless in room, pacing halls and slammed door. Pt received prn medications. States he wants to rest in room. Encouraged pt to notify staff if continued anxiety/agitation remains or wanting to speak with staff. Pt agreeable.

## 2023-09-24 NOTE — NURSING NOTE
PRN Atarax 100 mg po and Haldol 5 mg po effective. Pt expressed feeling frustrated and requested PRNs. Upon follow up, pt resting in bed.

## 2023-09-24 NOTE — NURSING NOTE
Pt requesting PRN for anxiety. Received Atarax 100 mg po. Upon follow up, pt resting in bed after lunch. Medication effective.

## 2023-09-25 PROCEDURE — 99232 SBSQ HOSP IP/OBS MODERATE 35: CPT | Performed by: PSYCHIATRY & NEUROLOGY

## 2023-09-25 RX ORDER — ESCITALOPRAM OXALATE 10 MG/1
10 TABLET ORAL DAILY
Status: DISCONTINUED | OUTPATIENT
Start: 2023-09-26 | End: 2023-09-26

## 2023-09-25 RX ORDER — BUSPIRONE HYDROCHLORIDE 10 MG/1
10 TABLET ORAL 3 TIMES DAILY
Status: DISCONTINUED | OUTPATIENT
Start: 2023-09-25 | End: 2023-09-28 | Stop reason: HOSPADM

## 2023-09-25 RX ADMIN — BUSPIRONE HYDROCHLORIDE 10 MG: 10 TABLET ORAL at 15:55

## 2023-09-25 RX ADMIN — HYDROXYZINE HYDROCHLORIDE 100 MG: 50 TABLET, FILM COATED ORAL at 18:28

## 2023-09-25 RX ADMIN — HALOPERIDOL 5 MG: 5 TABLET ORAL at 18:28

## 2023-09-25 RX ADMIN — BUSPIRONE HYDROCHLORIDE 10 MG: 10 TABLET ORAL at 21:33

## 2023-09-25 RX ADMIN — Medication 6000 UNITS: at 08:52

## 2023-09-25 RX ADMIN — HALOPERIDOL 5 MG: 5 TABLET ORAL at 10:08

## 2023-09-25 RX ADMIN — DIVALPROEX SODIUM 750 MG: 250 TABLET, FILM COATED, EXTENDED RELEASE ORAL at 21:33

## 2023-09-25 RX ADMIN — HYDROXYZINE HYDROCHLORIDE 100 MG: 50 TABLET, FILM COATED ORAL at 10:08

## 2023-09-25 RX ADMIN — BUSPIRONE HYDROCHLORIDE 5 MG: 5 TABLET ORAL at 08:52

## 2023-09-25 RX ADMIN — ESCITALOPRAM 5 MG: 5 TABLET, FILM COATED ORAL at 08:52

## 2023-09-25 NOTE — NURSING NOTE
Pt napping throughout the day. Irritable after phone calls. Laying on bed, grunting and kicking feet. Not interested in expressing feeling. Pt returns to sleep shortly after. Fluctuating anxiety and depression. Denies SI/HI or hallucinations.

## 2023-09-25 NOTE — NURSING NOTE
Patient belongings dropped off in locker:     In laundry ba red hoodie sweatshirt  3 pairs shorts (strings)  2 wendys shirts  3 white shirts  1 blue tshirt  1 red sweatshirt  1 black sweatshirt  3 pairs sweatpants  2 pairs shorts  2 pairs joggers  1 pair purple jeans  2 pairs socks  6 pairs boxers                  At bedside:    1 brush  1 shampoo  1 conditioner  3 pairs boxers  1 black adidas pull-over  3 pairs socks  2 pairs joggers  2 pairs shorts  1 wendys shirt  2 eagles shirts

## 2023-09-25 NOTE — PROGRESS NOTES
New admission - 201 from [address] [facility] ED. Post OD on Fentanyl. Pt focused on getting Suboxone restarted. Wants Rehab. Reported that he stopped his psych meds a few months ago. Irritable at times. Slept overnight. DC: TBD    09/25/23 0752   Team Meeting   Meeting Type Daily Rounds   Team Members Present   Team Members Present Physician;Nurse;; Other (Discipline and Name)   Physician Team Member Dr. Irene Crooks / Dr. Yakelin Morse / Markus Verduzco / Keri Mckeon Team Member Aleyda Ball / [facility] Management Team Member Matt Hunt / [place]   Other (Discipline and Name) Sigmund - Group Facilitator   Patient/Family Present   Patient Present No   Patient's Family Present No

## 2023-09-25 NOTE — NURSING NOTE
Pt compliant with morning medication. Denies SI/HI or hallucination. After breakfast, pt reported elevated anxiety and agitation. Could not pinpoint the cause/trigger. Did not want to elaborate. Immediately after getting Haldol 5 mg and Atarax 100 mg po. Pt fell asleep and it resting comfortably in bed. Medication effective.

## 2023-09-25 NOTE — PLAN OF CARE
Problem: Ineffective Coping  Goal: Identifies ineffective coping skills  Outcome: Progressing  Goal: Identifies healthy coping skills  Outcome: Progressing  Goal: Demonstrates healthy coping skills  Outcome: Progressing  Goal: Patient/Family participate in treatment and DC plans  Description: Interventions:  - Provide therapeutic environment  Outcome: Progressing  Goal: Patient/Family verbalizes awareness of resources  Outcome: Progressing     Problem: Depression  Goal: Treatment Goal: Demonstrate behavioral control of depressive symptoms, verbalize feelings of improved mood/affect, and adopt new coping skills prior to discharge  Outcome: Progressing  Goal: Verbalize thoughts and feelings  Description: Interventions:  - Assess and re-assess patient's level of risk   - Engage patient in 1:1 interactions, daily, for a minimum of 15 minutes   - Encourage patient to express feelings, fears, frustrations, hopes   Outcome: Progressing  Goal: Refrain from harming self  Description: Interventions:  - Monitor patient closely, per order   - Supervise medication ingestion, monitor effects and side effects   Outcome: Progressing  Goal: Refrain from self-neglect  Outcome: Progressing  Goal: Attend and participate in unit activities, including therapeutic, recreational, and educational groups  Description: Interventions:  - Provide therapeutic and educational activities daily, encourage attendance and participation, and document same in the medical record   Outcome: Progressing  Goal: Complete daily ADLs, including personal hygiene independently, as able  Description: Interventions:  - Observe, teach, and assist patient with ADLS  -  Monitor and promote a balance of rest/activity, with adequate nutrition and elimination   Outcome: Progressing

## 2023-09-25 NOTE — NURSING NOTE
F/U to Trazodone administration at 2131 hrs for sleep aid. Patient appears to be sleeping, no distress observed.

## 2023-09-25 NOTE — CASE MANAGEMENT
INTAKE    Readmit score:  Red 28   Confirmed Address   [place]     Resides in the home with/can return?:    Pt was living with family but would like to go to [facility] upon dc from [facility]. Pt reported he has been to [address] in the past and is open to returning to those if accepted. Confirmed Phone Number: [phone]   Commitment Status/Admitted from: [address] ED    Presenting C/O:             ED Note   "  Suicidal        Patient reports to the ed to 201 himself. Patient reports overdosing last night intentionally. Patient was supposed to go to rehab however would like to go to inpatient mental health first        Patient reports he was at the tail end of a 3-day meth savage when he decided to take enough fentanyl to kill himself. Stated he has not been using opioids recently with the exception of this episode last night. He was found by his girlfriend who brought him to the hospital.  He did not disclose that he was intentionally trying to harm himself. He was discharged home.   He reports he wants to come in as a 201 for thoughts of killing himself."      Pt is known to [facility] being admitted on:   11/22/21-12/1/21  12/15/21-12/23/21 1/4/22-1/11/22  1/3/23-1/11/23        ACT/ICM/CPS/WRT/SC: N/A   PCP:    Hansa Lopez DO  [phone]   Work/Income:      Unemployed   Legal/  Probation/[place]ce Ofc:    Unknown, pt did have probation/charges in 10/2022   Access to Firearms:    Denies   Referrals Needed: [facility]patient [facility]   Transport at Discharge:    Pt will need transportation   IMM:   Ramy Text ABEL: N/A   Emergency Contact:     Kashif Contreras (Mother) [phone]   Malcom Hamilton (Sister) [phone]   ROIs obtained:       Verbal ABEL for [facility]:     [facility]   Audit:        PAWSS:  BAT:  UDS: + for Meth, Benzos, THC

## 2023-09-25 NOTE — PROGRESS NOTES
Progress Note - [address] 25 y.o. male MRN: [id number]  Unit/Bed#: [facility] 204-02 Encounter: [id number]    Assessment:  Principal Problem:    Bipolar 1 disorder, depressed, moderate ([address])  Active Problems:    Medical clearance for psychiatric admission    Tobacco use    Cannabis use disorder, severe, dependence ([address])    Polysubstance dependence including opioid type drug with complication, continuous use (HCC)    Posttraumatic stress disorder    Anxiety    Hypertriglyceridemia    Tooth pain      Plan:  --Increase Lexapro to 10mg PO QD  --Increase Bupar to 10mg PO TID  --VPA level to be drawn  --Continue with psychiatric hospitalization  --Continue with individual, group, and milieu therapy  --Continue the following medications:  Current Facility-Administered Medications   Medication Dose Route Frequency   • acetaminophen (TYLENOL) tablet 650 mg  650 mg Oral Q6H PRN   • acetaminophen (TYLENOL) tablet 650 mg  650 mg Oral Q4H PRN   • acetaminophen (TYLENOL) tablet 975 mg  975 mg Oral Q6H PRN   • aluminum-magnesium hydroxide-simethicone (MAALOX) oral suspension 30 mL  30 mL Oral Q4H PRN   • haloperidol lactate (HALDOL) injection 2.5 mg  2.5 mg Intramuscular Q4H PRN Max 4/day    And   • LORazepam (ATIVAN) injection 1 mg  1 mg Intramuscular Q4H PRN Max 4/day    And   • benztropine (COGENTIN) injection 0.5 mg  0.5 mg Intramuscular Q4H PRN Max 4/day   • haloperidol lactate (HALDOL) injection 5 mg  5 mg Intramuscular Q4H PRN Max 4/day    And   • LORazepam (ATIVAN) injection 2 mg  2 mg Intramuscular Q4H PRN Max 4/day    And   • benztropine (COGENTIN) injection 1 mg  1 mg Intramuscular Q4H PRN Max 4/day   • benztropine (COGENTIN) tablet 1 mg  1 mg Oral Q4H PRN Max 6/day   • bisacodyl (DULCOLAX) rectal suppository 10 mg  10 mg Rectal Daily PRN   • busPIRone (BUSPAR) tablet 10 mg  10 mg Oral TID   • cholecalciferol (VITAMIN D3) tablet 6,000 Units  6,000 Units Oral Daily   • hydrOXYzine HCL (ATARAX) tablet 50 mg  50 mg Oral Q6H PRN Max 4/day    Or   • diphenhydrAMINE (BENADRYL) injection 50 mg  50 mg Intramuscular Q6H PRN   • divalproex sodium (DEPAKOTE ER) 24 hr tablet 750 mg  750 mg Oral HS   • [START ON 9/26/2023] escitalopram (LEXAPRO) tablet 10 mg  10 mg Oral Daily   • haloperidol (HALDOL) tablet 1 mg  1 mg Oral Q6H PRN   • haloperidol (HALDOL) tablet 2.5 mg  2.5 mg Oral Q4H PRN Max 4/day   • haloperidol (HALDOL) tablet 5 mg  5 mg Oral Q4H PRN Max 4/day   • hydrOXYzine HCL (ATARAX) tablet 100 mg  100 mg Oral Q6H PRN Max 4/day    Or   • LORazepam (ATIVAN) injection 2 mg  2 mg Intramuscular Q6H PRN   • hydrOXYzine HCL (ATARAX) tablet 25 mg  25 mg Oral Q6H PRN Max 4/day   • nicotine polacrilex (NICORETTE) gum 4 mg  4 mg Oral Q2H PRN   • polyethylene glycol (MIRALAX) packet 17 g  17 g Oral Daily PRN   • senna-docusate sodium (SENOKOT S) 8.6-50 mg per tablet 1 tablet  1 tablet Oral Daily PRN   • traZODone (DESYREL) tablet 50 mg  50 mg Oral HS PRN       Subjective: Patient was seen for continuation of care. Chart was reviewed and discussed with treatment team.     No acute behavioral events over the past 24 hours. Today, patient was seen and examined at bedside for continuation of care. Patient reports feeling tired and depressed. He stated that he was not taking his psychotropics in the outpatient setting, but that when he takes his medications - reported to be lexapro, depakote, seroquel, buspar, clonidine - they work for him. We discussed plan to slowly titrate medications to achieve therapeutic effect, and the patient verbalized understanding of the plan. Patient denied adverse effects to their psychiatric medication regimen. Patient denied other new or worsening psychiatric symptoms/complaints at this time. Discussed the importance of continuing to take medications as prescribed, as well as the importance of continuing to attend groups on the unit.      Psychiatric Review of Systems:  Medication adverse effects: none  Sleep: unchanged  Appetite: unchanged  Behavior over the past 24 hours: as per above    Vitals:  Vitals:    09/25/23 0751   BP: 121/68   Pulse: 63   Resp: 17   Temp: 97.6 °F (36.4 °C)   SpO2:        Laboratory results:    I have personally reviewed all pertinent laboratory/tests results  Recent Results (from the past 48 hour(s))   Urinalysis    Collection Time: 09/24/23  8:59 AM   Result Value Ref Range    Color, UA Light Yellow     Clarity, UA Clear     Specific Gravity, UA 1.010 1.005 - 1.030    pH, UA 6.0 4.5, 5.0, 5.5, 6.0, 6.5, 7.0, 7.5, 8.0    Leukocytes, UA Negative Negative    Nitrite, UA Negative Negative    Protein, UA Negative Negative mg/dl    Glucose, UA Negative Negative mg/dl    Ketones, UA Negative Negative mg/dl    Urobilinogen, UA <2.0 <2.0 mg/dl mg/dl    Bilirubin, UA Negative Negative    Occult Blood, UA Negative Negative    RBC, UA None Seen None Seen, 2-4 /hpf    WBC, UA 0-1 (A) None Seen, 2-4, 5-60 /hpf    Epithelial Cells Occasional None Seen, Occasional /hpf    Bacteria, UA Occasional None Seen, Occasional /hpf    MUCUS THREADS None Seen None Seen        Current Medications:  Current medications as per above. All medications have been reviewed. Risks, benefits, alternatives, and possible side effects of patient's psychiatric medications were discussed with patient. Mental Status Evaluation:  Appearance: casually dressed, consistent with stated age  Motor: +psychomotor retardation, no gait abnormalities  Behavior: cooperative, answers questions appropriately  Speech: soft, normal rhythm  Mood: "tired"  Affect: constricted, depressed-appearing  Thought Process: linear and goal-oriented  Thought Content: denies auditory hallucinations, denies visual hallucinations, denies delusions  Risk Potential: denies suicidal ideation, plan, or intent.  Denies homicidal ideation  Sensorium: Oriented to person, place, time, and situation  Cognition: cognitive ability appears intact but was not quantitatively tested  Consciousness: alert and awake  Attention: intact, able to focus without difficulty  Insight: fair  Judgement: limited        Progress Toward Goals & Illness Status: Patient is not at goal. They are not yet ready for discharge. The patient's condition currently requires active psychopharmacological medication management, interdisciplinary coordination with case management, and the utilization of adjunctive milieu and group therapy to augment psychopharmacological efficacy. The patient's risk of morbidity, and progression or decompensation of psychiatric disease, is higher without this current treatment. This note has been constructed using a voice recognition system. There may be translation, syntax, or grammatical errors. If you have any questions, please contact the dictating provider.

## 2023-09-25 NOTE — PROGRESS NOTES
Diagnosis of Bipolar 1 disorder, depressed, moderate reviewed. Short term goals for decrease in depressive symptoms, decrease in suicidal thoughts, decrease in self abusive behaviors, ability to stay safe on the unit, ability to stay free of restraints, improvement in ability to express basic needs discussed. All present parties in agreement and treatment plan signed.     09/26/23 1250   Team Meeting   Meeting Type Tx Team Meeting   Team Members Present   Team Members Present Physician;Nurse;   Physician Team Member Dr. Saqib Arzola Team Member [id number] Management Team Member Tomasa   Patient/Family Present   Patient Present No    Patient's Family Present No

## 2023-09-26 LAB — GLUCOSE SERPL-MCNC: 250 MG/DL (ref 65–140)

## 2023-09-26 PROCEDURE — 99232 SBSQ HOSP IP/OBS MODERATE 35: CPT | Performed by: PSYCHIATRY & NEUROLOGY

## 2023-09-26 RX ORDER — LORAZEPAM 1 MG/1
1 TABLET ORAL EVERY 8 HOURS PRN
Status: DISCONTINUED | OUTPATIENT
Start: 2023-09-26 | End: 2023-09-28 | Stop reason: HOSPADM

## 2023-09-26 RX ADMIN — Medication 6000 UNITS: at 09:08

## 2023-09-26 RX ADMIN — DIVALPROEX SODIUM 750 MG: 250 TABLET, FILM COATED, EXTENDED RELEASE ORAL at 22:00

## 2023-09-26 RX ADMIN — LORAZEPAM 1 MG: 1 TABLET ORAL at 17:37

## 2023-09-26 RX ADMIN — HALOPERIDOL 5 MG: 5 TABLET ORAL at 19:03

## 2023-09-26 RX ADMIN — NICOTINE POLACRILEX 4 MG: 4 GUM, CHEWING BUCCAL at 09:13

## 2023-09-26 RX ADMIN — TRAZODONE HYDROCHLORIDE 50 MG: 50 TABLET ORAL at 22:01

## 2023-09-26 RX ADMIN — BUSPIRONE HYDROCHLORIDE 10 MG: 10 TABLET ORAL at 15:33

## 2023-09-26 RX ADMIN — LORAZEPAM 1 MG: 1 TABLET ORAL at 09:38

## 2023-09-26 RX ADMIN — ACETAMINOPHEN 650 MG: 325 TABLET, FILM COATED ORAL at 18:14

## 2023-09-26 RX ADMIN — BUSPIRONE HYDROCHLORIDE 10 MG: 10 TABLET ORAL at 09:07

## 2023-09-26 RX ADMIN — BUSPIRONE HYDROCHLORIDE 10 MG: 10 TABLET ORAL at 22:00

## 2023-09-26 RX ADMIN — NICOTINE POLACRILEX 4 MG: 4 GUM, CHEWING BUCCAL at 16:27

## 2023-09-26 RX ADMIN — HYDROXYZINE HYDROCHLORIDE 50 MG: 50 TABLET, FILM COATED ORAL at 19:03

## 2023-09-26 RX ADMIN — NICOTINE POLACRILEX 4 MG: 4 GUM, CHEWING BUCCAL at 14:03

## 2023-09-26 NOTE — NURSING NOTE
Patient resting in bed at time of interaction. Denies SI, HI and AVH. Reports no present concerns, remains cooperative and pleasant.

## 2023-09-26 NOTE — PROGRESS NOTES
Progress Note - [address] 25 y.o. male MRN: [id number]  Unit/Bed#: [facility] 204-02 Encounter: [id number]    Assessment:  Principal Problem:    Bipolar 1 disorder, depressed, moderate ([address])  Active Problems:    Medical clearance for psychiatric admission    Tobacco use    Cannabis use disorder, severe, dependence ([address])    Polysubstance dependence including opioid type drug with complication, continuous use (HCC)    Posttraumatic stress disorder    Anxiety    Hypertriglyceridemia    Tooth pain      Plan:  --DC Lexapro; start Ativan 1mg PO Q8 PRN for severe anxiety  --Continue with psychiatric hospitalization  --Continue with individual, group, and milieu therapy  --Continue the following medications:  Current Facility-Administered Medications   Medication Dose Route Frequency   • acetaminophen (TYLENOL) tablet 650 mg  650 mg Oral Q6H PRN   • acetaminophen (TYLENOL) tablet 650 mg  650 mg Oral Q4H PRN   • acetaminophen (TYLENOL) tablet 975 mg  975 mg Oral Q6H PRN   • aluminum-magnesium hydroxide-simethicone (MAALOX) oral suspension 30 mL  30 mL Oral Q4H PRN   • haloperidol lactate (HALDOL) injection 2.5 mg  2.5 mg Intramuscular Q4H PRN Max 4/day    And   • LORazepam (ATIVAN) injection 1 mg  1 mg Intramuscular Q4H PRN Max 4/day    And   • benztropine (COGENTIN) injection 0.5 mg  0.5 mg Intramuscular Q4H PRN Max 4/day   • haloperidol lactate (HALDOL) injection 5 mg  5 mg Intramuscular Q4H PRN Max 4/day    And   • LORazepam (ATIVAN) injection 2 mg  2 mg Intramuscular Q4H PRN Max 4/day    And   • benztropine (COGENTIN) injection 1 mg  1 mg Intramuscular Q4H PRN Max 4/day   • benztropine (COGENTIN) tablet 1 mg  1 mg Oral Q4H PRN Max 6/day   • bisacodyl (DULCOLAX) rectal suppository 10 mg  10 mg Rectal Daily PRN   • busPIRone (BUSPAR) tablet 10 mg  10 mg Oral TID   • cholecalciferol (VITAMIN D3) tablet 6,000 Units  6,000 Units Oral Daily   • hydrOXYzine HCL (ATARAX) tablet 50 mg  50 mg Oral Q6H PRN Max 4/day    Or   • diphenhydrAMINE (BENADRYL) injection 50 mg  50 mg Intramuscular Q6H PRN   • divalproex sodium (DEPAKOTE ER) 24 hr tablet 750 mg  750 mg Oral HS   • haloperidol (HALDOL) tablet 1 mg  1 mg Oral Q6H PRN   • haloperidol (HALDOL) tablet 2.5 mg  2.5 mg Oral Q4H PRN Max 4/day   • haloperidol (HALDOL) tablet 5 mg  5 mg Oral Q4H PRN Max 4/day   • hydrOXYzine HCL (ATARAX) tablet 100 mg  100 mg Oral Q6H PRN Max 4/day    Or   • LORazepam (ATIVAN) injection 2 mg  2 mg Intramuscular Q6H PRN   • hydrOXYzine HCL (ATARAX) tablet 25 mg  25 mg Oral Q6H PRN Max 4/day   • nicotine polacrilex (NICORETTE) gum 4 mg  4 mg Oral Q2H PRN   • polyethylene glycol (MIRALAX) packet 17 g  17 g Oral Daily PRN   • senna-docusate sodium (SENOKOT S) 8.6-50 mg per tablet 1 tablet  1 tablet Oral Daily PRN   • traZODone (DESYREL) tablet 50 mg  50 mg Oral HS PRN       Subjective: Patient was seen for continuation of care. Chart was reviewed and discussed with treatment team.     Over the past 24 hrs, patient received Haldol and Atarax twice each PRN. Today, patient was seen and examined at bedside for continuation of care. Today, patient is c/o continued anxiety. He is Ativan-seeking and we discussed using a new PRN of Ativan sparingly, if needed, for severe anxiety. He says he has felt restless and had racing thoughts since yesterday, which coincides with an increase in his Lexapro dose. We discussed that in light of this, will DC lexapro. I explained that in the setting of BPAD symptomatology, Lexapro should be cautiously used. The patient understood. Patient denied adverse effects to their psychiatric medication regimen. Patient denied other new or worsening psychiatric symptoms/complaints at this time. Discussed the importance of continuing to take medications as prescribed, as well as the importance of continuing to attend groups on the unit.      Psychiatric Review of Systems:  Medication adverse effects: none  Sleep: unchanged  Appetite: unchanged  Behavior over the past 24 hours: as per above    Vitals:  Vitals:    09/26/23 0751   BP: 122/66   Pulse: 62   Resp: 17   Temp: (!) 97.2 °F (36.2 °C)   SpO2:        Laboratory results:    I have personally reviewed all pertinent laboratory/tests results  Recent Results (from the past 48 hour(s))   Urinalysis    Collection Time: 09/24/23  8:59 AM   Result Value Ref Range    Color, UA Light Yellow     Clarity, UA Clear     Specific Gravity, UA 1.010 1.005 - 1.030    pH, UA 6.0 4.5, 5.0, 5.5, 6.0, 6.5, 7.0, 7.5, 8.0    Leukocytes, UA Negative Negative    Nitrite, UA Negative Negative    Protein, UA Negative Negative mg/dl    Glucose, UA Negative Negative mg/dl    Ketones, UA Negative Negative mg/dl    Urobilinogen, UA <2.0 <2.0 mg/dl mg/dl    Bilirubin, UA Negative Negative    Occult Blood, UA Negative Negative    RBC, UA None Seen None Seen, 2-4 /hpf    WBC, UA 0-1 (A) None Seen, 2-4, 5-60 /hpf    Epithelial Cells Occasional None Seen, Occasional /hpf    Bacteria, UA Occasional None Seen, Occasional /hpf    MUCUS THREADS None Seen None Seen        Current Medications:  Current medications as per above. All medications have been reviewed. Risks, benefits, alternatives, and possible side effects of patient's psychiatric medications were discussed with patient. Mental Status Evaluation:  Appearance: moderately kempt  Motor: +psychomotor agitation  Behavior: cooperative, pleasant  Speech: normal rate, rhythm, and volume  Mood: "anxious"  Affect: mood-congruent, anxious appearing  Thought Process: organized and linear  Thought Content: denies auditory hallucinations, denies visual hallucinations, denies delusions  Risk Potential: denies suicidal ideation, plan, or intent.  Denies homicidal ideation  Sensorium: Oriented to person, place, time, and situation  Cognition: cognitive ability appears intact but was not quantitatively tested  Consciousness: alert and awake  Attention: currently intact  Insight: fair  Judgement: fair      Progress Toward Goals & Illness Status: Patient is not at goal. They are not yet ready for discharge. The patient's condition currently requires active psychopharmacological medication management, interdisciplinary coordination with case management, and the utilization of adjunctive milieu and group therapy to augment psychopharmacological efficacy. The patient's risk of morbidity, and progression or decompensation of psychiatric disease, is higher without this current treatment. This note has been constructed using a voice recognition system. There may be translation, syntax, or grammatical errors. If you have any questions, please contact the dictating provider.

## 2023-09-26 NOTE — NURSING NOTE
Patient observed resting with eyes closed intermittently, slept for brief period. Patient still resting now. Observational rounds maintained for promotion of patient safety.

## 2023-09-26 NOTE — PROGRESS NOTES
Pt slept most of the day, woke up for medication and went back to sleep. DC: TBD    09/26/23 0754   Team Meeting   Meeting Type Daily Rounds   Team Members Present   Team Members Present Physician;Nurse; Other (Discipline and Name);    Physician Team Member Dr. Cheryl Camarena / Dr. Bree Salinas / Lawrence Chacon / [address] [place] [address] Team Member Farzana Guillen / Aura Lopez / [facility] Management Team Member Maday Ramirez / Naomy Finch / Helena Martínez / Siddhartha Mahmood   Other (Discipline and Name) Will - Group Facilitator / Aneudy Arnett - Art Therapy / Brett Villasenor - Pharmacist   Patient/Family Present   Patient Present No   Patient's Family Present No

## 2023-09-26 NOTE — PLAN OF CARE
Problem: Depression  Goal: Treatment Goal: Demonstrate behavioral control of depressive symptoms, verbalize feelings of improved mood/affect, and adopt new coping skills prior to discharge  Outcome: Progressing  Goal: Verbalize thoughts and feelings  Description: Interventions:  - Assess and re-assess patient's level of risk   - Engage patient in 1:1 interactions, daily, for a minimum of 15 minutes   - Encourage patient to express feelings, fears, frustrations, hopes   Outcome: Progressing  Goal: Refrain from harming self  Description: Interventions:  - Monitor patient closely, per order   - Supervise medication ingestion, monitor effects and side effects   Outcome: Progressing  Goal: Refrain from self-neglect  Outcome: Progressing  Goal: Attend and participate in unit activities, including therapeutic, recreational, and educational groups  Description: Interventions:  - Provide therapeutic and educational activities daily, encourage attendance and participation, and document same in the medical record   Outcome: Progressing  Goal: Complete daily ADLs, including personal hygiene independently, as able  Description: Interventions:  - Observe, teach, and assist patient with ADLS  -  Monitor and promote a balance of rest/activity, with adequate nutrition and elimination   Outcome: Progressing

## 2023-09-26 NOTE — NURSING NOTE
Patient requested and received Haldol 5 mg po for agitiation and Atarax 100 mg po for anxiety at [facility] [address] [place]. PRNs were effective. Patient observed sleeping.

## 2023-09-26 NOTE — NURSING NOTE
Patient frequently at nurses station with many requests, reports being bored and is requesting more games on the unit. Pt advised to attend groups. Pt signed 72 hr notice today, 9/26 at [id number]. He denies SI, HI and AVH and reports feeling ready for discharge. RN asked pt if he was sure about 72hr notice due to his request from CM regarding rehab, he informed RN that he can get himself to rehab on his own when he discharges.

## 2023-09-27 PROCEDURE — 99232 SBSQ HOSP IP/OBS MODERATE 35: CPT | Performed by: PSYCHIATRY & NEUROLOGY

## 2023-09-27 RX ORDER — ONDANSETRON 4 MG/1
4 TABLET, ORALLY DISINTEGRATING ORAL EVERY 6 HOURS PRN
Status: DISCONTINUED | OUTPATIENT
Start: 2023-09-27 | End: 2023-09-28 | Stop reason: HOSPADM

## 2023-09-27 RX ADMIN — ONDANSETRON 4 MG: 4 TABLET, ORALLY DISINTEGRATING ORAL at 12:26

## 2023-09-27 RX ADMIN — ACETAMINOPHEN 975 MG: 325 TABLET, FILM COATED ORAL at 19:58

## 2023-09-27 RX ADMIN — TRAZODONE HYDROCHLORIDE 50 MG: 50 TABLET ORAL at 21:12

## 2023-09-27 RX ADMIN — BUSPIRONE HYDROCHLORIDE 10 MG: 10 TABLET ORAL at 15:37

## 2023-09-27 RX ADMIN — NICOTINE POLACRILEX 4 MG: 4 GUM, CHEWING BUCCAL at 13:11

## 2023-09-27 RX ADMIN — Medication 6000 UNITS: at 08:08

## 2023-09-27 RX ADMIN — LORAZEPAM 1 MG: 1 TABLET ORAL at 07:08

## 2023-09-27 RX ADMIN — HYDROXYZINE HYDROCHLORIDE 50 MG: 50 TABLET, FILM COATED ORAL at 13:11

## 2023-09-27 RX ADMIN — BUSPIRONE HYDROCHLORIDE 10 MG: 10 TABLET ORAL at 08:09

## 2023-09-27 RX ADMIN — LORAZEPAM 1 MG: 1 TABLET ORAL at 15:41

## 2023-09-27 RX ADMIN — NICOTINE POLACRILEX 4 MG: 4 GUM, CHEWING BUCCAL at 15:06

## 2023-09-27 RX ADMIN — DIVALPROEX SODIUM 750 MG: 250 TABLET, FILM COATED, EXTENDED RELEASE ORAL at 21:09

## 2023-09-27 RX ADMIN — BUSPIRONE HYDROCHLORIDE 10 MG: 10 TABLET ORAL at 21:09

## 2023-09-27 RX ADMIN — NICOTINE POLACRILEX 4 MG: 4 GUM, CHEWING BUCCAL at 10:14

## 2023-09-27 NOTE — NURSING NOTE
Patient requested and received Haldol 5 mg PO for agitation and Atarax 50 mg PO for anxiety at 1903. PRN was effective.

## 2023-09-27 NOTE — NURSING NOTE
Patient requested and received Tylenol 650 mg PO for lower leg/feet pain. At [phone]. Patient states "It must be from walking around here but there is nothing else to do"  PRN was effective.

## 2023-09-27 NOTE — NURSING NOTE
Pt frequently at nursing station making multiple requests. Pleasant yet demanding at times. Tearful related to on going relationship issues. PRN Ativan given with minimal effect. Active attending groups.  Denies SI/HI

## 2023-09-27 NOTE — NURSING NOTE
Staff attempted to obtain lab work from patient, but he did not respond after several attempts. Patient has unlabored breathing and appears to be in no distress.

## 2023-09-27 NOTE — PROGRESS NOTES
Pt reported feeling bored on the unit, walks the halls at times and naps. Encouraged to attend groups. Given Haldol and Atarax PRN. DC: Friday (?) pt signed 72 hour notice on 9/26/23 @ [id number] - pt reported he was interested in IP Rehab but then reported he can get into rehab himself.    09/27/23 0753   Team Meeting   Meeting Type Daily Rounds   Team Members Present   Team Members Present Physician;Nurse;; Other (Discipline and Name)   Physician Team Member Dr. Cheryl Camarena / Dr. Bree Salinas / Lawrence Chacon / Jean Claude Obrien Team Member Farzana Guillen / [facility] Management Team Member Maday Ramirez   Other (Discipline and Name) Will - Group Facilitator / Aneudy Arnett - Art [employer]   Patient/Family Present   Patient Present No   Patient's Family Present No

## 2023-09-27 NOTE — NURSING NOTE
Pt needs consist redirection. Pt is treating unit as a play ground. Bouncing balls/ throwing  in hallway. Consistently at nurses station's needs redirection to stay away from nurses station unless for meds not to " hanging out."   Pt talking to other peers "Get that  ativan that is stronger then that weak atarax."  Pt talking were to get needles and drugs to other peers. PT is disruptive to other peers treatment. While this writer was rounding. Pt  came up to this writer clapped  in this writers face. Pt laughed and said "Sidonie Shelling I am just playing around sorry not sorry."   This writer reminded pt do not go in to this writer personal space that it will not be  treated as a joke as it was not funny to this writer.

## 2023-09-27 NOTE — NURSING NOTE
Pt remains pleasant in conversation, states feeling bored on the unit and was encouraged to attend groups throughout the day. Pt states feeling hopeful to discharge soon from hospital, stating feeling that his mood has improved since admission. Denies SI/HI, reports sleeping well overnight. Denies any questions at this time.

## 2023-09-27 NOTE — NURSING NOTE
Patient requested and received Trazodone 50 MG PO for sleep at 2201. PRN was effective patient is sleeping.

## 2023-09-27 NOTE — CASE MANAGEMENT
CM called [facility] ([phone]) to see if they accept pt [address] [facility] and they reported they do not. CM to refer pt to [facility] for Inpatient D&A rehab.

## 2023-09-27 NOTE — NURSING NOTE
Pt required frequent redirection due to being intrusive with peers care-asking questions regarding prescribed medications for other peers. Pt laughing and requesting atarax and haldol prns, however denies symptoms which would require this.

## 2023-09-27 NOTE — CASE MANAGEMENT
CM met with pt to check in about dc plans and rehab referral. Pt calm and cooperative. CM informed pt that CM can send clinical information tomorrow to [facility] and [facility] if appropriate and then they would need to review and determine if they have a bed available for Friday. Pt asked if CM can start the process today or tomorrow and CM explained that they will want to see his clinical information and that he is making progress, attending groups, being social and not isolating in room. Pt verbalized understanding and agreement. Pt open to "any rehab that will accept me." Pt mentioned again that he went to [facility] in the past and he would be fine going back to either. Pt asked about [facility] or [facility]. Pt reported "they gave my mom info for [facility]." Pt did mention he had an incident at [facility] and he reported "I had an issue with a peer and then the staff member thought I was talking about them."     Pt spoke about wanting to go to inpatient rehab but also reported he wants to go to [facility] to get his check and get cigarettes and give some money to his mom and family and say goodbye to them. CM encouraged pt that the best scenario would be for pt to go right to rehab from [facility] to avoid risk of relapse and pt reported "I am not going to use and relapse. .. I just want to make sure I can get my check and say goodbye to my family."     CM spoke about the various options to see if he is accepted to rehab and then arrange for him to go later in the day on Friday to allow some time to do what he wants before he goes. Pt reported he is open to that idea and said maybe he can even talk to his mom about her driving him to rehab. CM encouraged pt to call his mom and speak to her directly about his plan as mom called CM and left VM regarding her concern for pt's plan of being dc'd Friday and not going to rehab until Monday.      Pt signed ABEL for [facility] [address] [place] [address] and then when meeting was over pt went to call his mom on pt phone. After phone call with mom, Pt then observed in afternoon group, participating and being social with peers and staff.

## 2023-09-27 NOTE — PROGRESS NOTES
Progress Note - [address] [facility] [address] 25 y.o. male MRN: [id number]  Unit/Bed#: U 204-02 Encounter: [id number]    Assessment:  Principal Problem:    Bipolar 1 disorder, depressed, moderate ([address])  Active Problems:    Medical clearance for psychiatric admission    Tobacco use    Cannabis use disorder, severe, dependence ([address])    Polysubstance dependence including opioid type drug with complication, continuous use ([address])    Posttraumatic stress disorder    Anxiety    Hypertriglyceridemia    Tooth pain      Plan:  --Plan to DC Friday as per 72 hour notice  --Continue with psychiatric hospitalization  --Continue with individual, group, and milieu therapy  --Continue the following medications:  Current Facility-Administered Medications   Medication Dose Route Frequency   • acetaminophen (TYLENOL) tablet 650 mg  650 mg Oral Q6H PRN   • acetaminophen (TYLENOL) tablet 650 mg  650 mg Oral Q4H PRN   • acetaminophen (TYLENOL) tablet 975 mg  975 mg Oral Q6H PRN   • aluminum-magnesium hydroxide-simethicone (MAALOX) oral suspension 30 mL  30 mL Oral Q4H PRN   • haloperidol lactate (HALDOL) injection 2.5 mg  2.5 mg Intramuscular Q4H PRN Max 4/day    And   • LORazepam (ATIVAN) injection 1 mg  1 mg Intramuscular Q4H PRN Max 4/day    And   • benztropine (COGENTIN) injection 0.5 mg  0.5 mg Intramuscular Q4H PRN Max 4/day   • haloperidol lactate (HALDOL) injection 5 mg  5 mg Intramuscular Q4H PRN Max 4/day    And   • LORazepam (ATIVAN) injection 2 mg  2 mg Intramuscular Q4H PRN Max 4/day    And   • benztropine (COGENTIN) injection 1 mg  1 mg Intramuscular Q4H PRN Max 4/day   • benztropine (COGENTIN) tablet 1 mg  1 mg Oral Q4H PRN Max 6/day   • bisacodyl (DULCOLAX) rectal suppository 10 mg  10 mg Rectal Daily PRN   • busPIRone (BUSPAR) tablet 10 mg  10 mg Oral TID   • cholecalciferol (VITAMIN D3) tablet 6,000 Units  6,000 Units Oral Daily   • hydrOXYzine HCL (ATARAX) tablet 50 mg  50 mg Oral Q6H PRN Max 4/day    Or   • Yes diphenhydrAMINE (BENADRYL) injection 50 mg  50 mg Intramuscular Q6H PRN   • divalproex sodium (DEPAKOTE ER) 24 hr tablet 750 mg  750 mg Oral HS   • haloperidol (HALDOL) tablet 1 mg  1 mg Oral Q6H PRN   • haloperidol (HALDOL) tablet 2.5 mg  2.5 mg Oral Q4H PRN Max 4/day   • haloperidol (HALDOL) tablet 5 mg  5 mg Oral Q4H PRN Max 4/day   • hydrOXYzine HCL (ATARAX) tablet 25 mg  25 mg Oral Q6H PRN Max 4/day   • LORazepam (ATIVAN) injection 2 mg  2 mg Intramuscular Q6H PRN   • LORazepam (ATIVAN) tablet 1 mg  1 mg Oral Q8H PRN   • nicotine polacrilex (NICORETTE) gum 4 mg  4 mg Oral Q2H PRN   • polyethylene glycol (MIRALAX) packet 17 g  17 g Oral Daily PRN   • senna-docusate sodium (SENOKOT S) 8.6-50 mg per tablet 1 tablet  1 tablet Oral Daily PRN   • traZODone (DESYREL) tablet 50 mg  50 mg Oral HS PRN       Subjective: Patient was seen for continuation of care. Chart was reviewed and discussed with treatment team.     No acute behavioral events over the past 24 hours. Today, patient was seen and examined at bedside for continuation of care. Patient signed a 72 hour notice to withdraw from further treatment. He is future oriented and wants to go to inpatient rehab after being discharged. His psychiatric symptoms are approximately at established history baseline. Patient denied adverse effects to their psychiatric medication regimen. Patient denied other new or worsening psychiatric symptoms/complaints at this time. Discussed the importance of continuing to take medications as prescribed, as well as the importance of continuing to attend groups on the unit.      Psychiatric Review of Systems:  Medication adverse effects: none  Sleep: unchanged  Appetite: unchanged  Behavior over the past 24 hours: as per above    Vitals:  Vitals:    09/27/23 0731   BP: 150/77   Pulse: 72   Resp: 18   Temp: 99.2 °F (37.3 °C)   SpO2:        Laboratory results:    I have personally reviewed all pertinent laboratory/tests results  No results found for this or any previous visit (from the past 50 hour(s)). Current Medications:  Current medications as per above. All medications have been reviewed. Risks, benefits, alternatives, and possible side effects of patient's psychiatric medications were discussed with patient. Mental Status Evaluation:  Appearance: casually dressed, appears consistent with stated age  Motor: no psychomotor disturbances, no gait abnormalities  Behavior: cooperative, interacts with this writer appropriately  Speech: normal rate, rhythm, and volume  Mood: "good"  Affect: euthymic, normal range and intensity  Thought Process: organized, linear, and goal-oriented  Thought Content: denies auditory hallucinations, denies visual hallucinations, denies delusions  Risk Potential: denies suicidal ideation, plan, or intent. Denies homicidal ideation  Sensorium: Oriented to person, place, time, and situation  Cognition: cognitive ability appears intact but was not quantitatively tested  Consciousness: alert and awake  Attention: able to focus without difficulty  Insight: improved  Judgement: improved      Progress Toward Goals & Illness Status: Patient is not at goal. They are not yet ready for discharge. The patient's condition currently requires active psychopharmacological medication management, interdisciplinary coordination with case management, and the utilization of adjunctive milieu and group therapy to augment psychopharmacological efficacy. The patient's risk of morbidity, and progression or decompensation of psychiatric disease, is higher without this current treatment. This note has been constructed using a voice recognition system. There may be translation, syntax, or grammatical errors. If you have any questions, please contact the dictating provider.

## 2023-09-28 VITALS
RESPIRATION RATE: 18 BRPM | HEART RATE: 80 BPM | WEIGHT: 225 LBS | HEIGHT: 66 IN | OXYGEN SATURATION: 97 % | DIASTOLIC BLOOD PRESSURE: 89 MMHG | SYSTOLIC BLOOD PRESSURE: 142 MMHG | TEMPERATURE: 98.7 F | BODY MASS INDEX: 36.16 KG/M2

## 2023-09-28 LAB
ALBUMIN SERPL BCP-MCNC: 4 G/DL (ref 3.5–5)
ALP SERPL-CCNC: 84 U/L (ref 34–104)
ALT SERPL W P-5'-P-CCNC: 92 U/L (ref 7–52)
ANION GAP SERPL CALCULATED.3IONS-SCNC: 7 MMOL/L
AST SERPL W P-5'-P-CCNC: 44 U/L (ref 13–39)
BILIRUB SERPL-MCNC: 0.21 MG/DL (ref 0.2–1)
BUN SERPL-MCNC: 17 MG/DL (ref 5–25)
CALCIUM SERPL-MCNC: 9.3 MG/DL (ref 8.4–10.2)
CHLORIDE SERPL-SCNC: 103 MMOL/L (ref 96–108)
CO2 SERPL-SCNC: 26 MMOL/L (ref 21–32)
CREAT SERPL-MCNC: 0.67 MG/DL (ref 0.6–1.3)
GFR SERPL CREATININE-BSD FRML MDRD: 136 ML/MIN/1.73SQ M
GLUCOSE P FAST SERPL-MCNC: 88 MG/DL (ref 65–99)
GLUCOSE SERPL-MCNC: 88 MG/DL (ref 65–140)
POTASSIUM SERPL-SCNC: 4.3 MMOL/L (ref 3.5–5.3)
PROT SERPL-MCNC: 7.4 G/DL (ref 6.4–8.4)
SODIUM SERPL-SCNC: 136 MMOL/L (ref 135–147)
VALPROATE SERPL-MCNC: 29 UG/ML (ref 50–100)

## 2023-09-28 PROCEDURE — 80164 ASSAY DIPROPYLACETIC ACD TOT: CPT | Performed by: PSYCHIATRY & NEUROLOGY

## 2023-09-28 PROCEDURE — 99239 HOSP IP/OBS DSCHRG MGMT >30: CPT | Performed by: PSYCHIATRY & NEUROLOGY

## 2023-09-28 PROCEDURE — 80053 COMPREHEN METABOLIC PANEL: CPT | Performed by: PSYCHIATRY & NEUROLOGY

## 2023-09-28 RX ORDER — BUSPIRONE HYDROCHLORIDE 10 MG/1
10 TABLET ORAL 3 TIMES DAILY
Qty: 90 TABLET | Refills: 0 | Status: CANCELLED | OUTPATIENT
Start: 2023-09-28 | End: 2023-10-28

## 2023-09-28 RX ORDER — MELATONIN
6000 DAILY
Qty: 180 TABLET | Refills: 0 | Status: SHIPPED | OUTPATIENT
Start: 2023-09-29 | End: 2023-10-29

## 2023-09-28 RX ORDER — MELATONIN
6000 DAILY
Qty: 180 TABLET | Refills: 0 | Status: CANCELLED | OUTPATIENT
Start: 2023-09-29 | End: 2023-10-29

## 2023-09-28 RX ORDER — DIVALPROEX SODIUM 250 MG/1
750 TABLET, EXTENDED RELEASE ORAL
Qty: 90 TABLET | Refills: 0 | Status: CANCELLED | OUTPATIENT
Start: 2023-09-28 | End: 2023-10-28

## 2023-09-28 RX ORDER — BUSPIRONE HYDROCHLORIDE 10 MG/1
10 TABLET ORAL 3 TIMES DAILY
Qty: 90 TABLET | Refills: 0 | Status: SHIPPED | OUTPATIENT
Start: 2023-09-28 | End: 2023-10-28

## 2023-09-28 RX ORDER — DIVALPROEX SODIUM 250 MG/1
750 TABLET, EXTENDED RELEASE ORAL
Qty: 90 TABLET | Refills: 0 | Status: SHIPPED | OUTPATIENT
Start: 2023-09-28 | End: 2023-10-28

## 2023-09-28 RX ADMIN — NICOTINE POLACRILEX 4 MG: 4 GUM, CHEWING BUCCAL at 11:04

## 2023-09-28 RX ADMIN — Medication 6000 UNITS: at 08:10

## 2023-09-28 RX ADMIN — ALUMINUM HYDROXIDE, MAGNESIUM HYDROXIDE, AND SIMETHICONE 30 ML: 200; 200; 20 SUSPENSION ORAL at 11:29

## 2023-09-28 RX ADMIN — LORAZEPAM 1 MG: 1 TABLET ORAL at 08:10

## 2023-09-28 RX ADMIN — NICOTINE POLACRILEX 4 MG: 4 GUM, CHEWING BUCCAL at 08:14

## 2023-09-28 RX ADMIN — ONDANSETRON 4 MG: 4 TABLET, ORALLY DISINTEGRATING ORAL at 08:53

## 2023-09-28 RX ADMIN — BUSPIRONE HYDROCHLORIDE 10 MG: 10 TABLET ORAL at 08:10

## 2023-09-28 NOTE — DISCHARGE SUMMARY
Discharge Summary - [facility] R [place]ket[place] Greg saldana male MRN: [id number]  Unit/Bed#: [address] 872-91 Encounter: [id number]     Admission Date: 9/23/2023         Discharge Date: 09/28/23    Attending Psychiatrist: Dorian Bowens DO    Reason for Admission/HPI (as per admission documentation):     Kylie Randolph is a Greg caruso.gabriela male, presenting to [address] [place] [address], possessing pertinent psychiatric history of bipolar 1 disorder, PTSD, anxiety, and polysubstance abuse, subsequent to worsening drug abuse, depression, suicidal ideation, and overdose on fentanyl in the setting of medication noncompliance. Per initial ED evaluation completed by Lance Ramsey DO from 9/22/2023:  Kylie Randolph is a [age]ardyo year old male with PMH of polysubstance abuse, bipolar disorder, seizure disorder presenting to the [facility] ED for altered mental status. Patient reportedly found by 2 friends laying on a [place] was reported to be minimally responsive. The 2 friends pulled him into a bathroom to try to put him in a shower. One of the friends reportedly attempted CPR while the patient was laying on his side. EMS found patient somnolent and unresponsive. Patient was given intranasal Narcan en route to the emergency department with gradual improvement of mental status and increased respirations. Per review of chart patient has history of polysubstance abuse including methamphetamine and opioid abuse. On arrival to the emergency department the patient became agitated. Ladarius Augustin would answer one-word questions though was hyperactive, uncooperative and flailing around in bed.  Patient was not able to provide additional history.     Per subsequent ED evaluation completed by Ethan Gardner MD from 9/22/2023:  Patient reports he was at the tail end of a 3-day meth savage when he decided to take enough fentanyl to kill himself.  Stated he has not been using opioids recently with the exception of this episode last night. Noel Pallas was found by his girlfriend who brought him to the hospital. Noel Pallas did not disclose that he was intentionally trying to harm himself. Noel Pallas was discharged home. Noel Pallas reports he wants to come in as a 201 for thoughts of killing himself.     Per crisis evaluation completed by Aries Meredith on 9/22/2023:  Carmen Ignacio is a 24 y/o male, diagnosed with Opiate abuse, episodic,  Bipolar 1 disorder, depressed, moderate, ADHD, Polysubstance dependence including opioid type drug with complication, continuous use, PTSD. Pt presented to ED via private transportation due to:  Patient reports to the ed to 201 himself. Patient reports overdosing last night intentionally. Patient was supposed to go to rehab however would like to go to inpatient mental health first. Pt oriented x4. Pt calm and cooperative. This pt 2nd visit at ED today. Pt reports feeling depressed and suicidal. Pt states "yesterday I planned on killing myself by overdose, but today I did not plan it. Pt reports suicidal thoughts and intend. Pt states he cannot control his thoughts. Pt states "my mind needs help". Pt denies HI, self harming, hallucinations. No problem with appetite of sleep. Pt asking to sign self in to get help.      The patient was admitted to the behavioral health unit for safety monitoring, medication management, group and milieu therapy, and discharge planning. On evaluation today Radha Pond was drowsy but responded to questions appropriately with redirection. He reports prior to admission symptoms of depression, poor sleep, low appetite, low energy, poor concentration, suicidal ideation x2 weeks without plan (reports recent overdose was accidental), and increased anxiety.   Per chart review patient admitted to [address] [place] [address] from 1/3/2023 to 1/11/2023 and discharged on the following medication regiment:  • Lexapro 10 mg daily  • Clonidine 0.1 mg daily  • Depakote 1000 mg nightly  • Seroquel 300 mg nightly  • BuSpar 5 mg 3 times daily  • Suboxone 8-2 mg sublingual daily  However, Norris Day also reports being hospitalized at [facility] "a few months ago" and says he was discharged on "unknown" medications which he discontinued after 1.5 months due to lack of follow up care. He reports that he resorted to fentanyl use due to being unable to get his Suboxone refilled. He would like to restart medications today and says he wants to discharge to rehab to work on his drug addition. Past Medical History:   Diagnosis Date   • ADHD (attention deficit hyperactivity disorder)    • Anxiety    • Bipolar disorder ([address])    • Depression    • GERD (gastroesophageal reflux disease)    • Hyperlipidemia    • Hypertension    • Psychiatric disorder    • Psychiatric illness    • Seizures ([address])    • Substance abuse ([address])    • Tourette syndrome      Past Surgical History:   Procedure Laterality Date   • WI EXC B9 LESION MRGN XCP SK TG T/A/L >4.0 CM Left 10/26/2021    Procedure: EXCISION LIPOMA (BACK);   Surgeon: German Mari DO;  Location: [facility];  Service: General   • TONSILECTOMY AND ADNOIDECTOMY     • TONSILLECTOMY     • TYMPANOSTOMY TUBE PLACEMENT         Medications:    Current Facility-Administered Medications   Medication Dose Route Frequency Provider Last Rate   • acetaminophen  650 mg Oral Q6H PRN Louise Posey MD     • acetaminophen  650 mg Oral Q4H PRN Louise Posey MD     • acetaminophen  975 mg Oral Q6H PRN Louise Posey MD     • aluminum-magnesium hydroxide-simethicone  30 mL Oral Q4H PRN Louise Posey MD     • haloperidol lactate  2.5 mg Intramuscular Q4H PRN Max 4/day Louise Posey MD      And   • LORazepam  1 mg Intramuscular Q4H PRN Max 4/day Louise Posey MD      And   • benztropine  0.5 mg Intramuscular Q4H PRN Max 4/day Louise Posey MD     • haloperidol lactate  5 mg Intramuscular Q4H PRN Max 4/day Louise Posey MD      And   • LORazepam  2 mg Intramuscular Q4H PRN Max 4/day Louise Posey MD      And   • benztropine  1 mg Intramuscular Q4H PRN Max 4/day Aidee Mcgee MD     • benztropine  1 mg Oral Q4H PRN Max 6/day Aidee Mcgee MD     • bisacodyl  10 mg Rectal Daily PRN Aidee Mcgee MD     • busPIRone  10 mg Oral TID Compa Bojorquez, DO     • cholecalciferol  6,000 Units Oral Daily Luciana Duarte PA-C     • hydrOXYzine HCL  50 mg Oral Q6H PRN Max 4/day Aidee MD Everardo      Or   • diphenhydrAMINE  50 mg Intramuscular Q6H PRN Aidee Mcgee MD     • divalproex sodium  750 mg Oral HS Rishi Garcia DO     • haloperidol  1 mg Oral Q6H PRN Aidee Mcgee MD     • haloperidol  2.5 mg Oral Q4H PRN Max 4/day Aidee Mcgee MD     • haloperidol  5 mg Oral Q4H PRN Max 4/day Aidee Mcgee MD     • hydrOXYzine HCL  25 mg Oral Q6H PRN Max 4/day Aidee Mcgee MD     • LORazepam  2 mg Intramuscular Q6H PRN Aidee Mcgee MD     • LORazepam  1 mg Oral Q8H PRN Compa Bojorquez DO     • nicotine polacrilex  4 mg Oral Q2H PRN Aidee Mcgee MD     • ondansetron  4 mg Oral Q6H PRN Compa Bojorquez DO     • polyethylene glycol  17 g Oral Daily PRN Aidee Mcgee MD     • senna-docusate sodium  1 tablet Oral Daily PRN Aidee Mcgee MD     • traZODone  50 mg Oral HS PRN Aidee Mcgee MD         Allergies: Allergies   Allergen Reactions   • Abilify [Aripiprazole] Other (See Comments)     Seizures and hyperglycemia       Objective     Vital signs in last 24 hours:    Temp:  [98.7 °F (37.1 °C)-99 °F (37.2 °C)] 98.7 °F (37.1 °C)  HR:  [80-97] 80  Resp:  [17-18] 18  BP: (142-162)/(89-96) 142/89    No intake or output data in the 24 hours ending 09/28/23 [address]:     Leigh White was admitted to the inpatient psychiatric unit on 9/23/2023. During their hospitalization, Leigh White was encouraged to attend groups and interact appropriately and positively with others in the milieu.      Leighlupillo White was started on the following psychiatric medications: Buspar 10mg PO TID (for anxiety) and Depakote ER 750mg PO HS (for mood). These medications were titrated up to a therapeutic range as evidenced by a reduction in Tomas's reported psychiatric symptomatology. There were no side effects noted or reported throughout Tomas's psychiatric hospitalization. At the time of discharge, there were no criteria present through which Nicole Fulton could be kept involuntarily for further psychiatric hospitalization. Patient was able to articulate a safety plan upon discharge home, including going to any ED if their symptoms return or worsen, or calling 911. An outpatient discharge/follow up plan was discussed and coordinated between Nicole Fulton, this writer, and case management team.    Specific discharge disposition: Inpatient Rehab ([facility]). Medications were sent to [facility] in [place], [place]. Mental Status at Time of Discharge:     Appearance: casually dressed, appears consistent with stated age  Motor: no psychomotor disturbances, no gait abnormalities  Behavior: cooperative, interacts with this writer appropriately  Speech: normal rate, rhythm, and volume  Mood: "good"  Affect: euthymic, normal range and intensity  Thought Process: organized, linear, and goal-oriented  Thought Content: denies auditory or visual hallucinations  Perception: denies delusions or other perceptual disturbances  Risk Potential: denies suicidal ideation, plan, or intent. Denies homicidal ideation  Sensorium: Oriented to person, place, time, and situation  Cognition: cognitive ability appears intact but was not quantitatively tested  Consciousness: alert and awake  Attention: able to focus without difficulty  Insight: improved  Judgement: improved       Suicide/Homicide Risk Assessment:    Risk of Harm to Self:   • Patient denied suicidal thoughts, intent, plan, or acts of furtherance at the time of discharge.     Risk of Harm to Others:  • Patient denied homicidal thoughts or intent at the time of discharge      Admission Diagnosis:    Principal Problem:    Bipolar 1 disorder, depressed, moderate ([address])  Active Problems:    Medical clearance for psychiatric admission    Tobacco use    Cannabis use disorder, severe, dependence ([address])    Polysubstance dependence including opioid type drug with complication, continuous use (HCC)    Posttraumatic stress disorder    Anxiety    Hypertriglyceridemia    Tooth pain      Discharge Diagnosis:     Principal Problem:    Bipolar 1 disorder, depressed, moderate ([address])  Active Problems:    Medical clearance for psychiatric admission    Tobacco use    Cannabis use disorder, severe, dependence ([address])    Polysubstance dependence including opioid type drug with complication, continuous use ([address])    Posttraumatic stress disorder    Anxiety    Hypertriglyceridemia    Tooth pain  Resolved Problems:    * No resolved hospital problems. *      Laboratory Results: I have personally reviewed all pertinent lab results. Recent Results (from the past 48 hour(s))   Comprehensive metabolic panel    Collection Time: 09/28/23  5:52 AM   Result Value Ref Range    Sodium 136 135 - 147 mmol/L    Potassium 4.3 3.5 - 5.3 mmol/L    Chloride 103 96 - 108 mmol/L    CO2 26 21 - 32 mmol/L    ANION GAP 7 mmol/L    BUN 17 5 - 25 mg/dL    Creatinine 0.67 0.60 - 1.30 mg/dL    Glucose 88 65 - 140 mg/dL    Glucose, Fasting 88 65 - 99 mg/dL    Calcium 9.3 8.4 - 10.2 mg/dL    AST 44 (H) 13 - 39 U/L    ALT 92 (H) 7 - 52 U/L    Alkaline Phosphatase 84 34 - 104 U/L    Total Protein 7.4 6.4 - 8.4 g/dL    Albumin 4.0 3.5 - 5.0 g/dL    Total Bilirubin 0.21 0.20 - 1.00 mg/dL    eGFR 136 ml/min/1.73sq m   Valproic acid level, total    Collection Time: 09/28/23  5:52 AM   Result Value Ref Range    Valproic Acid, Total 29 (L) 50 - 100 ug/mL        Discharge Medications:    See after visit summary for all reconciled discharge medications provided to patient and family.       Current Discharge Medication List         Current Discharge Medication List      STOP taking these medications       buprenorphine-naloxone (Suboxone) 12-3 MG Comments:   Reason for Stopping:         ciprofloxacin-dexamethasone (CIPRODEX) otic suspension Comments:   Reason for Stopping:         cloNIDine (CATAPRES) 0.1 mg tablet Comments:   Reason for Stopping:         escitalopram (LEXAPRO) 10 mg tablet Comments:   Reason for Stopping:         hydrOXYzine HCL (ATARAX) 25 mg tablet Comments:   Reason for Stopping:         naloxone (Narcan) 4 mg/0.1 mL nasal spray Comments:   Reason for Stopping:         naloxone (NARCAN) 4 mg/0.1 mL nasal spray Comments:   Reason for Stopping:         predniSONE 10 mg tablet Comments:   Reason for Stopping:         QUEtiapine (SEROquel) 300 mg tablet Comments:   Reason for Stopping:              Current Discharge Medication List      CONTINUE these medications which have CHANGED    Details   busPIRone (BUSPAR) 10 mg tablet Take 1 tablet (10 mg total) by mouth 3 (three) times a day  Qty: 90 tablet, Refills: 0    Associated Diagnoses: Anxiety      cholecalciferol (VITAMIN D3) 1,000 units tablet Take 6 tablets (6,000 Units total) by mouth daily Do not start before September 29, 2023. Qty: 180 tablet, Refills: 0    Associated Diagnoses: Vitamin D deficiency      divalproex sodium (DEPAKOTE ER) 250 mg 24 hr tablet Take 3 tablets (750 mg total) by mouth daily at bedtime  Qty: 90 tablet, Refills: 0    Associated Diagnoses: Bipolar 1 disorder, depressed, moderate ([address])            Current Discharge Medication List           Discharge instructions/Information to patient and family:     See after visit summary for information provided to patient and family. Provisions for Follow-Up Care:    See after visit summary for information related to follow-up care and any pertinent home health orders. Discharge Statement:    I spent 45 minutes discharging the patient. This time was spent on the day of discharge.  I had direct contact with the patient on the day of discharge. Additional documentation is required if more than 30 minutes were spent on discharge:    · I had face-to-face contact with the patient and discussed discharge treatment plan  · I reviewed the importance of compliance with medications and outpatient treatment after discharge with the patient. · I discussed discharge and treatment plan with the patient, nursing, and case management/social work. · I discussed the medication regimen and possible side effects of the medications with the patient prior to discharge. At the time of discharge they were tolerating psychiatric medications. · I discussed outpatient follow up with the patient as per treatment plan / aftercare summary. · I reviewed elements of the aftercare plan with the patient. I discussed that if symptoms worsen or reoccur, that the patient can return to the emergency room or dial 911 in an emergency. · I reviewed the patient's hospital course and current psychiatric disease status. As of today, they are now at goal. They are psychiatrically stable for discharge home. The combination of active psychopharmacological medication management, interdisciplinary coordination with case management, and adjunctive milieu and group therapy to augment psychopharmacological efficacy appears to have been successful. The patient's risk of morbidity, and progression or decompensation of psychiatric disease, is lower today as compared to the day of admission.       Elham Perla,  09/28/23

## 2023-09-28 NOTE — NURSING NOTE
Pt remains social with peers, denies SI/HI, reports mild anxiety however pt states its mainly due to being currently hospitalized and wanting to be discharged. Pt expresses feeling readiness to discharge. Expresses healthy coping skills to utilize when discharged if feeling upset. Denies any questions at this time.

## 2023-09-28 NOTE — PROGRESS NOTES
Pt loud, disruptive. Requested Ativan and at nurses station frequently asking for things. Needed to be redirected many times, intrusive with peers care. DC: Today (?) will began rehab bed search      09/28/23 9019   Team Meeting   Meeting Type Daily Rounds   Team Members Present   Team Members Present Physician;Nurse;; Other (Discipline and Name)   Physician Team Member Dr. Santiago Ray / Dr. Alize eDvlin / Manan Medrano / Kendall Mahan Team Member Crow Gaytan / [facility] Management Team Member Jo Ann Astudillo / Elio Lunsford / [place]   Other (Discipline and Name) Karthikund - Group Facilitator / Leticia Castrejon - Art Therapy   Patient/Family Present   Patient Present No   Patient's Family Present No

## 2023-09-28 NOTE — CASE MANAGEMENT
MARE sent clinical information to [facility] Admissions ([email or web address]) for review. (MARE sent to [email or web address], but it came back as 'undeliverable. ')     CM informed pt that referral was sent and that CM is waiting for a response. CM informed pt that [facility] not [facility]. Pt verbalized understanding.

## 2023-09-28 NOTE — PROGRESS NOTES
09/27/23 1100   Activity/Group Checklist   Group Wellness  (Interpersonal activity)   Attendance Attended   Attendance Duration (min) 31-45   Interactions Interacted appropriately   Affect/Mood Appropriate   Goals Achieved Identified feelings; Identified distorted thoughts/beliefs; Able to listen to others; Able to engage in interactions; Able to self-disclose; Able to recieve feedback  (Participated in interpersonal wellness sharing activity. Interacted with other peers on the unit. Discussed recovery-oriented themes including substance abuse and coping skills.  Utilized interpersonal skills in therapeutic environment.)

## 2023-09-28 NOTE — TREATMENT TEAM
09/27/23 1230   Activity/Group Checklist   Group Other (Comment)  (art therapy)   Attendance Attended   Attendance Duration (min) 16-30   Interactions Interacted appropriately   Affect/Mood Appropriate   Goals Achieved Able to listen to others; Able to engage in interactions; Other (Comment)  (authentic, spontaneous self expression, connection, and insight)

## 2023-09-28 NOTE — TREATMENT TEAM
09/28/23 0930   Activity/Group Checklist   Group Exercise   Attendance Attended   Attendance Duration (min) 16-30   Interactions Interacted appropriately   Affect/Mood Appropriate;Bright  (expressed hope for DC to rehab today)   Goals Achieved Able to listen to others; Able to engage in interactions; Identified feelings; Able to experience relief/decrease in symptoms; Other (Comment)  (participated in [facility] workout video with peers and this therapist)

## 2023-09-28 NOTE — NURSING NOTE
Patient utilized Trazodone to fall asleep, appeared to have slept throughout the shift without any further difficulty.

## 2023-09-28 NOTE — NURSING NOTE
F/U to Tylenol administration at 1958 hrs for bilateral foot pain. Patient report no further pain sensation.

## 2023-09-28 NOTE — BH TRANSITION RECORD
Contact Information: If you have any questions, concerns, pended studies, tests and/or procedures, or emergencies regarding your inpatient behavioral health visit. Please contact [address] behavioral health unit [phone] and ask to speak to a , nurse or physician. A contact is available 24 hours/ 7 days a week at this number. Summary of Procedures Performed During your Stay:  Below is a list of major procedures performed during your hospital stay and a summary of results:  - No major procedures performed. Pending Studies (From admission, onward)    None        Please follow up on the above pending studies with your PCP and/or referring provider.

## 2023-09-28 NOTE — TREATMENT TEAM
09/27/23 1330   Activity/Group Checklist   Group Life Skills  (Anxiety psychoeducation)   Attendance Attended   Attendance Duration (min) 46-60   Interactions Interacted appropriately   Affect/Mood Appropriate   Goals Achieved Identified feelings; Discussed discharge plans; Able to recieve feedback; Able to self-disclose  (Reviewed psychoeducational material about anxiety and tool for processing worries. Engaged in interpersonal sharing and feedback with other group members.  Processed interactions and observations at conclusion of session.)

## 2023-09-28 NOTE — NURSING NOTE
@3968 Pt with a complaint of an increase in anxiety, pacing halls and frequently to nursing station. Killian score 28. PRN Ativan 1mg po given. Medication effective, pt reports anxiety has decreased. @5452 Pt reports a complaint of nausea. PRN Zofran given. Pt states medication effective.

## 2023-09-28 NOTE — CASE MANAGEMENT
Franck informed CM that they have a bed on hold at their [facility] location and requested that CM assist pt in calling admissions ([phone]) to complete phone screening. CM met with pt to inform him of the above and provided him with cordless phone to completed phone screening. 11:05am  CM was informed that pt completed phone screening and has been accepted to [facility] for inpatient D&A rehab. CM informed treatment team and contacted admissions to request transport time and to confirm which pharmacy they would like medications sent to. They reported [employer] - Phone: [phone], Fax: [phone]    CM spoke to [facility] in admissions and she confirmed that pt is approved and sent CM to their transportation department. Transportation reported they have a  in the area and can pick pt up around 12:15pm today.      CM informed treatment team.

## 2023-09-28 NOTE — NURSING NOTE
F/U to Trazodone administration at 2112 hrs as a sleep aid, pt appears to be sleeping, no distress observed.

## 2023-09-28 NOTE — TREATMENT TEAM
09/28/23 5716   Activity/Group Checklist   Group Admission/Discharge   Attendance Attended   Attendance Duration (min) 0-15   Interactions Interacted appropriately   Affect/Mood Appropriate   Goals Achieved Identified feelings; Identified relapse prevention strategies; Discussed coping strategies; Identified resources and support systems; Able to listen to others; Able to engage in interactions; Able to reflect/comment on own behavior; Other (Comment)  (pt independently filled out the relapse prevention plan form with this therapist available for support if needed. once completed pt had no questions or concerns.  crisis #s highlighted and form placed in DC folder.)

## 2023-09-28 NOTE — DISCHARGE INSTR - OTHER ORDERS
24/7 [address], [facility];  Call: [address] Free:  [facility]: [phone]    The Danny Lower is for persons from [place], [place] and [place]  Phone: [phone]  [address] [place] [address] Speculator:  [phone]  *Alcohol Anonymous: 998.551.4412  *[place] Drug & Alcohol Commission: (405) 546-8230  [address] [place] [address] ([facility]) HELPLINE: [phone]/Website: www.SwapBeats  *Substance Abuse and [address] Administration([facility]) [employer], which is a confidential, free, 24-hour-a-day, 365-day-a-year, information service for individuals and family members facing mental health and/or substance use disorders. This service provides referrals to local treatment facilities, support groups, and community-based organizations. Callers can also order free publications and other information. Call 9-735.264.3833/Website: www.[facility].gov  *[facility] 2-1-1: This is a toll free, confidential, 24-hour-a-day service which connects you to a community  in your area who can help you find services and resources that are available to you locally and provide critical services that can improve and save lives. Call: 211  /Website: https://amyProjectioneeringchua.DyMynd/      Outpatient Drug & Alcohol Treatment  The Commission currently operates outpatient treatment units:  [facility]  in [place], [place] as a functional unit of [place], [place] as a functional unit of [address] treatment units are licensed by the [place] Department of Drug & Alcohol Programs to provide individual and group counseling for those with substance abuse and dependency problems.  The clinical staff is experienced in a variety of therapeutic modalities and provides treatment that is individualized to meet the particular needs of each person. These units are drug-free treatment programs. The Commission accepts most major healthcare insurance coverage plans, [place] Medical Assistance and in those cases where the consumer has no third party healthcare coverage, a liberal sliding fee schedule is utilized. The length of service and type of outpatient service provided is based on the results of the Level of Care Assessment           There are currently three treatment protocols available: Outpatient  Intensive Outpatient  Contracted services for Partial Hospitalization  Therapy is provided in both Individual and Group counseling formats. The Outpatient department offers individual counseling for the family members of substance abusers to address co-dependent and enabling behaviors. Outpatient treatment services in BEHAVIORAL MEDICINE AT [facility] are purchased through a fee-for-service subcontract with:  [place] Treatment and [facility]  [address]    Phone: [phone]     [address] [place] [address],  [facility] [address], [address] [place] [address]  [address]   New Admissions [phone]  Local office [phone]      Outpatient treatment services in [facility] are purchased through fee-for-service subcontracts with:  [address] [place] [address] [address] [address] [address]  Phone: [phone]  [address]  Phone: Greg Knutson [phone] / [address] [phone]  Outpatient treatment services are also available to [facility] residents in [facility].

## 2023-09-28 NOTE — DISCHARGE INSTR - APPOINTMENTS
You will be discharged to [facility] for Inpatient Drug & Alcohol Rehab - [address] [phone]  You confirmed that your cell phone number is [phone]        Froilan Lozada or Kaylyn, our [address] [place] [address], will be calling you after your discharge, on the phone number that you provided. They will be available as an additional support, if needed. If you wish to speak with one of them, you may contact Froilan Lozada at [phone] or Sherine River at [phone].

## 2023-09-28 NOTE — PLAN OF CARE
Problem: Ineffective Coping  Goal: Identifies ineffective coping skills  Outcome: Adequate for Discharge  Goal: Identifies healthy coping skills  Outcome: Adequate for Discharge  Goal: Demonstrates healthy coping skills  Outcome: Adequate for Discharge  Goal: Participates in unit activities  Description: Interventions:  - Provide therapeutic environment   - Provide required programming   - Redirect inappropriate behaviors   Outcome: Adequate for Discharge  Goal: Patient/Family participate in treatment and DC plans  Description: Interventions:  - Provide therapeutic environment  Outcome: Adequate for Discharge  Goal: Patient/Family verbalizes awareness of resources  Outcome: Adequate for Discharge     Problem: Depression  Goal: Treatment Goal: Demonstrate behavioral control of depressive symptoms, verbalize feelings of improved mood/affect, and adopt new coping skills prior to discharge  Outcome: Adequate for Discharge  Goal: Verbalize thoughts and feelings  Description: Interventions:  - Assess and re-assess patient's level of risk   - Engage patient in 1:1 interactions, daily, for a minimum of 15 minutes   - Encourage patient to express feelings, fears, frustrations, hopes   Outcome: Adequate for Discharge  Goal: Refrain from harming self  Description: Interventions:  - Monitor patient closely, per order   - Supervise medication ingestion, monitor effects and side effects   Outcome: Adequate for Discharge  Goal: Refrain from self-neglect  Outcome: Adequate for Discharge  Goal: Attend and participate in unit activities, including therapeutic, recreational, and educational groups  Description: Interventions:  - Provide therapeutic and educational activities daily, encourage attendance and participation, and document same in the medical record   Outcome: Adequate for Discharge  Goal: Complete daily ADLs, including personal hygiene independently, as able  Description: Interventions:  - Observe, teach, and assist patient with ADLS  -  Monitor and promote a balance of rest/activity, with adequate nutrition and elimination   Outcome: Adequate for Discharge     Problem: DISCHARGE PLANNING  Goal: Discharge to home or other facility with appropriate resources  Description: INTERVENTIONS:  - Identify barriers to discharge w/patient and caregiver  - Arrange for needed discharge resources and transportation as appropriate  - Identify discharge learning needs (meds, wound care, etc.)  - Arrange for interpretive services to assist at discharge as needed  - Refer to Case Management Department for coordinating discharge planning if the patient needs post-hospital services based on physician/advanced practitioner order or complex needs related to functional status, cognitive ability, or social support system  Outcome: Adequate for Discharge

## 2023-09-29 NOTE — PROGRESS NOTES
Discussed with patient:   AUDIT score of 2    UDS/Identified Substance(s) used:   Meth, Benzos, THC     Pt requested Inpatient Drug and Alcohol Rehab and was accepted to [address] [place] [address] on 9/282/23 for inpatient treatment.

## 2024-02-27 NOTE — PLAN OF CARE
Patient participated in 1/2 groups for the day      Problem: Ineffective Coping  Goal: Participates in unit activities  Description: Interventions:  - Provide therapeutic environment   - Provide required programming   - Redirect inappropriate behaviors   Outcome: Progressing Length Of Therapy: 11 months Detail Level: Zone Patient Reported Weight(Optional But Include Units): 280 lbs Latest Quantiferon Gold (Optional): negative- 11/27/22 Add High Risk Medication Management Associated Diagnosis?: No

## 2024-06-01 ENCOUNTER — OFFICE VISIT (OUTPATIENT)
Dept: URGENT CARE | Facility: MEDICAL CENTER | Age: 24
End: 2024-06-01
Payer: COMMERCIAL

## 2024-06-01 VITALS
BODY MASS INDEX: 37.61 KG/M2 | RESPIRATION RATE: 18 BRPM | HEIGHT: 66 IN | WEIGHT: 234 LBS | HEART RATE: 62 BPM | TEMPERATURE: 98.9 F | OXYGEN SATURATION: 98 %

## 2024-06-01 DIAGNOSIS — K92.1 BLOOD IN STOOL: Primary | ICD-10-CM

## 2024-06-01 PROCEDURE — 99212 OFFICE O/P EST SF 10 MIN: CPT | Performed by: PHYSICIAN ASSISTANT

## 2024-06-01 NOTE — PROGRESS NOTES
"  [facility]        NAME: Tomas RASCON [place] is a 23 y.o. male  : 2000    MRN: [id number]  DATE: 2024  TIME: 1:17 PM    Assessment and Plan   Blood in stool [K92.1]  1. Blood in stool              Patient Instructions     You must follow up with PCP for blood in stool.  If symptoms worsen go to the ER for further evaluation    Follow up with PCP in 3-5 days.  Proceed to  ER if symptoms worsen.    If tests have been performed at [facility], our office will contact you with results if changes need to be made to the care plan discussed with you at the visit.  You can review your full results on [facility].    Chief Complaint     Chief Complaint   Patient presents with   • Abdominal Pain     Woke up the other night and had extreme pain in his stomach, patient states that when having a bowel movement  over  the past couple of days blood has been coming out          History of Present Illness       Patient presents with a stomachache which started the other night after eating Chinese food.  The following day he had a \"small\" amount of blood in his stool.  He states over the past couple days he has similar symptoms.  Denies overt diarrhea, nausea vomiting or abdominal pain.  Patient denies taking NSAIDs such as ibuprofen, Advil, Aleve or Motrin.  Patient states he missed work yesterday due to nausea and today missed work secondary to blood in his stool and is here to obtain a note excusing him.  Patient advised he needs to seek out the reason why he has blood in the stool.  Patient refused to let me do a rectal exam.        Review of Systems   Review of Systems   Constitutional:  Negative for chills and fever.   Gastrointestinal:  Positive for blood in stool. Negative for abdominal pain, diarrhea, nausea and vomiting.   Skin:  Negative for rash.         Current Medications       Current Outpatient Medications:   •  busPIRone (BUSPAR) 10 mg tablet, Take 1 tablet (10 mg total) by mouth 3 (three) " "times a day, Disp: 90 tablet, Rfl: 0  •  cholecalciferol (VITAMIN D3) 1,000 units tablet, Take 6 tablets (6,000 Units total) by mouth daily Do not start before September 29, 2023., Disp: 180 tablet, Rfl: 0  •  divalproex sodium (DEPAKOTE ER) 250 mg 24 hr tablet, Take 3 tablets (750 mg total) by mouth daily at bedtime, Disp: 90 tablet, Rfl: 0    Current Allergies     Allergies as of 06/01/2024 - Reviewed 06/01/2024   Allergen Reaction Noted   • Abilify [aripiprazole] Other (See Comments) 12/05/2016            The following portions of the patient's history were reviewed and updated as appropriate: allergies, current medications, past family history, past medical history, past social history, past surgical history and problem list.     Past Medical History:   Diagnosis Date   • ADHD (attention deficit hyperactivity disorder)    • Anxiety    • Bipolar disorder (HCC)    • Depression    • GERD (gastroesophageal reflux disease)    • Hyperlipidemia    • Hypertension    • Psychiatric disorder    • Psychiatric illness    • Seizures (HCC)    • Substance abuse (HCC)    • Tourette syndrome        Past Surgical History:   Procedure Laterality Date   • PA EXC B9 LESION MRGN XCP SK TG T/A/L >4.0 CM Left 10/26/2021    Procedure: EXCISION LIPOMA (BACK);  Surgeon: Jonathan Sawyer DO;  Location: [facility];  Service: General   • TONSILECTOMY AND ADNOIDECTOMY     • TONSILLECTOMY     • TYMPANOSTOMY TUBE PLACEMENT         Family History   Problem Relation Age of Onset   • No Known Problems Mother    • Substance Abuse Father    • Cirrhosis Father    • Coronary artery disease Father    • Hepatitis Father    • Substance Abuse Brother    • Diabetes Neg Hx          Medications have been verified.        Objective   Pulse 62   Temp 98.9 °F (37.2 °C)   Resp 18   Ht 5' 6\" (1.676 m)   Wt 106 kg (234 lb)   SpO2 98%   BMI 37.77 kg/m²   No LMP for male patient.       Physical Exam     Physical Exam  Vitals and nursing note reviewed. "   Constitutional:       Appearance: He is well-developed.   HENT:      Head: Normocephalic and atraumatic.   Cardiovascular:      Rate and Rhythm: Normal rate.   Pulmonary:      Effort: Pulmonary effort is normal.   Abdominal:      General: Bowel sounds are normal.      Palpations: Abdomen is soft.      Tenderness: There is no abdominal tenderness. There is no guarding or rebound.   Neurological:      Mental Status: He is alert.

## 2024-06-01 NOTE — LETTER
June 1, 2024     Patient: Tomas RASCON [place]   YOB: 2000   Date of Visit: 6/1/2024       To Whom It May Concern:    It is my medical opinion that Tomas Bonner may return to work on 06/02/24 .    If you have any questions or concerns, please don't hesitate to call.         Sincerely,        Emerita Hall PA-C    CC: No Recipients

## 2024-06-01 NOTE — PATIENT INSTRUCTIONS
You must follow up with PCP for blood in stool.  If symptoms worsen go to the ER for further evaluation

## 2024-07-12 ENCOUNTER — OFFICE VISIT (OUTPATIENT)
Dept: URGENT CARE | Facility: MEDICAL CENTER | Age: 24
End: 2024-07-12
Payer: COMMERCIAL

## 2024-07-12 ENCOUNTER — APPOINTMENT (OUTPATIENT)
Dept: URGENT CARE | Facility: MEDICAL CENTER | Age: 24
End: 2024-07-12
Payer: COMMERCIAL

## 2024-07-12 VITALS
HEIGHT: 66 IN | WEIGHT: 220 LBS | OXYGEN SATURATION: 98 % | DIASTOLIC BLOOD PRESSURE: 62 MMHG | RESPIRATION RATE: 20 BRPM | SYSTOLIC BLOOD PRESSURE: 120 MMHG | BODY MASS INDEX: 35.36 KG/M2 | TEMPERATURE: 97.3 F | HEART RATE: 52 BPM

## 2024-07-12 DIAGNOSIS — K12.30: Primary | ICD-10-CM

## 2024-07-12 PROCEDURE — 87205 SMEAR GRAM STAIN: CPT

## 2024-07-12 PROCEDURE — 99213 OFFICE O/P EST LOW 20 MIN: CPT

## 2024-07-12 PROCEDURE — 87070 CULTURE OTHR SPECIMN AEROBIC: CPT

## 2024-07-12 RX ORDER — AMOXICILLIN AND CLAVULANATE POTASSIUM 875; 125 MG/1; MG/1
1 TABLET, FILM COATED ORAL EVERY 12 HOURS SCHEDULED
Qty: 14 TABLET | Refills: 0 | Status: SHIPPED | OUTPATIENT
Start: 2024-07-12 | End: 2024-07-19

## 2024-07-12 RX ORDER — BUPRENORPHINE HYDROCHLORIDE AND NALOXONE HYDROCHLORIDE DIHYDRATE 8; 2 MG/1; MG/1
1 TABLET SUBLINGUAL DAILY
COMMUNITY
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
## 2024-07-12 NOTE — PATIENT INSTRUCTIONS
You may take over the counter Tylenol (Acetaminophen) and/or Motrin (Ibuprofen) as needed, as directed on packaging.     Please follow up with your primary provider in the next several days. Should you have any worsening of symptoms, or lack of improvement please be re-evaluated. If needed for significant concerns, consider 911 or ER evaluation.

## 2024-07-14 LAB
BACTERIA WND AEROBE CULT: NORMAL
GRAM STN SPEC: NORMAL

## (undated) DEVICE — PAD GROUNDING ADULT

## (undated) DEVICE — MEDI-VAC YANK SUCT HNDL W/TPRD BULBOUS TIP: Brand: CARDINAL HEALTH

## (undated) DEVICE — SUT VICRYL 3-0 SH 27 IN J416H

## (undated) DEVICE — SUT PROLENE 4-0 PS-2 18 IN 8682H

## (undated) DEVICE — GLOVE INDICATOR PI UNDERGLOVE SZ 7 BLUE

## (undated) DEVICE — INTENDED FOR TISSUE SEPARATION, AND OTHER PROCEDURES THAT REQUIRE A SHARP SURGICAL BLADE TO PUNCTURE OR CUT.: Brand: BARD-PARKER SAFETY BLADES SIZE 15, STERILE

## (undated) DEVICE — ALL PURPOSE SPONGES,NON-WOVEN, 4 PLY: Brand: CURITY

## (undated) DEVICE — GAUZE SPONGES,16 PLY: Brand: CURITY

## (undated) DEVICE — CHLORAPREP HI-LITE 26ML ORANGE

## (undated) DEVICE — PLUMEPEN PRO 10FT

## (undated) DEVICE — GLOVE SRG BIOGEL 7

## (undated) DEVICE — 3M™ STERI-STRIP™ REINFORCED ADHESIVE SKIN CLOSURES, R1546, 1/4 IN X 4 IN (6 MM X 100 MM), 10 STRIPS/ENVELOPE: Brand: 3M™ STERI-STRIP™

## (undated) DEVICE — 3M™ TEGADERM™ TRANSPARENT FILM DRESSING FRAME STYLE, 1626W, 4 IN X 4-3/4 IN (10 CM X 12 CM), 50/CT 4CT/CASE: Brand: 3M™ TEGADERM™

## (undated) DEVICE — SYRINGE 10ML LL

## (undated) DEVICE — NEEDLE 25G X 1 1/2

## (undated) DEVICE — SWABSTCK, BENZOIN TINCTURE, 1/PK, STRL: Brand: APLICARE

## (undated) DEVICE — TUBING SUCTION 5MM X 12 FT

## (undated) DEVICE — BETHLEHEM UNIVERSAL MINOR GEN: Brand: CARDINAL HEALTH

## (undated) DEVICE — SUT MONOCRYL 4-0 PS-2 27 IN Y426H